# Patient Record
Sex: FEMALE | Race: WHITE | NOT HISPANIC OR LATINO | Employment: FULL TIME | URBAN - METROPOLITAN AREA
[De-identification: names, ages, dates, MRNs, and addresses within clinical notes are randomized per-mention and may not be internally consistent; named-entity substitution may affect disease eponyms.]

---

## 2017-01-16 ENCOUNTER — HOSPITAL ENCOUNTER (OUTPATIENT)
Dept: MAMMOGRAPHY | Facility: HOSPITAL | Age: 47
Discharge: HOME | End: 2017-01-16
Attending: PHYSICIAN ASSISTANT | Admitting: PHYSICIAN ASSISTANT

## 2017-01-17 ENCOUNTER — HOSPITAL ENCOUNTER (OUTPATIENT)
Dept: PREOP | Facility: HOSPITAL | Age: 47
Setting detail: HOSPITAL OUTPATIENT SURGERY
Discharge: HOME | End: 2017-01-17
Attending: INTERNAL MEDICINE | Admitting: INTERNAL MEDICINE

## 2017-01-17 ENCOUNTER — ON CAMPUS - OUTPATIENT (OUTPATIENT)
Dept: URBAN - METROPOLITAN AREA HOSPITAL 85 | Facility: HOSPITAL | Age: 47
End: 2017-01-17
Payer: COMMERCIAL

## 2017-01-17 DIAGNOSIS — R13.10 DYSPHAGIA, UNSPECIFIED: ICD-10-CM

## 2017-01-17 LAB — GLUCOSE BLD-MCNC: 138 MG/DL (ref 70–105)

## 2017-01-17 PROCEDURE — 43450 DILATE ESOPHAGUS 1/MULT PASS: CPT | Performed by: INTERNAL MEDICINE

## 2017-01-17 PROCEDURE — 43235 EGD DIAGNOSTIC BRUSH WASH: CPT | Performed by: INTERNAL MEDICINE

## 2017-02-09 ENCOUNTER — HOSPITAL ENCOUNTER (OUTPATIENT)
Dept: PREADMISSION TESTING | Facility: HOSPITAL | Age: 47
Discharge: HOME | End: 2017-02-09
Attending: PODIATRIST | Admitting: PODIATRIST

## 2017-02-09 LAB
ANION GAP SERPL CALC-SCNC: 13 MMOL/L (ref 10–20)
BACTERIA SPEC AEROBE CULT: NORMAL
BASOPHILS # BLD AUTO: 0.1 10*3/UL (ref 0–0.2)
BASOPHILS NFR BLD AUTO: 1 % (ref 0–2)
BILIRUB UR QL STRIP: NEGATIVE MG/DL
BUN SERPL-MCNC: 8 MG/DL (ref 8–20)
BUN/CREAT SERPL: 13.3 (ref 5.4–26.2)
CALCIUM SERPL-MCNC: 8.7 MG/DL (ref 8.9–10.3)
CASTS URNS QL MICRO: NORMAL /[LPF]
CHLORIDE SERPL-SCNC: 108 MMOL/L (ref 101–111)
COLOR UR: YELLOW
CONV BACTERIA IN URINE MICRO: NEGATIVE
CONV CLARITY OF URINE: CLEAR
CONV CO2: 22 MMOL/L (ref 22–32)
CONV HYALINE CASTS IN URINE MICRO: 2 /[LPF] (ref 0–5)
CONV PROTEIN IN URINE BY AUTOMATED TEST STRIP: NEGATIVE MG/DL
CONV SMALL ROUND CELLS: NORMAL /[HPF]
CONV UROBILINOGEN IN URINE BY AUTOMATED TEST STRIP: 0.2 MG/DL
CREAT UR-MCNC: 0.6 MG/DL (ref 0.4–1)
CULTURE INDICATED?: NORMAL
DIFFERENTIAL METHOD BLD: (no result)
EOSINOPHIL # BLD AUTO: 0.2 10*3/UL (ref 0–0.3)
EOSINOPHIL # BLD AUTO: 2 % (ref 0–3)
ERYTHROCYTE [DISTWIDTH] IN BLOOD BY AUTOMATED COUNT: 13.5 % (ref 11.5–14.5)
GLUCOSE SERPL-MCNC: 105 MG/DL (ref 65–99)
GLUCOSE UR QL: NEGATIVE MG/DL
HCT VFR BLD AUTO: 42.8 % (ref 35–49)
HGB BLD-MCNC: 14.7 G/DL (ref 12–15)
HGB UR QL STRIP: NEGATIVE
KETONES UR QL STRIP: NEGATIVE MG/DL
LEUKOCYTE ESTERASE UR QL STRIP: NEGATIVE
LYMPHOCYTES # BLD AUTO: 2.4 10*3/UL (ref 0.8–4.8)
LYMPHOCYTES NFR BLD AUTO: 28 % (ref 18–42)
Lab: NORMAL
MCH RBC QN AUTO: 30.8 PG (ref 26–32)
MCHC RBC AUTO-ENTMCNC: 34.3 G/DL (ref 32–36)
MCV RBC AUTO: 89.9 FL (ref 80–94)
MICRO REPORT STATUS: NORMAL
MONOCYTES # BLD AUTO: 0.6 10*3/UL (ref 0.1–1.3)
MONOCYTES NFR BLD AUTO: 7 % (ref 2–11)
NEUTROPHILS # BLD AUTO: 5.4 10*3/UL (ref 2.3–8.6)
NEUTROPHILS NFR BLD AUTO: 62 % (ref 50–75)
NITRITE UR QL STRIP: NEGATIVE
NRBC BLD AUTO-RTO: 0 /100{WBCS}
NRBC/RBC NFR BLD MANUAL: 0 10*3/UL
PH UR STRIP.AUTO: 6 [PH] (ref 4.5–8)
PLATELET # BLD AUTO: 145 10*3/UL (ref 150–450)
PMV BLD AUTO: 10 FL (ref 7.4–10.4)
POTASSIUM SERPL-SCNC: ABNORMAL MMOL/L (ref 3.6–5.1)
RBC # BLD AUTO: 4.77 10*6/UL (ref 4–5.4)
RBC #/AREA URNS HPF: 0 /[HPF] (ref 0–3)
SODIUM SERPL-SCNC: 139 MMOL/L (ref 136–144)
SP GR UR: 1.01 (ref 1–1.03)
SPECIMEN SOURCE: NORMAL
SPERM URNS QL MICRO: NORMAL /[HPF]
SQUAMOUS SPT QL MICRO: 1 /[HPF] (ref 0–5)
UNIDENT CRYS URNS QL MICRO: NORMAL /[HPF]
WBC # BLD AUTO: 8.8 10*3/UL (ref 4.5–11.5)
WBC #/AREA URNS HPF: 1 /[HPF] (ref 0–5)
YEAST SPEC QL WET PREP: NORMAL /[HPF]

## 2017-02-15 ENCOUNTER — HOSPITAL ENCOUNTER (OUTPATIENT)
Dept: PERIOP | Facility: HOSPITAL | Age: 47
Setting detail: HOSPITAL OUTPATIENT SURGERY
Discharge: HOME | End: 2017-02-15
Attending: PODIATRIST | Admitting: PODIATRIST

## 2017-02-28 ENCOUNTER — OFFICE (OUTPATIENT)
Dept: URBAN - METROPOLITAN AREA CLINIC 64 | Facility: CLINIC | Age: 47
End: 2017-02-28
Payer: COMMERCIAL

## 2017-02-28 VITALS
HEIGHT: 65 IN | SYSTOLIC BLOOD PRESSURE: 125 MMHG | DIASTOLIC BLOOD PRESSURE: 72 MMHG | WEIGHT: 212 LBS | HEART RATE: 76 BPM

## 2017-02-28 DIAGNOSIS — K59.1 FUNCTIONAL DIARRHEA: ICD-10-CM

## 2017-02-28 DIAGNOSIS — R11.0 NAUSEA: ICD-10-CM

## 2017-02-28 DIAGNOSIS — K59.00 CONSTIPATION, UNSPECIFIED: ICD-10-CM

## 2017-02-28 PROCEDURE — 99213 OFFICE O/P EST LOW 20 MIN: CPT | Performed by: NURSE PRACTITIONER

## 2017-02-28 RX ORDER — LACTULOSE 10 G/15ML
600 SOLUTION ORAL
Qty: 1800 | Refills: 11 | Status: COMPLETED
Start: 2017-02-28 | End: 2020-02-25

## 2017-03-08 ENCOUNTER — HOSPITAL ENCOUNTER (OUTPATIENT)
Dept: ORTHOPEDIC SURGERY | Facility: CLINIC | Age: 47
Discharge: HOME | End: 2017-03-08
Attending: ORTHOPAEDIC SURGERY | Admitting: ORTHOPAEDIC SURGERY

## 2017-03-08 ENCOUNTER — CONVERSION ENCOUNTER (OUTPATIENT)
Dept: FAMILY MEDICINE CLINIC | Facility: CLINIC | Age: 47
End: 2017-03-08

## 2017-03-08 LAB — INTACT PTH: 64 PG/ML (ref 14–64)

## 2017-03-11 LAB
ALBUMIN SERPL-MCNC: 4 G/DL (ref 3.6–5.1)
ALBUMIN/GLOB SERPL: ABNORMAL {RATIO} (ref 1–2.5)
ALP SERPL-CCNC: 57 UNITS/L (ref 33–115)
ALT SERPL-CCNC: 11 UNITS/L (ref 6–29)
ANA SER QL IA: POSITIVE
ANTI-CARDIOLIPIN IGG ANTIBODY: <14
ANTI-CARDIOLIPIN IGM ANTIBODY: <12
AST SERPL-CCNC: 12 UNITS/L (ref 10–35)
BASOPHILS # BLD AUTO: ABNORMAL 10*3/MM3 (ref 0–200)
BASOPHILS NFR BLD AUTO: 0.4 %
BILIRUB SERPL-MCNC: 0.3 MG/DL (ref 0.2–1.2)
BILIRUB UR QL STRIP: NEGATIVE
BUN SERPL-MCNC: 7 MG/DL (ref 7–25)
BUN/CREAT SERPL: ABNORMAL (ref 6–22)
C3 SERPL-MCNC: 132 MG/DL (ref 90–180)
C4 SERPL-MCNC: 30 MG/DL (ref 16–47)
CALCIUM SERPL-MCNC: 8.9 MG/DL (ref 8.6–10.2)
CHLORIDE SERPL-SCNC: 105 MMOL/L (ref 98–110)
CO2 CONTENT VENOUS: 28 MMOL/L (ref 20–31)
COLOR UR: YELLOW
CONV BACTERIA IN URINE MICRO: ABNORMAL /HPF
CONV HYALINE CASTS IN URINE MICRO: ABNORMAL
CONV NEUTROPHILS/100 LEUKOCYTES IN BODY FLUID BY MANUAL COUNT: 62.9 %
CONV PROTEIN IN URINE BY AUTOMATED TEST STRIP: NEGATIVE
CONV TOTAL PROTEIN: 6.4 G/DL (ref 6.1–8.1)
CREAT UR-MCNC: 0.58 MG/DL (ref 0.5–1.1)
CRP SERPL-MCNC: 0.25 MG/DL
DSDNA AB SER-ACNC: <1 [IU]/ML
EOSINOPHIL # BLD AUTO: 1.3 %
EOSINOPHIL # BLD AUTO: ABNORMAL 10*3/MM3 (ref 15–500)
ERYTHROCYTE [DISTWIDTH] IN BLOOD BY AUTOMATED COUNT: 13.9 % (ref 11–15)
ERYTHROCYTE [SEDIMENTATION RATE] IN BLOOD BY WESTERGREN METHOD: 2 MM/HR
GLOBULIN UR ELPH-MCNC: ABNORMAL G/DL (ref 1.9–3.7)
GLUCOSE SERPL-MCNC: 94 MG/DL (ref 65–99)
GLUCOSE UR QL: NEGATIVE G/DL
HCT VFR BLD AUTO: 42.2 % (ref 35–45)
HGB BLD-MCNC: 13.9 G/DL (ref 11.7–15.5)
HGB UR QL STRIP: NEGATIVE
KETONES UR QL STRIP: NEGATIVE
LEUKOCYTE ESTERASE UR QL STRIP: NEGATIVE
LYMPHOCYTES # BLD AUTO: ABNORMAL 10*3/MM3 (ref 850–3900)
LYMPHOCYTES NFR BLD AUTO: 30.5 %
MCH RBC QN AUTO: 30.3 PG (ref 27–33)
MCHC RBC AUTO-ENTMCNC: ABNORMAL % (ref 32–36)
MCV RBC AUTO: 92 FL (ref 80–100)
MONOCYTES # BLD AUTO: ABNORMAL 10*3/MICROLITER (ref 200–950)
MONOCYTES NFR BLD AUTO: 4.9 %
NEUTROPHILS # BLD AUTO: ABNORMAL 10*3/MM3 (ref 1500–7800)
NITRITE UR QL STRIP: NEGATIVE
PH UR STRIP.AUTO: 6 [PH] (ref 5–8)
PLATELET # BLD AUTO: ABNORMAL 10*3/MM3 (ref 140–400)
PMV BLD AUTO: 10.1 FL (ref 7.5–12.5)
POTASSIUM SERPL-SCNC: 4 MMOL/L (ref 3.5–5.3)
RBC # BLD AUTO: ABNORMAL 10*6/MM3 (ref 3.8–5.1)
RBC #/AREA URNS HPF: ABNORMAL /[HPF]
SODIUM SERPL-SCNC: 138 MMOL/L (ref 135–146)
SP GR UR: 1.01 (ref 1–1.03)
SQUAMOUS #/AREA URNS HPF: ABNORMAL /HPF
WBC # BLD AUTO: ABNORMAL K/UL (ref 3.8–10.8)
WBC #/AREA URNS HPF: ABNORMAL CELLS/HPF

## 2017-03-16 ENCOUNTER — HOSPITAL ENCOUNTER (OUTPATIENT)
Dept: ORTHOPEDIC SURGERY | Facility: CLINIC | Age: 47
Discharge: HOME | End: 2017-03-16
Attending: PODIATRIST | Admitting: PODIATRIST

## 2017-04-10 ENCOUNTER — OFFICE (OUTPATIENT)
Dept: URBAN - METROPOLITAN AREA CLINIC 64 | Facility: CLINIC | Age: 47
End: 2017-04-10
Payer: COMMERCIAL

## 2017-04-10 VITALS
HEART RATE: 85 BPM | SYSTOLIC BLOOD PRESSURE: 129 MMHG | HEIGHT: 65 IN | DIASTOLIC BLOOD PRESSURE: 79 MMHG | WEIGHT: 233 LBS

## 2017-04-10 DIAGNOSIS — K21.9 GASTRO-ESOPHAGEAL REFLUX DISEASE WITHOUT ESOPHAGITIS: ICD-10-CM

## 2017-04-10 DIAGNOSIS — K59.00 CONSTIPATION, UNSPECIFIED: ICD-10-CM

## 2017-04-10 DIAGNOSIS — R11.0 NAUSEA: ICD-10-CM

## 2017-04-10 PROCEDURE — 99213 OFFICE O/P EST LOW 20 MIN: CPT | Performed by: NURSE PRACTITIONER

## 2017-06-16 ENCOUNTER — HOSPITAL ENCOUNTER (OUTPATIENT)
Dept: CT IMAGING | Facility: HOSPITAL | Age: 47
Discharge: HOME | End: 2017-06-16
Attending: SURGERY | Admitting: SURGERY

## 2017-07-11 ENCOUNTER — HOSPITAL ENCOUNTER (OUTPATIENT)
Dept: LAB | Facility: HOSPITAL | Age: 47
Discharge: HOME | End: 2017-07-11
Attending: INTERNAL MEDICINE | Admitting: INTERNAL MEDICINE

## 2017-07-11 LAB
ALBUMIN SERPL-MCNC: 3.8 G/DL (ref 3.5–4.8)
ALBUMIN/GLOB SERPL: 1.4 {RATIO} (ref 1–1.7)
ALP SERPL-CCNC: 63 IU/L (ref 32–91)
ALT SERPL-CCNC: 14 IU/L (ref 14–54)
ANION GAP SERPL CALC-SCNC: 11 MMOL/L (ref 10–20)
AST SERPL-CCNC: 18 IU/L (ref 15–41)
BACTERIA SPEC AEROBE CULT: NORMAL
BASOPHILS # BLD AUTO: 0.1 10*3/UL (ref 0–0.2)
BASOPHILS NFR BLD AUTO: 1 % (ref 0–2)
BILIRUB SERPL-MCNC: 0.6 MG/DL (ref 0.3–1.2)
BILIRUB UR QL STRIP: NEGATIVE MG/DL
BUN SERPL-MCNC: 11 MG/DL (ref 8–20)
BUN/CREAT SERPL: 15.7 (ref 5.4–26.2)
CALCIUM SERPL-MCNC: 8.7 MG/DL (ref 8.9–10.3)
CASTS URNS QL MICRO: ABNORMAL /[LPF]
CHLORIDE SERPL-SCNC: 109 MMOL/L (ref 101–111)
COLOR UR: ABNORMAL
CONV BACTERIA IN URINE MICRO: ABNORMAL
CONV CLARITY OF URINE: CLEAR
CONV CO2: 23 MMOL/L (ref 22–32)
CONV HYALINE CASTS IN URINE MICRO: ABNORMAL /[LPF] (ref 0–5)
CONV PROTEIN IN URINE BY AUTOMATED TEST STRIP: ABNORMAL MG/DL
CONV SMALL ROUND CELLS: ABNORMAL /[HPF]
CONV TOTAL PROTEIN: 6.6 G/DL (ref 6.1–7.9)
CONV UROBILINOGEN IN URINE BY AUTOMATED TEST STRIP: 0.2 MG/DL
CREAT UR-MCNC: 0.7 MG/DL (ref 0.4–1)
CRP SERPL-MCNC: 0.17 MG/DL (ref 0–0.7)
CULTURE INDICATED?: ABNORMAL
DIFFERENTIAL METHOD BLD: (no result)
EOSINOPHIL # BLD AUTO: 0.2 10*3/UL (ref 0–0.3)
EOSINOPHIL # BLD AUTO: 2 % (ref 0–3)
ERYTHROCYTE [DISTWIDTH] IN BLOOD BY AUTOMATED COUNT: 14.2 % (ref 11.5–14.5)
ERYTHROCYTE [SEDIMENTATION RATE] IN BLOOD BY WESTERGREN METHOD: 8 MM/HR (ref 0–20)
GLOBULIN UR ELPH-MCNC: 2.8 G/DL (ref 2.5–3.8)
GLUCOSE SERPL-MCNC: 104 MG/DL (ref 65–99)
GLUCOSE UR QL: NEGATIVE MG/DL
HCT VFR BLD AUTO: 43.2 % (ref 35–49)
HGB BLD-MCNC: 14.7 G/DL (ref 12–15)
HGB UR QL STRIP: NEGATIVE
KETONES UR QL STRIP: NEGATIVE MG/DL
LEUKOCYTE ESTERASE UR QL STRIP: NEGATIVE
LYMPHOCYTES # BLD AUTO: 2.4 10*3/UL (ref 0.8–4.8)
LYMPHOCYTES NFR BLD AUTO: 26 % (ref 18–42)
Lab: NORMAL
MCH RBC QN AUTO: 31.4 PG (ref 26–32)
MCHC RBC AUTO-ENTMCNC: 34 G/DL (ref 32–36)
MCV RBC AUTO: 92.4 FL (ref 80–94)
MICRO REPORT STATUS: NORMAL
MONOCYTES # BLD AUTO: 0.8 10*3/UL (ref 0.1–1.3)
MONOCYTES NFR BLD AUTO: 9 % (ref 2–11)
NEUTROPHILS # BLD AUTO: 5.8 10*3/UL (ref 2.3–8.6)
NEUTROPHILS NFR BLD AUTO: 62 % (ref 50–75)
NITRITE UR QL STRIP: NEGATIVE
NRBC BLD AUTO-RTO: 0 /100{WBCS}
NRBC/RBC NFR BLD MANUAL: 0 10*3/UL
PH UR STRIP.AUTO: 5.5 [PH] (ref 4.5–8)
PLATELET # BLD AUTO: 160 10*3/UL (ref 150–450)
PMV BLD AUTO: 9.3 FL (ref 7.4–10.4)
POTASSIUM SERPL-SCNC: 4 MMOL/L (ref 3.6–5.1)
RBC # BLD AUTO: 4.67 10*6/UL (ref 4–5.4)
RBC #/AREA URNS HPF: 2 /[HPF] (ref 0–3)
SODIUM SERPL-SCNC: 139 MMOL/L (ref 136–144)
SP GR UR: 1.03 (ref 1–1.03)
SPECIMEN SOURCE: NORMAL
SPERM URNS QL MICRO: ABNORMAL /[HPF]
SQUAMOUS SPT QL MICRO: 7 /[HPF] (ref 0–5)
UNIDENT CRYS URNS QL MICRO: ABNORMAL /[HPF]
WBC # BLD AUTO: 9.2 10*3/UL (ref 4.5–11.5)
WBC #/AREA URNS HPF: 3 /[HPF] (ref 0–5)
YEAST SPEC QL WET PREP: ABNORMAL /[HPF]

## 2017-07-12 LAB
C3 SERPL-MCNC: 102 MG/DL (ref 79–152)
C4 SERPL-MCNC: 23.2 MG/DL (ref 18–55)

## 2017-07-17 ENCOUNTER — HOSPITAL ENCOUNTER (OUTPATIENT)
Dept: FAMILY MEDICINE CLINIC | Facility: CLINIC | Age: 47
Setting detail: SPECIMEN
Discharge: HOME | End: 2017-07-17
Attending: PHYSICIAN ASSISTANT | Admitting: PHYSICIAN ASSISTANT

## 2017-07-17 LAB
BILIRUB UR QL STRIP: ABNORMAL
BILIRUB UR QL STRIP: ABNORMAL MG/DL
CASTS URNS QL MICRO: ABNORMAL /[LPF]
COLOR UR: ABNORMAL
CONV BACTERIA IN URINE MICRO: NEGATIVE
CONV CLARITY OF URINE: CLEAR
CONV HYALINE CASTS IN URINE MICRO: 5 /[LPF] (ref 0–5)
CONV PROTEIN IN URINE BY AUTOMATED TEST STRIP: ABNORMAL MG/DL
CONV SMALL ROUND CELLS: ABNORMAL /[HPF]
CONV UROBILINOGEN IN URINE BY AUTOMATED TEST STRIP: 0.2 MG/DL
CULTURE INDICATED?: ABNORMAL
GLUCOSE UR QL: NEGATIVE MG/DL
HGB UR QL STRIP: NEGATIVE
KETONES UR QL STRIP: NEGATIVE MG/DL
LEUKOCYTE ESTERASE UR QL STRIP: NEGATIVE
NITRITE UR QL STRIP: NEGATIVE
PH UR STRIP.AUTO: 6 [PH] (ref 4.5–8)
RBC #/AREA URNS HPF: 1 /[HPF] (ref 0–3)
SP GR UR: 1.03 (ref 1–1.03)
SPERM URNS QL MICRO: ABNORMAL /[HPF]
SQUAMOUS SPT QL MICRO: 1 /[HPF] (ref 0–5)
UNIDENT CRYS URNS QL MICRO: ABNORMAL /[HPF]
WBC #/AREA URNS HPF: 2 /[HPF] (ref 0–5)
YEAST SPEC QL WET PREP: ABNORMAL /[HPF]

## 2017-08-30 ENCOUNTER — HOSPITAL ENCOUNTER (OUTPATIENT)
Dept: PREOP | Facility: HOSPITAL | Age: 47
Setting detail: HOSPITAL OUTPATIENT SURGERY
Discharge: HOME | End: 2017-08-30
Attending: SURGERY | Admitting: SURGERY

## 2017-09-20 ENCOUNTER — HOSPITAL ENCOUNTER (OUTPATIENT)
Dept: CARDIOLOGY | Facility: HOSPITAL | Age: 47
Discharge: HOME | End: 2017-09-20
Attending: PHYSICIAN ASSISTANT | Admitting: PHYSICIAN ASSISTANT

## 2017-09-21 ENCOUNTER — HOSPITAL ENCOUNTER (OUTPATIENT)
Dept: FAMILY MEDICINE CLINIC | Facility: CLINIC | Age: 47
Setting detail: SPECIMEN
Discharge: HOME | End: 2017-09-21
Attending: PHYSICIAN ASSISTANT | Admitting: PHYSICIAN ASSISTANT

## 2017-10-05 ENCOUNTER — HOSPITAL ENCOUNTER (OUTPATIENT)
Dept: FAMILY MEDICINE CLINIC | Facility: CLINIC | Age: 47
Setting detail: SPECIMEN
Discharge: HOME | End: 2017-10-05
Attending: PHYSICIAN ASSISTANT | Admitting: PHYSICIAN ASSISTANT

## 2017-10-05 LAB
CA-I SERPL ISE-MCNC: 1.25 MMOL/L (ref 1.2–1.3)
CALCIUM 24H UR-MCNC: 11.3 MG/DL
PTH-INTACT SERPL-MCNC: 56 PG/ML (ref 11–72)
TSH SERPL-ACNC: 1.23 UIU/ML (ref 0.34–5.6)

## 2017-10-25 ENCOUNTER — HOSPITAL ENCOUNTER (OUTPATIENT)
Dept: CARDIOLOGY | Facility: HOSPITAL | Age: 47
Discharge: HOME | End: 2017-10-25
Attending: INTERNAL MEDICINE | Admitting: INTERNAL MEDICINE

## 2018-02-01 ENCOUNTER — HOSPITAL ENCOUNTER (OUTPATIENT)
Dept: LAB | Facility: HOSPITAL | Age: 48
Discharge: HOME | End: 2018-02-01
Attending: NURSE PRACTITIONER | Admitting: NURSE PRACTITIONER

## 2018-02-01 LAB
ALBUMIN SERPL-MCNC: 3.6 G/DL (ref 3.5–4.8)
ALBUMIN/GLOB SERPL: 1.1 {RATIO} (ref 1–1.7)
ALP SERPL-CCNC: 64 IU/L (ref 32–91)
ALT SERPL-CCNC: 16 IU/L (ref 14–54)
ANION GAP SERPL CALC-SCNC: 9.7 MMOL/L (ref 10–20)
AST SERPL-CCNC: 18 IU/L (ref 15–41)
BASOPHILS # BLD AUTO: 0 10*3/UL (ref 0–0.2)
BASOPHILS NFR BLD AUTO: 1 % (ref 0–2)
BILIRUB SERPL-MCNC: 0.3 MG/DL (ref 0.3–1.2)
BUN SERPL-MCNC: 7 MG/DL (ref 8–20)
BUN/CREAT SERPL: 11.7 (ref 5.4–26.2)
CALCIUM SERPL-MCNC: 8.9 MG/DL (ref 8.9–10.3)
CHLORIDE SERPL-SCNC: 105 MMOL/L (ref 101–111)
CONV CO2: 29 MMOL/L (ref 22–32)
CONV TOTAL PROTEIN: 6.8 G/DL (ref 6.1–7.9)
CREAT UR-MCNC: 0.6 MG/DL (ref 0.4–1)
CRP SERPL-MCNC: 0.99 MG/DL (ref 0–0.7)
DIFFERENTIAL METHOD BLD: (no result)
EOSINOPHIL # BLD AUTO: 0.1 10*3/UL (ref 0–0.3)
EOSINOPHIL # BLD AUTO: 2 % (ref 0–3)
ERYTHROCYTE [DISTWIDTH] IN BLOOD BY AUTOMATED COUNT: 14.3 % (ref 11.5–14.5)
ERYTHROCYTE [SEDIMENTATION RATE] IN BLOOD BY WESTERGREN METHOD: 11 MM/HR (ref 0–20)
GLOBULIN UR ELPH-MCNC: 3.2 G/DL (ref 2.5–3.8)
GLUCOSE SERPL-MCNC: 110 MG/DL (ref 65–99)
HCT VFR BLD AUTO: 43.2 % (ref 35–49)
HGB BLD-MCNC: 14.6 G/DL (ref 12–15)
LYMPHOCYTES # BLD AUTO: 2 10*3/UL (ref 0.8–4.8)
LYMPHOCYTES NFR BLD AUTO: 26 % (ref 18–42)
MCH RBC QN AUTO: 31.9 PG (ref 26–32)
MCHC RBC AUTO-ENTMCNC: 33.8 G/DL (ref 32–36)
MCV RBC AUTO: 94.4 FL (ref 80–94)
MONOCYTES # BLD AUTO: 0.8 10*3/UL (ref 0.1–1.3)
MONOCYTES NFR BLD AUTO: 11 % (ref 2–11)
NEUTROPHILS # BLD AUTO: 4.5 10*3/UL (ref 2.3–8.6)
NEUTROPHILS NFR BLD AUTO: 60 % (ref 50–75)
NRBC BLD AUTO-RTO: 0 /100{WBCS}
NRBC/RBC NFR BLD MANUAL: 0 10*3/UL
PLATELET # BLD AUTO: 172 10*3/UL (ref 150–450)
PMV BLD AUTO: 10.2 FL (ref 7.4–10.4)
POTASSIUM SERPL-SCNC: 3.7 MMOL/L (ref 3.6–5.1)
RBC # BLD AUTO: 4.58 10*6/UL (ref 4–5.4)
SODIUM SERPL-SCNC: 140 MMOL/L (ref 136–144)
WBC # BLD AUTO: 7.5 10*3/UL (ref 4.5–11.5)

## 2019-01-14 ENCOUNTER — HOSPITAL ENCOUNTER (OUTPATIENT)
Dept: FAMILY MEDICINE CLINIC | Facility: CLINIC | Age: 49
Discharge: HOME | End: 2019-01-14
Attending: PHYSICIAN ASSISTANT | Admitting: PHYSICIAN ASSISTANT

## 2019-01-14 ENCOUNTER — HOSPITAL ENCOUNTER (OUTPATIENT)
Dept: FAMILY MEDICINE CLINIC | Facility: CLINIC | Age: 49
Setting detail: SPECIMEN
Discharge: HOME | End: 2019-01-14
Attending: PHYSICIAN ASSISTANT | Admitting: PHYSICIAN ASSISTANT

## 2019-01-14 LAB
ALBUMIN SERPL-MCNC: 3.5 G/DL (ref 3.5–4.8)
ALBUMIN/GLOB SERPL: 1.2 {RATIO} (ref 1–1.7)
ALP SERPL-CCNC: 73 IU/L (ref 32–91)
ALT SERPL-CCNC: 15 IU/L (ref 14–54)
ANION GAP SERPL CALC-SCNC: 14.5 MMOL/L (ref 10–20)
AST SERPL-CCNC: 18 IU/L (ref 15–41)
BILIRUB SERPL-MCNC: 0.2 MG/DL (ref 0.3–1.2)
BUN SERPL-MCNC: 7 MG/DL (ref 8–20)
BUN/CREAT SERPL: 10 (ref 5.4–26.2)
CALCIUM SERPL-MCNC: 8.4 MG/DL (ref 8.9–10.3)
CHLORIDE SERPL-SCNC: 103 MMOL/L (ref 101–111)
CHOLEST SERPL-MCNC: 172 MG/DL
CHOLEST/HDLC SERPL: 4 {RATIO}
CONV CO2: 25 MMOL/L (ref 22–32)
CONV LDL CHOLESTEROL DIRECT: 60 MG/DL (ref 0–100)
CONV TOTAL PROTEIN: 6.4 G/DL (ref 6.1–7.9)
CREAT UR-MCNC: 0.7 MG/DL (ref 0.4–1)
GLOBULIN UR ELPH-MCNC: 2.9 G/DL (ref 2.5–3.8)
GLUCOSE SERPL-MCNC: 86 MG/DL (ref 65–99)
HDLC SERPL-MCNC: 43 MG/DL
LDLC/HDLC SERPL: 1.4 {RATIO}
LIPID INTERPRETATION: ABNORMAL
POTASSIUM SERPL-SCNC: 3.5 MMOL/L (ref 3.6–5.1)
SODIUM SERPL-SCNC: 139 MMOL/L (ref 136–144)
TRIGL SERPL-MCNC: 225 MG/DL
VLDLC SERPL CALC-MCNC: 69 MG/DL

## 2019-05-10 ENCOUNTER — HOSPITAL ENCOUNTER (OUTPATIENT)
Dept: SLEEP MEDICINE | Facility: HOSPITAL | Age: 49
Discharge: HOME | End: 2019-05-10
Attending: INTERNAL MEDICINE | Admitting: INTERNAL MEDICINE

## 2019-05-17 ENCOUNTER — HOSPITAL ENCOUNTER (OUTPATIENT)
Dept: GENERAL RADIOLOGY | Facility: HOSPITAL | Age: 49
Discharge: HOME | End: 2019-05-17
Attending: PHYSICIAN ASSISTANT | Admitting: PHYSICIAN ASSISTANT

## 2019-05-17 ENCOUNTER — CONVERSION ENCOUNTER (OUTPATIENT)
Dept: FAMILY MEDICINE CLINIC | Facility: CLINIC | Age: 49
End: 2019-05-17

## 2019-05-17 ENCOUNTER — HOSPITAL ENCOUNTER (OUTPATIENT)
Dept: FAMILY MEDICINE CLINIC | Facility: CLINIC | Age: 49
Setting detail: SPECIMEN
Discharge: HOME | End: 2019-05-17
Attending: PHYSICIAN ASSISTANT | Admitting: PHYSICIAN ASSISTANT

## 2019-05-17 LAB
BASOPHILS # BLD AUTO: 0.1 10*3/UL (ref 0–0.2)
BASOPHILS NFR BLD AUTO: 1 % (ref 0–2)
DIFFERENTIAL METHOD BLD: (no result)
EOSINOPHIL # BLD AUTO: 0.2 10*3/UL (ref 0–0.3)
EOSINOPHIL # BLD AUTO: 1 % (ref 0–3)
ERYTHROCYTE [DISTWIDTH] IN BLOOD BY AUTOMATED COUNT: 14.4 % (ref 11.5–14.5)
HCT VFR BLD AUTO: 45.1 % (ref 35–49)
HGB BLD-MCNC: 14.9 G/DL (ref 12–15)
LYMPHOCYTES # BLD AUTO: 2.6 10*3/UL (ref 0.8–4.8)
LYMPHOCYTES NFR BLD AUTO: 22 % (ref 18–42)
MCH RBC QN AUTO: 31 PG (ref 26–32)
MCHC RBC AUTO-ENTMCNC: 33.1 G/DL (ref 32–36)
MCV RBC AUTO: 93.9 FL (ref 80–94)
MONOCYTES # BLD AUTO: 1 10*3/UL (ref 0.1–1.3)
MONOCYTES NFR BLD AUTO: 8 % (ref 2–11)
NEUTROPHILS # BLD AUTO: 8.3 10*3/UL (ref 2.3–8.6)
NEUTROPHILS NFR BLD AUTO: 68 % (ref 50–75)
NRBC BLD AUTO-RTO: 0 /100{WBCS}
NRBC/RBC NFR BLD MANUAL: 0 10*3/UL
PLATELET # BLD AUTO: 199 10*3/UL (ref 150–450)
PMV BLD AUTO: 9.7 FL (ref 7.4–10.4)
RBC # BLD AUTO: 4.8 10*6/UL (ref 4–5.4)
WBC # BLD AUTO: 12.1 10*3/UL (ref 4.5–11.5)

## 2019-05-31 ENCOUNTER — HOSPITAL ENCOUNTER (OUTPATIENT)
Dept: SLEEP MEDICINE | Facility: HOSPITAL | Age: 49
Discharge: HOME | End: 2019-05-31
Attending: INTERNAL MEDICINE | Admitting: INTERNAL MEDICINE

## 2019-06-04 VITALS
OXYGEN SATURATION: 95 % | WEIGHT: 250 LBS | DIASTOLIC BLOOD PRESSURE: 88 MMHG | BODY MASS INDEX: 40.18 KG/M2 | HEIGHT: 66 IN | HEART RATE: 92 BPM | SYSTOLIC BLOOD PRESSURE: 148 MMHG

## 2019-06-18 RX ORDER — LISINOPRIL 20 MG/1
TABLET ORAL
Qty: 30 TABLET | Refills: 6 | Status: SHIPPED | OUTPATIENT
Start: 2019-06-18 | End: 2019-06-21 | Stop reason: SINTOL

## 2019-06-21 ENCOUNTER — OFFICE VISIT (OUTPATIENT)
Dept: CARDIOLOGY | Facility: CLINIC | Age: 49
End: 2019-06-21

## 2019-06-21 VITALS
OXYGEN SATURATION: 96 % | BODY MASS INDEX: 40.32 KG/M2 | WEIGHT: 249.8 LBS | RESPIRATION RATE: 18 BRPM | SYSTOLIC BLOOD PRESSURE: 155 MMHG | DIASTOLIC BLOOD PRESSURE: 82 MMHG | HEART RATE: 70 BPM

## 2019-06-21 DIAGNOSIS — R00.2 PALPITATIONS: ICD-10-CM

## 2019-06-21 DIAGNOSIS — I49.3 PVC'S (PREMATURE VENTRICULAR CONTRACTIONS): ICD-10-CM

## 2019-06-21 DIAGNOSIS — I95.1 ORTHOSTATIC HYPOTENSION: ICD-10-CM

## 2019-06-21 DIAGNOSIS — I10 ESSENTIAL HYPERTENSION: Primary | ICD-10-CM

## 2019-06-21 PROBLEM — E66.9 OBESITY: Status: ACTIVE | Noted: 2017-03-07

## 2019-06-21 PROBLEM — G47.30 SLEEP APNEA: Status: ACTIVE | Noted: 2017-11-02

## 2019-06-21 PROBLEM — Z95.818 PRESENCE OF OTHER CARDIAC IMPLANTS AND GRAFTS: Status: ACTIVE | Noted: 2018-05-09

## 2019-06-21 PROCEDURE — 93000 ELECTROCARDIOGRAM COMPLETE: CPT | Performed by: INTERNAL MEDICINE

## 2019-06-21 PROCEDURE — 99204 OFFICE O/P NEW MOD 45 MIN: CPT | Performed by: INTERNAL MEDICINE

## 2019-06-21 NOTE — PROGRESS NOTES
Visit Type: New consultation  CC: Palpitations and prior history of vasovagal syncope  Referring physician Dr. Shah    History of Present Illness:  49-year-old patient with multiple medical problems came for evaluation of symptoms of palpitations and intermittent symptoms of dizziness and lightheadedness without any hansel syncope --she has a loop recorder in situ with a prior diagnosis of vasovagal syncope and she also has remote history of ablation done nearly 19 years ago --patient has multiple other medical problems including SLE with possible combination of scleroderma being followed by rheumatologist, history of recurrent hypertension, recently diagnosed sleep apnea with history of obesity --she also has chronic pain syndrome and COPD   Patient had prior  stress test which was abnormal consistent with apical ischemia.  Echocardiogram showed EF of 60% and no significant valvular heart   disease.  Holter monitor showed few PVCs.  Patient underwent cardiac catheterization in 2016 and showed no significant CAD.  In the past Holter monitor showed moderate density of PVCs but no SVT or VT noted. Patient underwent loop recorder implantation further monitoring of palpitation and arrhythmia and syncope.  She does have issues with ongoing tobacco abuse and history of hypertension currently being treated with a combination of metoprolol and lisinopril  She does have more vasovagal symptoms with  lisinopril        Past Medical History:        connective tissue dz        smoker        pinched nerve in right elbow        migraine        retinopathy bilateral        fibromyalgia        ddd-back        bladder stimulator on right         right ovarian mass        vocal cord fungus        Asthma        C O [facility]-dr draw- o2 at night        mild josé miguel        leg Pain        Diabetes, Type 2        G E R D        Hypertension        GI- dr chen        anxiety/depression- dr mcclendon        Cardiac Dysrhythmia        c-diff         "neurogentic bladder        rynauds disease        IBS        long term steriod use        early stage osteoporosis         elevated cholesterol        heartburn        lupus        hip/foot/muscle/wrist/knee/back and ankle pain        carpal tunnel syndrome        restless leg syndrome        morbid obesity        balance disturbance    Past Surgical History:     Reviewed history from 05/09/2018 and no changes required:        Urerine hysterectomy         bronchoscopy        t and a        d&c        bladder stim        emily        electrophysiology        colon        endoscopy        Cardiac Ablation - 1/2000         Ankle Surgery R 2/2017        Knee surgery R 9/2017        right wrist surgery        Loop recorder placement    Current Allergies (reviewed today):  * MORPHINE (Critical)  NEXCARE FLEXIBLE TAPE 3/4\"[id number] (ADHESIVE TAPE) (Critical)  * PLASTIC TAPE (Critical)  * BUSPAR (Mild)    Current Medications reviewed with the patient in person    Family History Summary:   Negative for sudden cardiac death or arrhythmias     Social history--schoolteacher by profession, fairly active and smokes a pack a day denies any alcohol abuse and compliant with medications        Review of Systems   General: No fatigue or tiredness  Eyes: No redness  Ear/Nose/Throat: No discharge  Cardiovascular:   Palpitation improved  Respiratory:  shortness of breath  Gastrointestinal: No nausea or vomiting  Genitourinary: No Bleeding  Musculoskeletal:  arthritis  Skin: No rash  Neurologic: No numbness or tingling  Psychiatric: No anxiety or depression  Hematologic/Lymphatic: No abnormal bleeding      Vital Signs:    Blood pressure 155/82, pulse rate on this patient was 65 bpm, respiration 14 times a minute and patient is afebrile      Physical Exam    General:      well developed, well nourished, in no acute distress.    Head:      normocephalic and atraumatic.    Neck:      no masses, thyromegaly  Chest Wall:      no deformities "   Lungs:      clear bilaterally to auscultation.  Trachea central with normal respiratory effort  Heart:      non-displaced PMI, chest non-tender; regular rate and rhythm, S1, S2 without murmurs, rubs, or gallops  Abdomen:       normal bowel sounds;   Pulses:      pulses normal in all extremities without any bruits   extremities:      no clubbing, cyanosis, edema,  Neurologic:      no focal deficits, alert and oriented x3  Psych:      alert and cooperative; normal mood and affect; normal attention span and concentration.              Assessment plan    Complex patient with multiple medical problems with polypharmacy  Ongoing tobacco abuse  History of vasovagal syncope with vasodepressor type and a prior tilt table testing done at [facility]  Hypertension  Obesity with sleep apnea  Connective tissue disorder  Interloop recorder in situ  Prior history of ventricular arrhythmias with PVCs      Recommendations    Lifestyle modification to avoid recurrent symptoms of vasovagal syncope educated to the patient  Stop theophylline  Stop ACE inhibitor  Monitor blood pressure closely to evaluate for any progressive development of worsening hypertension  Initiation of sleep apnea therapy with CPAP would help and optimize hypertension  Polypharmacy and drug interaction educated  Patient to be followed up in a month with a blood pressure log to evaluate and optimize her symptoms and hypertension    EKG sinus rhythm

## 2019-07-15 ENCOUNTER — OFFICE VISIT (OUTPATIENT)
Dept: FAMILY MEDICINE CLINIC | Facility: CLINIC | Age: 49
End: 2019-07-15

## 2019-07-15 VITALS
HEIGHT: 66 IN | BODY MASS INDEX: 39.05 KG/M2 | DIASTOLIC BLOOD PRESSURE: 81 MMHG | SYSTOLIC BLOOD PRESSURE: 114 MMHG | WEIGHT: 243 LBS | TEMPERATURE: 97.9 F | OXYGEN SATURATION: 97 % | HEART RATE: 73 BPM | RESPIRATION RATE: 14 BRPM

## 2019-07-15 DIAGNOSIS — R10.84 GENERALIZED ABDOMINAL PAIN: ICD-10-CM

## 2019-07-15 DIAGNOSIS — Z72.0 TOBACCO USE: Primary | ICD-10-CM

## 2019-07-15 DIAGNOSIS — R19.7 DIARRHEA, UNSPECIFIED TYPE: ICD-10-CM

## 2019-07-15 DIAGNOSIS — R11.2 NAUSEA AND VOMITING, INTRACTABILITY OF VOMITING NOT SPECIFIED, UNSPECIFIED VOMITING TYPE: ICD-10-CM

## 2019-07-15 PROBLEM — M25.371 OTHER INSTABILITY, RIGHT ANKLE: Status: ACTIVE | Noted: 2017-04-13

## 2019-07-15 PROBLEM — A08.4 VIRAL INTESTINAL INFECTION: Status: ACTIVE | Noted: 2019-05-17

## 2019-07-15 PROBLEM — M54.2 NECK PAIN: Status: ACTIVE | Noted: 2019-01-14

## 2019-07-15 PROBLEM — R06.2 WHEEZING: Status: ACTIVE | Noted: 2017-09-14

## 2019-07-15 PROBLEM — Z71.3 ENCOUNTER FOR DIETARY TREATMENT OF HYPERTENSION: Status: ACTIVE | Noted: 2017-09-21

## 2019-07-15 PROBLEM — D72.829 LEUKOCYTOSIS: Status: ACTIVE | Noted: 2019-05-17

## 2019-07-15 PROBLEM — E55.9 VITAMIN D DEFICIENCY: Status: ACTIVE | Noted: 2019-01-14

## 2019-07-15 PROBLEM — R93.1 ABNORMAL ECHOCARDIOGRAM: Status: ACTIVE | Noted: 2019-07-15

## 2019-07-15 PROBLEM — R55 VASODEPRESSOR SYNCOPE: Status: ACTIVE | Noted: 2019-07-15

## 2019-07-15 PROBLEM — M32.9 SYSTEMIC LUPUS ERYTHEMATOSUS: Status: ACTIVE | Noted: 2017-03-16

## 2019-07-15 PROBLEM — J45.909 ASTHMA: Status: ACTIVE | Noted: 2019-07-15

## 2019-07-15 PROBLEM — R25.2 MUSCLE CRAMPS: Status: ACTIVE | Noted: 2017-09-21

## 2019-07-15 PROBLEM — R12 HEARTBURN: Status: ACTIVE | Noted: 2017-11-02

## 2019-07-15 PROBLEM — Z87.891 HISTORY OF TOBACCO USE: Status: ACTIVE | Noted: 2019-07-15

## 2019-07-15 PROBLEM — Z71.6 TOBACCO ABUSE COUNSELING: Status: ACTIVE | Noted: 2017-09-21

## 2019-07-15 PROBLEM — K02.9 DENTAL CARIES: Status: ACTIVE | Noted: 2019-01-14

## 2019-07-15 PROBLEM — M19.90 OSTEOARTHRITIS: Status: ACTIVE | Noted: 2017-03-07

## 2019-07-15 PROBLEM — Z82.49 FAMILY HISTORY OF EARLY CAD: Status: ACTIVE | Noted: 2019-07-15

## 2019-07-15 PROBLEM — M85.80 OSTEOPENIA: Status: ACTIVE | Noted: 2019-01-14

## 2019-07-15 PROBLEM — I10 ENCOUNTER FOR DIETARY TREATMENT OF HYPERTENSION: Status: ACTIVE | Noted: 2017-09-21

## 2019-07-15 PROBLEM — M25.572 LEFT ANKLE PAIN: Status: ACTIVE | Noted: 2017-09-21

## 2019-07-15 PROBLEM — E83.51 HYPOCALCEMIA: Status: ACTIVE | Noted: 2017-09-21

## 2019-07-15 PROBLEM — G47.9 SLEEP DISORDER: Status: ACTIVE | Noted: 2019-01-14

## 2019-07-15 PROBLEM — R20.2 FACIAL PARESTHESIA: Status: ACTIVE | Noted: 2017-06-12

## 2019-07-15 PROBLEM — F32.A DEPRESSION: Status: ACTIVE | Noted: 2017-11-02

## 2019-07-15 PROBLEM — Z23 ENCOUNTER FOR IMMUNIZATION: Status: ACTIVE | Noted: 2017-09-21

## 2019-07-15 PROBLEM — E66.01 MORBID OBESITY: Status: ACTIVE | Noted: 2017-11-02

## 2019-07-15 PROBLEM — M54.50 LOWER BACK PAIN: Status: ACTIVE | Noted: 2018-02-01

## 2019-07-15 PROBLEM — M25.562 ARTHRALGIA OF LEFT KNEE: Status: ACTIVE | Noted: 2017-09-21

## 2019-07-15 PROBLEM — Z74.09 IMPAIRED MOBILITY: Status: ACTIVE | Noted: 2017-09-14

## 2019-07-15 LAB
ALBUMIN SERPL-MCNC: 3.6 G/DL (ref 3.5–4.8)
ALBUMIN/GLOB SERPL: 1.4 G/DL (ref 1–1.7)
ALP SERPL-CCNC: 55 U/L (ref 32–91)
ALT SERPL W P-5'-P-CCNC: 16 U/L (ref 14–54)
AMYLASE SERPL-CCNC: 25 U/L (ref 36–128)
ANION GAP SERPL CALCULATED.3IONS-SCNC: 13.7 MMOL/L (ref 5–15)
AST SERPL-CCNC: 18 U/L (ref 15–41)
BACTERIA UR QL AUTO: ABNORMAL /HPF
BASOPHILS # BLD AUTO: 0.1 10*3/MM3 (ref 0–0.2)
BASOPHILS NFR BLD AUTO: 0.9 % (ref 0–1.5)
BILIRUB SERPL-MCNC: 0.6 MG/DL (ref 0.3–1.2)
BILIRUB UR QL STRIP: ABNORMAL
BUN BLD-MCNC: 13 MG/DL (ref 8–20)
BUN/CREAT SERPL: 18.6 (ref 5.4–26.2)
CALCIUM SPEC-SCNC: 8.5 MG/DL (ref 8.9–10.3)
CHLORIDE SERPL-SCNC: 106 MMOL/L (ref 101–111)
CLARITY UR: ABNORMAL
CO2 SERPL-SCNC: 22 MMOL/L (ref 22–32)
COD CRY URNS QL: ABNORMAL /HPF
COLOR UR: ABNORMAL
CREAT BLD-MCNC: 0.7 MG/DL (ref 0.4–1)
DEPRECATED RDW RBC AUTO: 45.5 FL (ref 37–54)
EOSINOPHIL # BLD AUTO: 0.1 10*3/MM3 (ref 0–0.4)
EOSINOPHIL NFR BLD AUTO: 1.8 % (ref 0.3–6.2)
ERYTHROCYTE [DISTWIDTH] IN BLOOD BY AUTOMATED COUNT: 13.8 % (ref 12.3–15.4)
GFR SERPL CREATININE-BSD FRML MDRD: 89 ML/MIN/1.73
GGT SERPL-CCNC: 29 U/L (ref 7–50)
GLOBULIN UR ELPH-MCNC: 2.6 GM/DL (ref 2.5–3.8)
GLUCOSE BLD-MCNC: 92 MG/DL (ref 65–99)
GLUCOSE UR STRIP-MCNC: NEGATIVE MG/DL
HCT VFR BLD AUTO: 42.6 % (ref 34–46.6)
HGB BLD-MCNC: 14.4 G/DL (ref 12–15.9)
HGB UR QL STRIP.AUTO: NEGATIVE
HYALINE CASTS UR QL AUTO: ABNORMAL /LPF
KETONES UR QL STRIP: NEGATIVE
LEUKOCYTE ESTERASE UR QL STRIP.AUTO: NEGATIVE
LIPASE SERPL-CCNC: 31 U/L (ref 22–51)
LYMPHOCYTES # BLD AUTO: 2.6 10*3/MM3 (ref 0.7–3.1)
LYMPHOCYTES NFR BLD AUTO: 31.9 % (ref 19.6–45.3)
MCH RBC QN AUTO: 31.5 PG (ref 26.6–33)
MCHC RBC AUTO-ENTMCNC: 33.8 G/DL (ref 31.5–35.7)
MCV RBC AUTO: 93.2 FL (ref 79–97)
MONOCYTES # BLD AUTO: 0.5 10*3/MM3 (ref 0.1–0.9)
MONOCYTES NFR BLD AUTO: 6.4 % (ref 5–12)
NEUTROPHILS # BLD AUTO: 4.8 10*3/MM3 (ref 1.7–7)
NEUTROPHILS NFR BLD AUTO: 59 % (ref 42.7–76)
NITRITE UR QL STRIP: NEGATIVE
NRBC BLD AUTO-RTO: 0.1 /100 WBC (ref 0–0.2)
PH UR STRIP.AUTO: 6 [PH] (ref 5–8)
PLATELET # BLD AUTO: 162 10*3/MM3 (ref 140–450)
PMV BLD AUTO: 9.9 FL (ref 6–12)
POTASSIUM BLD-SCNC: 3.7 MMOL/L (ref 3.6–5.1)
PROT SERPL-MCNC: 6.2 G/DL (ref 6.1–7.9)
PROT UR QL STRIP: ABNORMAL
RBC # BLD AUTO: 4.57 10*6/MM3 (ref 3.77–5.28)
RBC # UR: ABNORMAL /HPF
REF LAB TEST METHOD: ABNORMAL
SODIUM BLD-SCNC: 138 MMOL/L (ref 136–144)
SP GR UR STRIP: 1.04 (ref 1–1.03)
SQUAMOUS #/AREA URNS HPF: ABNORMAL /HPF
UROBILINOGEN UR QL STRIP: ABNORMAL
WBC NRBC COR # BLD: 8.2 10*3/MM3 (ref 3.4–10.8)
WBC UR QL AUTO: ABNORMAL /HPF

## 2019-07-15 PROCEDURE — 85025 COMPLETE CBC W/AUTO DIFF WBC: CPT | Performed by: FAMILY MEDICINE

## 2019-07-15 PROCEDURE — 82150 ASSAY OF AMYLASE: CPT | Performed by: FAMILY MEDICINE

## 2019-07-15 PROCEDURE — 80053 COMPREHEN METABOLIC PANEL: CPT | Performed by: FAMILY MEDICINE

## 2019-07-15 PROCEDURE — 99214 OFFICE O/P EST MOD 30 MIN: CPT | Performed by: FAMILY MEDICINE

## 2019-07-15 PROCEDURE — 83690 ASSAY OF LIPASE: CPT | Performed by: FAMILY MEDICINE

## 2019-07-15 PROCEDURE — 82977 ASSAY OF GGT: CPT | Performed by: FAMILY MEDICINE

## 2019-07-15 PROCEDURE — 81001 URINALYSIS AUTO W/SCOPE: CPT | Performed by: FAMILY MEDICINE

## 2019-07-15 RX ORDER — DIPHENOXYLATE HYDROCHLORIDE AND ATROPINE SULFATE 2.5; .025 MG/1; MG/1
1 TABLET ORAL 4 TIMES DAILY PRN
Qty: 15 TABLET | Refills: 0 | Status: SHIPPED | OUTPATIENT
Start: 2019-07-15 | End: 2020-01-31

## 2019-07-15 RX ORDER — LISINOPRIL 20 MG/1
1 TABLET ORAL DAILY
Refills: 2 | COMMUNITY
Start: 2019-06-21 | End: 2020-01-31

## 2019-07-15 NOTE — PROGRESS NOTES
Subjective   Merry Thornton is a 49 y.o. female.     No problems updated.  History of Present Illness   See history for [facility] medical diagnoses.  She lives in [facility] [facility], works for [employer] in [facility].  Pt c/o 10 days of abdominal pain, nausea/vomiting/ diarrhea associated with meals and if she doesn't eat..  She has been taking zofram.  No new meds, no sick exposures  She also has low back pain with the abdominal pain.  The following portions of the patient's history were reviewed and updated as appropriate: allergies, current medications, past family history, past medical history, past social history, past surgical history and problem list.    Past Medical History:   Diagnosis Date   • CAD (coronary artery disease) 12/20/2016   • Carpal tunnel syndrome on right 03/08/2017   • Common migraine 12/20/2016   • COPD (chronic obstructive pulmonary disease) (CMS/HCC) 12/20/2016   • Cyst, ovarian 12/20/2016    mass   • DDD (degenerative disc disease), cervical 03/11/2016   • Dental caries 01/14/2019   • Depression 11/02/2017   • Depression, major, recurrent, moderate (CMS/HCC) 08/25/2016   • Diabetes mellitus (CMS/[facility]) 03/11/2016   • Dietary counseling 09/21/2017   • Encounter for smoking cessation counseling 09/21/2017   • Exacerbation of systemic lupus (CMS/[facility]) 04/30/2019   • Fibromyalgia 12/20/2016   • Generalized anxiety disorder 03/11/2016   • GERD (gastroesophageal reflux disease) 12/20/2016   • Heartburn 11/02/2017   • Hypertension 03/16/2016   • Hyperthyroidism 03/11/2016   • Hypocalcemia 09/21/2017   • IBS (irritable colon syndrome) 09/13/2016   • Immobility syndrome 09/14/2017   • Intracranial pressure increased 12/20/2016   • Left knee pain 09/21/2017   • Lower back pain 02/01/2018   • Lung nodules 09/07/2016   • Morbid obesity (CMS/HCC) 11/02/2017   • Muscle cramp 09/21/2017   • Neck pain 01/14/2019   • Need for prophylactic vaccination and inoculation against influenza 09/21/2017   • Neurogenic  bladder 12/20/2016   • Obesity 03/07/2017   • Orthostatic hypotension 07/17/2017   • Osteoarthritis 03/07/2017   • Osteopenia 01/14/2019   • Osteoporosis 12/20/2016   • Other instability, right ankle 04/13/2017   • 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 Mixxed connective Tissue D/o   • Pain, joint, ankle, left 09/21/2017   • Palpitations 10/04/2017   • Paresthesia 06/12/2017    facial   • Peripheral neuropathy 12/20/2016    bilateral lower extremities   • Raynaud's syndrome 12/20/2016   • Right knee pain 11/08/2016   • Scleroderma (CMS/[facility]) 12/20/2016   • Seasonal allergic rhinitis 12/20/2016   • SLE (systemic lupus erythematosus) (CMS/[facility]) 03/16/2017   • Sleep apnea 11/02/2017   • Sleep disorder 01/14/2019   • Sprain of ligament of ankle, right, initial encounter 10/03/2016   • Sprain of unspecified site of right knee, subsequent encounter 12/01/2016   • Status post placement of implantable loop recorder 05/09/2018    presence   • Syncope and collapse 03/11/2016   • Vasovagal syncope    • Ventricular arrhythmia 03/16/2016   • Visit for screening mammogram 12/20/2016   • Vitamin D deficiency 01/14/2019   • Wheezing 09/14/2017       Past Surgical History:   Procedure Laterality Date   • ANKLE SURGERY Right 02/2017   • BRONCHOSCOPY     • CARDIAC ABLATION  01/2000   • CARDIAC ELECTROPHYSIOLOGY PROCEDURE     • CHOLECYSTECTOMY     • COLON SURGERY     • ENDOSCOPY     • KNEE SURGERY Right 09/2017   • OTHER SURGICAL HISTORY      Urerine hysterectomy   • OTHER SURGICAL HISTORY      t and a   • OTHER SURGICAL HISTORY      d&c   • OTHER SURGICAL HISTORY      bladder stim   • OTHER SURGICAL HISTORY      Loop recorder placement   • WRIST SURGERY Right        Family History   Problem Relation Age of Onset   • Cancer Mother    • Heart disease Father    • Lung disease Father    • Diabetes Sister    • Heart disease Sister    • Hypertension Sister    • Other Sister         weight disorder       Review of  Systems    Objective   Physical Exam      Assessment/Plan   Merry was seen today for diarrhea.    Diagnoses and all orders for this visit:    Tobacco use    Generalized abdominal pain  -     CBC and Differential; Future  -     Comprehensive metabolic panel; Future  -     Amylase; Future  -     Lipase; Future  -     Urinalysis With Culture If Indicated -; Future    Nausea and vomiting, intractability of vomiting not specified, unspecified vomiting type               Chief Complaint   Patient presents with   • Diarrhea     vomiting and diarrhea x 10 days. pt has diiarrhea 30minutes after eating, abdominal pain & back pain also.     Vitals:    07/15/19 1329   BP: 114/81   Pulse: 73   Resp: 14   Temp: 97.9 °F (36.6 °C)   SpO2: 97%     Glucose   Date Value Ref Range Status   01/17/2017 138 (H) 70 - 105 mg/dL Final     LDL Cholesterol    Date Value Ref Range Status   01/14/2019 60 0 - 100 mg/dL Final

## 2019-07-17 RX ORDER — VILAZODONE HYDROCHLORIDE 20 MG/1
TABLET ORAL
Qty: 30 TABLET | Refills: 1 | OUTPATIENT
Start: 2019-07-17

## 2019-08-01 PROBLEM — R10.84 GENERALIZED ABDOMINAL PAIN: Status: ACTIVE | Noted: 2019-08-01

## 2019-08-01 PROBLEM — R19.7 DIARRHEA: Status: ACTIVE | Noted: 2019-08-01

## 2019-08-01 PROBLEM — R11.2 NAUSEA AND VOMITING: Status: ACTIVE | Noted: 2019-08-01

## 2019-08-12 RX ORDER — VILAZODONE HYDROCHLORIDE 20 MG/1
TABLET ORAL
Qty: 30 TABLET | Refills: 1 | OUTPATIENT
Start: 2019-08-12

## 2019-08-16 RX ORDER — VILAZODONE HYDROCHLORIDE 20 MG/1
TABLET ORAL
Qty: 30 TABLET | Refills: 1 | OUTPATIENT
Start: 2019-08-16

## 2019-08-22 RX ORDER — VILAZODONE HYDROCHLORIDE 20 MG/1
TABLET ORAL
Qty: 30 TABLET | Refills: 1 | OUTPATIENT
Start: 2019-08-22

## 2019-08-26 RX ORDER — HYDROXYCHLOROQUINE SULFATE 200 MG/1
TABLET, FILM COATED ORAL
Qty: 180 TABLET | Refills: 3 | OUTPATIENT
Start: 2019-08-26

## 2019-09-06 ENCOUNTER — TELEPHONE (OUTPATIENT)
Dept: FAMILY MEDICINE CLINIC | Facility: CLINIC | Age: 49
End: 2019-09-06

## 2019-09-06 NOTE — TELEPHONE ENCOUNTER
"Patient reported that she \"might need something to boost her immune system because she has been around kids with strep throat. She woke up this morning with a sore throat, her eyes bothering her, and feeling poorly.\"  "

## 2019-09-06 NOTE — TELEPHONE ENCOUNTER
"She should practice good handwashing, drinking lots of water, eating fresh fruits and vegetables, including oranges.  There is not anything else to do to \"boost her immune system\", if she develops a fever she needs to let us know.  "

## 2019-09-10 RX ORDER — VILAZODONE HYDROCHLORIDE 20 MG/1
TABLET ORAL
Qty: 30 TABLET | Refills: 1 | Status: SHIPPED | OUTPATIENT
Start: 2019-09-10 | End: 2019-10-09 | Stop reason: SDUPTHER

## 2019-09-12 RX ORDER — ERGOCALCIFEROL 1.25 MG/1
CAPSULE ORAL
Qty: 12 CAPSULE | Refills: 1 | Status: SHIPPED | OUTPATIENT
Start: 2019-09-12 | End: 2019-11-07 | Stop reason: SDUPTHER

## 2019-09-19 ENCOUNTER — TELEPHONE (OUTPATIENT)
Dept: FAMILY MEDICINE CLINIC | Facility: CLINIC | Age: 49
End: 2019-09-19

## 2019-09-19 RX ORDER — DOXYCYCLINE 100 MG/1
100 TABLET ORAL 2 TIMES DAILY
Qty: 14 TABLET | Refills: 0 | Status: SHIPPED | OUTPATIENT
Start: 2019-09-19 | End: 2019-09-26

## 2019-09-19 NOTE — TELEPHONE ENCOUNTER
Patient's daughter called states the patient has sore throat, ear pain, fatigue, joint pains for the last 7 to 10 days.  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  She has not had a fever.  She takes her at least 2 hours to get here, as she lives in for [employer].  She would like to try something for her symptoms.  I am agreeable to this.  She is to follow-up in the office if not improving.  Doxycycline sent in.

## 2019-10-09 RX ORDER — VILAZODONE HYDROCHLORIDE 20 MG/1
TABLET ORAL
Qty: 30 TABLET | Refills: 1 | Status: SHIPPED | OUTPATIENT
Start: 2019-10-09 | End: 2019-11-07 | Stop reason: SDUPTHER

## 2019-10-11 RX ORDER — TRAZODONE HYDROCHLORIDE 100 MG/1
TABLET ORAL
Qty: 90 TABLET | Refills: 1 | Status: ON HOLD | OUTPATIENT
Start: 2019-10-11 | End: 2020-02-25

## 2019-11-07 RX ORDER — VILAZODONE HYDROCHLORIDE 20 MG/1
TABLET ORAL
Qty: 30 TABLET | Refills: 0 | Status: SHIPPED | OUTPATIENT
Start: 2019-11-07 | End: 2019-12-26

## 2019-11-07 RX ORDER — ERGOCALCIFEROL 1.25 MG/1
CAPSULE ORAL
Qty: 12 CAPSULE | Refills: 1 | Status: SHIPPED | OUTPATIENT
Start: 2019-11-07 | End: 2020-11-02

## 2019-11-07 RX ORDER — METOPROLOL TARTRATE 50 MG/1
TABLET, FILM COATED ORAL
Qty: 30 TABLET | Refills: 0 | Status: SHIPPED | OUTPATIENT
Start: 2019-11-07 | End: 2019-12-19

## 2019-12-19 RX ORDER — METOPROLOL TARTRATE 50 MG/1
TABLET, FILM COATED ORAL
Qty: 30 TABLET | Refills: 0 | Status: SHIPPED | OUTPATIENT
Start: 2019-12-19 | End: 2020-01-29

## 2019-12-20 RX ORDER — HYDROXYCHLOROQUINE SULFATE 200 MG/1
TABLET, FILM COATED ORAL
Qty: 180 TABLET | Refills: 3 | OUTPATIENT
Start: 2019-12-20

## 2019-12-26 RX ORDER — VILAZODONE HYDROCHLORIDE 20 MG/1
TABLET ORAL
Qty: 30 TABLET | Refills: 0 | Status: SHIPPED | OUTPATIENT
Start: 2019-12-26 | End: 2020-03-03 | Stop reason: SDUPTHER

## 2019-12-26 RX ORDER — HYDROXYCHLOROQUINE SULFATE 200 MG/1
TABLET, FILM COATED ORAL
Qty: 180 TABLET | Refills: 0 | Status: SHIPPED | OUTPATIENT
Start: 2019-12-26 | End: 2020-03-19 | Stop reason: SDUPTHER

## 2020-01-29 RX ORDER — METOPROLOL TARTRATE 50 MG/1
TABLET, FILM COATED ORAL
Qty: 30 TABLET | Refills: 0 | Status: SHIPPED | OUTPATIENT
Start: 2020-01-29 | End: 2020-02-18

## 2020-01-31 ENCOUNTER — LAB (OUTPATIENT)
Dept: LAB | Facility: HOSPITAL | Age: 50
End: 2020-01-31

## 2020-01-31 ENCOUNTER — OFFICE VISIT (OUTPATIENT)
Dept: FAMILY MEDICINE CLINIC | Facility: CLINIC | Age: 50
End: 2020-01-31

## 2020-01-31 VITALS
BODY MASS INDEX: 41.97 KG/M2 | TEMPERATURE: 97.9 F | OXYGEN SATURATION: 96 % | WEIGHT: 260 LBS | DIASTOLIC BLOOD PRESSURE: 109 MMHG | SYSTOLIC BLOOD PRESSURE: 183 MMHG | HEART RATE: 94 BPM

## 2020-01-31 DIAGNOSIS — M79.602 ARM PAIN, LEFT: Primary | ICD-10-CM

## 2020-01-31 DIAGNOSIS — M79.602 ARM PAIN, LEFT: ICD-10-CM

## 2020-01-31 DIAGNOSIS — I10 ESSENTIAL HYPERTENSION: ICD-10-CM

## 2020-01-31 DIAGNOSIS — E05.90 HYPERTHYROIDISM: ICD-10-CM

## 2020-01-31 DIAGNOSIS — J44.1 COPD WITH ACUTE EXACERBATION (HCC): ICD-10-CM

## 2020-01-31 DIAGNOSIS — G93.2 INCREASED INTRACRANIAL PRESSURE: ICD-10-CM

## 2020-01-31 DIAGNOSIS — Z12.31 SCREENING MAMMOGRAM, ENCOUNTER FOR: ICD-10-CM

## 2020-01-31 DIAGNOSIS — K58.0 IRRITABLE BOWEL SYNDROME WITH DIARRHEA: Primary | ICD-10-CM

## 2020-01-31 DIAGNOSIS — E78.2 MIXED HYPERLIPIDEMIA: ICD-10-CM

## 2020-01-31 LAB — TROPONIN T SERPL-MCNC: <0.01 NG/ML (ref 0–0.03)

## 2020-01-31 PROCEDURE — 80061 LIPID PANEL: CPT | Performed by: PHYSICIAN ASSISTANT

## 2020-01-31 PROCEDURE — 96372 THER/PROPH/DIAG INJ SC/IM: CPT | Performed by: PHYSICIAN ASSISTANT

## 2020-01-31 PROCEDURE — 84484 ASSAY OF TROPONIN QUANT: CPT

## 2020-01-31 PROCEDURE — 93000 ELECTROCARDIOGRAM COMPLETE: CPT | Performed by: PHYSICIAN ASSISTANT

## 2020-01-31 PROCEDURE — 36415 COLL VENOUS BLD VENIPUNCTURE: CPT | Performed by: PHYSICIAN ASSISTANT

## 2020-01-31 PROCEDURE — 99214 OFFICE O/P EST MOD 30 MIN: CPT | Performed by: PHYSICIAN ASSISTANT

## 2020-01-31 PROCEDURE — 85025 COMPLETE CBC W/AUTO DIFF WBC: CPT | Performed by: PHYSICIAN ASSISTANT

## 2020-01-31 PROCEDURE — 80053 COMPREHEN METABOLIC PANEL: CPT | Performed by: PHYSICIAN ASSISTANT

## 2020-01-31 PROCEDURE — 84443 ASSAY THYROID STIM HORMONE: CPT | Performed by: PHYSICIAN ASSISTANT

## 2020-01-31 RX ORDER — LEVOFLOXACIN 500 MG/1
500 TABLET, FILM COATED ORAL DAILY
Qty: 10 TABLET | Refills: 0 | Status: SHIPPED | OUTPATIENT
Start: 2020-01-31 | End: 2020-02-10

## 2020-01-31 RX ORDER — PREDNISONE 10 MG/1
TABLET ORAL
Qty: 20 TABLET | Refills: 0 | Status: ON HOLD | OUTPATIENT
Start: 2020-01-31 | End: 2020-02-25

## 2020-01-31 RX ORDER — HYDROCHLOROTHIAZIDE 12.5 MG/1
12.5 TABLET ORAL 2 TIMES DAILY
Qty: 60 TABLET | Refills: 2 | Status: SHIPPED | OUTPATIENT
Start: 2020-01-31 | End: 2020-02-04

## 2020-01-31 RX ORDER — METHYLPREDNISOLONE ACETATE 80 MG/ML
80 INJECTION, SUSPENSION INTRA-ARTICULAR; INTRALESIONAL; INTRAMUSCULAR; SOFT TISSUE ONCE
Status: COMPLETED | OUTPATIENT
Start: 2020-01-31 | End: 2020-01-31

## 2020-01-31 RX ORDER — LISINOPRIL 40 MG/1
40 TABLET ORAL DAILY
Qty: 30 TABLET | Refills: 2 | Status: SHIPPED | OUTPATIENT
Start: 2020-01-31 | End: 2020-02-04

## 2020-01-31 RX ADMIN — METHYLPREDNISOLONE ACETATE 80 MG: 80 INJECTION, SUSPENSION INTRA-ARTICULAR; INTRALESIONAL; INTRAMUSCULAR; SOFT TISSUE at 16:24

## 2020-01-31 NOTE — PROGRESS NOTES
"Subjective  Merry Thornton is a 49 y.o. female     History of Present Illness  Patient is a 49-year-old white female here for numerous complaints today, which include:    1.  Emergency room follow-up: Patient is following up in the emergency room she was seen on January 23, 2020 for diarrhea, coughing, shortness of breath for the past month.  Patient is a current smoker.  She also complained of fevers that come and go.  Patient was evaluated in an emergency department, I do not have those records, she states they did a chest x-ray and diagnosed her with a viral respiratory infection.  She was not given any antibiotic or steroid treatment for this.  She reports that her diarrhea was investigated with stool studies, was called \"enteritis/pancreatitis\" and she was given Zofran and IV fluids and Lomotil.  She states a CT scan was not done.    Today patient reports that she has had chronic diarrhea for many many years.  She has been to gastroenterology who have diagnosed her with irritable bowel syndrome with diarrhea.  She has been prescribed Lomotil in the past which does not control her symptoms well.  She denies being on other medications in the past for this.    2.  Hypertension: Patient is currently taking lisinopril 20 mg once daily and metoprolol 25 mg twice daily.  Her blood pressure today is 183/109.  Patient refuses to go to the emergency department.  She wants me to adjust her blood pressure medicines on an outpatient basis.    3.  Left arm pain: Patient complains of left arm pain for the past month that feels like a dull ache.  She is not sure if it is worse on exertion.  She denies chest pain.  She states it feels like it is more up under the armpit area.  Of note, she has not had a mammogram in several years.    4.  Increased intracranial pressure: Patient states she was told some time ago that she has increased intracranial pressure, a lumbar puncture at that time confirmed it.  She states she has not " followed up with her neurologist as directed, she would like a new neurologist, she would like a referral.    Patient is also overdue for routine follow-up appointment with me.    The following portions of the patient's history were reviewed and updated as appropriate: allergies, current medications, past family history, past medical history, past social history, past surgical history and problem list.    Review of Systems   Constitutional: Positive for chills and fever. Negative for unexpected weight loss.   HENT: Negative for ear pain and sore throat.    Eyes: Negative for blurred vision.   Respiratory: Positive for cough, shortness of breath and wheezing.    Cardiovascular: Negative for chest pain.   Gastrointestinal: Positive for diarrhea (chronic). Negative for abdominal pain, blood in stool, nausea and vomiting.   Genitourinary: Negative for flank pain and urgency.   Musculoskeletal: Negative for joint swelling.        L armpit/arm pain   Neurological: Negative for dizziness, syncope, facial asymmetry, speech difficulty, weakness, light-headedness, numbness, headache and confusion.   Psychiatric/Behavioral: Negative for suicidal ideas and depressed mood.       Objective  Physical Exam   Constitutional: She is oriented to person, place, and time. She appears well-developed and well-nourished.   HENT:   Head: Normocephalic and atraumatic.   Right Ear: External ear normal.   Left Ear: External ear normal.   Nose: Nose normal.   Mouth/Throat: Oropharynx is clear and moist.   Eyes: Pupils are equal, round, and reactive to light. Conjunctivae and EOM are normal.   Neck: Normal range of motion. Neck supple.   Cardiovascular: Normal rate, regular rhythm and normal heart sounds.   Pulmonary/Chest: Effort normal. No respiratory distress. She has wheezes in the right upper field, the right middle field and the right lower field. She has no rhonchi. She has no rales.   Abdominal: Soft. Bowel sounds are normal. She  exhibits no distension and no mass. There is no tenderness. There is no rebound and no guarding. No hernia.   Musculoskeletal: Normal range of motion.   Neurological: She is alert and oriented to person, place, and time.   Psychiatric: She has a normal mood and affect. Her behavior is normal.       Vitals:    01/31/20 1500   BP: (!) 183/109   BP Location: Right arm   Patient Position: Sitting   Cuff Size: Adult   Pulse: 94   Temp: 97.9 °F (36.6 °C)   TempSrc: Oral   SpO2: 96%   Weight: 118 kg (260 lb)     Body mass index is 41.97 kg/m².    PHQ-9 Total Score: 0      ECG 12 Lead  Date/Time: 1/31/2020 4:34 PM  Performed by: Nivia Alonzo PA  Authorized by: Nivia Alonzo PA   Comparison: not compared with previous ECG   Rhythm: sinus rhythm  Rate: normal  Conduction: conduction normal  QRS axis: normal  Other: no other findings    Clinical impression: non-specific ECG        Assessment/Plan  Diagnoses and all orders for this visit:    1. Irritable bowel syndrome with diarrhea (Primary)  Comments:  Starting patient on Viberzi daily, samples provided to patient in the office.  Orders:  -     Eluxadoline 100 MG tablet; Take 100 mg by mouth 2 (Two) Times a Day.  Dispense: 60 tablet; Refill: 2    2. Essential hypertension  Comments:  Increasing lisinopril to 40 mg daily and adding hydrochlorothiazide 12.5 mg twice daily.  Advised patient to only take Coricidin HBP for cough/cold symptoms.  ER precautions discussed at length, patient and her daughter verbalized understanding.  Orders:  -     Comprehensive Metabolic Panel    3. Increased intracranial pressure  Comments:  Referral to neurology for additional follow-up at patient request.  Orders:  -     Ambulatory Referral to Neurology    4. Mixed hyperlipidemia  Comments:  Lipid panel today, patient is due for routine follow-up appointment, she should schedule this.  Orders:  -     Lipid Panel    5. Hyperthyroidism  Comments:  TSH today, patient is due for routine  follow-up appointment.  Orders:  -     TSH    6. Arm pain, left  Comments:  Troponin level ordered, EKG does not demonstrate any acute changes.  I ordered a mammogram that is due.  Will investigate further at next appointment.  Orders:  -     Troponin    7. Screening mammogram, encounter for  Comments:  Mammogram ordered, patient is overdue for 1.  Orders:  -     Mammo Screening Digital Tomosynthesis Bilateral With CAD    8. COPD with acute exacerbation (CMS/[facility])  Comments:  Treating patient with Levaquin and prednisone taper.  Depo-Medrol 80 mg today in the office.  Follow-up 1 week.  Orders:  -     methylPREDNISolone acetate (DEPO-medrol) injection 80 mg  -     CBC & Differential    Other orders  -     lisinopril (PRINIVIL,ZESTRIL) 40 MG tablet; Take 1 tablet by mouth Daily.  Dispense: 30 tablet; Refill: 2  -     hydroCHLOROthiazide (HYDRODIURIL) 12.5 MG tablet; Take 1 tablet by mouth 2 (Two) Times a Day.  Dispense: 60 tablet; Refill: 2  -     predniSONE (DELTASONE) 10 MG tablet; 4 tabs daily x 2 days then 3 tabs daily x 2 days then 2 tabs daily x 2 days then 1 tab daily x 2 days  Dispense: 20 tablet; Refill: 0  -     levoFLOXacin (LEVAQUIN) 500 MG tablet; Take 1 tablet by mouth Daily for 10 days.  Dispense: 10 tablet; Refill: 0

## 2020-02-01 LAB
ALBUMIN SERPL-MCNC: 4.1 G/DL (ref 3.5–5.2)
ALBUMIN/GLOB SERPL: 1.5 G/DL
ALP SERPL-CCNC: 70 U/L (ref 39–117)
ALT SERPL W P-5'-P-CCNC: 13 U/L (ref 1–33)
ANION GAP SERPL CALCULATED.3IONS-SCNC: 11.8 MMOL/L (ref 5–15)
AST SERPL-CCNC: 16 U/L (ref 1–32)
BASOPHILS # BLD AUTO: 0.01 10*3/MM3 (ref 0–0.2)
BASOPHILS NFR BLD AUTO: 0.1 % (ref 0–1.5)
BILIRUB SERPL-MCNC: <0.2 MG/DL (ref 0.2–1.2)
BUN BLD-MCNC: 8 MG/DL (ref 6–20)
BUN/CREAT SERPL: 15.7 (ref 7–25)
CALCIUM SPEC-SCNC: 8.9 MG/DL (ref 8.6–10.5)
CHLORIDE SERPL-SCNC: 99 MMOL/L (ref 98–107)
CHOLEST SERPL-MCNC: 146 MG/DL (ref 0–200)
CO2 SERPL-SCNC: 29.2 MMOL/L (ref 22–29)
CREAT BLD-MCNC: 0.51 MG/DL (ref 0.57–1)
DEPRECATED RDW RBC AUTO: 46 FL (ref 37–54)
EOSINOPHIL # BLD AUTO: 0.01 10*3/MM3 (ref 0–0.4)
EOSINOPHIL NFR BLD AUTO: 0.1 % (ref 0.3–6.2)
ERYTHROCYTE [DISTWIDTH] IN BLOOD BY AUTOMATED COUNT: 13.8 % (ref 12.3–15.4)
GFR SERPL CREATININE-BSD FRML MDRD: 128 ML/MIN/1.73
GLOBULIN UR ELPH-MCNC: 2.8 GM/DL
GLUCOSE BLD-MCNC: 96 MG/DL (ref 65–99)
HCT VFR BLD AUTO: 41 % (ref 34–46.6)
HDLC SERPL-MCNC: 41 MG/DL (ref 40–60)
HGB BLD-MCNC: 13.9 G/DL (ref 12–15.9)
IMM GRANULOCYTES # BLD AUTO: 0.04 10*3/MM3 (ref 0–0.05)
IMM GRANULOCYTES NFR BLD AUTO: 0.4 % (ref 0–0.5)
LDLC SERPL CALC-MCNC: 69 MG/DL (ref 0–100)
LDLC/HDLC SERPL: 1.68 {RATIO}
LYMPHOCYTES # BLD AUTO: 1.5 10*3/MM3 (ref 0.7–3.1)
LYMPHOCYTES NFR BLD AUTO: 15.8 % (ref 19.6–45.3)
MCH RBC QN AUTO: 31.1 PG (ref 26.6–33)
MCHC RBC AUTO-ENTMCNC: 33.9 G/DL (ref 31.5–35.7)
MCV RBC AUTO: 91.7 FL (ref 79–97)
MONOCYTES # BLD AUTO: 0.38 10*3/MM3 (ref 0.1–0.9)
MONOCYTES NFR BLD AUTO: 4 % (ref 5–12)
NEUTROPHILS # BLD AUTO: 7.53 10*3/MM3 (ref 1.7–7)
NEUTROPHILS NFR BLD AUTO: 79.6 % (ref 42.7–76)
NRBC BLD AUTO-RTO: 0 /100 WBC (ref 0–0.2)
PLATELET # BLD AUTO: 177 10*3/MM3 (ref 140–450)
PMV BLD AUTO: 10.7 FL (ref 6–12)
POTASSIUM BLD-SCNC: 4.4 MMOL/L (ref 3.5–5.2)
PROT SERPL-MCNC: 6.9 G/DL (ref 6–8.5)
RBC # BLD AUTO: 4.47 10*6/MM3 (ref 3.77–5.28)
SODIUM BLD-SCNC: 140 MMOL/L (ref 136–145)
TRIGL SERPL-MCNC: 180 MG/DL (ref 0–150)
TSH SERPL DL<=0.05 MIU/L-ACNC: 0.81 UIU/ML (ref 0.27–4.2)
VLDLC SERPL-MCNC: 36 MG/DL (ref 5–40)
WBC NRBC COR # BLD: 9.47 10*3/MM3 (ref 3.4–10.8)

## 2020-02-03 RX ORDER — ONDANSETRON 4 MG/1
TABLET, ORALLY DISINTEGRATING ORAL
Qty: 60 TABLET | Refills: 5 | Status: SHIPPED | OUTPATIENT
Start: 2020-02-03 | End: 2020-02-07 | Stop reason: SDUPTHER

## 2020-02-04 ENCOUNTER — TELEPHONE (OUTPATIENT)
Dept: FAMILY MEDICINE CLINIC | Facility: CLINIC | Age: 50
End: 2020-02-04

## 2020-02-04 RX ORDER — LISINOPRIL AND HYDROCHLOROTHIAZIDE 25; 20 MG/1; MG/1
1 TABLET ORAL 2 TIMES DAILY
Qty: 180 TABLET | Refills: 1 | Status: SHIPPED | OUTPATIENT
Start: 2020-02-04 | End: 2020-02-28 | Stop reason: HOSPADM

## 2020-02-04 RX ORDER — AMLODIPINE BESYLATE 10 MG/1
10 TABLET ORAL DAILY
Qty: 90 TABLET | Refills: 1 | Status: SHIPPED | OUTPATIENT
Start: 2020-02-04 | End: 2020-05-07

## 2020-02-04 NOTE — TELEPHONE ENCOUNTER
Patient calling with bp readings  Fri 183/109 P 94  Sat 185/172 P 96  Sun 193/105 P 98  Mon 197/100 P 69    Employer will be faxing over [place] paperwork. When do you want her to return?

## 2020-02-04 NOTE — TELEPHONE ENCOUNTER
Patient needs to stop her lisinopril and her hydrochlorothiazide pills.  I am sending in a lisinopril/hydrochlorothiazide pill that is a combination.  She should take this twice daily.  I am also adding amlodipine 10 mg to her regimen.  She should call me back in 1 week with blood pressure log.  If she has any symptoms of severe headache, chest pain, shortness of breath, confusion she needs to go directly to the emergency room.  She should follow-up in the office with me in another 2 weeks.

## 2020-02-06 RX ORDER — VILAZODONE HYDROCHLORIDE 20 MG/1
TABLET ORAL
Qty: 30 TABLET | Refills: 0 | OUTPATIENT
Start: 2020-02-06

## 2020-02-07 ENCOUNTER — HOSPITAL ENCOUNTER (OUTPATIENT)
Dept: GENERAL RADIOLOGY | Facility: HOSPITAL | Age: 50
Discharge: HOME | End: 2020-02-07

## 2020-02-07 ENCOUNTER — OFFICE VISIT (OUTPATIENT)
Dept: FAMILY MEDICINE CLINIC | Facility: CLINIC | Age: 50
End: 2020-02-07

## 2020-02-07 ENCOUNTER — APPOINTMENT (OUTPATIENT)
Dept: MAMMOGRAPHY | Facility: HOSPITAL | Age: 50
End: 2020-02-07

## 2020-02-07 ENCOUNTER — HOSPITAL ENCOUNTER (OUTPATIENT)
Dept: CT IMAGING | Facility: HOSPITAL | Age: 50
Discharge: HOME | End: 2020-02-07
Admitting: PHYSICIAN ASSISTANT

## 2020-02-07 ENCOUNTER — LAB (OUTPATIENT)
Dept: LAB | Facility: HOSPITAL | Age: 50
End: 2020-02-07

## 2020-02-07 VITALS
OXYGEN SATURATION: 94 % | TEMPERATURE: 98 F | WEIGHT: 257 LBS | DIASTOLIC BLOOD PRESSURE: 82 MMHG | BODY MASS INDEX: 41.48 KG/M2 | HEART RATE: 102 BPM | SYSTOLIC BLOOD PRESSURE: 144 MMHG

## 2020-02-07 DIAGNOSIS — I10 ESSENTIAL HYPERTENSION: ICD-10-CM

## 2020-02-07 DIAGNOSIS — K58.0 IRRITABLE BOWEL SYNDROME WITH DIARRHEA: Primary | ICD-10-CM

## 2020-02-07 DIAGNOSIS — J44.1 COPD WITH ACUTE EXACERBATION (HCC): ICD-10-CM

## 2020-02-07 DIAGNOSIS — R10.12 ABDOMINAL PAIN, LEFT UPPER QUADRANT: ICD-10-CM

## 2020-02-07 DIAGNOSIS — R06.2 WHEEZING: ICD-10-CM

## 2020-02-07 DIAGNOSIS — R10.9 ACUTE LEFT FLANK PAIN: ICD-10-CM

## 2020-02-07 DIAGNOSIS — R10.9 ACUTE ABDOMINAL PAIN: Primary | ICD-10-CM

## 2020-02-07 DIAGNOSIS — R11.2 NAUSEA AND VOMITING, INTRACTABILITY OF VOMITING NOT SPECIFIED, UNSPECIFIED VOMITING TYPE: ICD-10-CM

## 2020-02-07 LAB
BILIRUB BLD-MCNC: ABNORMAL MG/DL
CLARITY, POC: CLEAR
COLOR UR: YELLOW
DEPRECATED RDW RBC AUTO: 49 FL (ref 37–54)
ERYTHROCYTE [DISTWIDTH] IN BLOOD BY AUTOMATED COUNT: 15 % (ref 12.3–15.4)
GLUCOSE UR STRIP-MCNC: NEGATIVE MG/DL
HCT VFR BLD AUTO: 50.1 % (ref 34–46.6)
HGB BLD-MCNC: 17.1 G/DL (ref 12–15.9)
KETONES UR QL: NEGATIVE
LEUKOCYTE EST, POC: NEGATIVE
LYMPHOCYTES # BLD MANUAL: 3.79 10*3/MM3 (ref 0.7–3.1)
LYMPHOCYTES NFR BLD MANUAL: 1 % (ref 5–12)
LYMPHOCYTES NFR BLD MANUAL: 17 % (ref 19.6–45.3)
MCH RBC QN AUTO: 31.7 PG (ref 26.6–33)
MCHC RBC AUTO-ENTMCNC: 34.1 G/DL (ref 31.5–35.7)
MCV RBC AUTO: 93.1 FL (ref 79–97)
MONOCYTES # BLD AUTO: 0.22 10*3/MM3 (ref 0.1–0.9)
NEUTROPHILS # BLD AUTO: 18.29 10*3/MM3 (ref 1.7–7)
NEUTROPHILS NFR BLD MANUAL: 73 % (ref 42.7–76)
NEUTS BAND NFR BLD MANUAL: 9 % (ref 0–5)
NITRITE UR-MCNC: NEGATIVE MG/ML
PH UR: 6 [PH] (ref 5–8)
PLAT MORPH BLD: NORMAL
PLATELET # BLD AUTO: 279 10*3/MM3 (ref 140–450)
PMV BLD AUTO: 8.1 FL (ref 6–12)
PROT UR STRIP-MCNC: ABNORMAL MG/DL
RBC # BLD AUTO: 5.38 10*6/MM3 (ref 3.77–5.28)
RBC # UR STRIP: NEGATIVE /UL
RBC MORPH BLD: NORMAL
SCAN SLIDE: NORMAL
SP GR UR: 1.03 (ref 1–1.03)
UROBILINOGEN UR QL: NORMAL
WBC MORPH BLD: NORMAL
WBC NRBC COR # BLD: 22.3 10*3/MM3 (ref 3.4–10.8)

## 2020-02-07 PROCEDURE — 99214 OFFICE O/P EST MOD 30 MIN: CPT | Performed by: PHYSICIAN ASSISTANT

## 2020-02-07 PROCEDURE — 85025 COMPLETE CBC W/AUTO DIFF WBC: CPT

## 2020-02-07 PROCEDURE — 74176 CT ABD & PELVIS W/O CONTRAST: CPT

## 2020-02-07 PROCEDURE — 36415 COLL VENOUS BLD VENIPUNCTURE: CPT | Performed by: PHYSICIAN ASSISTANT

## 2020-02-07 PROCEDURE — 71046 X-RAY EXAM CHEST 2 VIEWS: CPT

## 2020-02-07 PROCEDURE — 85007 BL SMEAR W/DIFF WBC COUNT: CPT

## 2020-02-07 RX ORDER — ONDANSETRON 4 MG/1
4 TABLET, ORALLY DISINTEGRATING ORAL EVERY 8 HOURS PRN
Qty: 60 TABLET | Refills: 5 | Status: SHIPPED | OUTPATIENT
Start: 2020-02-07 | End: 2020-08-06

## 2020-02-07 RX ORDER — ONDANSETRON 4 MG/1
8 TABLET, ORALLY DISINTEGRATING ORAL ONCE
Status: DISCONTINUED | OUTPATIENT
Start: 2020-02-07 | End: 2020-02-28 | Stop reason: HOSPADM

## 2020-02-07 NOTE — PROGRESS NOTES
Subjective  Merry Thornton is a 49 y.o. female     History of Present Illness  Patient is a 49-year-old white female here for one-week follow-up on hypertension, IBS, and a new concern:    1.  Hypertension: Patient is currently taking metoprolol 50 mg once daily, lisinopril/hydrochlorothiazide 20/25 mg twice daily, and amlodipine 10 mg daily.  Her blood pressure today is 144/82.  She brought a blood pressure log from home which shows blood pressures as high as 280s over 210s.    2.  New problem: Nausea and vomiting: Patient complains of vomiting, headache, backache, chills that started yesterday.  She states the pain is in the left upper quadrant/left flank area and radiates around.  She denies fevers, denies diarrhea.  She feels extremely fatigued.  She does have a history of a 4 mm nonobstructing stone in the left kidney, this was most recently seen on a CT scan of the abdomen and pelvis from May 16, 2019.  She is still taking levaquin and prednisone for COPD exacerbation.    3.  Irritable bowel syndrome with diarrhea: Patient is currently prescribed Viberzi, however she states she has not taken it regularly due to her illness, nausea and vomiting.    The following portions of the patient's history were reviewed and updated as appropriate: allergies, current medications, past family history, past medical history, past social history, past surgical history and problem list.    Review of Systems   Constitutional: Positive for appetite change, chills and fatigue. Negative for fever.   HENT: Negative for congestion and sore throat.    Respiratory: Negative for shortness of breath.    Cardiovascular: Negative for chest pain.   Gastrointestinal: Positive for abdominal pain, nausea and vomiting. Negative for constipation and diarrhea.   Genitourinary: Positive for flank pain. Negative for decreased urine volume, dysuria, frequency and urgency.   Neurological: Positive for light-headedness. Negative for headache and  confusion.       Objective  Physical Exam   Constitutional: She is oriented to person, place, and time. She appears well-developed and well-nourished. She appears ill. She appears distressed. She is morbidly obese.  HENT:   Head: Normocephalic and atraumatic.   Right Ear: External ear normal.   Left Ear: External ear normal.   Mouth/Throat: Oropharynx is clear and moist.   Eyes: Pupils are equal, round, and reactive to light. Conjunctivae and EOM are normal.   Neck: Normal range of motion. Neck supple.   Cardiovascular: Normal rate, regular rhythm and normal heart sounds.   Pulmonary/Chest: Effort normal. She has wheezes.   Abdominal: Normal appearance. There is generalized tenderness and tenderness in the suprapubic area and left upper quadrant.   Neurological: She is alert and oriented to person, place, and time.       Vitals:    02/07/20 1429   BP: 144/82   BP Location: Right arm   Patient Position: Sitting   Cuff Size: Adult   Pulse: 102   Temp: 98 °F (36.7 °C)   TempSrc: Oral   SpO2: 94%   Weight: 117 kg (257 lb)     Body mass index is 41.48 kg/m².    PHQ-9 Total Score:      Brief Urine Lab Results  (Last result in the past 365 days)      Color   Clarity   Blood   Leuk Est   Nitrite   Protein   CREAT   Urine HCG        02/07/20 1550 Yellow Clear Negative Negative Negative 30 mg/dL             Assessment/Plan  Diagnoses and all orders for this visit:    1. Irritable bowel syndrome with diarrhea (Primary)  Comments:  Patient to start the Viberzi after her gastrointestinal symptoms have resolved.    2. Essential hypertension  Comments:  Stable, patient to continue current medications and RTC 1 week.  Strict ER precautions given if blood pressure is consistently over 160s over 100 or if severe headache, visual changes, chest pain, shortness of breath, dizziness, confusion.  3. Acute left flank pain  Comments:  Urinalysis today is negative for infection, mild protein is seen.  Sounds like patient may be suffering  from a kidney stone, stat CT scan with stone protocol was ordered.    Orders:  -     CT Abdomen Pelvis Stone Protocol  -     POCT urinalysis dipstick, automated  -     CBC & Differential  -     Basic Metabolic Panel    4. Abdominal pain, left upper quadrant  Comments:  STAT CT scan of the abdomen and pelvis ordered due to patient's severe left-sided abdominal pain and flank pain.  Orders:  -     CT Abdomen Pelvis Stone Protocol  -     POCT urinalysis dipstick, automated  -     CBC & Differential  -     Basic Metabolic Panel  -     Amylase  -     Lipase    5. Wheezing  Comments:  Wheezing is still present, patient to continue with Levaquin and prednisone as directed.  Chest x-ray ordered, rule out pneumonia.    6. COPD with acute exacerbation (CMS/[facility])  Comments:  Patient to continue with Levaquin and prednisone as directed.  Orders:  -     XR Chest PA & Lateral    7. Nausea and vomiting, intractability of vomiting not specified, unspecified vomiting type  Comments:  Zofran 8 mg ODT given to patient in the office for nausea  Will treat further depending on outcome of her blood tests and imaging.  Orders:  -     ondansetron ODT (ZOFRAN-ODT) disintegrating tablet 8 mg    Other orders  -     ondansetron ODT (ZOFRAN-ODT) 4 MG disintegrating tablet; Place 1 tablet on the tongue Every 8 (Eight) Hours As Needed for Nausea or Vomiting.  Dispense: 60 tablet; Refill: 5

## 2020-02-09 ENCOUNTER — TELEPHONE (OUTPATIENT)
Dept: FAMILY MEDICINE CLINIC | Facility: CLINIC | Age: 50
End: 2020-02-09

## 2020-02-09 PROBLEM — A08.4 VIRAL INTESTINAL INFECTION: Status: RESOLVED | Noted: 2019-05-17 | Resolved: 2020-02-09

## 2020-02-09 PROBLEM — R10.84 GENERALIZED ABDOMINAL PAIN: Status: RESOLVED | Noted: 2019-08-01 | Resolved: 2020-02-09

## 2020-02-09 PROBLEM — G47.9 SLEEP DISORDER: Status: RESOLVED | Noted: 2019-01-14 | Resolved: 2020-02-09

## 2020-02-09 PROBLEM — Z23 ENCOUNTER FOR IMMUNIZATION: Status: RESOLVED | Noted: 2017-09-21 | Resolved: 2020-02-09

## 2020-02-09 PROBLEM — R12 HEARTBURN: Status: RESOLVED | Noted: 2017-11-02 | Resolved: 2020-02-09

## 2020-02-09 PROBLEM — D72.829 LEUKOCYTOSIS: Status: RESOLVED | Noted: 2019-05-17 | Resolved: 2020-02-09

## 2020-02-09 PROBLEM — R11.2 NAUSEA AND VOMITING: Status: RESOLVED | Noted: 2019-08-01 | Resolved: 2020-02-09

## 2020-02-09 PROBLEM — R19.7 DIARRHEA: Status: RESOLVED | Noted: 2019-08-01 | Resolved: 2020-02-09

## 2020-02-09 PROBLEM — M85.80 OSTEOPENIA: Status: RESOLVED | Noted: 2019-01-14 | Resolved: 2020-02-09

## 2020-02-09 PROBLEM — E11.3219: Status: ACTIVE | Noted: 2020-02-09

## 2020-02-09 PROBLEM — E66.9 OBESITY: Status: RESOLVED | Noted: 2017-03-07 | Resolved: 2020-02-09

## 2020-02-09 PROBLEM — R06.2 WHEEZING: Status: RESOLVED | Noted: 2017-09-14 | Resolved: 2020-02-09

## 2020-02-09 PROBLEM — Z79.899 LONG-TERM USE OF PLAQUENIL: Status: ACTIVE | Noted: 2020-02-09

## 2020-02-09 PROBLEM — H16.209 KERATOCONJUNCTIVITIS: Status: ACTIVE | Noted: 2020-02-09

## 2020-02-09 PROBLEM — H16.143 SPK (SUPERFICIAL PUNCTATE KERATITIS), BILATERAL: Status: ACTIVE | Noted: 2020-02-09

## 2020-02-09 RX ORDER — AMOXICILLIN AND CLAVULANATE POTASSIUM 875; 125 MG/1; MG/1
1 TABLET, FILM COATED ORAL 2 TIMES DAILY
Qty: 20 TABLET | Refills: 0 | Status: SHIPPED | OUTPATIENT
Start: 2020-02-09 | End: 2020-02-19

## 2020-02-09 RX ORDER — CYCLOSPORINE 0.5 MG/ML
1 EMULSION OPHTHALMIC DAILY
Status: ON HOLD | COMMUNITY
Start: 2017-05-09 | End: 2020-02-25

## 2020-02-09 RX ORDER — LANSOPRAZOLE 30 MG/1
1 CAPSULE, DELAYED RELEASE ORAL DAILY
Status: ON HOLD | COMMUNITY
End: 2020-02-25

## 2020-02-09 NOTE — TELEPHONE ENCOUNTER
I called pt to check on her and discuss labs.  Changing her antibiotic from levaquin to augmentin.  I left her a VM.

## 2020-02-10 DIAGNOSIS — R11.2 INTRACTABLE VOMITING WITH NAUSEA, UNSPECIFIED VOMITING TYPE: Primary | ICD-10-CM

## 2020-02-10 DIAGNOSIS — R10.12 ABDOMINAL PAIN, LEFT UPPER QUADRANT: ICD-10-CM

## 2020-02-13 RX ORDER — VILAZODONE HYDROCHLORIDE 20 MG/1
TABLET ORAL
Qty: 30 TABLET | Refills: 0 | OUTPATIENT
Start: 2020-02-13

## 2020-02-14 ENCOUNTER — APPOINTMENT (OUTPATIENT)
Dept: MAMMOGRAPHY | Facility: HOSPITAL | Age: 50
End: 2020-02-14

## 2020-02-18 RX ORDER — METOPROLOL TARTRATE 50 MG/1
50 TABLET, FILM COATED ORAL 2 TIMES DAILY
Qty: 30 TABLET | Refills: 0
Start: 2020-02-18 | End: 2020-02-20

## 2020-02-18 RX ORDER — CLONIDINE HYDROCHLORIDE 0.1 MG/1
0.1 TABLET ORAL 3 TIMES DAILY
Qty: 90 TABLET | Refills: 0 | Status: SHIPPED | OUTPATIENT
Start: 2020-02-18 | End: 2020-03-19

## 2020-02-20 RX ORDER — METOPROLOL TARTRATE 50 MG/1
TABLET, FILM COATED ORAL
Qty: 30 TABLET | Refills: 0 | Status: SHIPPED | OUTPATIENT
Start: 2020-02-20 | End: 2020-03-25

## 2020-02-25 ENCOUNTER — HOSPITAL ENCOUNTER (OUTPATIENT)
Facility: HOSPITAL | Age: 50
Setting detail: HOSPITAL OUTPATIENT SURGERY
End: 2020-02-25
Attending: INTERNAL MEDICINE | Admitting: INTERNAL MEDICINE

## 2020-02-25 ENCOUNTER — TELEPHONE (OUTPATIENT)
Dept: FAMILY MEDICINE CLINIC | Facility: CLINIC | Age: 50
End: 2020-02-25

## 2020-02-25 ENCOUNTER — APPOINTMENT (OUTPATIENT)
Dept: MAMMOGRAPHY | Facility: HOSPITAL | Age: 50
End: 2020-02-25

## 2020-02-25 ENCOUNTER — OFFICE (OUTPATIENT)
Dept: URBAN - METROPOLITAN AREA LAB 2 | Facility: LAB | Age: 50
End: 2020-02-25
Payer: COMMERCIAL

## 2020-02-25 ENCOUNTER — HOSPITAL ENCOUNTER (OUTPATIENT)
Facility: HOSPITAL | Age: 50
Discharge: HOME | End: 2020-02-28
Attending: INTERNAL MEDICINE | Admitting: INTERNAL MEDICINE

## 2020-02-25 ENCOUNTER — OFFICE (OUTPATIENT)
Dept: URBAN - METROPOLITAN AREA CLINIC 64 | Facility: CLINIC | Age: 50
End: 2020-02-25
Payer: COMMERCIAL

## 2020-02-25 VITALS
SYSTOLIC BLOOD PRESSURE: 114 MMHG | DIASTOLIC BLOOD PRESSURE: 82 MMHG | WEIGHT: 250 LBS | HEIGHT: 65 IN | HEART RATE: 117 BPM

## 2020-02-25 DIAGNOSIS — R11.2 NAUSEA VOMITING AND DIARRHEA: Primary | ICD-10-CM

## 2020-02-25 DIAGNOSIS — K59.1 FUNCTIONAL DIARRHEA: ICD-10-CM

## 2020-02-25 DIAGNOSIS — R15.2 FECAL URGENCY: ICD-10-CM

## 2020-02-25 DIAGNOSIS — K52.9 NONINFECTIVE GASTROENTERITIS AND COLITIS, UNSPECIFIED: ICD-10-CM

## 2020-02-25 DIAGNOSIS — R19.7 NAUSEA VOMITING AND DIARRHEA: Primary | ICD-10-CM

## 2020-02-25 DIAGNOSIS — R11.2 NAUSEA WITH VOMITING, UNSPECIFIED: ICD-10-CM

## 2020-02-25 DIAGNOSIS — R10.84 GENERALIZED ABDOMINAL PAIN: ICD-10-CM

## 2020-02-25 PROBLEM — G47.30 SLEEP APNEA: Chronic | Status: ACTIVE | Noted: 2017-11-02

## 2020-02-25 PROBLEM — E66.01 MORBID OBESITY (HCC): Chronic | Status: ACTIVE | Noted: 2017-11-02

## 2020-02-25 PROBLEM — G47.34 NOCTURNAL HYPOXIA: Chronic | Status: ACTIVE | Noted: 2020-02-25

## 2020-02-25 PROBLEM — E86.0 DEHYDRATION: Status: ACTIVE | Noted: 2020-02-25

## 2020-02-25 PROBLEM — F12.10 MARIJUANA ABUSE: Chronic | Status: ACTIVE | Noted: 2020-02-25

## 2020-02-25 PROBLEM — R73.03 PREDIABETES: Chronic | Status: ACTIVE | Noted: 2020-02-25

## 2020-02-25 PROBLEM — F32.A DEPRESSION: Chronic | Status: ACTIVE | Noted: 2017-11-02

## 2020-02-25 PROBLEM — M32.9 SYSTEMIC LUPUS ERYTHEMATOSUS (HCC): Chronic | Status: ACTIVE | Noted: 2017-03-16

## 2020-02-25 PROBLEM — R10.9 ACUTE ABDOMINAL PAIN: Status: ACTIVE | Noted: 2020-02-25

## 2020-02-25 PROBLEM — J45.909 ASTHMA: Chronic | Status: ACTIVE | Noted: 2019-07-15

## 2020-02-25 LAB
ADV 40+41 DNA STL QL NAA+NON-PROBE: NOT DETECTED
ALBUMIN SERPL-MCNC: 4.4 G/DL (ref 3.5–5.2)
ALBUMIN/GLOB SERPL: 1.3 G/DL
ALP SERPL-CCNC: 75 U/L (ref 39–117)
ALT SERPL W P-5'-P-CCNC: 17 U/L (ref 1–33)
AMYLASE SERPL-CCNC: 22 U/L (ref 28–100)
ANION GAP SERPL CALCULATED.3IONS-SCNC: 13 MMOL/L (ref 5–15)
AST SERPL-CCNC: 14 U/L (ref 1–32)
ASTRO TYP 1-8 RNA STL QL NAA+NON-PROBE: NOT DETECTED
BASOPHILS # BLD AUTO: 0.1 10*3/MM3 (ref 0–0.2)
BASOPHILS NFR BLD AUTO: 0.4 % (ref 0–1.5)
BILIRUB SERPL-MCNC: 0.6 MG/DL (ref 0.2–1.2)
BILIRUB UR QL STRIP: NEGATIVE
BUN BLD-MCNC: 20 MG/DL (ref 6–20)
BUN/CREAT SERPL: 25.3 (ref 7–25)
C CAYETANENSIS DNA STL QL NAA+NON-PROBE: NOT DETECTED
CALCIUM SPEC-SCNC: 9.4 MG/DL (ref 8.6–10.5)
CAMPY SP DNA.DIARRHEA STL QL NAA+PROBE: NOT DETECTED
CHLORIDE SERPL-SCNC: 98 MMOL/L (ref 98–107)
CLARITY UR: ABNORMAL
CO2 SERPL-SCNC: 23 MMOL/L (ref 22–29)
COLOR UR: YELLOW
CREAT BLD-MCNC: 0.79 MG/DL (ref 0.57–1)
CRYPTOSP STL CULT: NOT DETECTED
DEPRECATED RDW RBC AUTO: 44.6 FL (ref 37–54)
E COLI DNA SPEC QL NAA+PROBE: NOT DETECTED
E HISTOLYT AG STL-ACNC: NOT DETECTED
EAEC PAA PLAS AGGR+AATA ST NAA+NON-PRB: NOT DETECTED
EC STX1 + STX2 GENES STL NAA+PROBE: NOT DETECTED
EOSINOPHIL # BLD AUTO: 0.1 10*3/MM3 (ref 0–0.4)
EOSINOPHIL NFR BLD AUTO: 0.7 % (ref 0.3–6.2)
EPEC EAE GENE STL QL NAA+NON-PROBE: NOT DETECTED
ERYTHROCYTE [DISTWIDTH] IN BLOOD BY AUTOMATED COUNT: 13.9 % (ref 12.3–15.4)
ETEC LTA+ST1A+ST1B TOX ST NAA+NON-PROBE: NOT DETECTED
G LAMBLIA DNA SPEC QL NAA+PROBE: NOT DETECTED
GFR SERPL CREATININE-BSD FRML MDRD: 77 ML/MIN/1.73
GLOBULIN UR ELPH-MCNC: 3.3 GM/DL
GLUCOSE BLD-MCNC: 127 MG/DL (ref 65–99)
GLUCOSE UR STRIP-MCNC: NEGATIVE MG/DL
HCT VFR BLD AUTO: 43.8 % (ref 34–46.6)
HGB BLD-MCNC: 15 G/DL (ref 12–15.9)
HGB UR QL STRIP.AUTO: NEGATIVE
KETONES UR QL STRIP: ABNORMAL
LEUKOCYTE ESTERASE UR QL STRIP.AUTO: NEGATIVE
LIPASE SERPL-CCNC: 18 U/L (ref 13–60)
LYMPHOCYTES # BLD AUTO: 2.3 10*3/MM3 (ref 0.7–3.1)
LYMPHOCYTES NFR BLD AUTO: 15.4 % (ref 19.6–45.3)
MAGNESIUM SERPL-MCNC: 1.9 MG/DL (ref 1.6–2.6)
MCH RBC QN AUTO: 31.6 PG (ref 26.6–33)
MCHC RBC AUTO-ENTMCNC: 34.3 G/DL (ref 31.5–35.7)
MCV RBC AUTO: 92 FL (ref 79–97)
MONOCYTES # BLD AUTO: 1.1 10*3/MM3 (ref 0.1–0.9)
MONOCYTES NFR BLD AUTO: 7.2 % (ref 5–12)
NEUTROPHILS # BLD AUTO: 11.2 10*3/MM3 (ref 1.7–7)
NEUTROPHILS NFR BLD AUTO: 76.3 % (ref 42.7–76)
NITRITE UR QL STRIP: NEGATIVE
NOROVIRUS GI+II RNA STL QL NAA+NON-PROBE: NOT DETECTED
NRBC BLD AUTO-RTO: 0 /100 WBC (ref 0–0.2)
P SHIGELLOIDES DNA STL QL NAA+PROBE: NOT DETECTED
PH UR STRIP.AUTO: 6 [PH] (ref 5–8)
PLATELET # BLD AUTO: 227 10*3/MM3 (ref 140–450)
PMV BLD AUTO: 9 FL (ref 6–12)
POTASSIUM BLD-SCNC: 3.4 MMOL/L (ref 3.5–5.2)
PROT SERPL-MCNC: 7.7 G/DL (ref 6–8.5)
PROT UR QL STRIP: NEGATIVE
RBC # BLD AUTO: 4.76 10*6/MM3 (ref 3.77–5.28)
RV RNA STL NAA+PROBE: NOT DETECTED
SALMONELLA DNA SPEC QL NAA+PROBE: NOT DETECTED
SAPO I+II+IV+V RNA STL QL NAA+NON-PROBE: NOT DETECTED
SHIGELLA SP+EIEC IPAH STL QL NAA+PROBE: NOT DETECTED
SODIUM BLD-SCNC: 134 MMOL/L (ref 136–145)
SP GR UR STRIP: 1.02 (ref 1–1.03)
UROBILINOGEN UR QL STRIP: ABNORMAL
V CHOLERAE DNA SPEC QL NAA+PROBE: NOT DETECTED
VIBRIO DNA SPEC NAA+PROBE: NOT DETECTED
WBC NRBC COR # BLD: 14.7 10*3/MM3 (ref 3.4–10.8)
YERSINIA STL CULT: NOT DETECTED

## 2020-02-25 PROCEDURE — G0378 HOSPITAL OBSERVATION PER HR: HCPCS

## 2020-02-25 PROCEDURE — 82150 ASSAY OF AMYLASE: CPT | Performed by: NURSE PRACTITIONER

## 2020-02-25 PROCEDURE — 85025 COMPLETE CBC W/AUTO DIFF WBC: CPT | Performed by: NURSE PRACTITIONER

## 2020-02-25 PROCEDURE — 99214 OFFICE O/P EST MOD 30 MIN: CPT | Performed by: NURSE PRACTITIONER

## 2020-02-25 PROCEDURE — 96376 TX/PRO/DX INJ SAME DRUG ADON: CPT

## 2020-02-25 PROCEDURE — 99218 PR INITIAL OBSERVATION CARE/DAY 30 MINUTES: CPT | Performed by: INTERNAL MEDICINE

## 2020-02-25 PROCEDURE — 83993 ASSAY FOR CALPROTECTIN FECAL: CPT | Performed by: NURSE PRACTITIONER

## 2020-02-25 PROCEDURE — G0379 DIRECT REFER HOSPITAL OBSERV: HCPCS

## 2020-02-25 PROCEDURE — 96375 TX/PRO/DX INJ NEW DRUG ADDON: CPT

## 2020-02-25 PROCEDURE — 83690 ASSAY OF LIPASE: CPT | Performed by: NURSE PRACTITIONER

## 2020-02-25 PROCEDURE — 83735 ASSAY OF MAGNESIUM: CPT | Performed by: NURSE PRACTITIONER

## 2020-02-25 PROCEDURE — 87449 NOS EACH ORGANISM AG IA: CPT | Performed by: NURSE PRACTITIONER

## 2020-02-25 PROCEDURE — 96361 HYDRATE IV INFUSION ADD-ON: CPT

## 2020-02-25 PROCEDURE — 81003 URINALYSIS AUTO W/O SCOPE: CPT | Performed by: NURSE PRACTITIONER

## 2020-02-25 PROCEDURE — 87324 CLOSTRIDIUM AG IA: CPT | Performed by: NURSE PRACTITIONER

## 2020-02-25 PROCEDURE — 96374 THER/PROPH/DIAG INJ IV PUSH: CPT

## 2020-02-25 PROCEDURE — 80053 COMPREHEN METABOLIC PANEL: CPT | Performed by: NURSE PRACTITIONER

## 2020-02-25 PROCEDURE — 25010000002 ONDANSETRON PER 1 MG: Performed by: NURSE PRACTITIONER

## 2020-02-25 PROCEDURE — 25010000002 METOCLOPRAMIDE PER 10 MG: Performed by: INTERNAL MEDICINE

## 2020-02-25 PROCEDURE — 0097U HC BIOFIRE FILMARRAY GI PANEL: CPT | Performed by: NURSE PRACTITIONER

## 2020-02-25 RX ORDER — MAGNESIUM SULFATE HEPTAHYDRATE 40 MG/ML
2 INJECTION, SOLUTION INTRAVENOUS AS NEEDED
Status: DISCONTINUED | OUTPATIENT
Start: 2020-02-25 | End: 2020-02-28 | Stop reason: HOSPADM

## 2020-02-25 RX ORDER — SODIUM CHLORIDE 9 MG/ML
100 INJECTION, SOLUTION INTRAVENOUS CONTINUOUS
Status: DISCONTINUED | OUTPATIENT
Start: 2020-02-25 | End: 2020-02-28 | Stop reason: HOSPADM

## 2020-02-25 RX ORDER — SACCHAROMYCES BOULARDII 250 MG
250 CAPSULE ORAL 2 TIMES DAILY
Status: DISCONTINUED | OUTPATIENT
Start: 2020-02-25 | End: 2020-02-28 | Stop reason: HOSPADM

## 2020-02-25 RX ORDER — PROMETHAZINE HYDROCHLORIDE 12.5 MG/1
12.5 SUPPOSITORY RECTAL EVERY 6 HOURS PRN
Status: DISCONTINUED | OUTPATIENT
Start: 2020-02-25 | End: 2020-02-28 | Stop reason: HOSPADM

## 2020-02-25 RX ORDER — VILAZODONE HYDROCHLORIDE 40 MG/1
20 TABLET ORAL DAILY
Status: DISCONTINUED | OUTPATIENT
Start: 2020-02-26 | End: 2020-02-28 | Stop reason: HOSPADM

## 2020-02-25 RX ORDER — CLONIDINE HYDROCHLORIDE 0.1 MG/1
0.1 TABLET ORAL 3 TIMES DAILY
Status: DISCONTINUED | OUTPATIENT
Start: 2020-02-25 | End: 2020-02-28 | Stop reason: HOSPADM

## 2020-02-25 RX ORDER — SODIUM CHLORIDE 0.9 % (FLUSH) 0.9 %
10 SYRINGE (ML) INJECTION AS NEEDED
Status: DISCONTINUED | OUTPATIENT
Start: 2020-02-25 | End: 2020-02-28 | Stop reason: HOSPADM

## 2020-02-25 RX ORDER — HYDROCODONE BITARTRATE AND ACETAMINOPHEN 10; 325 MG/1; MG/1
1 TABLET ORAL EVERY 8 HOURS PRN
Status: DISCONTINUED | OUTPATIENT
Start: 2020-02-25 | End: 2020-02-28 | Stop reason: HOSPADM

## 2020-02-25 RX ORDER — ACETAMINOPHEN 650 MG/1
650 SUPPOSITORY RECTAL EVERY 4 HOURS PRN
Status: DISCONTINUED | OUTPATIENT
Start: 2020-02-25 | End: 2020-02-28 | Stop reason: HOSPADM

## 2020-02-25 RX ORDER — SODIUM CHLORIDE 0.9 % (FLUSH) 0.9 %
10 SYRINGE (ML) INJECTION EVERY 12 HOURS SCHEDULED
Status: DISCONTINUED | OUTPATIENT
Start: 2020-02-25 | End: 2020-02-28 | Stop reason: HOSPADM

## 2020-02-25 RX ORDER — TOPIRAMATE 25 MG/1
50 TABLET ORAL NIGHTLY
Status: DISCONTINUED | OUTPATIENT
Start: 2020-02-25 | End: 2020-02-28 | Stop reason: HOSPADM

## 2020-02-25 RX ORDER — ONDANSETRON 4 MG/1
4 TABLET, FILM COATED ORAL EVERY 6 HOURS PRN
Status: DISCONTINUED | OUTPATIENT
Start: 2020-02-25 | End: 2020-02-28 | Stop reason: HOSPADM

## 2020-02-25 RX ORDER — ALBUTEROL SULFATE 2.5 MG/3ML
2.5 SOLUTION RESPIRATORY (INHALATION) EVERY 4 HOURS PRN
Status: DISCONTINUED | OUTPATIENT
Start: 2020-02-25 | End: 2020-02-28 | Stop reason: HOSPADM

## 2020-02-25 RX ORDER — ONDANSETRON 2 MG/ML
4 INJECTION INTRAMUSCULAR; INTRAVENOUS EVERY 6 HOURS PRN
Status: DISCONTINUED | OUTPATIENT
Start: 2020-02-25 | End: 2020-02-28 | Stop reason: HOSPADM

## 2020-02-25 RX ORDER — MAGNESIUM SULFATE 1 G/100ML
1 INJECTION INTRAVENOUS AS NEEDED
Status: DISCONTINUED | OUTPATIENT
Start: 2020-02-25 | End: 2020-02-28 | Stop reason: HOSPADM

## 2020-02-25 RX ORDER — AMLODIPINE BESYLATE 5 MG/1
10 TABLET ORAL DAILY
Status: DISCONTINUED | OUTPATIENT
Start: 2020-02-26 | End: 2020-02-28 | Stop reason: HOSPADM

## 2020-02-25 RX ORDER — GABAPENTIN 300 MG/1
600 CAPSULE ORAL 3 TIMES DAILY
Status: DISCONTINUED | OUTPATIENT
Start: 2020-02-25 | End: 2020-02-28 | Stop reason: HOSPADM

## 2020-02-25 RX ORDER — PEG-3350, SODIUM SULFATE, SODIUM CHLORIDE, POTASSIUM CHLORIDE, SODIUM ASCORBATE AND ASCORBIC ACID 7.5-2.691G
1000 KIT ORAL EVERY 12 HOURS
Status: DISPENSED | OUTPATIENT
Start: 2020-02-25 | End: 2020-02-26

## 2020-02-25 RX ORDER — PANTOPRAZOLE SODIUM 40 MG/1
40 TABLET, DELAYED RELEASE ORAL
Status: DISCONTINUED | OUTPATIENT
Start: 2020-02-26 | End: 2020-02-28 | Stop reason: HOSPADM

## 2020-02-25 RX ORDER — HYDROXYCHLOROQUINE SULFATE 200 MG/1
200 TABLET, FILM COATED ORAL 2 TIMES DAILY
Status: DISCONTINUED | OUTPATIENT
Start: 2020-02-25 | End: 2020-02-28 | Stop reason: HOSPADM

## 2020-02-25 RX ORDER — PROMETHAZINE HYDROCHLORIDE 25 MG/1
TABLET ORAL
Qty: 60 | Refills: 1 | Status: ACTIVE

## 2020-02-25 RX ORDER — ACETAMINOPHEN 160 MG/5ML
650 SOLUTION ORAL EVERY 4 HOURS PRN
Status: DISCONTINUED | OUTPATIENT
Start: 2020-02-25 | End: 2020-02-28 | Stop reason: HOSPADM

## 2020-02-25 RX ORDER — CHOLECALCIFEROL (VITAMIN D3) 125 MCG
5 CAPSULE ORAL NIGHTLY PRN
Status: DISCONTINUED | OUTPATIENT
Start: 2020-02-25 | End: 2020-02-28 | Stop reason: HOSPADM

## 2020-02-25 RX ORDER — BISACODYL 10 MG
10 SUPPOSITORY, RECTAL RECTAL DAILY PRN
Status: DISCONTINUED | OUTPATIENT
Start: 2020-02-25 | End: 2020-02-28 | Stop reason: HOSPADM

## 2020-02-25 RX ORDER — ACETAMINOPHEN 325 MG/1
650 TABLET ORAL EVERY 4 HOURS PRN
Status: DISCONTINUED | OUTPATIENT
Start: 2020-02-25 | End: 2020-02-28 | Stop reason: HOSPADM

## 2020-02-25 RX ORDER — FAMOTIDINE 10 MG/ML
20 INJECTION, SOLUTION INTRAVENOUS EVERY 12 HOURS SCHEDULED
Status: DISCONTINUED | OUTPATIENT
Start: 2020-02-25 | End: 2020-02-25

## 2020-02-25 RX ORDER — POTASSIUM CHLORIDE 20 MEQ/1
40 TABLET, EXTENDED RELEASE ORAL AS NEEDED
Status: DISCONTINUED | OUTPATIENT
Start: 2020-02-25 | End: 2020-02-28 | Stop reason: HOSPADM

## 2020-02-25 RX ORDER — NICOTINE 21 MG/24HR
1 PATCH, TRANSDERMAL 24 HOURS TRANSDERMAL EVERY 24 HOURS
Status: DISCONTINUED | OUTPATIENT
Start: 2020-02-25 | End: 2020-02-28 | Stop reason: HOSPADM

## 2020-02-25 RX ORDER — LISINOPRIL 20 MG/1
20 TABLET ORAL EVERY 12 HOURS SCHEDULED
Status: DISCONTINUED | OUTPATIENT
Start: 2020-02-25 | End: 2020-02-28 | Stop reason: HOSPADM

## 2020-02-25 RX ORDER — KETOROLAC TROMETHAMINE 15 MG/ML
15 INJECTION, SOLUTION INTRAMUSCULAR; INTRAVENOUS EVERY 6 HOURS PRN
Status: ACTIVE | OUTPATIENT
Start: 2020-02-25 | End: 2020-02-27

## 2020-02-25 RX ORDER — ALUMINA, MAGNESIA, AND SIMETHICONE 2400; 2400; 240 MG/30ML; MG/30ML; MG/30ML
15 SUSPENSION ORAL EVERY 6 HOURS PRN
Status: DISCONTINUED | OUTPATIENT
Start: 2020-02-25 | End: 2020-02-28 | Stop reason: HOSPADM

## 2020-02-25 RX ORDER — LIDOCAINE 50 MG/G
1 PATCH TOPICAL NIGHTLY
Status: DISCONTINUED | OUTPATIENT
Start: 2020-02-25 | End: 2020-02-28 | Stop reason: HOSPADM

## 2020-02-25 RX ORDER — METOCLOPRAMIDE HYDROCHLORIDE 5 MG/ML
10 INJECTION INTRAMUSCULAR; INTRAVENOUS EVERY 6 HOURS
Status: DISCONTINUED | OUTPATIENT
Start: 2020-02-25 | End: 2020-02-27

## 2020-02-25 RX ADMIN — METOPROLOL TARTRATE 25 MG: 25 TABLET, FILM COATED ORAL at 22:20

## 2020-02-25 RX ADMIN — Medication 10 ML: at 22:30

## 2020-02-25 RX ADMIN — LIDOCAINE 1 PATCH: 50 PATCH CUTANEOUS at 22:20

## 2020-02-25 RX ADMIN — HYDROXYCHLOROQUINE SULFATE 200 MG: 200 TABLET, FILM COATED ORAL at 22:20

## 2020-02-25 RX ADMIN — GABAPENTIN 600 MG: 300 CAPSULE ORAL at 16:26

## 2020-02-25 RX ADMIN — ONDANSETRON 4 MG: 2 INJECTION INTRAMUSCULAR; INTRAVENOUS at 22:29

## 2020-02-25 RX ADMIN — GABAPENTIN 600 MG: 300 CAPSULE ORAL at 22:19

## 2020-02-25 RX ADMIN — NICOTINE 1 PATCH: 21 PATCH TRANSDERMAL at 16:25

## 2020-02-25 RX ADMIN — CLONIDINE HYDROCHLORIDE 0.1 MG: 0.1 TABLET ORAL at 22:25

## 2020-02-25 RX ADMIN — METOCLOPRAMIDE 10 MG: 5 INJECTION, SOLUTION INTRAMUSCULAR; INTRAVENOUS at 22:26

## 2020-02-25 RX ADMIN — SODIUM CHLORIDE 100 ML/HR: 900 INJECTION, SOLUTION INTRAVENOUS at 16:26

## 2020-02-25 RX ADMIN — ONDANSETRON 4 MG: 2 INJECTION INTRAMUSCULAR; INTRAVENOUS at 16:26

## 2020-02-25 RX ADMIN — Medication 250 MG: at 22:19

## 2020-02-25 RX ADMIN — LISINOPRIL 20 MG: 20 TABLET ORAL at 22:20

## 2020-02-25 NOTE — TELEPHONE ENCOUNTER
Patient came to the office today for an appointment, however she was unable to leave her van due to severe nausea, vomiting, and diarrhea.  I went out to her vehicle, took her blood pressure, checked her pulse, and listen to her heart and lungs.  Her blood pressure was 124/86 and her pulse was 98.  Heart and lungs were normal and clear on auscultation.  Her abdominal area is diffusely tender throughout.  This patient is well-known to me, I recommended she be admitted for fluid resuscitation as well as further work-up of her symptoms for which she has previously been seen by me in the office as well as in the emergency department and nothing specific has been found to account for her symptoms.  She did see the gastroenterologist today and stool testing was ordered.  I called the on-call hospitalist group and they agreed to admit the patient to [facility].  Patient was informed and agreed to go over to the hospital for admission.

## 2020-02-26 ENCOUNTER — ANESTHESIA (OUTPATIENT)
Dept: GASTROENTEROLOGY | Facility: HOSPITAL | Age: 50
End: 2020-02-26

## 2020-02-26 ENCOUNTER — ON CAMPUS - OUTPATIENT (OUTPATIENT)
Dept: URBAN - METROPOLITAN AREA HOSPITAL 85 | Facility: HOSPITAL | Age: 50
End: 2020-02-26
Payer: COMMERCIAL

## 2020-02-26 ENCOUNTER — ANESTHESIA EVENT (OUTPATIENT)
Dept: GASTROENTEROLOGY | Facility: HOSPITAL | Age: 50
End: 2020-02-26

## 2020-02-26 ENCOUNTER — APPOINTMENT (OUTPATIENT)
Dept: CT IMAGING | Facility: HOSPITAL | Age: 50
End: 2020-02-26

## 2020-02-26 DIAGNOSIS — R11.2 NAUSEA WITH VOMITING, UNSPECIFIED: ICD-10-CM

## 2020-02-26 DIAGNOSIS — K21.9 GASTRO-ESOPHAGEAL REFLUX DISEASE WITHOUT ESOPHAGITIS: ICD-10-CM

## 2020-02-26 DIAGNOSIS — I10 ESSENTIAL (PRIMARY) HYPERTENSION: ICD-10-CM

## 2020-02-26 DIAGNOSIS — R19.7 DIARRHEA, UNSPECIFIED: ICD-10-CM

## 2020-02-26 DIAGNOSIS — R13.10 DYSPHAGIA, UNSPECIFIED: ICD-10-CM

## 2020-02-26 DIAGNOSIS — K57.90 DIVERTICULOSIS OF INTESTINE, PART UNSPECIFIED, WITHOUT PERFO: ICD-10-CM

## 2020-02-26 DIAGNOSIS — R10.9 UNSPECIFIED ABDOMINAL PAIN: ICD-10-CM

## 2020-02-26 DIAGNOSIS — K64.8 OTHER HEMORRHOIDS: ICD-10-CM

## 2020-02-26 DIAGNOSIS — E87.6 HYPOKALEMIA: ICD-10-CM

## 2020-02-26 DIAGNOSIS — J44.9 CHRONIC OBSTRUCTIVE PULMONARY DISEASE, UNSPECIFIED: ICD-10-CM

## 2020-02-26 LAB
ALBUMIN SERPL-MCNC: 3.6 G/DL (ref 3.5–5.2)
ALBUMIN/GLOB SERPL: 1.3 G/DL
ALP SERPL-CCNC: 64 U/L (ref 39–117)
ALT SERPL W P-5'-P-CCNC: 15 U/L (ref 1–33)
ANION GAP SERPL CALCULATED.3IONS-SCNC: 11 MMOL/L (ref 5–15)
AST SERPL-CCNC: 12 U/L (ref 1–32)
BASOPHILS # BLD AUTO: 0 10*3/MM3 (ref 0–0.2)
BASOPHILS NFR BLD AUTO: 0.2 % (ref 0–1.5)
BILIRUB SERPL-MCNC: 0.5 MG/DL (ref 0.2–1.2)
BUN BLD-MCNC: 16 MG/DL (ref 6–20)
BUN/CREAT SERPL: 22.2 (ref 7–25)
C DIFF GDH STL QL: NEGATIVE
CALCIUM SPEC-SCNC: 8.6 MG/DL (ref 8.6–10.5)
CHLORIDE SERPL-SCNC: 99 MMOL/L (ref 98–107)
CO2 SERPL-SCNC: 25 MMOL/L (ref 22–29)
CREAT BLD-MCNC: 0.72 MG/DL (ref 0.57–1)
DEPRECATED RDW RBC AUTO: 46.4 FL (ref 37–54)
EOSINOPHIL # BLD AUTO: 0.2 10*3/MM3 (ref 0–0.4)
EOSINOPHIL NFR BLD AUTO: 1.5 % (ref 0.3–6.2)
ERYTHROCYTE [DISTWIDTH] IN BLOOD BY AUTOMATED COUNT: 14.1 % (ref 12.3–15.4)
GFR SERPL CREATININE-BSD FRML MDRD: 86 ML/MIN/1.73
GLOBULIN UR ELPH-MCNC: 2.8 GM/DL
GLUCOSE BLD-MCNC: 93 MG/DL (ref 65–99)
HCT VFR BLD AUTO: 39.3 % (ref 34–46.6)
HGB BLD-MCNC: 13.7 G/DL (ref 12–15.9)
LYMPHOCYTES # BLD AUTO: 2.9 10*3/MM3 (ref 0.7–3.1)
LYMPHOCYTES NFR BLD AUTO: 28.3 % (ref 19.6–45.3)
MAGNESIUM SERPL-MCNC: 1.9 MG/DL (ref 1.6–2.6)
MCH RBC QN AUTO: 32.7 PG (ref 26.6–33)
MCHC RBC AUTO-ENTMCNC: 34.9 G/DL (ref 31.5–35.7)
MCV RBC AUTO: 93.7 FL (ref 79–97)
MONOCYTES # BLD AUTO: 0.9 10*3/MM3 (ref 0.1–0.9)
MONOCYTES NFR BLD AUTO: 8.3 % (ref 5–12)
NEUTROPHILS # BLD AUTO: 6.4 10*3/MM3 (ref 1.7–7)
NEUTROPHILS NFR BLD AUTO: 61.7 % (ref 42.7–76)
NRBC BLD AUTO-RTO: 0.1 /100 WBC (ref 0–0.2)
PLATELET # BLD AUTO: 167 10*3/MM3 (ref 140–450)
PMV BLD AUTO: 9.1 FL (ref 6–12)
POTASSIUM BLD-SCNC: 3.2 MMOL/L (ref 3.5–5.2)
POTASSIUM BLD-SCNC: 3.4 MMOL/L (ref 3.5–5.2)
PROT SERPL-MCNC: 6.4 G/DL (ref 6–8.5)
RBC # BLD AUTO: 4.19 10*6/MM3 (ref 3.77–5.28)
SODIUM BLD-SCNC: 135 MMOL/L (ref 136–145)
WBC NRBC COR # BLD: 10.3 10*3/MM3 (ref 3.4–10.8)

## 2020-02-26 PROCEDURE — 96361 HYDRATE IV INFUSION ADD-ON: CPT

## 2020-02-26 PROCEDURE — 99215 OFFICE O/P EST HI 40 MIN: CPT | Mod: 25 | Performed by: NURSE PRACTITIONER

## 2020-02-26 PROCEDURE — 25010000002 ONDANSETRON PER 1 MG: Performed by: INTERNAL MEDICINE

## 2020-02-26 PROCEDURE — G0378 HOSPITAL OBSERVATION PER HR: HCPCS

## 2020-02-26 PROCEDURE — 80053 COMPREHEN METABOLIC PANEL: CPT | Performed by: INTERNAL MEDICINE

## 2020-02-26 PROCEDURE — 45380 COLONOSCOPY AND BIOPSY: CPT | Performed by: INTERNAL MEDICINE

## 2020-02-26 PROCEDURE — 25010000002 PROMETHAZINE PER 50 MG: Performed by: NURSE PRACTITIONER

## 2020-02-26 PROCEDURE — 74177 CT ABD & PELVIS W/CONTRAST: CPT

## 2020-02-26 PROCEDURE — 25010000002 PROMETHAZINE PER 50 MG: Performed by: INTERNAL MEDICINE

## 2020-02-26 PROCEDURE — 25010000002 METOCLOPRAMIDE PER 10 MG: Performed by: INTERNAL MEDICINE

## 2020-02-26 PROCEDURE — 99225 PR SBSQ OBSERVATION CARE/DAY 25 MINUTES: CPT | Performed by: INTERNAL MEDICINE

## 2020-02-26 PROCEDURE — 96376 TX/PRO/DX INJ SAME DRUG ADON: CPT

## 2020-02-26 PROCEDURE — 0 IOPAMIDOL PER 1 ML: Performed by: INTERNAL MEDICINE

## 2020-02-26 PROCEDURE — 88305 TISSUE EXAM BY PATHOLOGIST: CPT | Performed by: INTERNAL MEDICINE

## 2020-02-26 PROCEDURE — 85025 COMPLETE CBC W/AUTO DIFF WBC: CPT | Performed by: INTERNAL MEDICINE

## 2020-02-26 PROCEDURE — 25010000002 ONDANSETRON PER 1 MG: Performed by: NURSE PRACTITIONER

## 2020-02-26 PROCEDURE — 25010000002 PROPOFOL 10 MG/ML EMULSION: Performed by: ANESTHESIOLOGY

## 2020-02-26 PROCEDURE — 96375 TX/PRO/DX INJ NEW DRUG ADDON: CPT

## 2020-02-26 PROCEDURE — 83735 ASSAY OF MAGNESIUM: CPT | Performed by: NURSE PRACTITIONER

## 2020-02-26 PROCEDURE — 84132 ASSAY OF SERUM POTASSIUM: CPT | Performed by: INTERNAL MEDICINE

## 2020-02-26 RX ORDER — ATROPINE SULFATE 1 MG/ML
0.5 INJECTION, SOLUTION INTRAMUSCULAR; INTRAVENOUS; SUBCUTANEOUS ONCE AS NEEDED
Status: CANCELLED | OUTPATIENT
Start: 2020-02-26

## 2020-02-26 RX ORDER — ALBUTEROL SULFATE 2.5 MG/3ML
2.5 SOLUTION RESPIRATORY (INHALATION) ONCE AS NEEDED
Status: CANCELLED | OUTPATIENT
Start: 2020-02-26

## 2020-02-26 RX ORDER — PROPOFOL 10 MG/ML
VIAL (ML) INTRAVENOUS AS NEEDED
Status: DISCONTINUED | OUTPATIENT
Start: 2020-02-26 | End: 2020-02-26 | Stop reason: SURG

## 2020-02-26 RX ORDER — HYDRALAZINE HYDROCHLORIDE 20 MG/ML
5 INJECTION INTRAMUSCULAR; INTRAVENOUS
Status: CANCELLED | OUTPATIENT
Start: 2020-02-26

## 2020-02-26 RX ORDER — HYDROMORPHONE HCL 110MG/55ML
0.5 PATIENT CONTROLLED ANALGESIA SYRINGE INTRAVENOUS
Status: CANCELLED | OUTPATIENT
Start: 2020-02-26 | End: 2020-03-07

## 2020-02-26 RX ORDER — FLUMAZENIL 0.1 MG/ML
0.2 INJECTION INTRAVENOUS AS NEEDED
Status: CANCELLED | OUTPATIENT
Start: 2020-02-26

## 2020-02-26 RX ORDER — PROMETHAZINE HYDROCHLORIDE 25 MG/1
25 TABLET ORAL ONCE AS NEEDED
Status: CANCELLED | OUTPATIENT
Start: 2020-02-26

## 2020-02-26 RX ORDER — PHENYLEPHRINE HCL IN 0.9% NACL 0.5 MG/5ML
SYRINGE (ML) INTRAVENOUS AS NEEDED
Status: DISCONTINUED | OUTPATIENT
Start: 2020-02-26 | End: 2020-02-26 | Stop reason: SURG

## 2020-02-26 RX ORDER — NALBUPHINE HCL 10 MG/ML
10 AMPUL (ML) INJECTION EVERY 4 HOURS PRN
Status: CANCELLED | OUTPATIENT
Start: 2020-02-26

## 2020-02-26 RX ORDER — LIDOCAINE HYDROCHLORIDE 10 MG/ML
INJECTION, SOLUTION EPIDURAL; INFILTRATION; INTRACAUDAL; PERINEURAL AS NEEDED
Status: DISCONTINUED | OUTPATIENT
Start: 2020-02-26 | End: 2020-02-26 | Stop reason: SURG

## 2020-02-26 RX ORDER — NALOXONE HCL 0.4 MG/ML
0.4 VIAL (ML) INJECTION AS NEEDED
Status: CANCELLED | OUTPATIENT
Start: 2020-02-26

## 2020-02-26 RX ORDER — LORAZEPAM 2 MG/ML
1 INJECTION INTRAMUSCULAR
Status: CANCELLED | OUTPATIENT
Start: 2020-02-26 | End: 2020-03-07

## 2020-02-26 RX ORDER — COLESEVELAM 180 1/1
1875 TABLET ORAL 2 TIMES DAILY WITH MEALS
Status: DISCONTINUED | OUTPATIENT
Start: 2020-02-26 | End: 2020-02-28 | Stop reason: HOSPADM

## 2020-02-26 RX ORDER — MEPERIDINE HYDROCHLORIDE 25 MG/ML
12.5 INJECTION INTRAMUSCULAR; INTRAVENOUS; SUBCUTANEOUS
Status: CANCELLED | OUTPATIENT
Start: 2020-02-26 | End: 2020-02-27

## 2020-02-26 RX ORDER — HYDROMORPHONE HCL 110MG/55ML
0.5 PATIENT CONTROLLED ANALGESIA SYRINGE INTRAVENOUS
Status: CANCELLED | OUTPATIENT
Start: 2020-02-26

## 2020-02-26 RX ORDER — MESALAMINE 1.2 G/1
4.8 TABLET, DELAYED RELEASE ORAL
Status: DISCONTINUED | OUTPATIENT
Start: 2020-02-27 | End: 2020-02-28 | Stop reason: HOSPADM

## 2020-02-26 RX ORDER — EPHEDRINE SULFATE 50 MG/ML
5 INJECTION, SOLUTION INTRAVENOUS ONCE AS NEEDED
Status: CANCELLED | OUTPATIENT
Start: 2020-02-26

## 2020-02-26 RX ORDER — PHENYLEPHRINE HCL IN 0.9% NACL 0.5 MG/5ML
.5-3 SYRINGE (ML) INTRAVENOUS
Status: CANCELLED | OUTPATIENT
Start: 2020-02-26

## 2020-02-26 RX ORDER — ONDANSETRON 2 MG/ML
4 INJECTION INTRAMUSCULAR; INTRAVENOUS ONCE AS NEEDED
Status: CANCELLED | OUTPATIENT
Start: 2020-02-26

## 2020-02-26 RX ORDER — MIDAZOLAM HYDROCHLORIDE 1 MG/ML
1 INJECTION INTRAMUSCULAR; INTRAVENOUS
Status: CANCELLED | OUTPATIENT
Start: 2020-02-26

## 2020-02-26 RX ORDER — NALBUPHINE HCL 10 MG/ML
2 AMPUL (ML) INJECTION EVERY 4 HOURS PRN
Status: CANCELLED | OUTPATIENT
Start: 2020-02-26

## 2020-02-26 RX ORDER — PROMETHAZINE HYDROCHLORIDE 25 MG/1
25 SUPPOSITORY RECTAL ONCE AS NEEDED
Status: CANCELLED | OUTPATIENT
Start: 2020-02-26

## 2020-02-26 RX ORDER — DIPHENHYDRAMINE HYDROCHLORIDE 50 MG/ML
12.5 INJECTION INTRAMUSCULAR; INTRAVENOUS
Status: CANCELLED | OUTPATIENT
Start: 2020-02-26

## 2020-02-26 RX ORDER — PROMETHAZINE HYDROCHLORIDE 25 MG/ML
6.25 INJECTION, SOLUTION INTRAMUSCULAR; INTRAVENOUS ONCE AS NEEDED
Status: CANCELLED | OUTPATIENT
Start: 2020-02-26

## 2020-02-26 RX ORDER — LABETALOL HYDROCHLORIDE 5 MG/ML
5 INJECTION, SOLUTION INTRAVENOUS
Status: CANCELLED | OUTPATIENT
Start: 2020-02-26

## 2020-02-26 RX ADMIN — METOCLOPRAMIDE 10 MG: 5 INJECTION, SOLUTION INTRAMUSCULAR; INTRAVENOUS at 09:59

## 2020-02-26 RX ADMIN — COLESEVELAM HCL 1875 MG: 625 TABLET, FILM COATED ORAL at 18:18

## 2020-02-26 RX ADMIN — SODIUM CHLORIDE 12.5 MG: 900 INJECTION, SOLUTION INTRAVENOUS at 09:59

## 2020-02-26 RX ADMIN — PHENYLEPHRINE HYDROCHLORIDE 300 MCG: 10 INJECTION INTRAVENOUS at 13:36

## 2020-02-26 RX ADMIN — Medication 10 ML: at 21:01

## 2020-02-26 RX ADMIN — Medication 10 ML: at 09:58

## 2020-02-26 RX ADMIN — METOCLOPRAMIDE 10 MG: 5 INJECTION, SOLUTION INTRAMUSCULAR; INTRAVENOUS at 04:16

## 2020-02-26 RX ADMIN — METOCLOPRAMIDE 10 MG: 5 INJECTION, SOLUTION INTRAMUSCULAR; INTRAVENOUS at 15:57

## 2020-02-26 RX ADMIN — PHENYLEPHRINE HYDROCHLORIDE 300 MCG: 10 INJECTION INTRAVENOUS at 13:41

## 2020-02-26 RX ADMIN — ONDANSETRON 4 MG: 2 INJECTION INTRAMUSCULAR; INTRAVENOUS at 15:57

## 2020-02-26 RX ADMIN — ONDANSETRON 4 MG: 2 INJECTION INTRAMUSCULAR; INTRAVENOUS at 21:58

## 2020-02-26 RX ADMIN — LIDOCAINE HYDROCHLORIDE 50 MG: 10 INJECTION, SOLUTION EPIDURAL; INFILTRATION; INTRACAUDAL; PERINEURAL at 12:51

## 2020-02-26 RX ADMIN — POLYETHYLENE GLYCOL 3350, SODIUM SULFATE, SODIUM CHLORIDE, POTASSIUM CHLORIDE, ASCORBIC ACID, SODIUM ASCORBATE 1000 ML: KIT at 08:00

## 2020-02-26 RX ADMIN — POTASSIUM CHLORIDE 40 MEQ: 1500 TABLET, EXTENDED RELEASE ORAL at 15:57

## 2020-02-26 RX ADMIN — SODIUM CHLORIDE 100 ML/HR: 900 INJECTION, SOLUTION INTRAVENOUS at 16:01

## 2020-02-26 RX ADMIN — CLONIDINE HYDROCHLORIDE 0.1 MG: 0.1 TABLET ORAL at 16:13

## 2020-02-26 RX ADMIN — SODIUM CHLORIDE 12.5 MG: 900 INJECTION, SOLUTION INTRAVENOUS at 18:18

## 2020-02-26 RX ADMIN — PROPOFOL 600 MG: 10 INJECTION, EMULSION INTRAVENOUS at 12:50

## 2020-02-26 RX ADMIN — ONDANSETRON 4 MG: 2 INJECTION INTRAMUSCULAR; INTRAVENOUS at 05:57

## 2020-02-26 RX ADMIN — SODIUM CHLORIDE 12.5 MG: 900 INJECTION, SOLUTION INTRAVENOUS at 01:44

## 2020-02-26 RX ADMIN — LIDOCAINE 1 PATCH: 50 PATCH CUTANEOUS at 20:58

## 2020-02-26 RX ADMIN — Medication 250 MG: at 21:00

## 2020-02-26 RX ADMIN — POTASSIUM CHLORIDE 40 MEQ: 1500 TABLET, EXTENDED RELEASE ORAL at 21:56

## 2020-02-26 RX ADMIN — GABAPENTIN 600 MG: 300 CAPSULE ORAL at 21:00

## 2020-02-26 RX ADMIN — METOPROLOL TARTRATE 25 MG: 25 TABLET, FILM COATED ORAL at 21:00

## 2020-02-26 RX ADMIN — POTASSIUM CHLORIDE 40 MEQ: 1500 TABLET, EXTENDED RELEASE ORAL at 05:54

## 2020-02-26 RX ADMIN — IOPAMIDOL 100 ML: 755 INJECTION, SOLUTION INTRAVENOUS at 02:00

## 2020-02-26 RX ADMIN — METOCLOPRAMIDE 10 MG: 5 INJECTION, SOLUTION INTRAMUSCULAR; INTRAVENOUS at 21:57

## 2020-02-26 RX ADMIN — CLONIDINE HYDROCHLORIDE 0.1 MG: 0.1 TABLET ORAL at 21:00

## 2020-02-26 RX ADMIN — HYDROXYCHLOROQUINE SULFATE 200 MG: 200 TABLET, FILM COATED ORAL at 21:00

## 2020-02-26 RX ADMIN — PANTOPRAZOLE SODIUM 40 MG: 40 TABLET, DELAYED RELEASE ORAL at 05:54

## 2020-02-26 RX ADMIN — NICOTINE 1 PATCH: 21 PATCH TRANSDERMAL at 16:09

## 2020-02-26 RX ADMIN — GABAPENTIN 600 MG: 300 CAPSULE ORAL at 15:57

## 2020-02-26 RX ADMIN — LISINOPRIL 20 MG: 20 TABLET ORAL at 21:00

## 2020-02-26 NOTE — ANESTHESIA POSTPROCEDURE EVALUATION
Patient: Merry Thornton    Procedure Summary     Date:  02/26/20 Room / Location:  [facility] ENDOSCOPY 1 / [facility] ENDOSCOPY    Anesthesia Start:  1327 Anesthesia Stop:  1355    Procedure:  COLONOSCOPY WITH BIOPSY X1 AREA (N/A ) Diagnosis:       Nausea vomiting and diarrhea      (Nausea vomiting and diarrhea [R11.2, R19.7])    Surgeon:  Michael Cheng MD Provider:  Giancarlo Gan MD    Anesthesia Type:  MAC ASA Status:  4          Anesthesia Type: MAC    Vitals  Vitals Value Taken Time   /50 2/26/2020  2:07 PM   Temp     Pulse 81 2/26/2020  2:07 PM   Resp 14 2/26/2020  2:07 PM   SpO2 100 % 2/26/2020  2:07 PM           Post Anesthesia Care and Evaluation    Patient location during evaluation: PHASE II  Patient participation: complete - patient participated  Level of consciousness: awake  Pain scale: See nurse's notes for pain score.  Pain management: adequate  Airway patency: patent  Anesthetic complications: No anesthetic complications  PONV Status: none  Cardiovascular status: acceptable  Respiratory status: acceptable  Hydration status: acceptable    Comments: Patient seen and examined postoperatively; vital signs stable; SpO2 greater than or equal to 90%; cardiopulmonary status stable; nausea/vomiting adequately controlled; pain adequately controlled; no apparent anesthesia complications; patient discharged from anesthesia care when discharge criteria were met

## 2020-02-26 NOTE — ANESTHESIA PREPROCEDURE EVALUATION
Anesthesia Evaluation                  Airway   Mallampati: II  TM distance: >3 FB  Neck ROM: full  No difficulty expected  Dental - normal exam     Pulmonary - normal exam    breath sounds clear to auscultation  (+) COPD, asthma,sleep apnea,   Cardiovascular - normal exam    Rhythm: regular  Rate: normal    (+) hypertension, CAD,       Neuro/Psych  (+) headaches, syncope, psychiatric history Anxiety,     GI/Hepatic/Renal/Endo    (+) morbid obesity, GERD,      Musculoskeletal     (+) neck pain,   Abdominal  - normal exam   Substance History      OB/GYN          Other   arthritis,                      Anesthesia Plan    ASA 4     MAC     intravenous induction     Anesthetic plan, all risks, benefits, and alternatives have been provided, discussed and informed consent has been obtained with: patient.

## 2020-02-27 ENCOUNTER — ON CAMPUS - OUTPATIENT (OUTPATIENT)
Dept: URBAN - METROPOLITAN AREA HOSPITAL 85 | Facility: HOSPITAL | Age: 50
End: 2020-02-27
Payer: COMMERCIAL

## 2020-02-27 DIAGNOSIS — R11.2 NAUSEA WITH VOMITING, UNSPECIFIED: ICD-10-CM

## 2020-02-27 DIAGNOSIS — R13.10 DYSPHAGIA, UNSPECIFIED: ICD-10-CM

## 2020-02-27 DIAGNOSIS — J44.9 CHRONIC OBSTRUCTIVE PULMONARY DISEASE, UNSPECIFIED: ICD-10-CM

## 2020-02-27 DIAGNOSIS — R19.7 DIARRHEA, UNSPECIFIED: ICD-10-CM

## 2020-02-27 DIAGNOSIS — E87.6 HYPOKALEMIA: ICD-10-CM

## 2020-02-27 DIAGNOSIS — I10 ESSENTIAL (PRIMARY) HYPERTENSION: ICD-10-CM

## 2020-02-27 LAB
ALBUMIN SERPL-MCNC: 3.2 G/DL (ref 3.5–5.2)
ALBUMIN/GLOB SERPL: 1.1 G/DL
ALP SERPL-CCNC: 79 U/L (ref 39–117)
ALT SERPL W P-5'-P-CCNC: 12 U/L (ref 1–33)
ANION GAP SERPL CALCULATED.3IONS-SCNC: 9 MMOL/L (ref 5–15)
AST SERPL-CCNC: 18 U/L (ref 1–32)
BILIRUB SERPL-MCNC: 0.3 MG/DL (ref 0.2–1.2)
BUN BLD-MCNC: 14 MG/DL (ref 6–20)
BUN/CREAT SERPL: 21.5 (ref 7–25)
CALCIUM SPEC-SCNC: 7.9 MG/DL (ref 8.6–10.5)
CHLORIDE SERPL-SCNC: 104 MMOL/L (ref 98–107)
CO2 SERPL-SCNC: 25 MMOL/L (ref 22–29)
CREAT BLD-MCNC: 0.65 MG/DL (ref 0.57–1)
GFR SERPL CREATININE-BSD FRML MDRD: 97 ML/MIN/1.73
GLOBULIN UR ELPH-MCNC: 2.8 GM/DL
GLUCOSE BLD-MCNC: 132 MG/DL (ref 65–99)
LAB AP CASE REPORT: NORMAL
LAB AP CLINICAL INFORMATION: NORMAL
MAGNESIUM SERPL-MCNC: 2 MG/DL (ref 1.6–2.6)
PATH REPORT.FINAL DX SPEC: NORMAL
PATH REPORT.GROSS SPEC: NORMAL
POTASSIUM BLD-SCNC: 3.3 MMOL/L (ref 3.5–5.2)
POTASSIUM BLD-SCNC: 4.1 MMOL/L (ref 3.5–5.2)
PROT SERPL-MCNC: 6 G/DL (ref 6–8.5)
SODIUM BLD-SCNC: 138 MMOL/L (ref 136–145)

## 2020-02-27 PROCEDURE — 25010000002 ONDANSETRON PER 1 MG: Performed by: INTERNAL MEDICINE

## 2020-02-27 PROCEDURE — 25010000002 PROMETHAZINE PER 50 MG: Performed by: INTERNAL MEDICINE

## 2020-02-27 PROCEDURE — 99225 PR SBSQ OBSERVATION CARE/DAY 25 MINUTES: CPT | Performed by: INTERNAL MEDICINE

## 2020-02-27 PROCEDURE — 83735 ASSAY OF MAGNESIUM: CPT | Performed by: INTERNAL MEDICINE

## 2020-02-27 PROCEDURE — 80053 COMPREHEN METABOLIC PANEL: CPT | Performed by: INTERNAL MEDICINE

## 2020-02-27 PROCEDURE — 99214 OFFICE O/P EST MOD 30 MIN: CPT | Performed by: INTERNAL MEDICINE

## 2020-02-27 PROCEDURE — 25010000002 METOCLOPRAMIDE PER 10 MG: Performed by: INTERNAL MEDICINE

## 2020-02-27 PROCEDURE — G0378 HOSPITAL OBSERVATION PER HR: HCPCS

## 2020-02-27 PROCEDURE — 84132 ASSAY OF SERUM POTASSIUM: CPT | Performed by: INTERNAL MEDICINE

## 2020-02-27 RX ORDER — BUDESONIDE 3 MG/1
9 CAPSULE, COATED PELLETS ORAL DAILY
Status: DISCONTINUED | OUTPATIENT
Start: 2020-02-27 | End: 2020-02-28 | Stop reason: HOSPADM

## 2020-02-27 RX ORDER — METOCLOPRAMIDE 10 MG/1
10 TABLET ORAL
Status: DISCONTINUED | OUTPATIENT
Start: 2020-02-27 | End: 2020-02-28 | Stop reason: HOSPADM

## 2020-02-27 RX ADMIN — Medication 10 ML: at 21:48

## 2020-02-27 RX ADMIN — POTASSIUM CHLORIDE 40 MEQ: 1500 TABLET, EXTENDED RELEASE ORAL at 17:19

## 2020-02-27 RX ADMIN — METOCLOPRAMIDE 10 MG: 5 INJECTION, SOLUTION INTRAMUSCULAR; INTRAVENOUS at 04:12

## 2020-02-27 RX ADMIN — CLONIDINE HYDROCHLORIDE 0.1 MG: 0.1 TABLET ORAL at 17:15

## 2020-02-27 RX ADMIN — CLONIDINE HYDROCHLORIDE 0.1 MG: 0.1 TABLET ORAL at 20:30

## 2020-02-27 RX ADMIN — Medication 250 MG: at 08:50

## 2020-02-27 RX ADMIN — Medication 250 MG: at 20:30

## 2020-02-27 RX ADMIN — SODIUM CHLORIDE 100 ML/HR: 900 INJECTION, SOLUTION INTRAVENOUS at 03:20

## 2020-02-27 RX ADMIN — POTASSIUM CHLORIDE 40 MEQ: 1500 TABLET, EXTENDED RELEASE ORAL at 08:51

## 2020-02-27 RX ADMIN — ONDANSETRON 4 MG: 2 INJECTION INTRAMUSCULAR; INTRAVENOUS at 11:27

## 2020-02-27 RX ADMIN — METOCLOPRAMIDE 10 MG: 10 TABLET ORAL at 17:15

## 2020-02-27 RX ADMIN — LIDOCAINE 1 PATCH: 50 PATCH CUTANEOUS at 08:53

## 2020-02-27 RX ADMIN — GABAPENTIN 600 MG: 300 CAPSULE ORAL at 08:50

## 2020-02-27 RX ADMIN — MESALAMINE 4.8 G: 1.2 TABLET, DELAYED RELEASE ORAL at 08:49

## 2020-02-27 RX ADMIN — SODIUM CHLORIDE 100 ML/HR: 900 INJECTION, SOLUTION INTRAVENOUS at 13:29

## 2020-02-27 RX ADMIN — AMLODIPINE BESYLATE 10 MG: 5 TABLET ORAL at 08:51

## 2020-02-27 RX ADMIN — Medication 10 ML: at 08:52

## 2020-02-27 RX ADMIN — CLONIDINE HYDROCHLORIDE 0.1 MG: 0.1 TABLET ORAL at 08:50

## 2020-02-27 RX ADMIN — HYDROXYCHLOROQUINE SULFATE 200 MG: 200 TABLET, FILM COATED ORAL at 08:51

## 2020-02-27 RX ADMIN — HYDROXYCHLOROQUINE SULFATE 200 MG: 200 TABLET, FILM COATED ORAL at 20:30

## 2020-02-27 RX ADMIN — LISINOPRIL 20 MG: 20 TABLET ORAL at 08:51

## 2020-02-27 RX ADMIN — PANTOPRAZOLE SODIUM 40 MG: 40 TABLET, DELAYED RELEASE ORAL at 05:18

## 2020-02-27 RX ADMIN — METOPROLOL TARTRATE 25 MG: 25 TABLET, FILM COATED ORAL at 20:30

## 2020-02-27 RX ADMIN — BUDESONIDE 9 MG: 3 CAPSULE, COATED PELLETS ORAL at 08:50

## 2020-02-27 RX ADMIN — SODIUM CHLORIDE 100 ML/HR: 900 INJECTION, SOLUTION INTRAVENOUS at 02:13

## 2020-02-27 RX ADMIN — COLESEVELAM HCL 1875 MG: 625 TABLET, FILM COATED ORAL at 08:49

## 2020-02-27 RX ADMIN — METOPROLOL TARTRATE 25 MG: 25 TABLET, FILM COATED ORAL at 08:51

## 2020-02-27 RX ADMIN — COLESEVELAM HCL 1875 MG: 625 TABLET, FILM COATED ORAL at 17:14

## 2020-02-27 RX ADMIN — VILAZODONE HYDROCHLORIDE 20 MG: 40 TABLET ORAL at 08:51

## 2020-02-27 RX ADMIN — GABAPENTIN 600 MG: 300 CAPSULE ORAL at 17:15

## 2020-02-27 RX ADMIN — METOCLOPRAMIDE 10 MG: 10 TABLET ORAL at 11:27

## 2020-02-27 RX ADMIN — LIDOCAINE 1 PATCH: 50 PATCH CUTANEOUS at 20:33

## 2020-02-27 RX ADMIN — NICOTINE 1 PATCH: 21 PATCH TRANSDERMAL at 17:16

## 2020-02-27 RX ADMIN — SODIUM CHLORIDE 12.5 MG: 900 INJECTION, SOLUTION INTRAVENOUS at 22:19

## 2020-02-27 RX ADMIN — GABAPENTIN 600 MG: 300 CAPSULE ORAL at 20:30

## 2020-02-27 RX ADMIN — SODIUM CHLORIDE 12.5 MG: 900 INJECTION, SOLUTION INTRAVENOUS at 11:48

## 2020-02-27 RX ADMIN — LISINOPRIL 20 MG: 20 TABLET ORAL at 20:30

## 2020-02-27 RX ADMIN — SODIUM CHLORIDE 12.5 MG: 900 INJECTION, SOLUTION INTRAVENOUS at 04:49

## 2020-02-27 RX ADMIN — SODIUM CHLORIDE 100 ML/HR: 900 INJECTION, SOLUTION INTRAVENOUS at 23:30

## 2020-02-28 ENCOUNTER — TELEPHONE (OUTPATIENT)
Dept: SOCIAL WORK | Facility: HOSPITAL | Age: 50
End: 2020-02-28

## 2020-02-28 ENCOUNTER — NURSE TRIAGE (OUTPATIENT)
Dept: CALL CENTER | Facility: HOSPITAL | Age: 50
End: 2020-02-28

## 2020-02-28 VITALS
HEART RATE: 73 BPM | WEIGHT: 267.2 LBS | OXYGEN SATURATION: 98 % | RESPIRATION RATE: 16 BRPM | SYSTOLIC BLOOD PRESSURE: 113 MMHG | BODY MASS INDEX: 42.94 KG/M2 | HEIGHT: 66 IN | DIASTOLIC BLOOD PRESSURE: 74 MMHG | TEMPERATURE: 97.8 F

## 2020-02-28 LAB
ANION GAP SERPL CALCULATED.3IONS-SCNC: 8 MMOL/L (ref 5–15)
BASOPHILS # BLD AUTO: 0 10*3/MM3 (ref 0–0.2)
BASOPHILS NFR BLD AUTO: 0.5 % (ref 0–1.5)
BUN BLD-MCNC: 10 MG/DL (ref 6–20)
BUN/CREAT SERPL: 15.9 (ref 7–25)
CALCIUM SPEC-SCNC: 8 MG/DL (ref 8.6–10.5)
CHLORIDE SERPL-SCNC: 110 MMOL/L (ref 98–107)
CO2 SERPL-SCNC: 20 MMOL/L (ref 22–29)
CREAT BLD-MCNC: 0.63 MG/DL (ref 0.57–1)
DEPRECATED RDW RBC AUTO: 47.3 FL (ref 37–54)
EOSINOPHIL # BLD AUTO: 0.1 10*3/MM3 (ref 0–0.4)
EOSINOPHIL NFR BLD AUTO: 1.7 % (ref 0.3–6.2)
ERYTHROCYTE [DISTWIDTH] IN BLOOD BY AUTOMATED COUNT: 14.1 % (ref 12.3–15.4)
GFR SERPL CREATININE-BSD FRML MDRD: 100 ML/MIN/1.73
GLUCOSE BLD-MCNC: 109 MG/DL (ref 65–99)
HCT VFR BLD AUTO: 34.7 % (ref 34–46.6)
HGB BLD-MCNC: 11.8 G/DL (ref 12–15.9)
LYMPHOCYTES # BLD AUTO: 1.9 10*3/MM3 (ref 0.7–3.1)
LYMPHOCYTES NFR BLD AUTO: 23.2 % (ref 19.6–45.3)
MAGNESIUM SERPL-MCNC: 2 MG/DL (ref 1.6–2.6)
MCH RBC QN AUTO: 32.4 PG (ref 26.6–33)
MCHC RBC AUTO-ENTMCNC: 34.1 G/DL (ref 31.5–35.7)
MCV RBC AUTO: 95 FL (ref 79–97)
MONOCYTES # BLD AUTO: 0.7 10*3/MM3 (ref 0.1–0.9)
MONOCYTES NFR BLD AUTO: 7.9 % (ref 5–12)
NEUTROPHILS # BLD AUTO: 5.5 10*3/MM3 (ref 1.7–7)
NEUTROPHILS NFR BLD AUTO: 66.7 % (ref 42.7–76)
NRBC BLD AUTO-RTO: 0 /100 WBC (ref 0–0.2)
PLATELET # BLD AUTO: 134 10*3/MM3 (ref 140–450)
PMV BLD AUTO: 8.9 FL (ref 6–12)
POTASSIUM BLD-SCNC: 4.1 MMOL/L (ref 3.5–5.2)
RBC # BLD AUTO: 3.65 10*6/MM3 (ref 3.77–5.28)
SODIUM BLD-SCNC: 138 MMOL/L (ref 136–145)
WBC NRBC COR # BLD: 8.2 10*3/MM3 (ref 3.4–10.8)

## 2020-02-28 PROCEDURE — 80048 BASIC METABOLIC PNL TOTAL CA: CPT | Performed by: INTERNAL MEDICINE

## 2020-02-28 PROCEDURE — 25010000002 ONDANSETRON PER 1 MG: Performed by: INTERNAL MEDICINE

## 2020-02-28 PROCEDURE — 83735 ASSAY OF MAGNESIUM: CPT | Performed by: INTERNAL MEDICINE

## 2020-02-28 PROCEDURE — G0378 HOSPITAL OBSERVATION PER HR: HCPCS

## 2020-02-28 PROCEDURE — 85025 COMPLETE CBC W/AUTO DIFF WBC: CPT | Performed by: INTERNAL MEDICINE

## 2020-02-28 PROCEDURE — 25010000002 PROMETHAZINE PER 50 MG: Performed by: INTERNAL MEDICINE

## 2020-02-28 PROCEDURE — 99217 PR OBSERVATION CARE DISCHARGE MANAGEMENT: CPT | Performed by: INTERNAL MEDICINE

## 2020-02-28 RX ORDER — PANTOPRAZOLE SODIUM 40 MG/1
40 TABLET, DELAYED RELEASE ORAL DAILY
Qty: 30 TABLET | Refills: 0 | Status: SHIPPED | OUTPATIENT
Start: 2020-02-28 | End: 2020-03-19 | Stop reason: SDUPTHER

## 2020-02-28 RX ORDER — BUDESONIDE 3 MG/1
9 CAPSULE, COATED PELLETS ORAL DAILY
Qty: 90 CAPSULE | Refills: 0 | Status: SHIPPED | OUTPATIENT
Start: 2020-02-29 | End: 2020-03-19 | Stop reason: SDUPTHER

## 2020-02-28 RX ORDER — VILAZODONE HYDROCHLORIDE 20 MG/1
TABLET ORAL
Qty: 30 TABLET | Refills: 0 | OUTPATIENT
Start: 2020-02-28

## 2020-02-28 RX ORDER — COLESEVELAM 180 1/1
1875 TABLET ORAL 2 TIMES DAILY WITH MEALS
Qty: 180 TABLET | Refills: 0 | Status: SHIPPED | OUTPATIENT
Start: 2020-02-28 | End: 2020-03-29

## 2020-02-28 RX ORDER — MESALAMINE 1.2 G/1
4.8 TABLET, DELAYED RELEASE ORAL
Qty: 120 TABLET | Refills: 0 | Status: SHIPPED | OUTPATIENT
Start: 2020-02-29 | End: 2020-03-30

## 2020-02-28 RX ORDER — LISINOPRIL 20 MG/1
20 TABLET ORAL EVERY 12 HOURS SCHEDULED
Qty: 60 TABLET | Refills: 0 | Status: SHIPPED | OUTPATIENT
Start: 2020-02-28 | End: 2020-03-03 | Stop reason: SDUPTHER

## 2020-02-28 RX ORDER — SACCHAROMYCES BOULARDII 250 MG
250 CAPSULE ORAL 2 TIMES DAILY
Qty: 60 CAPSULE | Refills: 0 | Status: SHIPPED | OUTPATIENT
Start: 2020-02-28 | End: 2020-03-29

## 2020-02-28 RX ORDER — METOCLOPRAMIDE 10 MG/1
10 TABLET ORAL
Qty: 90 TABLET | Refills: 0 | Status: SHIPPED | OUTPATIENT
Start: 2020-02-28 | End: 2020-03-29

## 2020-02-28 RX ADMIN — METOPROLOL TARTRATE 25 MG: 25 TABLET, FILM COATED ORAL at 08:21

## 2020-02-28 RX ADMIN — CLONIDINE HYDROCHLORIDE 0.1 MG: 0.1 TABLET ORAL at 08:21

## 2020-02-28 RX ADMIN — MESALAMINE 4.8 G: 1.2 TABLET, DELAYED RELEASE ORAL at 08:20

## 2020-02-28 RX ADMIN — VILAZODONE HYDROCHLORIDE 20 MG: 40 TABLET ORAL at 08:20

## 2020-02-28 RX ADMIN — COLESEVELAM HCL 1875 MG: 625 TABLET, FILM COATED ORAL at 08:20

## 2020-02-28 RX ADMIN — PANTOPRAZOLE SODIUM 40 MG: 40 TABLET, DELAYED RELEASE ORAL at 05:52

## 2020-02-28 RX ADMIN — METOCLOPRAMIDE 10 MG: 10 TABLET ORAL at 12:19

## 2020-02-28 RX ADMIN — HYDROXYCHLOROQUINE SULFATE 200 MG: 200 TABLET, FILM COATED ORAL at 08:20

## 2020-02-28 RX ADMIN — SODIUM CHLORIDE 12.5 MG: 900 INJECTION, SOLUTION INTRAVENOUS at 09:03

## 2020-02-28 RX ADMIN — Medication 250 MG: at 08:20

## 2020-02-28 RX ADMIN — AMLODIPINE BESYLATE 10 MG: 5 TABLET ORAL at 08:20

## 2020-02-28 RX ADMIN — BUDESONIDE 9 MG: 3 CAPSULE, COATED PELLETS ORAL at 08:21

## 2020-02-28 RX ADMIN — GABAPENTIN 600 MG: 300 CAPSULE ORAL at 08:20

## 2020-02-28 RX ADMIN — LISINOPRIL 20 MG: 20 TABLET ORAL at 08:21

## 2020-02-28 RX ADMIN — ONDANSETRON 4 MG: 2 INJECTION INTRAMUSCULAR; INTRAVENOUS at 13:36

## 2020-02-28 RX ADMIN — METOCLOPRAMIDE 10 MG: 10 TABLET ORAL at 08:21

## 2020-02-28 NOTE — TELEPHONE ENCOUNTER
Received call from Katelyn at [facility].  Patient discharged today on 3 new medications and PA was needed.  Called and spoke to patient preferred pharmacy.  Received number to call for PA.  Spoke to Bhavik at [facility] and he stated only 2 medications were needing PA and those could be done on [email or web address].  Completed required PA auth on [email or web address].  Will take approximately 24 hours to get determination.  Called Katelyn back and she was going to call the patient and let them know we were working on obtaining PA. PA was for Welchol and Entocort

## 2020-02-28 NOTE — TELEPHONE ENCOUNTER
Patient has called again concerning her medication needing  Authorization. Explained to the patient may take 24 hour. She is afraid if she does not receive these meds , she may have to return to the hospital. She has also messaged her PCP per my chart. Message left for the ED case manager  [phone]  , called back, has sent in the necessary information for the medication, will hopefully be at the pharmacy in the morning, called the patient back informed

## 2020-02-28 NOTE — TELEPHONE ENCOUNTER
"Caller states that 4 of her new prescriptions require pre authorization.  Her doctor told her not to miss any doses.  She needs meds asap.  Routed to case management.    Reason for Disposition  • [1] Request for URGENT new prescription or refill of \"essential\" medication (i.e., likelihood of harm to patient if not taken) AND [2] triager unable to fill per unit policy    Additional Information  • Negative: Drug overdose and nurse unable to answer question  • Negative: Caller requesting information not related to medicine  • Negative: Caller requesting a prescription for Strep throat and has a positive culture result  • Negative: Rash while taking a medication or within 3 days of stopping it  • Negative: Immunization reaction suspected  • Negative: [1] Asthma and [2] having symptoms of asthma (cough, wheezing, etc)  • Negative: MORE THAN A DOUBLE DOSE of a prescription or over-the-counter (OTC) drug  • Negative: [1] DOUBLE DOSE (an extra dose or lesser amount) of over-the-counter (OTC) drug AND [2] any symptoms (e.g., dizziness, nausea, pain, sleepiness)  • Negative: [1] DOUBLE DOSE (an extra dose or lesser amount) of prescription drug AND [2] any symptoms (e.g., dizziness, nausea, pain, sleepiness)  • Negative: Took another person's prescription drug  • Negative: [1] DOUBLE DOSE (an extra dose or lesser amount) of prescription drug AND [2] NO symptoms (Exception: a double dose of antibiotics)  • Negative: Diabetes drug error or overdose (e.g., insulin or extra dose)  • Negative: [1] Prescription not at pharmacy AND [2] was prescribed today by PCP    Answer Assessment - Initial Assessment Questions  1. SYMPTOMS: \"Do you have any symptoms?\"      yes  2. SEVERITY: If symptoms are present, ask \"Are they mild, moderate or severe?\"      mild    Protocols used: MEDICATION QUESTION CALL-ADULT-      "

## 2020-02-29 ENCOUNTER — READMISSION MANAGEMENT (OUTPATIENT)
Dept: CALL CENTER | Facility: HOSPITAL | Age: 50
End: 2020-02-29

## 2020-02-29 NOTE — OUTREACH NOTE
Prep Survey      Responses   Facility patient discharged from?  [facility]   Is patient eligible?  Yes   Discharge diagnosis  Nausea vomiting and diarrhea   Does the patient have one of the following disease processes/diagnoses(primary or secondary)?  Other   Does the patient have Home health ordered?  No   Is there a DME ordered?  No   Prep survey completed?  Yes          Isidra Nicholson RN

## 2020-03-02 NOTE — PROGRESS NOTES
Discharge Planning Assessment   [facility]     Patient Name: Merry Thornton  MRN: [id number]  Today's Date: 3/2/2020    Admit Date: 2/25/2020          Plan    Final Discharge Disposition Code  01 - home or self-care    Final Note  return home          Carol naegele rn  Case management  Office number [phone]  Cell phone [phone]

## 2020-03-03 ENCOUNTER — OFFICE VISIT (OUTPATIENT)
Dept: FAMILY MEDICINE CLINIC | Facility: CLINIC | Age: 50
End: 2020-03-03

## 2020-03-03 ENCOUNTER — READMISSION MANAGEMENT (OUTPATIENT)
Dept: CALL CENTER | Facility: HOSPITAL | Age: 50
End: 2020-03-03

## 2020-03-03 VITALS
DIASTOLIC BLOOD PRESSURE: 62 MMHG | SYSTOLIC BLOOD PRESSURE: 91 MMHG | OXYGEN SATURATION: 96 % | HEART RATE: 98 BPM | TEMPERATURE: 98.7 F

## 2020-03-03 DIAGNOSIS — B37.0 THRUSH, ORAL: ICD-10-CM

## 2020-03-03 DIAGNOSIS — R11.2 INTRACTABLE VOMITING WITH NAUSEA, UNSPECIFIED VOMITING TYPE: ICD-10-CM

## 2020-03-03 DIAGNOSIS — R50.9 FEVER, UNSPECIFIED FEVER CAUSE: ICD-10-CM

## 2020-03-03 DIAGNOSIS — K51.90 ULCERATIVE COLITIS WITHOUT COMPLICATIONS, UNSPECIFIED LOCATION (HCC): Primary | ICD-10-CM

## 2020-03-03 DIAGNOSIS — J10.1 INFLUENZA B: ICD-10-CM

## 2020-03-03 LAB
EXPIRATION DATE: ABNORMAL
EXPIRATION DATE: NORMAL
FLUAV AG NPH QL: NEGATIVE
FLUBV AG NPH QL: POSITIVE
INTERNAL CONTROL: ABNORMAL
INTERNAL CONTROL: NORMAL
Lab: ABNORMAL
Lab: NORMAL
S PYO AG THROAT QL: NEGATIVE

## 2020-03-03 PROCEDURE — 87804 INFLUENZA ASSAY W/OPTIC: CPT | Performed by: PHYSICIAN ASSISTANT

## 2020-03-03 PROCEDURE — 87880 STREP A ASSAY W/OPTIC: CPT | Performed by: PHYSICIAN ASSISTANT

## 2020-03-03 PROCEDURE — 99214 OFFICE O/P EST MOD 30 MIN: CPT | Performed by: PHYSICIAN ASSISTANT

## 2020-03-03 RX ORDER — PROMETHAZINE HYDROCHLORIDE 25 MG/1
25 TABLET ORAL DAILY
COMMUNITY
End: 2022-09-08 | Stop reason: ALTCHOICE

## 2020-03-03 RX ORDER — PREDNISONE 20 MG/1
20 TABLET ORAL DAILY
COMMUNITY
End: 2020-03-03

## 2020-03-03 RX ORDER — NICOTINE 21 MG/24HR
1 PATCH, TRANSDERMAL 24 HOURS TRANSDERMAL EVERY 24 HOURS
Qty: 14 PATCH | Refills: 0 | Status: SHIPPED | OUTPATIENT
Start: 2020-03-03 | End: 2020-03-17

## 2020-03-03 RX ORDER — VILAZODONE HYDROCHLORIDE 20 MG/1
20 TABLET ORAL EVERY MORNING
Qty: 90 TABLET | Refills: 1 | Status: SHIPPED | OUTPATIENT
Start: 2020-03-03 | End: 2020-04-17 | Stop reason: SDUPTHER

## 2020-03-03 RX ORDER — OSELTAMIVIR PHOSPHATE 75 MG/1
75 CAPSULE ORAL 2 TIMES DAILY
Qty: 10 CAPSULE | Refills: 0 | Status: SHIPPED | OUTPATIENT
Start: 2020-03-03 | End: 2020-03-08

## 2020-03-03 RX ORDER — NICOTINE 21 MG/24HR
1 PATCH, TRANSDERMAL 24 HOURS TRANSDERMAL EVERY 24 HOURS
Qty: 30 PATCH | Refills: 1 | Status: SHIPPED | OUTPATIENT
Start: 2020-03-03 | End: 2020-04-17

## 2020-03-03 RX ORDER — LISINOPRIL 20 MG/1
20 TABLET ORAL EVERY 12 HOURS SCHEDULED
Qty: 180 TABLET | Refills: 1 | Status: SHIPPED | OUTPATIENT
Start: 2020-03-03 | End: 2020-04-02

## 2020-03-03 NOTE — PROGRESS NOTES
Subjective  Merry Thornton is a 49 y.o. female     History of Present Illness  Patient is a 49-year-old white female here to follow-up from the hospital where she was seen from February 25 to February 28 for severe nausea, vomiting, and diarrhea.  Extensive work-up was done, CT scan with and without contrast was negative for acute abnormality, laboratory testing was essentially normal, some electrolytes had to be replaced.  Colonoscopy which initially negative, however biopsies showed ulcerative colitis.  Patient was then started on Reglan, WelChol, budesonide, Florastor, and mesalamine.  Her PPI was continued.  ER records reviewed and medications reconciled.    Today patient reports that she is feeling better overall, her nausea vomiting and diarrhea are improved.  She is still trying to get the WelChol from the pharmacy due to insurance denying it.  She is complaining of ear pain, fever, cough, congestion, sore throat, tongue pain for the past 2 days.  Her temperature was 102.0 yesterday.  She did receive a flu vaccine this season.    The following portions of the patient's history were reviewed and updated as appropriate: allergies, current medications, past family history, past medical history, past social history, past surgical history and problem list.    Review of Systems   Constitutional: Positive for chills, diaphoresis, fatigue and fever. Negative for unexpected weight loss.   HENT: Positive for congestion, ear pain, sinus pressure and sore throat.    Eyes: Negative for pain and redness.   Respiratory: Positive for cough. Negative for shortness of breath.    Gastrointestinal: Positive for nausea. Negative for abdominal pain, constipation, diarrhea and vomiting.   Genitourinary: Negative for dysuria.   Musculoskeletal: Negative for joint swelling.   Neurological: Negative for dizziness, headache and confusion.   Psychiatric/Behavioral: Negative for suicidal ideas and depressed mood.       Objective  Physical  Exam   Constitutional: She is oriented to person, place, and time. She appears well-developed and well-nourished. She is obese.  HENT:   Head: Normocephalic and atraumatic.   Right Ear: External ear normal.   Left Ear: External ear normal.   Nose: Nose normal.   Mouth/Throat: Posterior oropharyngeal erythema present.   White coating appreciated on the tongue, soft palate erythematous.   Eyes: Pupils are equal, round, and reactive to light. Conjunctivae and EOM are normal.   Neck: Normal range of motion. Neck supple.   Cardiovascular: Normal rate, regular rhythm and normal heart sounds.   Pulmonary/Chest: Effort normal. She has wheezes.   Neurological: She is alert and oriented to person, place, and time.   Psychiatric: She has a normal mood and affect. Her behavior is normal.       Vitals:    03/03/20 1421   BP: 91/62   BP Location: Right arm   Patient Position: Sitting   Cuff Size: Adult   Pulse: 98   Temp: 98.7 °F (37.1 °C)   SpO2: 96%     There is no height or weight on file to calculate BMI.    PHQ-9 Total Score:        Assessment/Plan  Diagnoses and all orders for this visit:    1. Ulcerative colitis without complications, unspecified location (CMS/[facility]) (Primary)  Comments:  Stable, patient to continue her medications.    2. Influenza B  Comments:  Flu B+, treating patient with Tamiflu.    3. Thrush, oral  Comments:  Treating oral thrush with nystatin.    4. Intractable vomiting with nausea, unspecified vomiting type  Comments:  Improved, patient to continue current medications.    Other orders  -     lisinopril (PRINIVIL,ZESTRIL) 20 MG tablet; Take 1 tablet by mouth Every 12 (Twelve) Hours for 30 days.  Dispense: 180 tablet; Refill: 1  -     nicotine (NICODERM CQ) 21 MG/24HR patch; Place 1 patch on the skin as directed by provider Daily for 45 days.  Dispense: 30 patch; Refill: 1  -     nicotine (NICODERM CQ) 14 MG/24HR patch; Place 1 patch on the skin as directed by provider Daily for 14 days.  Dispense: 14  patch; Refill: 0  -     nicotine (NICODERM CQ) 7 MG/24HR patch; Place 1 patch on the skin as directed by provider Daily for 14 days.  Dispense: 14 patch; Refill: 0  -     oseltamivir (TAMIFLU) 75 MG capsule; Take 1 capsule by mouth 2 (Two) Times a Day for 5 days.  Dispense: 10 capsule; Refill: 0  -     nystatin (MYCOSTATIN) 754404 UNIT/ML suspension; Swish and swallow 5 mL 4 (Four) Times a Day.  Dispense: 200 mL; Refill: 2  -     vilazodone (VIIBRYD) 20 MG tablet tablet; Take 1 tablet by mouth Every Morning.  Dispense: 90 tablet; Refill: 1              Answers for HPI/ROS submitted by the patient on 3/3/2020   Abdominal pain  What is the primary reason for your visit?: Abdominal Pain  Chronicity: recurrent  Onset: 1 to 4 weeks ago  Onset quality: gradual  Frequency: constantly  Episode duration: 4 weeks  Pain location: epigastric region  Pain - numeric: 8/10  Pain quality: a sensation of fullness  Radiates to: left shoulder  anorexia: Yes  headaches: Yes  melena: No  Aggravated by: bowel movement, coughing, eating, movement, vomiting  Relieved by: certain positions, recumbency, sitting up  Diagnostic workup: CT scan, lower endoscopy

## 2020-03-04 NOTE — OUTREACH NOTE
Medical Week 1 Survey      Responses   [facility] patient discharged from?  [facility]   Does the patient have one of the following disease processes/diagnoses(primary or secondary)?  Other   Is there a successful TCM telephone encounter documented?  No   Week 1 attempt successful?  Yes   Call start time  1932   Call end time  1937   Discharge diagnosis  Nausea vomiting and diarrhea   Meds reviewed with patient/caregiver?  Yes   Is the patient having any side effects they believe may be caused by any medication additions or changes?  No   Does the patient have all medications ordered at discharge?  Yes   Is the patient taking all medications as directed (includes completed medication regime)?  Yes   Does the patient have a primary care provider?   Yes   Does the patient have an appointment with their PCP within 7 days of discharge?  Yes   Has the patient kept scheduled appointments due by today?  Yes   Has home health visited the patient within 72 hours of discharge?  N/A   Did the patient receive a copy of their discharge instructions?  Yes   Nursing interventions  Reviewed instructions with patient   What is the patient's perception of their health status since discharge?  New symptoms unrelated to diagnosis   Is the patient/caregiver able to teach back signs and symptoms related to disease process for when to call PCP?  Yes   Is the patient/caregiver able to teach back signs and symptoms related to disease process for when to call 911?  Yes   Is the patient/caregiver able to teach back the hierarchy of who to call/visit for symptoms/problems? PCP, Specialist, Home health nurse, Urgent Care, ED, 911  Yes   Additional teach back comments  Patient states she went to her PCP and she tested positive for the Flu, strep and has thrush.  They have started her on Tamiflu, antibiotics and steroids.  She also has a swish and swallow medication for the thrush.  She has already scheduled another follow up with her PCP.     Week 1 call completed?  Yes   Wrap up additional comments  Patient sergio has newly diagnosed with the flu and strep and has started on new meds.          Hawa Palacios LPN

## 2020-03-10 ENCOUNTER — READMISSION MANAGEMENT (OUTPATIENT)
Dept: CALL CENTER | Facility: HOSPITAL | Age: 50
End: 2020-03-10

## 2020-03-10 NOTE — OUTREACH NOTE
Medical Week 2 Survey      Responses   [facility] patient discharged from?  [facility]   Does the patient have one of the following disease processes/diagnoses(primary or secondary)?  Other   Week 2 attempt successful?  No   Rescheduled  Revoked   Revoke  Decline to participate [NO ANSWER, NO VM LEFT ]          Debbie Bonilla LPN

## 2020-03-17 ENCOUNTER — HOSPITAL ENCOUNTER (OUTPATIENT)
Dept: MAMMOGRAPHY | Facility: HOSPITAL | Age: 50
End: 2020-03-17

## 2020-03-18 RX ORDER — HYDROXYCHLOROQUINE SULFATE 200 MG/1
TABLET, FILM COATED ORAL
Qty: 180 TABLET | Refills: 0 | OUTPATIENT
Start: 2020-03-18

## 2020-03-19 RX ORDER — HYDROXYCHLOROQUINE SULFATE 200 MG/1
200 TABLET, FILM COATED ORAL 2 TIMES DAILY
Qty: 180 TABLET | Refills: 0 | Status: SHIPPED | OUTPATIENT
Start: 2020-03-19 | End: 2020-06-18 | Stop reason: SDUPTHER

## 2020-03-19 RX ORDER — CLONIDINE HYDROCHLORIDE 0.1 MG/1
TABLET ORAL
Qty: 90 TABLET | Refills: 1 | Status: SHIPPED | OUTPATIENT
Start: 2020-03-19 | End: 2020-04-08 | Stop reason: SDUPTHER

## 2020-03-19 RX ORDER — IPRATROPIUM BROMIDE AND ALBUTEROL SULFATE 2.5; .5 MG/3ML; MG/3ML
3 SOLUTION RESPIRATORY (INHALATION) 4 TIMES DAILY
Qty: 360 ML | Refills: 1 | Status: SHIPPED | OUTPATIENT
Start: 2020-03-19

## 2020-03-19 RX ORDER — BENZONATATE 150 MG/1
150 CAPSULE ORAL 3 TIMES DAILY PRN
Qty: 30 CAPSULE | Refills: 1 | Status: SHIPPED | OUTPATIENT
Start: 2020-03-19 | End: 2020-03-29

## 2020-03-19 RX ORDER — HYDROXYCHLOROQUINE SULFATE 200 MG/1
TABLET, FILM COATED ORAL
Qty: 180 TABLET | Refills: 0 | OUTPATIENT
Start: 2020-03-19

## 2020-03-19 RX ORDER — IPRATROPIUM BROMIDE AND ALBUTEROL SULFATE 2.5; .5 MG/3ML; MG/3ML
3 SOLUTION RESPIRATORY (INHALATION) 4 TIMES DAILY
Qty: 360 ML | Refills: 1 | Status: SHIPPED | OUTPATIENT
Start: 2020-03-19 | End: 2020-03-19 | Stop reason: SDUPTHER

## 2020-03-20 RX ORDER — BUDESONIDE 3 MG/1
9 CAPSULE, COATED PELLETS ORAL DAILY
Qty: 90 CAPSULE | Refills: 0 | Status: SHIPPED | OUTPATIENT
Start: 2020-03-20 | End: 2020-04-23

## 2020-03-20 RX ORDER — PANTOPRAZOLE SODIUM 40 MG/1
40 TABLET, DELAYED RELEASE ORAL DAILY
Qty: 90 TABLET | Refills: 1 | Status: SHIPPED | OUTPATIENT
Start: 2020-03-20 | End: 2020-04-19

## 2020-03-25 RX ORDER — METOPROLOL TARTRATE 50 MG/1
TABLET, FILM COATED ORAL
Qty: 30 TABLET | Refills: 5 | Status: SHIPPED | OUTPATIENT
Start: 2020-03-25 | End: 2020-08-24

## 2020-04-09 RX ORDER — TRAZODONE HYDROCHLORIDE 100 MG/1
TABLET ORAL
Qty: 90 TABLET | Refills: 1 | OUTPATIENT
Start: 2020-04-09

## 2020-04-09 RX ORDER — CLONIDINE HYDROCHLORIDE 0.1 MG/1
0.1 TABLET ORAL 3 TIMES DAILY
Qty: 270 TABLET | Refills: 1 | Status: SHIPPED | OUTPATIENT
Start: 2020-04-09 | End: 2020-08-28

## 2020-04-17 ENCOUNTER — OFFICE VISIT (OUTPATIENT)
Dept: PSYCHIATRY | Facility: CLINIC | Age: 50
End: 2020-04-17

## 2020-04-17 DIAGNOSIS — F41.1 GENERALIZED ANXIETY DISORDER: Primary | Chronic | ICD-10-CM

## 2020-04-17 DIAGNOSIS — F33.1 MAJOR DEPRESSIVE DISORDER, RECURRENT EPISODE, MODERATE (HCC): ICD-10-CM

## 2020-04-17 PROCEDURE — 99441 PR PHYS/QHP TELEPHONE EVALUATION 5-10 MIN: CPT | Performed by: PSYCHIATRY & NEUROLOGY

## 2020-04-17 RX ORDER — ALPRAZOLAM 0.5 MG/1
0.5 TABLET ORAL 2 TIMES DAILY PRN
Qty: 60 TABLET | Refills: 2 | Status: SHIPPED | OUTPATIENT
Start: 2020-04-17 | End: 2020-05-20 | Stop reason: SDUPTHER

## 2020-04-17 RX ORDER — VILAZODONE HYDROCHLORIDE 20 MG/1
20 TABLET ORAL EVERY MORNING
Qty: 90 TABLET | Refills: 1 | Status: SHIPPED | OUTPATIENT
Start: 2020-04-17 | End: 2020-07-30

## 2020-04-17 RX ORDER — PHENOL 1.4 %
10 AEROSOL, SPRAY (ML) MUCOUS MEMBRANE
Qty: 30 TABLET | Refills: 2 | Status: SHIPPED | OUTPATIENT
Start: 2020-04-17 | End: 2022-09-08

## 2020-04-17 NOTE — PROGRESS NOTES
"Subjective   Merry Thornton is a 49 y.o. female who presents today for follow up via phone     You have chosen to receive care through a telephone visit. Do you consent to use a telephone visit for your medical care today? Yes  Chief Complaint:  Depression anxiety     History of Present Illness:   The pt suffered from depression anxiety for many years, was doing well on Viibryd, she had to change jobs, Last appt 3/20/18   The pt was admitted to [facility] in march 2020 for abd pain, was dsd with ulcerative colitis .   BP is pretty high , anxiety affects her BP and pain, anxiety increased recently due to covid situation ,  Anxiety is persistent and intense , she is medically fragile, does not go out, feels fidgety , decreased sleep - she had sleep study done - chronic PIPPA , on BPAP machine now   Depression is rated as4-5/10, \"not more depressed than anybody else in this situation \"   Denied avh/si/hi        The following portions of the patient's history were reviewed and updated as appropriate: allergies, current medications, past family history, past medical history, past social history, past surgical history and problem list.    PAST PSYCHIATRIC HISTORY  Axis I  Affective/Bipoloar Disorder, Anxiety/Panic Disorder  Axis II  None    PAST OUTPATIENT TREATMENT  Diagnosis treated:  Affective Disorder, Anxiety/Panic Disorder  Treatment Type:  Medication Management  Prior Psychiatric Medications:  xanax PRN effective   buspar - allergy   zoloft weight gain  lexapro - weight gain   wellbutrin - anxiety   Trazodone -groggy in the morning     Support Groups:  None   Sequelae Of Mental Disorder:  medical illness, emotional distress          Interval History  No Change    Side Effects  Denied       Past Medical History:  Past Medical History:   Diagnosis Date   • CAD (coronary artery disease) 12/20/2016   • Carpal tunnel syndrome on right 03/08/2017   • Common migraine 12/20/2016   • COPD (chronic obstructive pulmonary " disease) (CMS/HCC) 12/20/2016   • Cyst, ovarian 12/20/2016    mass   • DDD (degenerative disc disease), cervical 03/11/2016   • Dental caries 01/14/2019   • Depression 11/02/2017   • Depression, major, recurrent, moderate (CMS/HCC) 08/25/2016   • Dietary counseling 09/21/2017   • Encounter for smoking cessation counseling 09/21/2017   • Exacerbation of systemic lupus (CMS/HCC) 04/30/2019   • Fibromyalgia 12/20/2016   • Generalized anxiety disorder 03/11/2016   • GERD (gastroesophageal reflux disease) 12/20/2016   • Heartburn 11/02/2017   • Hypertension 03/16/2016   • Hyperthyroidism 03/11/2016   • Hypocalcemia 09/21/2017   • IBS (irritable colon syndrome) 09/13/2016   • Immobility syndrome 09/14/2017   • Intracranial pressure increased 12/20/2016   • Left knee pain 09/21/2017   • Lower back pain 02/01/2018   • Lung nodules 09/07/2016   • Morbid obesity (CMS/HCC) 11/02/2017   • Muscle cramp 09/21/2017   • Neck pain 01/14/2019   • Need for prophylactic vaccination and inoculation against influenza 09/21/2017   • Neurogenic bladder 12/20/2016   • Obesity 03/07/2017   • Orthostatic hypotension 07/17/2017   • Osteoarthritis 03/07/2017   • Osteopenia 01/14/2019   • Osteoporosis 12/20/2016   • Other instability, right ankle 04/13/2017   • 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 Mixxed connective Tissue D/o   • Pain, joint, ankle, left 09/21/2017   • Palpitations 10/04/2017   • Paresthesia 06/12/2017    facial   • Peripheral neuropathy 12/20/2016    bilateral lower extremities   • Prediabetes    • Raynaud's syndrome 12/20/2016   • Right knee pain 11/08/2016   • Scleroderma (CMS/[facility]) 12/20/2016   • Seasonal allergic rhinitis 12/20/2016   • SLE (systemic lupus erythematosus) (CMS/HCC) 03/16/2017   • Sleep apnea 11/02/2017   • Sleep disorder 01/14/2019   • Sprain of ligament of ankle, right, initial encounter 10/03/2016   • Sprain of unspecified site of right knee, subsequent encounter 12/01/2016   •  Status post placement of implantable loop recorder 05/09/2018    presence   • Syncope and collapse 03/11/2016   • Vasovagal syncope    • Ventricular arrhythmia 03/16/2016   • Visit for screening mammogram 12/20/2016   • Vitamin D deficiency 01/14/2019   • Wheezing 09/14/2017       Social History:  Social History     Socioeconomic History   • Marital status: Single     Spouse name: Not on file   • Number of children: Not on file   • Years of education: Not on file   • Highest education level: Not on file   Tobacco Use   • Smoking status: Former Smoker     Packs/day: 1.00     Years: 0.00     Pack years: 0.00     Types: Cigarettes   • Smokeless tobacco: Never Used   Substance and Sexual Activity   • Alcohol use: No     Frequency: Never   • Drug use: Yes     Frequency: 1.0 times per week     Types: Marijuana   • Sexual activity: Defer       Family History:  Family History   Problem Relation Age of Onset   • Cancer Mother    • Heart disease Father    • Lung disease Father    • Diabetes Sister    • Heart disease Sister    • Hypertension Sister    • Other Sister         weight disorder       Past Surgical History:  Past Surgical History:   Procedure Laterality Date   • ANKLE SURGERY Right 02/2017   • BRONCHOSCOPY     • CARDIAC ABLATION  01/2000   • CARDIAC ELECTROPHYSIOLOGY PROCEDURE     • CHOLECYSTECTOMY     • COLON SURGERY     • COLONOSCOPY N/A 2/26/2020    Procedure: COLONOSCOPY WITH BIOPSY X1 AREA;  Surgeon: Michael Cheng MD;  Location: [facility] ENDOSCOPY;  Service: Gastroenterology;  Laterality: N/A;  diarrhea   • ENDOSCOPY     • KNEE SURGERY Right 09/2017   • OTHER SURGICAL HISTORY      Urerine hysterectomy   • OTHER SURGICAL HISTORY      t and a   • OTHER SURGICAL HISTORY      d&c   • OTHER SURGICAL HISTORY      bladder stim   • OTHER SURGICAL HISTORY      Loop recorder placement   • WRIST SURGERY Right        Problem List:  Patient Active Problem List   Diagnosis   • PVC's (premature ventricular contractions)   •  Hypertension   • Sleep apnea   • Orthostatic hypotension   • Presence of other cardiac implants and grafts   • Abnormal echocardiogram   • Left ankle pain   • Arthralgia of left knee   • Knee pain, right   • Low back pain   • Lower back pain   • Neurogenic claudication   • Carpal tunnel syndrome of right wrist   • Chronic coronary artery disease   • Allergic rhinitis, seasonal   • Chronic obstructive pulmonary disease (CMS/HCC)   • Depression   • Dental caries   • Fibromyalgia   • Neck pain   • Gastroesophageal reflux disease   • Generalized anxiety disorder   • H. pylori infection   • Degeneration of intervertebral disc   • Herniation of intervertebral disc   • Hyperthyroidism   • Hypocalcemia   • Impaired mobility   • Irritable bowel syndrome with diarrhea   • Chronic steroid use   • Long term current use of therapeutic drug   • Loss of peripheral visual field   • Lung nodule   • Major depressive disorder, recurrent episode, moderate (CMS/HCC)   • Migraine without aura, intractable   • Muscle cramps   • Neurogenic bladder   • Neuropathy involving both lower extremities   • Osteoarthritis   • Osteoporosis   • Other instability, right ankle   • Other localized visual field defect   • Other screening mammogram   • Ovarian cystic mass   • Palpitations   • Raynaud's phenomenon   • Scleroderma (CMS/HCC)   • Vasodepressor syncope   • Systemic lupus erythematosus (CMS/HCC)   • Tobacco dependency   • Undifferentiated connective tissue disease (CMS/HCC)   • Vitamin D deficiency   • White matter changes   • Asthma   • Morbid obesity (CMS/HCC)   • Pseudotumor cerebri   • Immunosuppression (CMS/HCC)   • Keratoconjunctivitis   • Presbyopia   • SPK (superficial punctate keratitis), bilateral   • Nausea vomiting and diarrhea   • Dehydration   • Acute abdominal pain   • Marijuana abuse   • Prediabetes   • Nocturnal hypoxia   • Moderate nonproliferative diabetic retinopathy (CMS/HCC)   • Ulcerative colitis (CMS/[facility])       Allergy:    Allergies   Allergen Reactions   • Adhesive Tape Other (See Comments)     Pt ok with paper tape    Abstracted from centricity   • Bupropion Shortness Of Breath   • Morphine Other (See Comments), Rash and Swelling     Abstracted from centricity   • Buspar [Buspirone] Other (See Comments)     Abstracted from centricity        Discontinued Medications:  Medications Discontinued During This Encounter   Medication Reason   • vilazodone (VIIBRYD) 20 MG tablet tablet Reorder   • ALPRAZolam (XANAX) 0.5 MG tablet Reorder       Current Medications:   Current Outpatient Medications   Medication Sig Dispense Refill   • albuterol sulfate  (90 Base) MCG/ACT inhaler Inhale 2 puffs Every 4 (Four) Hours As Needed for Wheezing.     • ALPRAZolam (XANAX) 0.5 MG tablet Take 1 tablet by mouth 2 (Two) Times a Day As Needed for Anxiety. 60 tablet 2   • amLODIPine (NORVASC) 10 MG tablet Take 1 tablet by mouth Daily. 90 tablet 1   • Budesonide (ENTOCORT EC) 3 MG 24 hr capsule Take 3 capsules by mouth Daily for 30 days. 90 capsule 0   • cloNIDine (CATAPRES) 0.1 MG tablet Take 1 tablet by mouth 3 (Three) Times a Day. 270 tablet 1   • Cyanocobalamin (B-12 COMPLIANCE INJECTION) 1000 MCG/ML kit Inject  as directed. Inject twice monthly     • Eluxadoline 100 MG tablet Take 100 mg by mouth 2 (Two) Times a Day. 60 tablet 2   • gabapentin (NEURONTIN) 600 MG tablet Take 600 mg by mouth 3 (Three) Times a Day.     • HYDROcodone-acetaminophen (NORCO)  MG per tablet Take 1 tablet by mouth Every 6 (Six) Hours As Needed for Moderate Pain .     • hydroxychloroquine (PLAQUENIL) 200 MG tablet Take 1 tablet by mouth 2 (Two) Times a Day. 180 tablet 0   • ipratropium-albuterol (DUO-NEB) 0.5-2.5 mg/3 ml nebulizer Take 3 mL by nebulization 4 (Four) Times a Day. 360 mL 1   • lidocaine (LIDODERM) 5 % Place 1 patch on the skin as directed by provider Daily. Remove & Discard patch within 12 hours or as directed by MD     • loratadine (CLARITIN) 10 MG  tablet Take 10 mg by mouth Daily.     • Melatonin 10 MG tablet Take 1 tablet by mouth every night at bedtime. 30 tablet 2   • metoprolol tartrate (LOPRESSOR) 50 MG tablet TAKE 1 TABLET BY MOUTH DAILY 30 tablet 5   • nicotine (NICODERM CQ) 21 MG/24HR patch Place 1 patch on the skin as directed by provider Daily for 45 days. 30 patch 1   • nystatin (MYCOSTATIN) 446305 UNIT/ML suspension Swish and swallow 5 mL 4 (Four) Times a Day. 200 mL 2   • O2 (OXYGEN) Inhale 2 L/min Every Night.     • ondansetron ODT (ZOFRAN-ODT) 4 MG disintegrating tablet Place 1 tablet on the tongue Every 8 (Eight) Hours As Needed for Nausea or Vomiting. (Patient taking differently: Place 4 mg on the tongue Every 8 (Eight) Hours As Needed for Nausea or Vomiting (can take up to 8 mg as needed).) 60 tablet 5   • orphenadrine (NORFLEX) 100 MG 12 hr tablet Take 100 mg by mouth 3 (Three) Times a Day. As needed     • pantoprazole (PROTONIX) 40 MG EC tablet Take 1 tablet by mouth Daily for 30 days. 90 tablet 1   • promethazine (PHENERGAN) 25 MG tablet Take 25 mg by mouth Daily.     • SUMAtriptan (IMITREX) 100 MG tablet Take 100 mg by mouth. One tab at onset of HA, after one hour use sumatriptan injection     • topiramate (TOPAMAX) 50 MG tablet Take 50 mg by mouth Daily.     • vilazodone (Viibryd) 20 MG tablet tablet Take 1 tablet by mouth Every Morning. 90 tablet 1   • vitamin D (ERGOCALCIFEROL) 1.25 MG (56495 UT) capsule capsule TAKE 1 CAPSULE BY MOUTH ONCE WEEKLY 12 capsule 1     No current facility-administered medications for this visit.          Review of Symptoms:    Psychiatric/Behavioral: Negative for agitation, behavioral problems, confusion, decreased concentration, dysphoric mood, hallucinations, self-injury, sleep disturbance and suicidal ideas. The patient is  nervous/anxious and is not hyperactive.        Physical Exam:   There were no vitals taken for this visit.    Mental Status Exam:   Hygiene:   unable to assess due to phone visit    Cooperation:  Cooperative  Eye Contact:  unable to assess due to phone visit   Psychomotor Behavior:  Appropriate  Affect:  Appropriate  Mood: anxious  Hopelessness: Denies  Speech:  Normal  Thought Process:  Goal directed and Linear  Thought Content:  Normal  Suicidal:  None  Homicidal:  None  Hallucinations:  None  Delusion:  None  Memory:  fair   Orientation:  Person, Place, Time and Situation  Reliability:  fair  Insight:  Fair  Judgement:  Fair  Impulse Control:  Fair  Physical/Medical Issues:  Yes         PHQ-9 Depression Screening  Little interest or pleasure in doing things? 2   Feeling down, depressed, or hopeless? 2   Trouble falling or staying asleep, or sleeping too much? 2   Feeling tired or having little energy? 2   Poor appetite or overeating? 1   Feeling bad about yourself - or that you are a failure or have let yourself or your family down? 1   Trouble concentrating on things, such as reading the newspaper or watching television? 1   Moving or speaking so slowly that other people could have noticed? Or the opposite - being so fidgety or restless that you have been moving around a lot more than usual? 0   Thoughts that you would be better off dead, or of hurting yourself in some way? 0   PHQ-9 Total Score 11   If you checked off any problems, how difficult have these problems made it for you to do your work, take care of things at home, or get along with other people? Very difficult           Former smoker    I advised Merry of the risks of tobacco use.     Lab Results:   Office Visit on 03/03/2020   Component Date Value Ref Range Status   • Rapid Influenza A Ag 03/03/2020 Negative  Negative Final   • Rapid Influenza B Ag 03/03/2020 Positive* Negative Final   • Internal Control 03/03/2020 Passed  Passed Final   • Lot Number 03/03/2020 [id number]   Final   • Expiration Date 03/03/2020 10/31/2021   Final   • Rapid Strep A Screen 03/03/2020 Negative  Negative, VALID, INVALID, Not Performed Final   •  Internal Control 03/03/2020 Passed  Passed Final   • Lot Number 03/03/2020 [id number]   Final   • Expiration Date 03/03/2020 04/30/2021   Final   Admission on 02/25/2020, Discharged on 02/28/2020   Component Date Value Ref Range Status   • Glucose 02/25/2020 127* 65 - 99 mg/dL Final   • BUN 02/25/2020 20  6 - 20 mg/dL Final   • Creatinine 02/25/2020 0.79  0.57 - 1.00 mg/dL Final   • Sodium 02/25/2020 134* 136 - 145 mmol/L Final   • Potassium 02/25/2020 3.4* 3.5 - 5.2 mmol/L Final   • Chloride 02/25/2020 98  98 - 107 mmol/L Final   • CO2 02/25/2020 23.0  22.0 - 29.0 mmol/L Final   • Calcium 02/25/2020 9.4  8.6 - 10.5 mg/dL Final   • Total Protein 02/25/2020 7.7  6.0 - 8.5 g/dL Final   • Albumin 02/25/2020 4.40  3.50 - 5.20 g/dL Final   • ALT (SGPT) 02/25/2020 17  1 - 33 U/L Final   • AST (SGOT) 02/25/2020 14  1 - 32 U/L Final   • Alkaline Phosphatase 02/25/2020 75  39 - 117 U/L Final   • Total Bilirubin 02/25/2020 0.6  0.2 - 1.2 mg/dL Final   • eGFR Non African Amer 02/25/2020 77  >60 mL/min/1.73 Final   • Globulin 02/25/2020 3.3  gm/dL Final   • A/G Ratio 02/25/2020 1.3  g/dL Final   • BUN/Creatinine Ratio 02/25/2020 25.3* 7.0 - 25.0 Final   • Anion Gap 02/25/2020 13.0  5.0 - 15.0 mmol/L Final   • WBC 02/25/2020 14.70* 3.40 - 10.80 10*3/mm3 Final   • RBC 02/25/2020 4.76  3.77 - 5.28 10*6/mm3 Final   • Hemoglobin 02/25/2020 15.0  12.0 - 15.9 g/dL Final   • Hematocrit 02/25/2020 43.8  34.0 - 46.6 % Final   • MCV 02/25/2020 92.0  79.0 - 97.0 fL Final   • MCH 02/25/2020 31.6  26.6 - 33.0 pg Final   • MCHC 02/25/2020 34.3  31.5 - 35.7 g/dL Final   • RDW 02/25/2020 13.9  12.3 - 15.4 % Final   • RDW-SD 02/25/2020 44.6  37.0 - 54.0 fl Final   • MPV 02/25/2020 9.0  6.0 - 12.0 fL Final   • Platelets 02/25/2020 227  140 - 450 10*3/mm3 Final   • Neutrophil % 02/25/2020 76.3* 42.7 - 76.0 % Final   • Lymphocyte % 02/25/2020 15.4* 19.6 - 45.3 % Final   • Monocyte % 02/25/2020 7.2  5.0 - 12.0 % Final   • Eosinophil % 02/25/2020 0.7   0.3 - 6.2 % Final   • Basophil % 02/25/2020 0.4  0.0 - 1.5 % Final   • Neutrophils, Absolute 02/25/2020 11.20* 1.70 - 7.00 10*3/mm3 Final   • Lymphocytes, Absolute 02/25/2020 2.30  0.70 - 3.10 10*3/mm3 Final   • Monocytes, Absolute 02/25/2020 1.10* 0.10 - 0.90 10*3/mm3 Final   • Eosinophils, Absolute 02/25/2020 0.10  0.00 - 0.40 10*3/mm3 Final   • Basophils, Absolute 02/25/2020 0.10  0.00 - 0.20 10*3/mm3 Final   • nRBC 02/25/2020 0.0  0.0 - 0.2 /100 WBC Final   • Magnesium 02/25/2020 1.9  1.6 - 2.6 mg/dL Final   • Color, UA 02/25/2020 Yellow  Yellow, Straw Final   • Appearance, UA 02/25/2020 Cloudy* Clear Final    Result checked    • pH, UA 02/25/2020 6.0  5.0 - 8.0 Final   • Specific Gravity, UA 02/25/2020 1.023  1.005 - 1.030 Final   • Glucose, UA 02/25/2020 Negative  Negative Final   • Ketones, UA 02/25/2020 Trace* Negative Final   • Bilirubin, UA 02/25/2020 Negative  Negative Final   • Blood, UA 02/25/2020 Negative  Negative Final   • Protein, UA 02/25/2020 Negative  Negative Final   • Leuk Esterase, UA 02/25/2020 Negative  Negative Final   • Nitrite, UA 02/25/2020 Negative  Negative Final   • Urobilinogen, UA 02/25/2020 0.2 E.U./dL  0.2 - 1.0 E.U./dL Final   • Campylobacter 02/25/2020 Not Detected  Not Detected Final   • Plesiomonas shigelloides 02/25/2020 Not Detected  Not Detected Final   • Salmonella 02/25/2020 Not Detected  Not Detected Final   • Vibrio 02/25/2020 Not Detected  Not Detected Final   • Vibrio cholerae 02/25/2020 Not Detected  Not Detected Final   • Yersinia enterocolitica 02/25/2020 Not Detected  Not Detected Final   • Enteroaggregative E. coli (EAEC) 02/25/2020 Not Detected  Not Detected Final   • Enteropathogenic E. coli (EPEC) 02/25/2020 Not Detected  Not Detected Final   • Enterotoxigenic E. coli (ETEC) lt/* 02/25/2020 Not Detected  Not Detected Final   • Shiga-like toxin-producing E. coli* 02/25/2020 Not Detected  Not Detected Final   • E. coli O157 02/25/2020 Not Detected  Not Detected  Final   • Shigella/Enteroinvasive E. coli (E* 02/25/2020 Not Detected  Not Detected Final   • Cryptosporidium 02/25/2020 Not Detected  Not Detected Final   • Cyclospora cayetanensis 02/25/2020 Not Detected  Not Detected Final   • Entamoeba histolytica 02/25/2020 Not Detected  Not Detected Final   • Giardia lamblia 02/25/2020 Not Detected  Not Detected Final   • Adenovirus F40/41 02/25/2020 Not Detected  Not Detected Final   • Astrovirus 02/25/2020 Not Detected  Not Detected Final   • Norovirus GI/GII 02/25/2020 Not Detected  Not Detected Final   • Rotavirus A 02/25/2020 Not Detected  Not Detected Final   • Sapovirus (I, II, IV or V) 02/25/2020 Not Detected  Not Detected Final   • C Diff GDH / Toxin 02/25/2020 Negative  Negative Final   • Lipase 02/25/2020 18  13 - 60 U/L Final   • Amylase 02/25/2020 22* 28 - 100 U/L Final   • Magnesium 02/26/2020 1.9  1.6 - 2.6 mg/dL Final   • Glucose 02/26/2020 93  65 - 99 mg/dL Final   • BUN 02/26/2020 16  6 - 20 mg/dL Final   • Creatinine 02/26/2020 0.72  0.57 - 1.00 mg/dL Final   • Sodium 02/26/2020 135* 136 - 145 mmol/L Final   • Potassium 02/26/2020 3.2* 3.5 - 5.2 mmol/L Final   • Chloride 02/26/2020 99  98 - 107 mmol/L Final   • CO2 02/26/2020 25.0  22.0 - 29.0 mmol/L Final   • Calcium 02/26/2020 8.6  8.6 - 10.5 mg/dL Final   • Total Protein 02/26/2020 6.4  6.0 - 8.5 g/dL Final   • Albumin 02/26/2020 3.60  3.50 - 5.20 g/dL Final   • ALT (SGPT) 02/26/2020 15  1 - 33 U/L Final   • AST (SGOT) 02/26/2020 12  1 - 32 U/L Final   • Alkaline Phosphatase 02/26/2020 64  39 - 117 U/L Final   • Total Bilirubin 02/26/2020 0.5  0.2 - 1.2 mg/dL Final   • eGFR Non African Amer 02/26/2020 86  >60 mL/min/1.73 Final   • Globulin 02/26/2020 2.8  gm/dL Final   • A/G Ratio 02/26/2020 1.3  g/dL Final   • BUN/Creatinine Ratio 02/26/2020 22.2  7.0 - 25.0 Final   • Anion Gap 02/26/2020 11.0  5.0 - 15.0 mmol/L Final   • WBC 02/26/2020 10.30  3.40 - 10.80 10*3/mm3 Final   • RBC 02/26/2020 4.19  3.77 -  5.28 10*6/mm3 Final   • Hemoglobin 02/26/2020 13.7  12.0 - 15.9 g/dL Final   • Hematocrit 02/26/2020 39.3  34.0 - 46.6 % Final   • MCV 02/26/2020 93.7  79.0 - 97.0 fL Final   • MCH 02/26/2020 32.7  26.6 - 33.0 pg Final   • MCHC 02/26/2020 34.9  31.5 - 35.7 g/dL Final   • RDW 02/26/2020 14.1  12.3 - 15.4 % Final   • RDW-SD 02/26/2020 46.4  37.0 - 54.0 fl Final   • MPV 02/26/2020 9.1  6.0 - 12.0 fL Final   • Platelets 02/26/2020 167  140 - 450 10*3/mm3 Final   • Neutrophil % 02/26/2020 61.7  42.7 - 76.0 % Final   • Lymphocyte % 02/26/2020 28.3  19.6 - 45.3 % Final   • Monocyte % 02/26/2020 8.3  5.0 - 12.0 % Final   • Eosinophil % 02/26/2020 1.5  0.3 - 6.2 % Final   • Basophil % 02/26/2020 0.2  0.0 - 1.5 % Final   • Neutrophils, Absolute 02/26/2020 6.40  1.70 - 7.00 10*3/mm3 Final   • Lymphocytes, Absolute 02/26/2020 2.90  0.70 - 3.10 10*3/mm3 Final   • Monocytes, Absolute 02/26/2020 0.90  0.10 - 0.90 10*3/mm3 Final   • Eosinophils, Absolute 02/26/2020 0.20  0.00 - 0.40 10*3/mm3 Final   • Basophils, Absolute 02/26/2020 0.00  0.00 - 0.20 10*3/mm3 Final   • nRBC 02/26/2020 0.1  0.0 - 0.2 /100 WBC Final   • Case Report 02/26/2020    Final                    Value:Surgical Pathology Report                         Case: [id number]                                  Authorizing Provider:  Michael Cheng MD       Collected:           02/26/2020 01:43 PM          Ordering Location:     [facility]  Received:            02/26/2020 03:45 PM                                 SUITES                                                                       Pathologist:           Nicanor Fallon MD                                                           Specimen:    Large Intestine, random colon                                                             • Clinical Information 02/26/2020    Final                    Value:This result contains rich text formatting which cannot be displayed here.   • Final Diagnosis  02/26/2020    Final                    Value:This result contains rich text formatting which cannot be displayed here.   • Gross Description 02/26/2020    Final                    Value:This result contains rich text formatting which cannot be displayed here.   • Potassium 02/26/2020 3.4* 3.5 - 5.2 mmol/L Final   • Magnesium 02/27/2020 2.0  1.6 - 2.6 mg/dL Final   • Glucose 02/27/2020 132* 65 - 99 mg/dL Final   • BUN 02/27/2020 14  6 - 20 mg/dL Final   • Creatinine 02/27/2020 0.65  0.57 - 1.00 mg/dL Final   • Sodium 02/27/2020 138  136 - 145 mmol/L Final   • Potassium 02/27/2020 3.3* 3.5 - 5.2 mmol/L Final   • Chloride 02/27/2020 104  98 - 107 mmol/L Final   • CO2 02/27/2020 25.0  22.0 - 29.0 mmol/L Final   • Calcium 02/27/2020 7.9* 8.6 - 10.5 mg/dL Final   • Total Protein 02/27/2020 6.0  6.0 - 8.5 g/dL Final   • Albumin 02/27/2020 3.20* 3.50 - 5.20 g/dL Final   • ALT (SGPT) 02/27/2020 12  1 - 33 U/L Final   • AST (SGOT) 02/27/2020 18  1 - 32 U/L Final   • Alkaline Phosphatase 02/27/2020 79  39 - 117 U/L Final   • Total Bilirubin 02/27/2020 0.3  0.2 - 1.2 mg/dL Final   • eGFR Non African Amer 02/27/2020 97  >60 mL/min/1.73 Final   • Globulin 02/27/2020 2.8  gm/dL Final   • A/G Ratio 02/27/2020 1.1  g/dL Final   • BUN/Creatinine Ratio 02/27/2020 21.5  7.0 - 25.0 Final   • Anion Gap 02/27/2020 9.0  5.0 - 15.0 mmol/L Final   • Potassium 02/27/2020 4.1  3.5 - 5.2 mmol/L Final   • Magnesium 02/28/2020 2.0  1.6 - 2.6 mg/dL Final   • Glucose 02/28/2020 109* 65 - 99 mg/dL Final   • BUN 02/28/2020 10  6 - 20 mg/dL Final   • Creatinine 02/28/2020 0.63  0.57 - 1.00 mg/dL Final   • Sodium 02/28/2020 138  136 - 145 mmol/L Final   • Potassium 02/28/2020 4.1  3.5 - 5.2 mmol/L Final   • Chloride 02/28/2020 110* 98 - 107 mmol/L Final   • CO2 02/28/2020 20.0* 22.0 - 29.0 mmol/L Final   • Calcium 02/28/2020 8.0* 8.6 - 10.5 mg/dL Final   • eGFR Non African Amer 02/28/2020 100  >60 mL/min/1.73 Final   • BUN/Creatinine Ratio 02/28/2020  15.9  7.0 - 25.0 Final   • Anion Gap 02/28/2020 8.0  5.0 - 15.0 mmol/L Final   • WBC 02/28/2020 8.20  3.40 - 10.80 10*3/mm3 Final   • RBC 02/28/2020 3.65* 3.77 - 5.28 10*6/mm3 Final   • Hemoglobin 02/28/2020 11.8* 12.0 - 15.9 g/dL Final   • Hematocrit 02/28/2020 34.7  34.0 - 46.6 % Final   • MCV 02/28/2020 95.0  79.0 - 97.0 fL Final   • MCH 02/28/2020 32.4  26.6 - 33.0 pg Final   • MCHC 02/28/2020 34.1  31.5 - 35.7 g/dL Final   • RDW 02/28/2020 14.1  12.3 - 15.4 % Final   • RDW-SD 02/28/2020 47.3  37.0 - 54.0 fl Final   • MPV 02/28/2020 8.9  6.0 - 12.0 fL Final   • Platelets 02/28/2020 134* 140 - 450 10*3/mm3 Final   • Neutrophil % 02/28/2020 66.7  42.7 - 76.0 % Final   • Lymphocyte % 02/28/2020 23.2  19.6 - 45.3 % Final   • Monocyte % 02/28/2020 7.9  5.0 - 12.0 % Final   • Eosinophil % 02/28/2020 1.7  0.3 - 6.2 % Final   • Basophil % 02/28/2020 0.5  0.0 - 1.5 % Final   • Neutrophils, Absolute 02/28/2020 5.50  1.70 - 7.00 10*3/mm3 Final   • Lymphocytes, Absolute 02/28/2020 1.90  0.70 - 3.10 10*3/mm3 Final   • Monocytes, Absolute 02/28/2020 0.70  0.10 - 0.90 10*3/mm3 Final   • Eosinophils, Absolute 02/28/2020 0.10  0.00 - 0.40 10*3/mm3 Final   • Basophils, Absolute 02/28/2020 0.00  0.00 - 0.20 10*3/mm3 Final   • nRBC 02/28/2020 0.0  0.0 - 0.2 /100 WBC Final   Lab on 02/07/2020   Component Date Value Ref Range Status   • WBC 02/07/2020 22.30* 3.40 - 10.80 10*3/mm3 Final   • RBC 02/07/2020 5.38* 3.77 - 5.28 10*6/mm3 Final   • Hemoglobin 02/07/2020 17.1* 12.0 - 15.9 g/dL Final   • Hematocrit 02/07/2020 50.1* 34.0 - 46.6 % Final   • MCV 02/07/2020 93.1  79.0 - 97.0 fL Final   • MCH 02/07/2020 31.7  26.6 - 33.0 pg Final   • MCHC 02/07/2020 34.1  31.5 - 35.7 g/dL Final   • RDW 02/07/2020 15.0  12.3 - 15.4 % Final   • RDW-SD 02/07/2020 49.0  37.0 - 54.0 fl Final   • MPV 02/07/2020 8.1  6.0 - 12.0 fL Final   • Platelets 02/07/2020 279  140 - 450 10*3/mm3 Final   • Scan Slide 02/07/2020    Final    See Manual Differential  Results   • Neutrophil % 02/07/2020 73.0  42.7 - 76.0 % Final   • Lymphocyte % 02/07/2020 17.0* 19.6 - 45.3 % Final   • Monocyte % 02/07/2020 1.0* 5.0 - 12.0 % Final   • Bands %  02/07/2020 9.0* 0.0 - 5.0 % Final   • Neutrophils Absolute 02/07/2020 18.29* 1.70 - 7.00 10*3/mm3 Final   • Lymphocytes Absolute 02/07/2020 3.79* 0.70 - 3.10 10*3/mm3 Final   • Monocytes Absolute 02/07/2020 0.22  0.10 - 0.90 10*3/mm3 Final   • RBC Morphology 02/07/2020 Normal  Normal Final   • WBC Morphology 02/07/2020 Normal  Normal Final   • Platelet Morphology 02/07/2020 Normal  Normal Final   Office Visit on 02/07/2020   Component Date Value Ref Range Status   • Color 02/07/2020 Yellow  Yellow, Straw, Dark Yellow, Tarah Final   • Clarity, UA 02/07/2020 Clear  Clear Final   • Specific Gravity  02/07/2020 1.030  1.005 - 1.030 Final   • pH, Urine 02/07/2020 6.0  5.0 - 8.0 Final   • Leukocytes 02/07/2020 Negative  Negative Final   • Nitrite, UA 02/07/2020 Negative  Negative Final   • Protein, POC 02/07/2020 30 mg/dL* Negative mg/dL Final   • Glucose, UA 02/07/2020 Negative  Negative, 1000 mg/dL (3+) mg/dL Final   • Ketones, UA 02/07/2020 Negative  Negative Final   • Urobilinogen, UA 02/07/2020 Normal  Normal Final   • Bilirubin 02/07/2020 Small (1+)* Negative Final   • Blood, UA 02/07/2020 Negative  Negative Final   Lab on 01/31/2020   Component Date Value Ref Range Status   • Troponin T 01/31/2020 <0.010  0.000 - 0.030 ng/mL Final   Office Visit on 01/31/2020   Component Date Value Ref Range Status   • Glucose 01/31/2020 96  65 - 99 mg/dL Final   • BUN 01/31/2020 8  6 - 20 mg/dL Final   • Creatinine 01/31/2020 0.51* 0.57 - 1.00 mg/dL Final   • Sodium 01/31/2020 140  136 - 145 mmol/L Final   • Potassium 01/31/2020 4.4  3.5 - 5.2 mmol/L Final   • Chloride 01/31/2020 99  98 - 107 mmol/L Final   • CO2 01/31/2020 29.2* 22.0 - 29.0 mmol/L Final   • Calcium 01/31/2020 8.9  8.6 - 10.5 mg/dL Final   • Total Protein 01/31/2020 6.9  6.0 - 8.5 g/dL  Final   • Albumin 01/31/2020 4.10  3.50 - 5.20 g/dL Final   • ALT (SGPT) 01/31/2020 13  1 - 33 U/L Final   • AST (SGOT) 01/31/2020 16  1 - 32 U/L Final    Specimen hemolyzed.  Results may be affected.   • Alkaline Phosphatase 01/31/2020 70  39 - 117 U/L Final   • Total Bilirubin 01/31/2020 <0.2* 0.2 - 1.2 mg/dL Final   • eGFR Non African Amer 01/31/2020 128  >60 mL/min/1.73 Final   • Globulin 01/31/2020 2.8  gm/dL Final   • A/G Ratio 01/31/2020 1.5  g/dL Final   • BUN/Creatinine Ratio 01/31/2020 15.7  7.0 - 25.0 Final   • Anion Gap 01/31/2020 11.8  5.0 - 15.0 mmol/L Final   • Total Cholesterol 01/31/2020 146  0 - 200 mg/dL Final   • Triglycerides 01/31/2020 180* 0 - 150 mg/dL Final   • HDL Cholesterol 01/31/2020 41  40 - 60 mg/dL Final   • LDL Cholesterol  01/31/2020 69  0 - 100 mg/dL Final   • VLDL Cholesterol 01/31/2020 36  5 - 40 mg/dL Final   • LDL/HDL Ratio 01/31/2020 1.68   Final   • TSH 01/31/2020 0.807  0.270 - 4.200 uIU/mL Final   • WBC 01/31/2020 9.47  3.40 - 10.80 10*3/mm3 Final   • RBC 01/31/2020 4.47  3.77 - 5.28 10*6/mm3 Final   • Hemoglobin 01/31/2020 13.9  12.0 - 15.9 g/dL Final   • Hematocrit 01/31/2020 41.0  34.0 - 46.6 % Final   • MCV 01/31/2020 91.7  79.0 - 97.0 fL Final   • MCH 01/31/2020 31.1  26.6 - 33.0 pg Final   • MCHC 01/31/2020 33.9  31.5 - 35.7 g/dL Final   • RDW 01/31/2020 13.8  12.3 - 15.4 % Final   • RDW-SD 01/31/2020 46.0  37.0 - 54.0 fl Final   • MPV 01/31/2020 10.7  6.0 - 12.0 fL Final   • Platelets 01/31/2020 177  140 - 450 10*3/mm3 Final   • Neutrophil % 01/31/2020 79.6* 42.7 - 76.0 % Final   • Lymphocyte % 01/31/2020 15.8* 19.6 - 45.3 % Final   • Monocyte % 01/31/2020 4.0* 5.0 - 12.0 % Final   • Eosinophil % 01/31/2020 0.1* 0.3 - 6.2 % Final   • Basophil % 01/31/2020 0.1  0.0 - 1.5 % Final   • Immature Grans % 01/31/2020 0.4  0.0 - 0.5 % Final   • Neutrophils, Absolute 01/31/2020 7.53* 1.70 - 7.00 10*3/mm3 Final   • Lymphocytes, Absolute 01/31/2020 1.50  0.70 - 3.10 10*3/mm3  Final   • Monocytes, Absolute 01/31/2020 0.38  0.10 - 0.90 10*3/mm3 Final   • Eosinophils, Absolute 01/31/2020 0.01  0.00 - 0.40 10*3/mm3 Final   • Basophils, Absolute 01/31/2020 0.01  0.00 - 0.20 10*3/mm3 Final   • Immature Grans, Absolute 01/31/2020 0.04  0.00 - 0.05 10*3/mm3 Final   • nRBC 01/31/2020 0.0  0.0 - 0.2 /100 WBC Final       Assessment/Plan   Problems Addressed this Visit        Other    Generalized anxiety disorder - Primary (Chronic)    Relevant Medications    vilazodone (Viibryd) 20 MG tablet tablet    ALPRAZolam (XANAX) 0.5 MG tablet    Major depressive disorder, recurrent episode, moderate (CMS/HCC)    Relevant Medications    vilazodone (Viibryd) 20 MG tablet tablet    ALPRAZolam (XANAX) 0.5 MG tablet          Visit Diagnoses:    ICD-10-CM ICD-9-CM   1. Generalized anxiety disorder F41.1 300.02   2. Major depressive disorder, recurrent episode, moderate (CMS/HCC) F33.1 296.32       TREATMENT PLAN/GOALS: Continue supportive psychotherapy efforts and medications as indicated. Treatment and medication options discussed during today's visit. Patient ackowledged and verbally consented to continue with current treatment plan and was educated on the importance of compliance with treatment and follow-up appointments.    MEDICATION ISSUES:  Cont viibryd 20 mg   xanxa 0.5 mg BID PRN for anxiety   Coping skills discussed   INSPECT reviewed as expected  Discussed medication options and treatment plan of prescribed medication as well as the risks, benefits, and side effects including potential falls, possible impaired driving and metabolic adversities among others. Patient is agreeable to call the office with any worsening of symptoms or onset of side effects. Patient is agreeable to call 911 or go to the nearest ER should he/she begin having SI/HI. No medication side effects or related complaints today.     MEDS ORDERED DURING VISIT:  New Medications Ordered This Visit   Medications   • vilazodone (Viibryd)  20 MG tablet tablet     Sig: Take 1 tablet by mouth Every Morning.     Dispense:  90 tablet     Refill:  1   • ALPRAZolam (XANAX) 0.5 MG tablet     Sig: Take 1 tablet by mouth 2 (Two) Times a Day As Needed for Anxiety.     Dispense:  60 tablet     Refill:  2   • Melatonin 10 MG tablet     Sig: Take 1 tablet by mouth every night at bedtime.     Dispense:  30 tablet     Refill:  2       Return in about 4 months (around 8/17/2020).       This visit has been rescheduled as a phone visit to comply with patient safety concerns in accordance with CDC recommendations. Total time of discussion was 15 minutes.      This document has been electronically signed by Africa Conway MD  April 17, 2020 11:45

## 2020-04-17 NOTE — PATIENT INSTRUCTIONS
Generalized Anxiety Disorder, Adult  Generalized anxiety disorder (MIGUEL ANGEL) is a mental health disorder. People with this condition constantly worry about everyday events. Unlike normal anxiety, worry related to MIGUEL ANGEL is not triggered by a specific event. These worries also do not fade or get better with time. MIGUEL ANGEL interferes with life functions, including relationships, work, and school.  MIGUEL ANGEL can vary from mild to severe. People with severe MIGUEL ANGEL can have intense waves of anxiety with physical symptoms (panic attacks).  What are the causes?  The exact cause of MIGUEL ANGEL is not known.  What increases the risk?  This condition is more likely to develop in:  · Women.  · People who have a family history of anxiety disorders.  · People who are very shy.  · People who experience very stressful life events, such as the death of a loved one.  · People who have a very stressful family environment.  What are the signs or symptoms?  People with MIGUEL ANGEL often worry excessively about many things in their lives, such as their health and family. They may also be overly concerned about:  · Doing well at work.  · Being on time.  · Natural disasters.  · Friendships.  Physical symptoms of MIGUEL ANGEL include:  · Fatigue.  · Muscle tension or having muscle twitches.  · Trembling or feeling shaky.  · Being easily startled.  · Feeling like your heart is pounding or racing.  · Feeling out of breath or like you cannot take a deep breath.  · Having trouble falling asleep or staying asleep.  · Sweating.  · Nausea, diarrhea, or irritable bowel syndrome (IBS).  · Headaches.  · Trouble concentrating or remembering facts.  · Restlessness.  · Irritability.  How is this diagnosed?  Your health care provider can diagnose MIGUEL ANGEL based on your symptoms and medical history. You will also have a physical exam. The health care provider will ask specific questions about your symptoms, including how severe they are, when they started, and if they come and go. Your health care  provider may ask you about your use of alcohol or drugs, including prescription medicines. Your health care provider may refer you to a mental health specialist for further evaluation.  Your health care provider will do a thorough examination and may perform additional tests to rule out other possible causes of your symptoms.  To be diagnosed with MIGUEL ANGEL, a person must have anxiety that:  · Is out of his or her control.  · Affects several different aspects of his or her life, such as work and relationships.  · Causes distress that makes him or her unable to take part in normal activities.  · Includes at least three physical symptoms of MIGUEL ANGEL, such as restlessness, fatigue, trouble concentrating, irritability, muscle tension, or sleep problems.  Before your health care provider can confirm a diagnosis of MIGUEL ANGEL, these symptoms must be present more days than they are not, and they must last for six months or longer.  How is this treated?  The following therapies are usually used to treat MIGUEL ANGEL:  · Medicine. Antidepressant medicine is usually prescribed for long-term daily control. Antianxiety medicines may be added in severe cases, especially when panic attacks occur.  · Talk therapy (psychotherapy). Certain types of talk therapy can be helpful in treating MIGUEL ANGEL by providing support, education, and guidance. Options include:  ? Cognitive behavioral therapy (CBT). People learn coping skills and techniques to ease their anxiety. They learn to identify unrealistic or negative thoughts and behaviors and to replace them with positive ones.  ? Acceptance and commitment therapy (ACT). This treatment teaches people how to be mindful as a way to cope with unwanted thoughts and feelings.  ? Biofeedback. This process trains you to manage your body's response (physiological response) through breathing techniques and relaxation methods. You will work with a therapist while machines are used to monitor your physical symptoms.  · Stress  management techniques. These include yoga, meditation, and exercise.  A mental health specialist can help determine which treatment is best for you. Some people see improvement with one type of therapy. However, other people require a combination of therapies.  Follow these instructions at home:  · Take over-the-counter and prescription medicines only as told by your health care provider.  · Try to maintain a normal routine.  · Try to anticipate stressful situations and allow extra time to manage them.  · Practice any stress management or self-calming techniques as taught by your health care provider.  · Do not punish yourself for setbacks or for not making progress.  · Try to recognize your accomplishments, even if they are small.  · Keep all follow-up visits as told by your health care provider. This is important.  Contact a health care provider if:  · Your symptoms do not get better.  · Your symptoms get worse.  · You have signs of depression, such as:  ? A persistently sad, cranky, or irritable mood.  ? Loss of enjoyment in activities that used to bring you justin.  ? Change in weight or eating.  ? Changes in sleeping habits.  ? Avoiding friends or family members.  ? Loss of energy for normal tasks.  ? Feelings of guilt or worthlessness.  Get help right away if:  · You have serious thoughts about hurting yourself or others.  If you ever feel like you may hurt yourself or others, or have thoughts about taking your own life, get help right away. You can go to your nearest emergency department or call:  · Your local emergency services (911 in the [place]  · A suicide crisis helpline, such as the National Suicide Prevention Lifeline at 1-341.385.1832. This is open 24 hours a day.  Summary  · Generalized anxiety disorder (MIGUEL ANGEL) is a mental health disorder that involves worry that is not triggered by a specific event.  · People with MIGUEL ANGEL often worry excessively about many things in their lives, such as their health and  family.  · MIGUEL ANGEL may cause physical symptoms such as restlessness, trouble concentrating, sleep problems, frequent sweating, nausea, diarrhea, headaches, and trembling or muscle twitching.  · A mental health specialist can help determine which treatment is best for you. Some people see improvement with one type of therapy. However, other people require a combination of therapies.  This information is not intended to replace advice given to you by your health care provider. Make sure you discuss any questions you have with your health care provider.  Document Released: 04/14/2014 Document Revised: 01/07/2020 Document Reviewed: 11/07/2017  [employer] Patient Education © 2020 [employer]

## 2020-04-23 ENCOUNTER — TELEPHONE (OUTPATIENT)
Dept: PSYCHIATRY | Facility: CLINIC | Age: 50
End: 2020-04-23

## 2020-04-23 ENCOUNTER — TELEPHONE (OUTPATIENT)
Dept: FAMILY MEDICINE CLINIC | Facility: CLINIC | Age: 50
End: 2020-04-23

## 2020-04-23 RX ORDER — BUDESONIDE 3 MG/1
CAPSULE, COATED PELLETS ORAL
Qty: 90 CAPSULE | Refills: 0 | Status: SHIPPED | OUTPATIENT
Start: 2020-04-23 | End: 2020-05-22

## 2020-04-23 RX ORDER — BENZONATATE 100 MG/1
100 CAPSULE ORAL 3 TIMES DAILY PRN
Qty: 30 CAPSULE | Refills: 2 | Status: SHIPPED | OUTPATIENT
Start: 2020-04-23 | End: 2020-05-28

## 2020-04-23 RX ORDER — AZITHROMYCIN 250 MG/1
TABLET, FILM COATED ORAL
Qty: 6 TABLET | Refills: 0 | Status: SHIPPED | OUTPATIENT
Start: 2020-04-23 | End: 2020-05-14

## 2020-04-23 RX ORDER — PREDNISONE 10 MG/1
TABLET ORAL
Qty: 20 TABLET | Refills: 0 | Status: SHIPPED | OUTPATIENT
Start: 2020-04-23 | End: 2020-05-14

## 2020-04-23 NOTE — TELEPHONE ENCOUNTER
Patient called complaining of cough for the past 6 weeks, chest tightness, fatigue from coughing.  She had influenza 2 months ago and has been coughing since then.  She states she has been at home quarantining since the coronavirus happened.  She does not feel like she has had a fever.  She does have COPD and is medically frail.  For this reason I am treating her with a Z-Brett and prednisone with Tessalon Perles.  She is to follow-up in the office if she is not improving or if a fever develops.

## 2020-04-23 NOTE — TELEPHONE ENCOUNTER
4/23/20-- DR WILSON-- PATIENT WAS PLACED ON ALPRZOLAM 0.5 #60---  HE ALSO  HAD AN RX DATED 4/15/ FOR [id number] #90 FOR 30DAYS BY DR ANTHONY LEBRON--- HIS INSURANCE IS REJECTING DUE TO CONCURRENT OPIOID  BENZODIAZEPINE----- IF YOU OK-- PA WANTS YOUR SIGNATURE-- AND THAT YOU ARE AWARE OF THE  CONCURRENT THERAPY-- BOX--DEV/HIMANSHU--PLEASE ADVISE OF APPROVAL OR NOT--HIMANSHU   LYNDON ambulatory encounter  FAMILY PRACTICE OFFICE VISIT    CHIEF COMPLAINT:    Chief Complaint   Patient presents with   • Follow-up       SUBJECTIVE:  Grzegorz Lion is a 50 year old male who presented requesting evaluation for multiple medical problems.     1. Arteriosclerotic heart disease: The patient presents for a routine follow-up for this medical problem. Doing well, clinically stable. Tolerating current medications. Denies any new problems.  2. Ischemic Cardiomyopathy: The patient is here for a follow up on this chronic problem. Patient states that he is doing well. The patient is tolerating his current medication regimen with no problems. Patient has recently experienced an increase in creatinine level. He has been experiencing mild swelling to bilateral lower extremities. He denies any current chest pain or shortness of breath.   3. 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 The problem has been unchanged. Patient complains of no new problems. He denies shortness of breath or chest pain. Current treatment includes insulin. Treatment has been effective.  4. Hypertensive heart disease: The patient has been doing well on current antihypertensive regimen. He has not had any new problems or issues at this time. He denies any current chest pain, headache or shortness of breath. His current treatment regimen includes Diuretic, ACE inhibitor, nitrates, Beta Blocker and Ranolazine. The patient denies any new problems or concerns.  5. Gastroesophageal reflux disease without esophagitis:  Reflux symptoms have been under good control.  Tolerating the treatment regimen quite well.  Has not had any problems or side effects from the treatment at this time.  6. Mild intermittent asthma: The patient presents for a routine follow-up for this medical problem. Doing well, clinically stable. Tolerating current medications. Denies any new  problems.  7. Depression: The patient is here for a follow up on this chronic problem. Patient states that he is doing well. The patient is tolerating his current medication regimen with no problems. Patient complains of no new problems.      Review of systems:   Constitutional: Negative for fever and chills.   Skin: Negative for rash.   HEENT: Negative for ear pain or sore throat.  Respiratory: Negative for cough or shortness of breath.    Cardiovascular: Negative for chest pain or chest pressure.   Extremities:  Negative for joint swelling or joint pain.     OBJECTIVE:  PROBLEM LIST:   Patient Active Problem List   Diagnosis   • Leukocytosis, unspecified   • Eosinophilia   • Angina pectoris (CMS/HCC)   • Asthma   • Ischemic cardiomyopathy, established   • Chronic pain   • CAD (coronary artery disease)   • GERD (gastroesophageal reflux disease)   • Hyperlipidemia   • Neck pain   •  BiV ICD, Medtronic   • Chronic systolic heart failure (CMS/[facility])   • Hypertension   • Angina at rest (CMS/[facility])   • Encounter for long-term use of high-risk medications - Ranolazine   • Chronic kidney disease (CKD), stage III (moderate) (CMS/[facility])   • Angina, class III (CMS/[facility])   • Type 2 diabetes mellitus with renal manifestations (CMS/[facility])   • Dyspnea   • Hyponatremia   • LBBB with QRS >130ms   • Prior anteroapical MI   • Obesity   • COPD (chronic obstructive pulmonary disease) (CMS/[facility])   • Recurrent syncope   • Non-ST elevation MI (NSTEMI) (CMS/[facility])   • KINGS (obstructive sleep apnea)   • Gout of multiple sites       PAST HISTORIES:   I have reviewed the past medical history, family history, social history, medications and allergies listed in the medical record as obtained by my nursing staff and support staff and agree with their documentation.  ALLERGIES:   Allergen Reactions   • Dye [Contrast Media] SWELLING     Eyes swell, improves with diphenhydramine   • Adhesive   (Environmental) RASH     Telemetry electrodes     Current  Outpatient Medications   Medication Sig Dispense Refill   • ONE TOUCH ULTRA TEST test strip USE ONE STRIP TO CHECK GLUCOSE 4 TIMES DAILY AS NEEDED 100 strip 11   • aspirin 325 MG tablet Take 325 mg by mouth daily.     • spironolactone (ALDACTONE) 50 MG tablet Take 1 tablet by mouth daily. 90 tablet 3   • KLOR-CON M20 20 MEQ CR tablet TAKE 1 TABLET BY MOUTH ONCE DAILY 90 tablet 1   • albuterol (PROAIR HFA) 108 (90 Base) MCG/ACT inhaler INHALE 2 PUFFS BY MOUTH EVERY 4 HOURS AS NEEDED FOR WHEEZING 2 Inhaler 3   • atorvastatin (LIPITOR) 80 MG tablet TAKE 1 TABLET BY MOUTH ONCE DAILY 30 tablet 6   • zolpidem (AMBIEN) 10 MG tablet TAKE 1 TABLET BY MOUTH AT BEDTIME AS NEEDED FOR SLEEP 30 tablet 2   • allopurinol (ZYLOPRIM) 100 MG tablet Take 2 tablets by mouth daily. 180 tablet 1   • albuterol (VENTOLIN) (2.5 MG/3ML) 0.083% nebulizer solution Take 3 mLs by nebulization every 6 hours as needed for Wheezing. 375 mL 12   • citalopram (CELEXA) 20 MG tablet Take 1.5 tablets by mouth daily. 135 tablet 1   • clopidogrel (PLAVIX) 75 MG tablet TAKE 1 TABLET BY MOUTH ONCE DAILY 30 tablet 6   • lisinopril (ZESTRIL) 10 MG tablet Take 1 tablet by mouth daily. 30 tablet 0   • isosorbide dinitrate (ISORDIL) 30 MG tablet TAKE 1 TABLET BY MOUTH 4 TIMES DAILY 120 tablet 0   • gabapentin (NEURONTIN) 800 MG tablet TAKE 1 TABLET BY MOUTH 4 TIMES DAILY 120 tablet 2   • nitroGLYcerin (NITROSTAT) 0.4 MG sublingual tablet Place 1 tablet under the tongue every 5 minutes as needed for Chest pain. 90 tablet 0   • Insulin Pen Needle 31G X 8 MM Misc Use to inject insulin 3 times daily. Remove needle cover(s) to expose needle before injecting. 300 each 1   • bisoprolol (ZEBETA) 10 MG tablet Take 5 mg by mouth. Decrease to 5mg qd per Dr Mcdonough 7/12/18      • metOLazone (ZAROXOLYN) 2.5 MG tablet Take 1 tablet by mouth as needed (weight gain). For a weight gain of three pounds over night or five pounds in a week 30 tablet 3   • torsemide (DEMADEX) 20 MG  tablet TAKE FOUR TABLETS BY MOUTH ONCE DAILY 120 tablet 11   • Ranolazine (RANEXA) 1000 MG TABLET SR 12 HR Take 1 tablet by mouth every 12 hours. 60 tablet 11   • insulin regular human, CONCENTRATED, (HUMULIN R U-500 KWIKPEN) 500 UNIT/ML pen-injector 80 units regular insulin equivalent tid with meals. 18 mL 3   • Insulin Pen Needle 29G X 10MM Misc 1 application 3 times daily. 30 each 0   • Insulin Pen Needle 32G X 4 MM Misc Use as directed. 100 each 0   • albuterol-ipratropium 2.5 mg/0.5 mg (DUONEB) 0.5-2.5 (3) MG/3ML nebulizer solution Use with nebulizer every 4 hours as needed for cough/shortness of breath/wheeze 90 mL 1     No current facility-administered medications for this visit.      Immunization History   Administered Date(s) Administered   • Hep B, adult 04/15/2010, 05/14/2010, 06/24/2010   • Influenza, Unspecified Formulation 10/28/2006, 09/25/2011, 10/23/2012   • Influenza, injectable, quadrivalent 11/06/2014, 09/21/2015, 09/12/2018   • Influenza, injectable, quadrivalent, preservative-free 09/22/2017, 09/12/2018   • Influenza, seasonal, injectable, preservative free 09/26/2015, 08/27/2016   • Influenza, seasonal, injectable, trivalent 10/28/2006, 10/06/2009, 10/01/2010, 09/25/2011, 10/23/2012, 10/01/2013, 10/02/2016, 09/22/2017   • Pneumococcal polysaccharide, adult, 23 valent 09/25/2011   • Td:Adult type tetanus/diphtheria 05/16/2009   • Tdap 05/16/2009, 01/08/2019     Past Medical History:   Diagnosis Date   • Angina, class III (CMS/[facility])    • Anxiety    • Asthma    • Asthma    • Blood in stool     Now and then - monitoring   • Bronchitis    • Cerebral infarction (CMS/[facility])     TIA   • CHF (congestive heart failure) (CMS/[facility])     Grade II / IV diastolic dysfunction;  EF 30 - 35%   • CKD (chronic kidney disease), stage II    • COPD (chronic obstructive pulmonary disease) (CMS/[facility])    • Coronary artery disease    • Depression     Needed hospitalization [id number]   • Dyslipidemia    • Falls     due to BP  problems, mostly resolved    • GERD (gastroesophageal reflux disease)    • Hiatal hernia    • High cholesterol    • Hypertension 2003   • Insomnia    • Ischemic cardiomyopathy     LVEF 26%, on heart transplant list since 7/2010   • MI (myocardial infarction) (CMS/[facility])     X6/recent 2006   • Pneumonia    • Sleep apnea     Never used CPAP due to not covered by Medicare   • Type II diabetes mellitus (CMS/[facility]) 1995    Checks blood sugar at home QID insulin start 2008   • Wears glasses      Past Surgical History:   Procedure Laterality Date   • Appendectomy     • Cardiac catherization  1/25/2012; 12/2013    LIMA graft to LAD patent, Left cx stent patent   • Cardiac catherization  12/13/2013   • Cardiac catherization  1/28/2015   • Cardiac catherization  2/14/2016    Occluded LAD, Patent LIMA bypass   • Cardiac catherization  1/15/2007   • Cardiac catherization  12/27/2007   • Cardiac defibrillator placement  08/18/2003        • Cardiac surgery  9/2005    CABG   • Colonoscopy     • Coronary angioplasty with stent placement  02/12/2011    PTCA/Stents to LAD, 1st Diagonal and Circ   • Coronary angioplasty with stent placement  07/31/2015    PTCA of the Ramus with a 2.75x26 Resolute Integrity drug eluting stent and PTCA of the OM with a 4.0x12 Resolute Integrity drug eluting stent   • Icd generator change  04/28/2006    medtronic protecta   • Left heart cath,percutaneous  04/20/2011    LVEF ~ 35% - Apical Aneurysm noted   • Other coronary care      CP  last treatment 1/27/2015   • Removal gallbladder  08/22/2006    Lap Cholecystectomy (gallbladder)   • Right heart cath  3/16/2016   • Right heart cath w oc sat & cardiac output  Multiple    04/14/2010, 10/11/2010, 4/26/2012   • Right heart cath w oc sat & cardiac output  06/10/2015   • Service to gastroenterology  2006; 2008    EGD   • Thoracentesis  10/04/2005   • Upgrade to bi-v implant  10/27/2016    MEDT  BIV     Social History     Socioeconomic History   • Marital  status: /Civil Union     Spouse name: Rebeka   • Number of children: 1   • Years of education: None   • Highest education level: None   Social Needs   • Financial resource strain: None   • Food insecurity - worry: None   • Food insecurity - inability: None   • Transportation needs - medical: None   • Transportation needs - non-medical: None   Occupational History   • Occupation: Disability     Comment: Construction   Tobacco Use   • Smoking status: Former Smoker     Packs/day: 0.25     Years: 20.00     Pack years: 5.00     Types: Cigars     Start date: 1983     Last attempt to quit: 2018     Years since quittin.1   • Smokeless tobacco: Former User   Substance and Sexual Activity   • Alcohol use: No     Alcohol/week: 0.0 oz     Comment: Quit drinking in    • Drug use: No     Comment: Quit 20 years ago   • Sexual activity: Not Currently     Partners: Female   Other Topics Concern   •  Service Not Asked   • Blood Transfusions No   • Caffeine Concern Not Asked   • Occupational Exposure Not Asked     Comment: No exposure to benzene, asbestos or radiation   • Hobby Hazards Not Asked   • Sleep Concern Not Asked   • Stress Concern Not Asked   • Weight Concern No   • Special Diet Not Asked   • Back Care Not Asked   • Exercise Not Asked   • Bike Helmet Not Asked   • Seat Belt Not Asked   • Self-Exams Not Asked   Social History Narrative   • None     Social History     Tobacco Use   Smoking Status Former Smoker   • Packs/day: 0.25   • Years: 20.00   • Pack years: 5.00   • Types: Cigars   • Start date: 1983   • Last attempt to quit: 2018   • Years since quittin.1   Smokeless Tobacco Former User     Social History     Substance and Sexual Activity   Alcohol Use No   • Alcohol/week: 0.0 oz    Comment: Quit drinking in      Family History   Problem Relation Age of Onset   • Hypertension Mother    • Diabetes Mother    • High blood pressure Mother    • Stroke Mother    • Asthma Mother     • Depression Mother    • Heart disease Mother    • High cholesterol Mother    • Thyroid Mother    • Hypertension Father    • Diabetes Father    • High blood pressure Father    • High cholesterol Father    • Asthma Father    • Diabetes Brother    • COPD Brother    • High blood pressure Brother    • Heart Brother      Health Maintenance   Topic Date Due   • Diabetes Eye Exam  06/27/1986   • Pneumococcal Vaccine 19-64 Highest Risk (2 of 3 - PCV13) 09/25/2012   • Colorectal Cancer Screening-Colonoscopy  06/27/2018   • Diabetes A1C  04/29/2019   • Diabetes Foot Exam  06/11/2019   • Depression Screening  10/25/2019   • Diabetes GFR  02/19/2020   • DTaP/Tdap/Td Vaccine (3 - Td) 01/08/2029   • Influenza Vaccine  Completed       PHYSICAL EXAM:   Constitutional: He is oriented to person, place, and time and well-developed, well-nourished, and in no distress. No distress.   HENT:   Head: Normocephalic and atraumatic.   Eyes: Conjunctivae are normal. Right eye exhibits no discharge. Left eye exhibits no discharge. No scleral icterus.   Neck: Neck supple. No tracheal deviation present. No thyromegaly present.   Cardiovascular: Normal rate, regular rhythm and normal heart sounds. Exam reveals no gallop and no friction rub.   No murmur heard.  Pulmonary/Chest: Effort normal and breath sounds normal. No respiratory distress.  The patient has no wheezes or rales.   Lymphadenopathy:   He has no cervical adenopathy.   Neurological: He is alert and oriented to person, place, and time. No cranial nerve deficit.   Skin: Skin is warm and dry. He is not diaphoretic.   Psychiatric: Mood, memory, affect and judgment normal.   Nursing note and vitals reviewed.      LAB RESULTS:   All pertinent laboratory results were reviewed.    ASSESSMENT:   1. ASHD (arteriosclerotic heart disease)    2. Ischemic cardiomyopathy    3. Type 2 diabetes mellitus with other circulatory complication, with long-term current use of insulin (CMS/[facility])    4.  Gastroesophageal reflux disease without esophagitis    5. Hypertensive heart disease without heart failure    6. Mild intermittent asthma without complication    7. Depression, unspecified depression type    8. Acute kidney injury (CMS/[facility])        PLAN:   Orders Placed This Encounter   • US Renal Complete (Comp Urinary System)   • Basic Metabolic Panel       1. Atherosclerotic heart disease:  Clinically controlled.  We will continue current treatment regimen of Plavix 75 mg daily and Aspirin 325 mg daily.  Will continue to monitor and make adjustments to medication and treatment plan as needed.  2. Ischemic cardiomyopathy: Clinically stable, he will continue with Renexa 1000 mg twice a day and Aspirin 325 mg daily along with current hypertensive therapy. He does use Nitroglycerin 0.4 mg PRN for chest pain. Will continue to monitor. He is followed by cardiology.   3. Diabetes: Patient is stable on current medications and treatment regimen. Will continue same medications per medication list and treatment regimen. Will make adjustments as needed.   4. Hypertension: Patient blood pressure currently stable. Will continue current treatment and medication including: Lisinopril 10 mg daily, Isosorbide dinitrate 30 mg QID, Bisoprolol 5 mg daily, metolazone 2.5 mg (prn), torsemide 80 mg daily, Ranolazine 1000 mg BID, Spironolactone 50 mg daily. Will continue to monitor blood pressure. Will make any adjustments to treatment and medication regimen as needed.  5. GERD: Reflux symptoms stable at this time. Will continue current diet regimen. Will make any adjustments and recommendations to plan of care as needed.  6. Mild intermittent asthma: Clinically controlled.  Will continue current treatment regimen.  Will continue to monitor and make adjustments and recommendations to plan of care as needed.  7. Depression: Will continue current citalopram 30 mg daily and treatment plan of care. Will make adjustments to treatment regimen  as needed or recommended.  8. Acute kidney injury: Will check stat BMP. Will check US renal system. Will make further recommendations based on laboratory and diagnostic results.       Return after ultrasound.    Instructions provided as documented in the after visit summary.    The patient indicated understanding of the diagnosis and agreed with the plan of care.     On 2/21/2019, IMelba RN scribed the services personally performed by Mukesh Souza MD      I attest that I performed all of the work for this encounter, and the scribe merely recorded my findings. Mukesh Souza MD

## 2020-04-28 RX ORDER — METOCLOPRAMIDE 10 MG/1
10 TABLET ORAL
COMMUNITY
End: 2020-04-28 | Stop reason: SDUPTHER

## 2020-04-30 RX ORDER — METOCLOPRAMIDE 10 MG/1
10 TABLET ORAL 4 TIMES DAILY
Qty: 120 TABLET | Refills: 1 | Status: SHIPPED | OUTPATIENT
Start: 2020-04-30 | End: 2020-08-04

## 2020-05-07 RX ORDER — AMLODIPINE BESYLATE 10 MG/1
10 TABLET ORAL DAILY
Qty: 90 TABLET | Refills: 1 | Status: SHIPPED | OUTPATIENT
Start: 2020-05-07 | End: 2020-06-25

## 2020-05-13 NOTE — PAT
Called [facility] to relay pt symptoms of sore throat and cough.  Pt states has had since December.  States cough is smokers cough and sore throat is Pharyngitis.  Her PCP is aware and has treated with meds.  No COVID testing done.  Had to leave message on Lizzy JORDAN at [facility].

## 2020-05-13 NOTE — PAT
Called I again to make sure Lizzy received message RE:  Pt symptoms.  Had to leave another message on VM

## 2020-05-14 ENCOUNTER — HOSPITAL ENCOUNTER (OUTPATIENT)
Facility: HOSPITAL | Age: 50
Setting detail: OBSERVATION
Discharge: HOME | End: 2020-05-17
Attending: EMERGENCY MEDICINE | Admitting: INTERNAL MEDICINE

## 2020-05-14 ENCOUNTER — APPOINTMENT (OUTPATIENT)
Dept: GENERAL RADIOLOGY | Facility: HOSPITAL | Age: 50
End: 2020-05-14

## 2020-05-14 DIAGNOSIS — J44.1 CHRONIC OBSTRUCTIVE PULMONARY DISEASE WITH ACUTE EXACERBATION (HCC): ICD-10-CM

## 2020-05-14 DIAGNOSIS — R50.9 FEVER, UNSPECIFIED FEVER CAUSE: ICD-10-CM

## 2020-05-14 DIAGNOSIS — R06.03 ACUTE RESPIRATORY DISTRESS: Primary | ICD-10-CM

## 2020-05-14 PROBLEM — K51.90 ULCERATIVE COLITIS: Chronic | Status: ACTIVE | Noted: 2020-03-03

## 2020-05-14 PROBLEM — E66.9 OBESITY (BMI 30-39.9): Chronic | Status: ACTIVE | Noted: 2020-05-14

## 2020-05-14 PROBLEM — R73.09 ELEVATED RANDOM BLOOD GLUCOSE LEVEL: Status: ACTIVE | Noted: 2020-05-14

## 2020-05-14 PROBLEM — Z20.822 SUSPECTED COVID-19 VIRUS INFECTION: Status: ACTIVE | Noted: 2020-05-14

## 2020-05-14 PROBLEM — E66.01 MORBID OBESITY: Chronic | Status: RESOLVED | Noted: 2017-11-02 | Resolved: 2020-05-14

## 2020-05-14 PROBLEM — R73.9 ELEVATED RANDOM BLOOD GLUCOSE LEVEL: Status: ACTIVE | Noted: 2020-05-14

## 2020-05-14 PROBLEM — E55.9 VITAMIN D DEFICIENCY: Chronic | Status: ACTIVE | Noted: 2019-01-14

## 2020-05-14 LAB
ALBUMIN SERPL-MCNC: 3.9 G/DL (ref 3.5–5.2)
ALBUMIN/GLOB SERPL: 1.3 G/DL
ALP SERPL-CCNC: 63 U/L (ref 39–117)
ALT SERPL W P-5'-P-CCNC: 16 U/L (ref 1–33)
ANION GAP SERPL CALCULATED.3IONS-SCNC: 14 MMOL/L (ref 5–15)
AST SERPL-CCNC: 14 U/L (ref 1–32)
B PERT DNA SPEC QL NAA+PROBE: NOT DETECTED
BASOPHILS # BLD AUTO: 0 10*3/MM3 (ref 0–0.2)
BASOPHILS NFR BLD AUTO: 0.4 % (ref 0–1.5)
BILIRUB SERPL-MCNC: 0.3 MG/DL (ref 0.2–1.2)
BUN BLD-MCNC: 8 MG/DL (ref 6–20)
BUN/CREAT SERPL: 11.9 (ref 7–25)
C PNEUM DNA NPH QL NAA+NON-PROBE: NOT DETECTED
CALCIUM SPEC-SCNC: 8.9 MG/DL (ref 8.6–10.5)
CHLORIDE SERPL-SCNC: 102 MMOL/L (ref 98–107)
CO2 SERPL-SCNC: 24 MMOL/L (ref 22–29)
CREAT BLD-MCNC: 0.67 MG/DL (ref 0.57–1)
CRP SERPL-MCNC: 0.76 MG/DL (ref 0–0.5)
D DIMER PPP FEU-MCNC: 0.75 MCGFEU/ML (ref 0.17–0.59)
D-LACTATE SERPL-SCNC: 2 MMOL/L (ref 0.5–2)
DEPRECATED RDW RBC AUTO: 48.6 FL (ref 37–54)
EOSINOPHIL # BLD AUTO: 0.1 10*3/MM3 (ref 0–0.4)
EOSINOPHIL NFR BLD AUTO: 0.9 % (ref 0.3–6.2)
ERYTHROCYTE [DISTWIDTH] IN BLOOD BY AUTOMATED COUNT: 14.8 % (ref 12.3–15.4)
FERRITIN SERPL-MCNC: 161.4 NG/ML (ref 13–150)
FLUAV H1 2009 PAND RNA NPH QL NAA+PROBE: NOT DETECTED
FLUAV H1 HA GENE NPH QL NAA+PROBE: NOT DETECTED
FLUAV H3 RNA NPH QL NAA+PROBE: NOT DETECTED
FLUAV SUBTYP SPEC NAA+PROBE: NOT DETECTED
FLUBV RNA ISLT QL NAA+PROBE: NOT DETECTED
GFR SERPL CREATININE-BSD FRML MDRD: 94 ML/MIN/1.73
GLOBULIN UR ELPH-MCNC: 3 GM/DL
GLUCOSE BLD-MCNC: 114 MG/DL (ref 65–99)
HADV DNA SPEC NAA+PROBE: NOT DETECTED
HCOV 229E RNA SPEC QL NAA+PROBE: NOT DETECTED
HCOV HKU1 RNA SPEC QL NAA+PROBE: NOT DETECTED
HCOV NL63 RNA SPEC QL NAA+PROBE: NOT DETECTED
HCOV OC43 RNA SPEC QL NAA+PROBE: NOT DETECTED
HCT VFR BLD AUTO: 39.6 % (ref 34–46.6)
HGB BLD-MCNC: 13.5 G/DL (ref 12–15.9)
HMPV RNA NPH QL NAA+NON-PROBE: NOT DETECTED
HOLD SPECIMEN: NORMAL
HOLD SPECIMEN: NORMAL
HPIV1 RNA SPEC QL NAA+PROBE: NOT DETECTED
HPIV2 RNA SPEC QL NAA+PROBE: NOT DETECTED
HPIV3 RNA NPH QL NAA+PROBE: NOT DETECTED
HPIV4 P GENE NPH QL NAA+PROBE: NOT DETECTED
LDH SERPL-CCNC: 313 U/L (ref 135–214)
LYMPHOCYTES # BLD AUTO: 2.2 10*3/MM3 (ref 0.7–3.1)
LYMPHOCYTES NFR BLD AUTO: 19.2 % (ref 19.6–45.3)
M PNEUMO IGG SER IA-ACNC: NOT DETECTED
MCH RBC QN AUTO: 32 PG (ref 26.6–33)
MCHC RBC AUTO-ENTMCNC: 34.2 G/DL (ref 31.5–35.7)
MCV RBC AUTO: 93.5 FL (ref 79–97)
MONOCYTES # BLD AUTO: 0.7 10*3/MM3 (ref 0.1–0.9)
MONOCYTES NFR BLD AUTO: 6 % (ref 5–12)
NEUTROPHILS # BLD AUTO: 8.3 10*3/MM3 (ref 1.7–7)
NEUTROPHILS NFR BLD AUTO: 73.5 % (ref 42.7–76)
NRBC BLD AUTO-RTO: 0 /100 WBC (ref 0–0.2)
PLATELET # BLD AUTO: 202 10*3/MM3 (ref 140–450)
PMV BLD AUTO: 8 FL (ref 6–12)
POTASSIUM BLD-SCNC: 3.8 MMOL/L (ref 3.5–5.2)
PROCALCITONIN SERPL-MCNC: <0.02 NG/ML (ref 0.1–0.25)
PROT SERPL-MCNC: 6.9 G/DL (ref 6–8.5)
RBC # BLD AUTO: 4.23 10*6/MM3 (ref 3.77–5.28)
RHINOVIRUS RNA SPEC NAA+PROBE: NOT DETECTED
RSV RNA NPH QL NAA+NON-PROBE: NOT DETECTED
SODIUM BLD-SCNC: 140 MMOL/L (ref 136–145)
WBC NRBC COR # BLD: 11.3 10*3/MM3 (ref 3.4–10.8)
WHOLE BLOOD HOLD SPECIMEN: NORMAL
WHOLE BLOOD HOLD SPECIMEN: NORMAL

## 2020-05-14 PROCEDURE — 94799 UNLISTED PULMONARY SVC/PX: CPT

## 2020-05-14 PROCEDURE — 87635 SARS-COV-2 COVID-19 AMP PRB: CPT | Performed by: EMERGENCY MEDICINE

## 2020-05-14 PROCEDURE — 85379 FIBRIN DEGRADATION QUANT: CPT | Performed by: STUDENT IN AN ORGANIZED HEALTH CARE EDUCATION/TRAINING PROGRAM

## 2020-05-14 PROCEDURE — 96365 THER/PROPH/DIAG IV INF INIT: CPT

## 2020-05-14 PROCEDURE — 85025 COMPLETE CBC W/AUTO DIFF WBC: CPT | Performed by: EMERGENCY MEDICINE

## 2020-05-14 PROCEDURE — 84145 PROCALCITONIN (PCT): CPT | Performed by: EMERGENCY MEDICINE

## 2020-05-14 PROCEDURE — 96372 THER/PROPH/DIAG INJ SC/IM: CPT

## 2020-05-14 PROCEDURE — 87040 BLOOD CULTURE FOR BACTERIA: CPT | Performed by: EMERGENCY MEDICINE

## 2020-05-14 PROCEDURE — 96361 HYDRATE IV INFUSION ADD-ON: CPT

## 2020-05-14 PROCEDURE — 83605 ASSAY OF LACTIC ACID: CPT

## 2020-05-14 PROCEDURE — 71045 X-RAY EXAM CHEST 1 VIEW: CPT

## 2020-05-14 PROCEDURE — 80053 COMPREHEN METABOLIC PANEL: CPT | Performed by: EMERGENCY MEDICINE

## 2020-05-14 PROCEDURE — 96375 TX/PRO/DX INJ NEW DRUG ADDON: CPT

## 2020-05-14 PROCEDURE — 93005 ELECTROCARDIOGRAM TRACING: CPT | Performed by: EMERGENCY MEDICINE

## 2020-05-14 PROCEDURE — 83615 LACTATE (LD) (LDH) ENZYME: CPT | Performed by: EMERGENCY MEDICINE

## 2020-05-14 PROCEDURE — 99220 PR INITIAL OBSERVATION CARE/DAY 70 MINUTES: CPT | Performed by: STUDENT IN AN ORGANIZED HEALTH CARE EDUCATION/TRAINING PROGRAM

## 2020-05-14 PROCEDURE — G0378 HOSPITAL OBSERVATION PER HR: HCPCS

## 2020-05-14 PROCEDURE — 99284 EMERGENCY DEPT VISIT MOD MDM: CPT

## 2020-05-14 PROCEDURE — 86140 C-REACTIVE PROTEIN: CPT | Performed by: EMERGENCY MEDICINE

## 2020-05-14 PROCEDURE — 0099U HC BIOFIRE FILMARRAY RESP PANEL 1: CPT | Performed by: EMERGENCY MEDICINE

## 2020-05-14 PROCEDURE — 82728 ASSAY OF FERRITIN: CPT | Performed by: STUDENT IN AN ORGANIZED HEALTH CARE EDUCATION/TRAINING PROGRAM

## 2020-05-14 PROCEDURE — 96376 TX/PRO/DX INJ SAME DRUG ADON: CPT

## 2020-05-14 PROCEDURE — 94640 AIRWAY INHALATION TREATMENT: CPT

## 2020-05-14 PROCEDURE — 25010000002 AZITHROMYCIN PER 500 MG: Performed by: EMERGENCY MEDICINE

## 2020-05-14 RX ORDER — GABAPENTIN 300 MG/1
600 CAPSULE ORAL 3 TIMES DAILY
Status: DISCONTINUED | OUTPATIENT
Start: 2020-05-14 | End: 2020-05-17 | Stop reason: HOSPADM

## 2020-05-14 RX ORDER — ALBUTEROL SULFATE 90 UG/1
2 AEROSOL, METERED RESPIRATORY (INHALATION)
Status: DISCONTINUED | OUTPATIENT
Start: 2020-05-14 | End: 2020-05-15

## 2020-05-14 RX ORDER — ACETAMINOPHEN 650 MG/1
650 SUPPOSITORY RECTAL EVERY 4 HOURS PRN
Status: DISCONTINUED | OUTPATIENT
Start: 2020-05-14 | End: 2020-05-17 | Stop reason: HOSPADM

## 2020-05-14 RX ORDER — LISINOPRIL 20 MG/1
20 TABLET ORAL DAILY
Status: DISCONTINUED | OUTPATIENT
Start: 2020-05-15 | End: 2020-05-17 | Stop reason: HOSPADM

## 2020-05-14 RX ORDER — VILAZODONE HYDROCHLORIDE 10 MG/1
20 TABLET ORAL NIGHTLY
Status: DISCONTINUED | OUTPATIENT
Start: 2020-05-14 | End: 2020-05-17 | Stop reason: HOSPADM

## 2020-05-14 RX ORDER — SUMATRIPTAN 6 MG/.5ML
6 INJECTION, SOLUTION SUBCUTANEOUS DAILY PRN
Status: DISCONTINUED | OUTPATIENT
Start: 2020-05-14 | End: 2020-05-17 | Stop reason: HOSPADM

## 2020-05-14 RX ORDER — ACETAZOLAMIDE 250 MG/1
250 TABLET ORAL DAILY
Status: DISCONTINUED | OUTPATIENT
Start: 2020-05-15 | End: 2020-05-17 | Stop reason: HOSPADM

## 2020-05-14 RX ORDER — SODIUM CHLORIDE 0.9 % (FLUSH) 0.9 %
10 SYRINGE (ML) INJECTION AS NEEDED
Status: DISCONTINUED | OUTPATIENT
Start: 2020-05-14 | End: 2020-05-17 | Stop reason: HOSPADM

## 2020-05-14 RX ORDER — BUDESONIDE 0.5 MG/2ML
0.5 INHALANT ORAL
Status: DISCONTINUED | OUTPATIENT
Start: 2020-05-14 | End: 2020-05-14 | Stop reason: SDUPTHER

## 2020-05-14 RX ORDER — METOCLOPRAMIDE 10 MG/1
10 TABLET ORAL 4 TIMES DAILY
Status: DISCONTINUED | OUTPATIENT
Start: 2020-05-14 | End: 2020-05-17 | Stop reason: HOSPADM

## 2020-05-14 RX ORDER — AMLODIPINE BESYLATE 5 MG/1
10 TABLET ORAL EVERY MORNING
Status: DISCONTINUED | OUTPATIENT
Start: 2020-05-15 | End: 2020-05-17 | Stop reason: HOSPADM

## 2020-05-14 RX ORDER — BENZONATATE 100 MG/1
100 CAPSULE ORAL 3 TIMES DAILY PRN
Status: DISCONTINUED | OUTPATIENT
Start: 2020-05-14 | End: 2020-05-17 | Stop reason: HOSPADM

## 2020-05-14 RX ORDER — GUAIFENESIN 600 MG/1
1200 TABLET, EXTENDED RELEASE ORAL EVERY 12 HOURS SCHEDULED
Status: DISCONTINUED | OUTPATIENT
Start: 2020-05-14 | End: 2020-05-17 | Stop reason: HOSPADM

## 2020-05-14 RX ORDER — SUMATRIPTAN 6 MG/.5ML
6 INJECTION, SOLUTION SUBCUTANEOUS ONCE AS NEEDED
COMMUNITY
End: 2023-01-12

## 2020-05-14 RX ORDER — IPRATROPIUM BROMIDE AND ALBUTEROL SULFATE 2.5; .5 MG/3ML; MG/3ML
3 SOLUTION RESPIRATORY (INHALATION) 4 TIMES DAILY
Status: DISCONTINUED | OUTPATIENT
Start: 2020-05-14 | End: 2020-05-14

## 2020-05-14 RX ORDER — AZITHROMYCIN 250 MG/1
250 TABLET, FILM COATED ORAL
Status: DISCONTINUED | OUTPATIENT
Start: 2020-05-15 | End: 2020-05-17 | Stop reason: HOSPADM

## 2020-05-14 RX ORDER — CETIRIZINE HYDROCHLORIDE 10 MG/1
10 TABLET ORAL DAILY
Status: DISCONTINUED | OUTPATIENT
Start: 2020-05-15 | End: 2020-05-17 | Stop reason: HOSPADM

## 2020-05-14 RX ORDER — IPRATROPIUM BROMIDE AND ALBUTEROL SULFATE 2.5; .5 MG/3ML; MG/3ML
3 SOLUTION RESPIRATORY (INHALATION)
Status: DISCONTINUED | OUTPATIENT
Start: 2020-05-14 | End: 2020-05-14 | Stop reason: SDUPTHER

## 2020-05-14 RX ORDER — TOPIRAMATE 25 MG/1
50 TABLET ORAL DAILY
Status: DISCONTINUED | OUTPATIENT
Start: 2020-05-15 | End: 2020-05-17 | Stop reason: HOSPADM

## 2020-05-14 RX ORDER — LIDOCAINE 50 MG/G
1 PATCH TOPICAL
Status: DISCONTINUED | OUTPATIENT
Start: 2020-05-14 | End: 2020-05-17 | Stop reason: HOSPADM

## 2020-05-14 RX ORDER — AZITHROMYCIN 250 MG/1
500 TABLET, FILM COATED ORAL DAILY
Status: DISCONTINUED | OUTPATIENT
Start: 2020-05-15 | End: 2020-05-14 | Stop reason: SDUPTHER

## 2020-05-14 RX ORDER — SUMATRIPTAN 50 MG/1
100 TABLET, FILM COATED ORAL DAILY PRN
Status: DISCONTINUED | OUTPATIENT
Start: 2020-05-14 | End: 2020-05-17 | Stop reason: HOSPADM

## 2020-05-14 RX ORDER — HYDROCODONE BITARTRATE AND ACETAMINOPHEN 10; 325 MG/1; MG/1
1 TABLET ORAL EVERY 8 HOURS PRN
Status: DISCONTINUED | OUTPATIENT
Start: 2020-05-14 | End: 2020-05-17 | Stop reason: HOSPADM

## 2020-05-14 RX ORDER — SODIUM CHLORIDE 0.9 % (FLUSH) 0.9 %
10 SYRINGE (ML) INJECTION EVERY 12 HOURS SCHEDULED
Status: DISCONTINUED | OUTPATIENT
Start: 2020-05-14 | End: 2020-05-17 | Stop reason: HOSPADM

## 2020-05-14 RX ORDER — THEOPHYLLINE 300 MG/1
300 TABLET, EXTENDED RELEASE ORAL DAILY
Status: DISCONTINUED | OUTPATIENT
Start: 2020-05-15 | End: 2020-05-17 | Stop reason: HOSPADM

## 2020-05-14 RX ORDER — BUDESONIDE AND FORMOTEROL FUMARATE DIHYDRATE 160; 4.5 UG/1; UG/1
2 AEROSOL RESPIRATORY (INHALATION)
Status: DISCONTINUED | OUTPATIENT
Start: 2020-05-14 | End: 2020-05-17 | Stop reason: HOSPADM

## 2020-05-14 RX ORDER — BUDESONIDE 3 MG/1
9 CAPSULE, COATED PELLETS ORAL EVERY 24 HOURS
Status: DISCONTINUED | OUTPATIENT
Start: 2020-05-15 | End: 2020-05-17 | Stop reason: HOSPADM

## 2020-05-14 RX ORDER — NICOTINE 21 MG/24HR
1 PATCH, TRANSDERMAL 24 HOURS TRANSDERMAL
Status: DISCONTINUED | OUTPATIENT
Start: 2020-05-15 | End: 2020-05-17 | Stop reason: HOSPADM

## 2020-05-14 RX ORDER — AZITHROMYCIN 250 MG/1
250 TABLET, FILM COATED ORAL DAILY
Status: DISCONTINUED | OUTPATIENT
Start: 2020-05-16 | End: 2020-05-14 | Stop reason: SDUPTHER

## 2020-05-14 RX ORDER — ONDANSETRON 2 MG/ML
4 INJECTION INTRAMUSCULAR; INTRAVENOUS EVERY 6 HOURS PRN
Status: DISCONTINUED | OUTPATIENT
Start: 2020-05-14 | End: 2020-05-17 | Stop reason: HOSPADM

## 2020-05-14 RX ORDER — ACETAMINOPHEN 325 MG/1
650 TABLET ORAL EVERY 4 HOURS PRN
Status: DISCONTINUED | OUTPATIENT
Start: 2020-05-14 | End: 2020-05-17 | Stop reason: HOSPADM

## 2020-05-14 RX ORDER — CLONIDINE HYDROCHLORIDE 0.1 MG/1
0.1 TABLET ORAL 3 TIMES DAILY
Status: DISCONTINUED | OUTPATIENT
Start: 2020-05-14 | End: 2020-05-17 | Stop reason: HOSPADM

## 2020-05-14 RX ORDER — ONDANSETRON 4 MG/1
4 TABLET, FILM COATED ORAL EVERY 6 HOURS PRN
Status: DISCONTINUED | OUTPATIENT
Start: 2020-05-14 | End: 2020-05-17 | Stop reason: HOSPADM

## 2020-05-14 RX ORDER — NITROGLYCERIN 0.4 MG/1
0.4 TABLET SUBLINGUAL
Status: DISCONTINUED | OUTPATIENT
Start: 2020-05-14 | End: 2020-05-17 | Stop reason: HOSPADM

## 2020-05-14 RX ORDER — ALPRAZOLAM 0.5 MG/1
0.5 TABLET ORAL 2 TIMES DAILY PRN
Status: DISCONTINUED | OUTPATIENT
Start: 2020-05-14 | End: 2020-05-17 | Stop reason: HOSPADM

## 2020-05-14 RX ORDER — CHOLECALCIFEROL (VITAMIN D3) 125 MCG
10 CAPSULE ORAL NIGHTLY
Status: DISCONTINUED | OUTPATIENT
Start: 2020-05-14 | End: 2020-05-17 | Stop reason: HOSPADM

## 2020-05-14 RX ORDER — ORPHENADRINE CITRATE 100 MG/1
100 TABLET, EXTENDED RELEASE ORAL 3 TIMES DAILY PRN
Status: DISCONTINUED | OUTPATIENT
Start: 2020-05-14 | End: 2020-05-17 | Stop reason: HOSPADM

## 2020-05-14 RX ORDER — TRAZODONE HYDROCHLORIDE 100 MG/1
100 TABLET ORAL DAILY
Status: DISCONTINUED | OUTPATIENT
Start: 2020-05-15 | End: 2020-05-17 | Stop reason: HOSPADM

## 2020-05-14 RX ORDER — ALBUTEROL SULFATE 90 UG/1
2 AEROSOL, METERED RESPIRATORY (INHALATION) ONCE
Status: COMPLETED | OUTPATIENT
Start: 2020-05-14 | End: 2020-05-14

## 2020-05-14 RX ORDER — ACETAMINOPHEN 160 MG/5ML
650 SOLUTION ORAL EVERY 4 HOURS PRN
Status: DISCONTINUED | OUTPATIENT
Start: 2020-05-14 | End: 2020-05-17 | Stop reason: HOSPADM

## 2020-05-14 RX ORDER — SODIUM CHLORIDE 9 MG/ML
100 INJECTION, SOLUTION INTRAVENOUS CONTINUOUS
Status: DISCONTINUED | OUTPATIENT
Start: 2020-05-15 | End: 2020-05-17 | Stop reason: HOSPADM

## 2020-05-14 RX ORDER — BISACODYL 10 MG
10 SUPPOSITORY, RECTAL RECTAL DAILY PRN
Status: DISCONTINUED | OUTPATIENT
Start: 2020-05-14 | End: 2020-05-17 | Stop reason: HOSPADM

## 2020-05-14 RX ORDER — ALBUTEROL SULFATE 90 UG/1
2 AEROSOL, METERED RESPIRATORY (INHALATION) EVERY 4 HOURS PRN
Status: DISCONTINUED | OUTPATIENT
Start: 2020-05-14 | End: 2020-05-14 | Stop reason: SDUPTHER

## 2020-05-14 RX ADMIN — ALBUTEROL SULFATE 2 PUFF: 90 AEROSOL, METERED RESPIRATORY (INHALATION) at 23:50

## 2020-05-14 RX ADMIN — GUAIFENESIN 1200 MG: 600 TABLET, EXTENDED RELEASE ORAL at 22:37

## 2020-05-14 RX ADMIN — ALBUTEROL SULFATE 2 PUFF: 90 AEROSOL, METERED RESPIRATORY (INHALATION) at 19:18

## 2020-05-14 RX ADMIN — Medication 10 ML: at 23:12

## 2020-05-14 RX ADMIN — IPRATROPIUM BROMIDE AND ALBUTEROL 1 PUFF: 20; 100 SPRAY, METERED RESPIRATORY (INHALATION) at 23:50

## 2020-05-14 RX ADMIN — AZITHROMYCIN MONOHYDRATE 500 MG: 500 INJECTION, POWDER, LYOPHILIZED, FOR SOLUTION INTRAVENOUS at 19:28

## 2020-05-14 NOTE — H&P
[facility] Medicine Services      Patient Name: Merry Thornton  : 1970  MRN: [id number]  Primary Care Physician: Nivia Alonzo PA  Date of admission: 2020    Patient Care Team:  Nivia Alonzo PA as PCP - General  Nivia Alonzo PA as PCP - Family Medicine          Subjective   History Present Illness     Chief Complaint:   Chief Complaint   Patient presents with   • Shortness of Breath       Ms. Thornton is a 49 y.o. female who presents to [facility] ED with a history of lupus, fibromyalgia, migraines, hypertension, ulcerative colitis, COPD,  anxiety, depression, and current smoker complaining of increasing shortness of breath.       Ms. Thornton is a 49 y.o. female who presents to [facility] ED with a history of lupus, fibromyalgia, migraines, hypertension, ulcerative colitis, COPD,  anxiety, depression, and current smoker complaining of increasing shortness of breath.  Patient states she has had increasing shortness of breath for the last week.  She has been nauseous with multiple episodes of emesis for the last 2 days, no emesis today but states she has had anything to eat or drink.  Patient states she has a significant productive cough that has lasted for about 7 days.  She states that she has had a sore throat with progressive hoarseness of voice.  Patient states she has felt weak since this all began.  Both of her ears are sore which she states feels like sinus allergy drainage.  She denies fever, hematochezia, hematemesis, melena, and diarrhea.  There have been no exacerbating or relieving factors noted.  Patient states she is supposed to wear a BiPAP at home but does not she does wear 2 L nasal cannula at night for sleep and has been attempting to quit smoking by using nicotine patches.  She states she did stop smoking marijuana about 2 months ago.  She has no known sick contacts.    In the ER, WBC 11.30, glucose 114, , CRP 0.76, lactic 2.0,  Procalcitonin <0.02. Blood Cultures and COVID 19 swab pending, Respiratory Virus panel negative. EKG SR 81. Chest XRay no acute findings. Sat 99% on room air.  Patient is admitted for further evaluation and treatment.      Review of Systems   Constitution: Negative for chills and fever.   HENT: Positive for ear pain and hoarse voice.    Respiratory: Positive for cough and shortness of breath.    Gastrointestinal: Positive for nausea and vomiting. Negative for diarrhea.   Genitourinary: Negative for dysuria, frequency and urgency.   Psychiatric/Behavioral: The patient is nervous/anxious.    All other systems reviewed and are negative.        Personal History     Past Medical History:   Past Medical History:   Diagnosis Date   • CAD (coronary artery disease) 12/20/2016    dr. singh   • Carpal tunnel syndrome on right 03/08/2017   • Common migraine 12/20/2016   • COPD (chronic obstructive pulmonary disease) (CMS/HCC) 12/20/2016   • Cyst, ovarian 12/20/2016    mass   • DDD (degenerative disc disease), cervical 03/11/2016   • Dental caries 01/14/2019   • Depression 11/02/2017   • Depression, major, recurrent, moderate (CMS/HCC) 08/25/2016   • Dietary counseling 09/21/2017   • Dysphagia 05/2020   • Elevated random blood glucose level 5/14/2020   • Encounter for smoking cessation counseling 09/21/2017   • Exacerbation of systemic lupus (CMS/[facility]) 04/30/2019   • Fibromyalgia 12/20/2016   • Generalized anxiety disorder 03/11/2016   • GERD (gastroesophageal reflux disease) 12/20/2016   • Heartburn 11/02/2017   • Hypertension 03/16/2016   • Hyperthyroidism 03/11/2016   • Hypocalcemia 09/21/2017   • IBS (irritable colon syndrome) 09/13/2016   • Immobility syndrome 09/14/2017   • Intracranial pressure increased 12/20/2016   • Left knee pain 09/21/2017   • Lower back pain 02/01/2018   • Lung nodules 09/07/2016   • Morbid obesity (CMS/HCC) 11/02/2017   • Muscle cramp 09/21/2017   • Neck pain 01/14/2019   • Need for prophylactic  vaccination and inoculation against influenza 09/21/2017   • Neurogenic bladder 12/20/2016   • Obesity 03/07/2017   • Obesity (BMI 30-39.9) 5/14/2020   • Orthostatic hypotension 07/17/2017   • Osteoarthritis 03/07/2017   • Osteopenia 01/14/2019   • Osteoporosis 12/20/2016   • Other instability, right ankle 04/13/2017   • 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 Mixxed connective Tissue D/o   • Pain, joint, ankle, left 09/21/2017   • Palpitations 10/04/2017   • Paresthesia 06/12/2017    facial   • Peripheral neuropathy 12/20/2016    bilateral lower extremities   • Poor vision    • Prediabetes    • Raynaud's syndrome 12/20/2016   • Retinopathy    • Right knee pain 11/08/2016   • Scleroderma (CMS/[facility]) 12/20/2016   • Seasonal allergic rhinitis 12/20/2016   • SLE (systemic lupus erythematosus) (CMS/[facility]) 03/16/2017   • Sleep apnea 11/02/2017    BIPAP   • Sleep disorder 01/14/2019   • Sprain of unspecified site of right knee, subsequent encounter 12/01/2016   • Status post placement of implantable loop recorder 05/09/2018    presence   • Syncope and collapse 2020    still has occassionally   • Urine incontinence    • Vasovagal syncope    • Ventricular arrhythmia 03/16/2016   • Visit for screening mammogram 12/20/2016   • Vitamin D deficiency 01/14/2019   • Wheezing        Surgical History:      Past Surgical History:   Procedure Laterality Date   • ANKLE SURGERY Right 02/2017   • BRONCHOSCOPY     • CARDIAC ABLATION  01/2000   • CARDIAC ELECTROPHYSIOLOGY PROCEDURE     • CHOLECYSTECTOMY     • COLON SURGERY     • COLONOSCOPY N/A 2/26/2020    Procedure: COLONOSCOPY WITH BIOPSY X1 AREA;  Surgeon: Michael Cheng MD;  Location: [facility] ENDOSCOPY;  Service: Gastroenterology;  Laterality: N/A;  diarrhea   • ENDOSCOPY     • KNEE SURGERY Right 09/2017   • OTHER SURGICAL HISTORY      Urerine hysterectomy   • OTHER SURGICAL HISTORY      t and a   • OTHER SURGICAL HISTORY      d&c   • OTHER SURGICAL HISTORY       bladder stim   • OTHER SURGICAL HISTORY      Loop recorder placement   • WRIST SURGERY Right        Family History: family history includes Cancer in her mother; Diabetes in her sister; Heart disease in her father and sister; Hypertension in her sister; Lung disease in her father; Other in her sister. Otherwise pertinent FHx was reviewed and unremarkable.     Social History:  reports that she has been smoking cigarettes. She has a 35.00 pack-year smoking history. She has never used smokeless tobacco. She reports that she has current or past drug history. Drug: Marijuana. Frequency: 1.00 time per week. She reports that she does not drink alcohol.      Medications:  Prior to Admission medications    Medication Sig Start Date End Date Taking? Authorizing Provider   acetaZOLAMIDE (DIAMOX) 250 MG tablet  5/13/20   Emergency, Nurse Kingsley, RN   albuterol sulfate  (90 Base) MCG/ACT inhaler Inhale 2 puffs Every 4 (Four) Hours As Needed for Wheezing. 9/18/17   ProviderNela MD   ALPRAZolam (XANAX) 0.5 MG tablet Take 1 tablet by mouth 2 (Two) Times a Day As Needed for Anxiety. 4/17/20   Africa Conway MD   ALPRAZolam (XANAX) 0.5 MG tablet alprazolam 0.5 mg tablet    Emergency, Nurse Kingsley, RN   amLODIPine (NORVASC) 10 MG tablet TAKE 1 TABLET BY MOUTH DAILY.  Patient taking differently: Take 10 mg by mouth Every Morning. Take dos 5/7/20   Nivia Alonzo PA   benzonatate (Tessalon Perles) 100 MG capsule Take 1 capsule by mouth 3 (Three) Times a Day As Needed for Cough. 4/23/20   Nivia Alonzo PA   Budesonide (ENTOCORT EC) 3 MG 24 hr capsule TAKE 3 CAPSULES BY MOUTH DAILY 4/23/20   Nivia Alonzo PA   cloNIDine (CATAPRES) 0.1 MG tablet Take 1 tablet by mouth 3 (Three) Times a Day. 4/9/20   Nivia Alonzo PA   Cyanocobalamin (B-12 COMPLIANCE INJECTION) 1000 MCG/ML kit Inject  as directed. Inject twice monthly 12/20/16   ProviderNela MD   Eluxadoline 100 MG tablet Take 100 mg by mouth 2 (Two) Times  a Day. 1/31/20   Nivia Alonzo PA   gabapentin (NEURONTIN) 600 MG tablet Take 600 mg by mouth 3 (Three) Times a Day. 3/11/16   Africa Conway MD   HYDROcodone-acetaminophen (NORCO)  MG per tablet Take 1 tablet by mouth Every 6 (Six) Hours As Needed for Moderate Pain . 9/30/16   Tiny Bhatt MD   hydroxychloroquine (PLAQUENIL) 200 MG tablet Take 1 tablet by mouth 2 (Two) Times a Day. 3/19/20   Nivia Alonzo PA   ipratropium-albuterol (DUO-NEB) 0.5-2.5 mg/3 ml nebulizer Take 3 mL by nebulization 4 (Four) Times a Day. 3/19/20   Nivia Alonzo PA   lidocaine (LIDODERM) 5 % Place 1 patch on the skin as directed by provider Daily. Remove & Discard patch within 12 hours or as directed by MD 9/14/18   ProviderNela MD   lisinopril (PRINIVIL,ZESTRIL) 20 MG tablet lisinopril 20 mg tablet    Emergency, Nurse Epic, RN   loratadine (CLARITIN) 10 MG tablet Take 10 mg by mouth Daily. 1/14/19   ProviderNela MD   Melatonin 10 MG tablet Take 1 tablet by mouth every night at bedtime. 4/17/20   Africa Conway MD   metoclopramide (REGLAN) 10 MG tablet Take 1 tablet by mouth 4 (Four) Times a Day. 4/30/20   Nivia Alonzo PA   metoprolol tartrate (LOPRESSOR) 50 MG tablet TAKE 1 TABLET BY MOUTH DAILY  Patient taking differently: Take 25 mg by mouth 2 (Two) Times a Day. Take dos 3/25/20   NIGEL Mckinney MD   nystatin (MYCOSTATIN) 455482 UNIT/ML suspension Swish and swallow 5 mL 4 (Four) Times a Day. 3/3/20   Nivia Alonzo PA   ondansetron ODT (ZOFRAN-ODT) 4 MG disintegrating tablet Place 1 tablet on the tongue Every 8 (Eight) Hours As Needed for Nausea or Vomiting.  Patient taking differently: Place 4 mg on the tongue Every 8 (Eight) Hours As Needed for Nausea or Vomiting (can take up to 8 mg as needed). 2/7/20   Nivia Alonzo PA   orphenadrine (NORFLEX) 100 MG 12 hr tablet Take 100 mg by mouth 3 (Three) Times a Day. As needed 1/14/19   Provider, MD Nela    promethazine (PHENERGAN) 25 MG tablet Take 25 mg by mouth Daily.    ProviderNela MD   SUMAtriptan (IMITREX) 100 MG tablet Take 100 mg by mouth. One tab at onset of HA, after one hour use sumatriptan injection 3/11/16   Tiny Bhatt MD   theophylline (THEODUR) 300 MG 12 hr tablet  4/21/20   Emergency, Nurse Epic, RN   topiramate (TOPAMAX) 50 MG tablet Take 50 mg by mouth Daily. Take dos 1/14/19   Nela Franklin MD   traZODone (DESYREL) 100 MG tablet  4/8/20   Emergency, Nurse Kingsley, RN   Umeclidinium Bromide (Incruse Ellipta) 62.5 MCG/INH aerosol powder  Inhale.    Nela Franklin MD   vilazodone (Viibryd) 20 MG tablet tablet Take 1 tablet by mouth Every Morning.  Patient taking differently: Take 20 mg by mouth every night at bedtime. 4/17/20   Africa Conway MD   vitamin D (ERGOCALCIFEROL) 1.25 MG (47511 UT) capsule capsule TAKE 1 CAPSULE BY MOUTH ONCE WEEKLY 11/7/19   Nivia Alonzo PA   azithromycin (Zithromax Z-Brett) 250 MG tablet Take 2 tablets the first day, then 1 tablet daily for 4 days. 4/23/20 5/14/20  Nivia Alonzo PA   benzonatate (TESSALON) 100 MG capsule benzonatate 100 mg capsule  5/14/20  Emergency, Nurse Kingsley, RN   O2 (OXYGEN) Inhale 2 L/min Every Night. 1/14/19 5/14/20  Nela Franklin MD   predniSONE (DELTASONE) 10 MG tablet 4 tabs daily x 2 days then 3 tabs daily x 2 days then 2 tabs daily x 2 days then 1 tab daily x 2 days 4/23/20 5/14/20  Nivia Alonzo PA   traZODone (DESYREL) 100 MG tablet trazodone 100 mg tablet  5/14/20  Emergency, Nurse Epic, RN       Allergies:    Allergies   Allergen Reactions   • Adhesive Tape Other (See Comments)     Pt ok with paper tape    Abstracted from centricity   • Bupropion Shortness Of Breath   • Morphine Other (See Comments), Rash and Swelling     Abstracted from centricity   • Buspar [Buspirone] Other (See Comments)     Abstracted from centricity   • Augmentin [Amoxicillin-Pot Clavulanate] Diarrhea        Objective   Objective     Vital Signs  Temp:  [96.4 °F (35.8 °C)-99 °F (37.2 °C)] 99 °F (37.2 °C)  Heart Rate:  [] 75  Resp:  [17-26] 17  BP: (110-129)/() 110/63  SpO2:  [95 %-100 %] 99 %  on   ;   Device (Oxygen Therapy): room air  Body mass index is 38.43 kg/m².    Physical Exam   Constitutional: She is oriented to person, place, and time. She appears well-developed. She appears distressed.   HENT:   Head: Normocephalic and atraumatic.   Mouth/Throat: Oropharynx is clear and moist.   Eyes: Pupils are equal, round, and reactive to light. Conjunctivae and EOM are normal.   Neck: Normal range of motion. Neck supple.   Cardiovascular: Normal rate, regular rhythm, normal heart sounds and intact distal pulses.   Pulmonary/Chest: She is in respiratory distress. She has wheezes.   Abdominal: Soft. Bowel sounds are normal. She exhibits no distension. There is no tenderness.   Musculoskeletal: Normal range of motion. She exhibits edema.   Neurological: She is alert and oriented to person, place, and time.   Skin: Skin is warm and dry. Capillary refill takes less than 2 seconds. She is not diaphoretic.   Psychiatric: She has a normal mood and affect. Judgment and thought content normal.   anxious   Vitals reviewed.      Results Review:  I have personally reviewed most recent cardiac tracings, lab results and radiology images and interpretations and agree with findings.    Results from last 7 days   Lab Units 05/14/20  1728   WBC 10*3/mm3 11.30*   HEMOGLOBIN g/dL 13.5   HEMATOCRIT % 39.6   PLATELETS 10*3/mm3 202     Results from last 7 days   Lab Units 05/14/20  1738 05/14/20  1728   SODIUM mmol/L  --  140   POTASSIUM mmol/L  --  3.8   CHLORIDE mmol/L  --  102   CO2 mmol/L  --  24.0   BUN mg/dL  --  8   CREATININE mg/dL  --  0.67   GLUCOSE mg/dL  --  114*   CALCIUM mg/dL  --  8.9   ALT (SGPT) U/L  --  16   AST (SGOT) U/L  --  14   LACTATE mmol/L 2.0  --    PROCALCITONIN ng/mL  --  <0.02*     Estimated  Creatinine Clearance: 126.4 mL/min (by C-G formula based on SCr of 0.67 mg/dL).  Brief Urine Lab Results  (Last result in the past 365 days)      Color   Clarity   Blood   Leuk Est   Nitrite   Protein   CREAT   Urine HCG        02/25/20 1733 Yellow Cloudy  Comment:  Result checked  Negative Negative Negative Negative               Microbiology Results (last 10 days)     Procedure Component Value - Date/Time    Respiratory Panel, PCR - Swab, Nasopharynx [[id number]]  (Normal) Collected:  05/14/20 1731    Lab Status:  Final result Specimen:  Swab from Nasopharynx Updated:  05/14/20 1922     ADENOVIRUS, PCR Not Detected     Coronavirus 229E Not Detected     Coronavirus HKU1 Not Detected     Coronavirus NL63 Not Detected     Coronavirus OC43 Not Detected     Human Metapneumovirus Not Detected     Human Rhinovirus/Enterovirus Not Detected     Influenza B PCR Not Detected     Parainfluenza Virus 1 Not Detected     Parainfluenza Virus 2 Not Detected     Parainfluenza Virus 3 Not Detected     Parainfluenza Virus 4 Not Detected     Bordetella pertussis pcr Not Detected     Influenza A H1 2009 PCR Not Detected     Chlamydophila pneumoniae PCR Not Detected     Mycoplasma pneumo by PCR Not Detected     Influenza A PCR Not Detected     Influenza A H3 Not Detected     Influenza A H1 Not Detected     RSV, PCR Not Detected    Narrative:       The coronavirus on the RVP is NOT COVID-19 and is NOT indicative of infection with COVID-19.           ECG/EMG Results (most recent)     Procedure Component Value Units Date/Time    ECG 12 Lead [[id number]] Collected:  05/14/20 1727     Updated:  05/14/20 1729    Narrative:       HEART RATE= 81  bpm  RR Interval= 720  ms  WV Interval= 168  ms  P Horizontal Axis= -24  deg  P Front Axis= 50  deg  QRSD Interval= 84  ms  QT Interval= 398  ms  QRS Axis= 24  deg  T Wave Axis= 22  deg  - BORDERLINE ECG -  Sinus rhythm  Probable left atrial enlargement  Low voltage, precordial leads  Electronically  Signed By:   Date and Time of Study: 2020-05-14 17:27:18              Xr Chest Ap    Result Date: 5/14/2020  No radiographic findings of acute cardiopulmonary abnormality.  Electronically Signed By-DR. Ramsey Stone MD On:5/14/2020 5:27 PM This report was finalized on [id number] by DR. Ramsey Stone MD.        Estimated Creatinine Clearance: 126.4 mL/min (by C-G formula based on SCr of 0.67 mg/dL).    Assessment/Plan   Assessment/Plan       Active Hospital Problems    Diagnosis  POA   • **Acute respiratory distress [R06.03]  Yes     Priority: High   • Suspected Covid-19 Virus Infection [R68.89]  Yes     Priority: High   • Chronic obstructive pulmonary disease (CMS/HCC) [J44.9]  Yes     Priority: Medium   • Elevated random blood glucose level [R73.09]  Yes     Priority: Low   • Obesity (BMI 30-39.9) [E66.9]  Yes   • COPD exacerbation (CMS/HCC) [J44.1]  Yes   • Ulcerative colitis (CMS/HCC) [K51.90]  Yes   • Vitamin D deficiency [E55.9]  Yes   • Sleep apnea [G47.30]  Yes   • Depression [F32.9]  Yes   • Systemic lupus erythematosus (CMS/HCC) [M32.9]  Yes   • Allergic rhinitis, seasonal [J30.2]  Yes   • Fibromyalgia [M79.7]  Yes   • Irritable bowel syndrome with diarrhea [K58.0]  Yes   • Tobacco dependency [F17.200]  Yes   • Hypertension [I10]  Yes   • Migraine without aura, intractable [G43.019]  Yes      Resolved Hospital Problems    Diagnosis Date Resolved POA   • Morbid obesity (CMS/HCC) [E66.01] 05/14/2020 Yes     Acute Respiratory distress    --Cannot rule out COPD exacerbation    --Cannot rule out COVID 19  -CXR reviewed   -COVID 19 test pending  -  -Ferritin, CK, Ddimer pending  -Guaifenesin  -Continue Zithromax for now  -Pulmicort and Duonebs  -Legionella and S. Penumonia ordered  -Continuous pulse ox  -Oxygen supplementation, wean as tolerated to keep oxygen sats >90%  -No nebulized medications  -PT/OT    COPD, possible exacerbation  -CXR Reviewed  -ABG pending, repeat in am if abnormal  -EKG  Reviewed  -Consider Solumedrol daily if COVID negative  -Continue albuterol, Tessalon Perles, ipratropium-albuterol, theophylline  -Hold Incruse Ellipta, non formulary  - Continue Nystatin swish and spit    Elevated blood glucose level  -Glucose 114  -A1C in a.m.  -Repeat BMP in am    Essential Hypertension, Chronic, Controlled   -Continue home amlodipine, clonidine, lisinopril, metoprolol  - Monitor with routine vital signs     Irritable bowel and ulcerative colitis  -Continue budesonide, metoclopramide  -Hold eluxadoline, non form    Fibromyalgia  -Continue Neurontin, Norco, Xanax (inspect verified)  -Continue Lidoderm patch    Lupus  -Hold hydroxychloroquine, unless brings home dose    Open pressure of LP high 2017  -Continue diamox    Migraines  -Hold home Zofran disintegrating tablet, Phenergan  -PRN Zofran for nausea  -Sumatriptan as needed  -Topamax  - Hold Orphenadrine    Anxiety/Depression with Insomnia  -Continue Viibryd  -Continue home melatonin and trazodone    Dietary supplementation  -Hold dietary supplements for now    Seasonal allergies/allergic rhinitis  -Continue loratadine with hospital substitute    Nicotine dependence  -Encourage lifestyle modificiation  -Consider nicotine patch    Obesity  -encourage lifestyle modifications  -Nutrition consult      VTE Prophylaxis -   Mechanical Order History:     None      Pharmalogical Order History:     Lovenox          CODE STATUS:    Code Status and Medical Interventions:   Ordered at: 05/14/20 2021     Code Status:    CPR     Medical Interventions (Level of Support Prior to Arrest):    Full       This patient has been examined wearing appropriate Personal Protective Equipment . 05/14/20      I discussed the patient's findings and my recommendations with patient.        Electronically signed by ARIANNA Flaherty, 05/14/20, 7:44 PM.  [facility]ist Team

## 2020-05-14 NOTE — ED PROVIDER NOTES
Subjective   Chief complaint Short of breath cough fever    History of present illness 49-year-old female with multiple health problems who presents complaining of a couple day history of fever up to 101 cough shortness of breath and loss of voice.  No ill exposures or foreign travels no recent antibiotic use or recent hospitalizations no leg pain or swelling.  He had generalized weakness.  This is worse with exertion better with rest continuous moderate severe 2 days.  No tick bites or rashes she went to the urgent care and they sent her to the ER.  Been no rash.  No recent change in medicines none diarrhea nausea but no vomiting          Review of Systems   Constitutional: Positive for chills and fever.   HENT: Positive for congestion, sore throat and voice change.    Eyes: Negative for photophobia and visual disturbance.   Respiratory: Positive for cough and shortness of breath. Negative for chest tightness.    Cardiovascular: Negative for chest pain and leg swelling.   Gastrointestinal: Negative for abdominal pain and vomiting.   Endocrine: Negative for cold intolerance and heat intolerance.   Genitourinary: Negative for difficulty urinating and dysuria.   Musculoskeletal: Negative for arthralgias and back pain.   Skin: Negative for color change and pallor.   Neurological: Negative for dizziness and light-headedness.   Psychiatric/Behavioral: Negative for agitation and behavioral problems.       Past Medical History:   Diagnosis Date   • CAD (coronary artery disease) 12/20/2016    dr. singh   • Carpal tunnel syndrome on right 03/08/2017   • Common migraine 12/20/2016   • COPD (chronic obstructive pulmonary disease) (CMS/HCC) 12/20/2016   • Cyst, ovarian 12/20/2016    mass   • DDD (degenerative disc disease), cervical 03/11/2016   • Dental caries 01/14/2019   • Depression 11/02/2017   • Depression, major, recurrent, moderate (CMS/HCC) 08/25/2016   • Dietary counseling 09/21/2017   • Dysphagia 05/2020   • Encounter  for smoking cessation counseling 09/21/2017   • Exacerbation of systemic lupus (CMS/Formerly [facility]) 04/30/2019   • Fibromyalgia 12/20/2016   • Generalized anxiety disorder 03/11/2016   • GERD (gastroesophageal reflux disease) 12/20/2016   • Heartburn 11/02/2017   • Hypertension 03/16/2016   • Hyperthyroidism 03/11/2016   • Hypocalcemia 09/21/2017   • IBS (irritable colon syndrome) 09/13/2016   • Immobility syndrome 09/14/2017   • Intracranial pressure increased 12/20/2016   • Left knee pain 09/21/2017   • Lower back pain 02/01/2018   • Lung nodules 09/07/2016   • Morbid obesity (CMS/Former[facility]) 11/02/2017   • Muscle cramp 09/21/2017   • Neck pain 01/14/2019   • Need for prophylactic vaccination and inoculation against influenza 09/21/2017   • Neurogenic bladder 12/20/2016   • Obesity 03/07/2017   • Orthostatic hypotension 07/17/2017   • Osteoarthritis 03/07/2017   • Osteopenia 01/14/2019   • Osteoporosis 12/20/2016   • Other instability, right ankle 04/13/2017   • 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 Mixxed connective Tissue D/o   • Pain, joint, ankle, left 09/21/2017   • Palpitations 10/04/2017   • Paresthesia 06/12/2017    facial   • Peripheral neuropathy 12/20/2016    bilateral lower extremities   • Poor vision    • Prediabetes    • Raynaud's syndrome 12/20/2016   • Retinopathy    • Right knee pain 11/08/2016   • Scleroderma (CMS/[facility]) 12/20/2016   • Seasonal allergic rhinitis 12/20/2016   • SLE (systemic lupus erythematosus) (CMS/[facility]) 03/16/2017   • Sleep apnea 11/02/2017    BIPAP   • Sleep disorder 01/14/2019   • Sprain of unspecified site of right knee, subsequent encounter 12/01/2016   • Status post placement of implantable loop recorder 05/09/2018    presence   • Syncope and collapse 2020    still has occassionally   • Urine incontinence    • Vasovagal syncope    • Ventricular arrhythmia 03/16/2016   • Visit for screening mammogram 12/20/2016   • Vitamin D deficiency 01/14/2019   • Wheezing         Allergies   Allergen Reactions   • Adhesive Tape Other (See Comments)     Pt ok with paper tape    Abstracted from [facility]   • Bupropion Shortness Of Breath   • Morphine Other (See Comments), Rash and Swelling     Abstracted from [facility]   • Buspar [Buspirone] Other (See Comments)     Abstracted from [facility]   • Augmentin [Amoxicillin-Pot Clavulanate] Diarrhea       Past Surgical History:   Procedure Laterality Date   • ANKLE SURGERY Right 02/2017   • BRONCHOSCOPY     • CARDIAC ABLATION  01/2000   • CARDIAC ELECTROPHYSIOLOGY PROCEDURE     • CHOLECYSTECTOMY     • COLON SURGERY     • COLONOSCOPY N/A 2/26/2020    Procedure: COLONOSCOPY WITH BIOPSY X1 AREA;  Surgeon: Michael Cheng MD;  Location: [facility];  Service: Gastroenterology;  Laterality: N/A;  diarrhea   • ENDOSCOPY     • KNEE SURGERY Right 09/2017   • OTHER SURGICAL HISTORY      Urerine hysterectomy   • OTHER SURGICAL HISTORY      t and a   • OTHER SURGICAL HISTORY      d&c   • OTHER SURGICAL HISTORY      bladder stim   • OTHER SURGICAL HISTORY      Loop recorder placement   • WRIST SURGERY Right        Family History   Problem Relation Age of Onset   • Cancer Mother    • Heart disease Father    • Lung disease Father    • Diabetes Sister    • Heart disease Sister    • Hypertension Sister    • Other Sister         weight disorder       Social History     Socioeconomic History   • Marital status: Single     Spouse name: Not on file   • Number of children: Not on file   • Years of education: Not on file   • Highest education level: Not on file   Tobacco Use   • Smoking status: Current Some Day Smoker     Packs/day: 1.00     Years: 35.00     Pack years: 35.00     Types: Cigarettes   • Smokeless tobacco: Never Used   • Tobacco comment: using nicotine patches to quit   Substance and Sexual Activity   • Alcohol use: No     Frequency: Never   • Drug use: Not Currently     Frequency: 1.0 times per week   • Sexual activity: Defer     Prior to  Admission medications    Medication Sig Start Date End Date Taking? Authorizing Provider   acetaZOLAMIDE (DIAMOX) 250 MG tablet  5/13/20   Emergency, Nurse Epic, RN   albuterol sulfate  (90 Base) MCG/ACT inhaler Inhale 2 puffs Every 4 (Four) Hours As Needed for Wheezing. 9/18/17   Nela Franklin MD   ALPRAZolam (XANAX) 0.5 MG tablet Take 1 tablet by mouth 2 (Two) Times a Day As Needed for Anxiety. 4/17/20   Africa Conway MD   ALPRAZolam (XANAX) 0.5 MG tablet alprazolam 0.5 mg tablet    Emergency, Nurse Kingsley, RN   amLODIPine (NORVASC) 10 MG tablet TAKE 1 TABLET BY MOUTH DAILY.  Patient taking differently: Take 10 mg by mouth Every Morning. Take dos 5/7/20   Nivia Aolnzo PA   benzonatate (Tessalon Perles) 100 MG capsule Take 1 capsule by mouth 3 (Three) Times a Day As Needed for Cough. 4/23/20   Nivia Alonzo PA   benzonatate (TESSALON) 100 MG capsule benzonatate 100 mg capsule    Emergency, Nurse Kingsley, RN   Budesonide (ENTOCORT EC) 3 MG 24 hr capsule TAKE 3 CAPSULES BY MOUTH DAILY 4/23/20   Nivia Alonzo PA   cloNIDine (CATAPRES) 0.1 MG tablet Take 1 tablet by mouth 3 (Three) Times a Day. 4/9/20   Nivia Alonzo PA   Cyanocobalamin (B-12 COMPLIANCE INJECTION) 1000 MCG/ML kit Inject  as directed. Inject twice monthly 12/20/16   Nela Franklin MD   Eluxadoline 100 MG tablet Take 100 mg by mouth 2 (Two) Times a Day. 1/31/20   Nivia Alonzo PA   gabapentin (NEURONTIN) 600 MG tablet Take 600 mg by mouth 3 (Three) Times a Day. 3/11/16   Africa Conway MD   HYDROcodone-acetaminophen (NORCO)  MG per tablet Take 1 tablet by mouth Every 6 (Six) Hours As Needed for Moderate Pain . 9/30/16   Tiny Bhatt MD   hydroxychloroquine (PLAQUENIL) 200 MG tablet Take 1 tablet by mouth 2 (Two) Times a Day. 3/19/20   Nivia Alonzo, PA   ipratropium-albuterol (DUO-NEB) 0.5-2.5 mg/3 ml nebulizer Take 3 mL by nebulization 4 (Four) Times a Day. 3/19/20   Nivia Alonzo, PA    lidocaine (LIDODERM) 5 % Place 1 patch on the skin as directed by provider Daily. Remove & Discard patch within 12 hours or as directed by MD 9/14/18   Nela Franklin MD   lisinopril (PRINIVIL,ZESTRIL) 20 MG tablet lisinopril 20 mg tablet    Emergency, Nurse Kingsley, RN   loratadine (CLARITIN) 10 MG tablet Take 10 mg by mouth Daily. 1/14/19   Nela Franklin MD   Melatonin 10 MG tablet Take 1 tablet by mouth every night at bedtime. 4/17/20   Africa Conway MD   metoclopramide (REGLAN) 10 MG tablet Take 1 tablet by mouth 4 (Four) Times a Day. 4/30/20   Nivia Alonzo PA   metoprolol tartrate (LOPRESSOR) 50 MG tablet TAKE 1 TABLET BY MOUTH DAILY  Patient taking differently: Take 25 mg by mouth 2 (Two) Times a Day. Take dos 3/25/20   NIGEL Mckinney MD   nystatin (MYCOSTATIN) 733196 UNIT/ML suspension Swish and swallow 5 mL 4 (Four) Times a Day. 3/3/20   Nivia Alonzo PA   O2 (OXYGEN) Inhale 2 L/min Every Night. 1/14/19   Nela Franklin MD   ondansetron ODT (ZOFRAN-ODT) 4 MG disintegrating tablet Place 1 tablet on the tongue Every 8 (Eight) Hours As Needed for Nausea or Vomiting.  Patient taking differently: Place 4 mg on the tongue Every 8 (Eight) Hours As Needed for Nausea or Vomiting (can take up to 8 mg as needed). 2/7/20   Nivia Alonzo PA   orphenadrine (NORFLEX) 100 MG 12 hr tablet Take 100 mg by mouth 3 (Three) Times a Day. As needed 1/14/19   Nela Franklin MD   promethazine (PHENERGAN) 25 MG tablet Take 25 mg by mouth Daily.    Nela Franklin MD   SUMAtriptan (IMITREX) 100 MG tablet Take 100 mg by mouth. One tab at onset of HA, after one hour use sumatriptan injection 3/11/16   Tiny Bhatt MD   theophylline (THEODUR) 300 MG 12 hr tablet  4/21/20   Emergency, Nurse Kingsley, RN   topiramate (TOPAMAX) 50 MG tablet Take 50 mg by mouth Daily. Take dos 1/14/19   Provider, Nela, MD   traZODone (DESYREL) 100 MG tablet  4/8/20   Emergency, Nurse  Kingsley, RN   traZODone (DESYREL) 100 MG tablet trazodone 100 mg tablet    Emergency, Nurse Kingsley, RN   Umeclidinium Bromide (Incruse Ellipta) 62.5 MCG/INH aerosol powder  Inhale.    Provider, MD Nela   vilazodone (Viibryd) 20 MG tablet tablet Take 1 tablet by mouth Every Morning.  Patient taking differently: Take 20 mg by mouth every night at bedtime. 4/17/20   Africa Conway MD   vitamin D (ERGOCALCIFEROL) 1.25 MG (79622 UT) capsule capsule TAKE 1 CAPSULE BY MOUTH ONCE WEEKLY 11/7/19   Nivia Alonzo PA   azithromycin (Zithromax Z-Brett) 250 MG tablet Take 2 tablets the first day, then 1 tablet daily for 4 days. 4/23/20 5/14/20  Nivia Alonzo PA   predniSONE (DELTASONE) 10 MG tablet 4 tabs daily x 2 days then 3 tabs daily x 2 days then 2 tabs daily x 2 days then 1 tab daily x 2 days 4/23/20 5/14/20  Nivia Alonzo PA           Objective   Physical Exam  49-year-old awake alert no acute distress HEENT extraocular muscles intact pupils equal reactive patient has a hoarse voice but no hot potato voice no stridor no drooling no erythema no exudate or abscess no trismus.  Neck supple with no adenopathy no meningeal signs no JVD lungs she has rhonchi throughout poor air movement no retractions heart regular without murmur abdomen was soft tenderness or masses extremities no edema cords or Homans sign or evidence of DVT pulses equal upper and lower extremities.  Patient is awake alert follows commands motor strength normal without focal weakness  Procedures           ED Course      Results for orders placed or performed during the hospital encounter of 05/14/20   Comprehensive Metabolic Panel   Result Value Ref Range    Glucose 114 (H) 65 - 99 mg/dL    BUN 8 6 - 20 mg/dL    Creatinine 0.67 0.57 - 1.00 mg/dL    Sodium 140 136 - 145 mmol/L    Potassium 3.8 3.5 - 5.2 mmol/L    Chloride 102 98 - 107 mmol/L    CO2 24.0 22.0 - 29.0 mmol/L    Calcium 8.9 8.6 - 10.5 mg/dL    Total Protein 6.9 6.0 - 8.5 g/dL    Albumin  3.90 3.50 - 5.20 g/dL    ALT (SGPT) 16 1 - 33 U/L    AST (SGOT) 14 1 - 32 U/L    Alkaline Phosphatase 63 39 - 117 U/L    Total Bilirubin 0.3 0.2 - 1.2 mg/dL    eGFR Non African Amer 94 >60 mL/min/1.73    Globulin 3.0 gm/dL    A/G Ratio 1.3 g/dL    BUN/Creatinine Ratio 11.9 7.0 - 25.0    Anion Gap 14.0 5.0 - 15.0 mmol/L   Lactate Dehydrogenase   Result Value Ref Range     (H) 135 - 214 U/L   Procalcitonin   Result Value Ref Range    Procalcitonin <0.02 (L) 0.10 - 0.25 ng/mL   C-reactive Protein   Result Value Ref Range    C-Reactive Protein 0.76 (H) 0.00 - 0.50 mg/dL   CBC Auto Differential   Result Value Ref Range    WBC 11.30 (H) 3.40 - 10.80 10*3/mm3    RBC 4.23 3.77 - 5.28 10*6/mm3    Hemoglobin 13.5 12.0 - 15.9 g/dL    Hematocrit 39.6 34.0 - 46.6 %    MCV 93.5 79.0 - 97.0 fL    MCH 32.0 26.6 - 33.0 pg    MCHC 34.2 31.5 - 35.7 g/dL    RDW 14.8 12.3 - 15.4 %    RDW-SD 48.6 37.0 - 54.0 fl    MPV 8.0 6.0 - 12.0 fL    Platelets 202 140 - 450 10*3/mm3    Neutrophil % 73.5 42.7 - 76.0 %    Lymphocyte % 19.2 (L) 19.6 - 45.3 %    Monocyte % 6.0 5.0 - 12.0 %    Eosinophil % 0.9 0.3 - 6.2 %    Basophil % 0.4 0.0 - 1.5 %    Neutrophils, Absolute 8.30 (H) 1.70 - 7.00 10*3/mm3    Lymphocytes, Absolute 2.20 0.70 - 3.10 10*3/mm3    Monocytes, Absolute 0.70 0.10 - 0.90 10*3/mm3    Eosinophils, Absolute 0.10 0.00 - 0.40 10*3/mm3    Basophils, Absolute 0.00 0.00 - 0.20 10*3/mm3    nRBC 0.0 0.0 - 0.2 /100 WBC   POC Lactate   Result Value Ref Range    Lactate 2.0 0.5 - 2.0 mmol/L   Light Blue Top   Result Value Ref Range    Extra Tube hold for add-on    Green Top (Gel)   Result Value Ref Range    Extra Tube Hold for add-ons.    Lavender Top   Result Value Ref Range    Extra Tube hold for add-on    Gold Top - SST   Result Value Ref Range    Extra Tube Hold for add-ons.      Xr Chest Ap    Result Date: 5/14/2020  No radiographic findings of acute cardiopulmonary abnormality.  Electronically Signed By-DR. Ramsey Stone MD  On:5/14/2020 5:27 PM This report was finalized on [id number] by DR. Ramsey Stone MD.    Medications   sodium chloride 0.9 % flush 10 mL (has no administration in time range)   albuterol sulfate HFA (PROVENTIL HFA;VENTOLIN HFA;PROAIR HFA) inhaler 2 puff (has no administration in time range)         Xr Chest Ap    Result Date: 5/14/2020  No radiographic findings of acute cardiopulmonary abnormality.  Electronically Signed By-DR. Ramsey Stone MD On:5/14/2020 5:27 PM This report was finalized on [id number] by DR. Ramsey Stone MD.    Medications   sodium chloride 0.9 % flush 10 mL (has no administration in time range)   albuterol sulfate HFA (PROVENTIL HFA;VENTOLIN HFA;PROAIR HFA) inhaler 2 puff (has no administration in time range)     EKG my interpretation normal sinus rhythm rate of 80 normal axis no hypertrophy QTC of 4 and 40 no change from previous EKG normal                                       MDM  Number of Diagnoses or Management Options  Acute respiratory distress:   Chronic obstructive pulmonary disease with acute exacerbation (CMS/HCC):   Fever, unspecified fever cause:   Diagnosis management comments: Medical decision making.  Patient had the above exam evaluation IV established placed on a monitor.  She was given a albuterol inhaler 2 puffs.  Her chest x-ray was unremarkable.  EKG was normal white count was 11,000 lactic acid was 2 chemistries unremarkable blood cultures were pending.  Patient did have a COVID-19 test which is pending at this point.  Patient repeat examination at 7:10 PM was resting comfortably still had diffuse scattered wheezes throughout but sats in the mid 90s.  The patient has multiple health problems.  She is high risk for COVID-19.  In light of all these health problems or bronchospasm and shortness of breath we placed in the hospital for further care and monitoring.  She was made aware of the findings she was given Zithromax 500 mg IV.  She was examined in appropriate  PPE per hospital protocol.  Hospitalist nurse practitioner paged to see no evidence of acute cardiac ischemia see no evidence to suggest an acute sepsis no evidence to his DVT or pulmonary embolism currently.  Stable unremarkable improved ER course      Final diagnoses:   Acute respiratory distress   Chronic obstructive pulmonary disease with acute exacerbation (CMS/HCC)   Fever, unspecified fever cause            Amandeep Hood MD  05/14/20 1915

## 2020-05-15 ENCOUNTER — TELEPHONE (OUTPATIENT)
Dept: FAMILY MEDICINE CLINIC | Facility: CLINIC | Age: 50
End: 2020-05-15

## 2020-05-15 LAB
ALBUMIN SERPL-MCNC: 3.7 G/DL (ref 3.5–5.2)
ALBUMIN/GLOB SERPL: 1.4 G/DL
ALP SERPL-CCNC: 56 U/L (ref 39–117)
ALT SERPL W P-5'-P-CCNC: 14 U/L (ref 1–33)
ANION GAP SERPL CALCULATED.3IONS-SCNC: 10 MMOL/L (ref 5–15)
ARTERIAL PATENCY WRIST A: POSITIVE
ARTERIAL PATENCY WRIST A: POSITIVE
AST SERPL-CCNC: 13 U/L (ref 1–32)
ATMOSPHERIC PRESS: ABNORMAL MM[HG]
ATMOSPHERIC PRESS: ABNORMAL MM[HG]
BASE EXCESS BLDA CALC-SCNC: 2.2 MMOL/L (ref 0–3)
BASE EXCESS BLDA CALC-SCNC: 2.9 MMOL/L (ref 0–3)
BASOPHILS # BLD AUTO: 0.1 10*3/MM3 (ref 0–0.2)
BASOPHILS NFR BLD AUTO: 0.7 % (ref 0–1.5)
BDY SITE: ABNORMAL
BDY SITE: ABNORMAL
BILIRUB SERPL-MCNC: 0.3 MG/DL (ref 0.2–1.2)
BUN BLD-MCNC: 8 MG/DL (ref 6–20)
BUN/CREAT SERPL: 14.3 (ref 7–25)
CALCIUM SPEC-SCNC: 9 MG/DL (ref 8.6–10.5)
CHLORIDE SERPL-SCNC: 104 MMOL/L (ref 98–107)
CK SERPL-CCNC: 56 U/L (ref 20–180)
CO2 BLDA-SCNC: 29.5 MMOL/L (ref 22–29)
CO2 BLDA-SCNC: 30.2 MMOL/L (ref 22–29)
CO2 SERPL-SCNC: 27 MMOL/L (ref 22–29)
CREAT BLD-MCNC: 0.56 MG/DL (ref 0.57–1)
CRP SERPL-MCNC: 0.69 MG/DL (ref 0–0.5)
D DIMER PPP FEU-MCNC: 0.61 MCGFEU/ML (ref 0.17–0.59)
DEPRECATED RDW RBC AUTO: 49.9 FL (ref 37–54)
EOSINOPHIL # BLD AUTO: 0.1 10*3/MM3 (ref 0–0.4)
EOSINOPHIL NFR BLD AUTO: 1.3 % (ref 0.3–6.2)
ERYTHROCYTE [DISTWIDTH] IN BLOOD BY AUTOMATED COUNT: 15 % (ref 12.3–15.4)
FERRITIN SERPL-MCNC: 161.4 NG/ML (ref 13–150)
FIBRINOGEN PPP-MCNC: 324 MG/DL (ref 210–450)
GFR SERPL CREATININE-BSD FRML MDRD: 115 ML/MIN/1.73
GLOBULIN UR ELPH-MCNC: 2.7 GM/DL
GLUCOSE BLD-MCNC: 104 MG/DL (ref 65–99)
GLUCOSE BLDC GLUCOMTR-MCNC: 105 MG/DL (ref 70–105)
GLUCOSE BLDC GLUCOMTR-MCNC: 198 MG/DL (ref 70–105)
HBA1C MFR BLD: 5.5 % (ref 3.5–5.6)
HCO3 BLDA-SCNC: 28 MMOL/L (ref 21–28)
HCO3 BLDA-SCNC: 28.7 MMOL/L (ref 21–28)
HCT VFR BLD AUTO: 36.6 % (ref 34–46.6)
HEMODILUTION: NO
HEMODILUTION: NO
HGB BLD-MCNC: 12.9 G/DL (ref 12–15.9)
HOROWITZ INDEX BLD+IHG-RTO: 21 %
HOROWITZ INDEX BLD+IHG-RTO: 28 %
L PNEUMO1 AG UR QL IA: NEGATIVE
LDH SERPL-CCNC: 255 U/L (ref 135–214)
LYMPHOCYTES # BLD AUTO: 2.5 10*3/MM3 (ref 0.7–3.1)
LYMPHOCYTES NFR BLD AUTO: 29.9 % (ref 19.6–45.3)
MCH RBC QN AUTO: 33 PG (ref 26.6–33)
MCHC RBC AUTO-ENTMCNC: 35.2 G/DL (ref 31.5–35.7)
MCV RBC AUTO: 93.8 FL (ref 79–97)
MODALITY: ABNORMAL
MODALITY: ABNORMAL
MONOCYTES # BLD AUTO: 0.5 10*3/MM3 (ref 0.1–0.9)
MONOCYTES NFR BLD AUTO: 6.6 % (ref 5–12)
NEUTROPHILS # BLD AUTO: 5.1 10*3/MM3 (ref 1.7–7)
NEUTROPHILS NFR BLD AUTO: 61.5 % (ref 42.7–76)
NRBC BLD AUTO-RTO: 0.1 /100 WBC (ref 0–0.2)
PCO2 BLDA: 47 MM HG (ref 35–48)
PCO2 BLDA: 47.4 MM HG (ref 35–48)
PH BLDA: 7.38 PH UNITS (ref 7.35–7.45)
PH BLDA: 7.39 PH UNITS (ref 7.35–7.45)
PLATELET # BLD AUTO: 150 10*3/MM3 (ref 140–450)
PMV BLD AUTO: 8.1 FL (ref 6–12)
PO2 BLDA: 84.7 MM HG (ref 83–108)
PO2 BLDA: 85.8 MM HG (ref 83–108)
POTASSIUM BLD-SCNC: 3.5 MMOL/L (ref 3.5–5.2)
PROT SERPL-MCNC: 6.4 G/DL (ref 6–8.5)
RBC # BLD AUTO: 3.9 10*6/MM3 (ref 3.77–5.28)
S PNEUM AG SPEC QL LA: NEGATIVE
SAO2 % BLDCOA: 96.1 % (ref 94–98)
SAO2 % BLDCOA: 96.2 % (ref 94–98)
SARS-COV-2 RNA RESP QL NAA+PROBE: NOT DETECTED
SODIUM BLD-SCNC: 141 MMOL/L (ref 136–145)
WBC NRBC COR # BLD: 8.3 10*3/MM3 (ref 3.4–10.8)

## 2020-05-15 PROCEDURE — 36600 WITHDRAWAL OF ARTERIAL BLOOD: CPT

## 2020-05-15 PROCEDURE — 82550 ASSAY OF CK (CPK): CPT | Performed by: STUDENT IN AN ORGANIZED HEALTH CARE EDUCATION/TRAINING PROGRAM

## 2020-05-15 PROCEDURE — 87899 AGENT NOS ASSAY W/OPTIC: CPT | Performed by: STUDENT IN AN ORGANIZED HEALTH CARE EDUCATION/TRAINING PROGRAM

## 2020-05-15 PROCEDURE — 97165 OT EVAL LOW COMPLEX 30 MIN: CPT

## 2020-05-15 PROCEDURE — 96361 HYDRATE IV INFUSION ADD-ON: CPT

## 2020-05-15 PROCEDURE — 82803 BLOOD GASES ANY COMBINATION: CPT

## 2020-05-15 PROCEDURE — 83615 LACTATE (LD) (LDH) ENZYME: CPT | Performed by: STUDENT IN AN ORGANIZED HEALTH CARE EDUCATION/TRAINING PROGRAM

## 2020-05-15 PROCEDURE — 25010000002 METHYLPREDNISOLONE PER 125 MG: Performed by: INTERNAL MEDICINE

## 2020-05-15 PROCEDURE — 85025 COMPLETE CBC W/AUTO DIFF WBC: CPT | Performed by: STUDENT IN AN ORGANIZED HEALTH CARE EDUCATION/TRAINING PROGRAM

## 2020-05-15 PROCEDURE — 82962 GLUCOSE BLOOD TEST: CPT

## 2020-05-15 PROCEDURE — 82728 ASSAY OF FERRITIN: CPT | Performed by: STUDENT IN AN ORGANIZED HEALTH CARE EDUCATION/TRAINING PROGRAM

## 2020-05-15 PROCEDURE — 96372 THER/PROPH/DIAG INJ SC/IM: CPT

## 2020-05-15 PROCEDURE — G0378 HOSPITAL OBSERVATION PER HR: HCPCS

## 2020-05-15 PROCEDURE — 25010000002 ENOXAPARIN PER 10 MG: Performed by: STUDENT IN AN ORGANIZED HEALTH CARE EDUCATION/TRAINING PROGRAM

## 2020-05-15 PROCEDURE — 99225 PR SBSQ OBSERVATION CARE/DAY 25 MINUTES: CPT | Performed by: INTERNAL MEDICINE

## 2020-05-15 PROCEDURE — 96375 TX/PRO/DX INJ NEW DRUG ADDON: CPT

## 2020-05-15 PROCEDURE — 83036 HEMOGLOBIN GLYCOSYLATED A1C: CPT | Performed by: STUDENT IN AN ORGANIZED HEALTH CARE EDUCATION/TRAINING PROGRAM

## 2020-05-15 PROCEDURE — 85384 FIBRINOGEN ACTIVITY: CPT | Performed by: STUDENT IN AN ORGANIZED HEALTH CARE EDUCATION/TRAINING PROGRAM

## 2020-05-15 PROCEDURE — 80053 COMPREHEN METABOLIC PANEL: CPT | Performed by: STUDENT IN AN ORGANIZED HEALTH CARE EDUCATION/TRAINING PROGRAM

## 2020-05-15 PROCEDURE — 94799 UNLISTED PULMONARY SVC/PX: CPT

## 2020-05-15 PROCEDURE — 85379 FIBRIN DEGRADATION QUANT: CPT | Performed by: STUDENT IN AN ORGANIZED HEALTH CARE EDUCATION/TRAINING PROGRAM

## 2020-05-15 PROCEDURE — 86140 C-REACTIVE PROTEIN: CPT | Performed by: STUDENT IN AN ORGANIZED HEALTH CARE EDUCATION/TRAINING PROGRAM

## 2020-05-15 PROCEDURE — 96376 TX/PRO/DX INJ SAME DRUG ADON: CPT

## 2020-05-15 PROCEDURE — 94640 AIRWAY INHALATION TREATMENT: CPT

## 2020-05-15 RX ORDER — METHYLPREDNISOLONE SODIUM SUCCINATE 125 MG/2ML
60 INJECTION, POWDER, LYOPHILIZED, FOR SOLUTION INTRAMUSCULAR; INTRAVENOUS EVERY 8 HOURS
Status: DISCONTINUED | OUTPATIENT
Start: 2020-05-15 | End: 2020-05-17 | Stop reason: HOSPADM

## 2020-05-15 RX ORDER — ALBUTEROL SULFATE 2.5 MG/3ML
2.5 SOLUTION RESPIRATORY (INHALATION)
Status: DISCONTINUED | OUTPATIENT
Start: 2020-05-15 | End: 2020-05-17 | Stop reason: HOSPADM

## 2020-05-15 RX ORDER — IPRATROPIUM BROMIDE AND ALBUTEROL SULFATE 2.5; .5 MG/3ML; MG/3ML
3 SOLUTION RESPIRATORY (INHALATION)
Status: DISCONTINUED | OUTPATIENT
Start: 2020-05-15 | End: 2020-05-17 | Stop reason: HOSPADM

## 2020-05-15 RX ADMIN — LISINOPRIL 20 MG: 20 TABLET ORAL at 08:45

## 2020-05-15 RX ADMIN — METHYLPREDNISOLONE SODIUM SUCCINATE 60 MG: 125 INJECTION, POWDER, FOR SOLUTION INTRAMUSCULAR; INTRAVENOUS at 13:05

## 2020-05-15 RX ADMIN — VILAZODONE HYDROCHLORIDE 20 MG: 10 TABLET ORAL at 20:30

## 2020-05-15 RX ADMIN — GABAPENTIN 600 MG: 300 CAPSULE ORAL at 20:30

## 2020-05-15 RX ADMIN — THEOPHYLLINE 300 MG: 300 TABLET, EXTENDED RELEASE ORAL at 09:12

## 2020-05-15 RX ADMIN — SODIUM CHLORIDE 100 ML/HR: 900 INJECTION, SOLUTION INTRAVENOUS at 13:16

## 2020-05-15 RX ADMIN — CLONIDINE HYDROCHLORIDE 0.1 MG: 0.1 TABLET ORAL at 20:31

## 2020-05-15 RX ADMIN — METOCLOPRAMIDE 10 MG: 10 TABLET ORAL at 20:30

## 2020-05-15 RX ADMIN — MELATONIN TAB 5 MG 10 MG: 5 TAB at 00:28

## 2020-05-15 RX ADMIN — NICOTINE 1 PATCH: 21 PATCH TRANSDERMAL at 21:00

## 2020-05-15 RX ADMIN — TOPIRAMATE 50 MG: 25 TABLET, FILM COATED ORAL at 08:45

## 2020-05-15 RX ADMIN — METOPROLOL TARTRATE 25 MG: 25 TABLET, FILM COATED ORAL at 20:30

## 2020-05-15 RX ADMIN — HYDROCODONE BITARTRATE AND ACETAMINOPHEN 1 TABLET: 10; 325 TABLET ORAL at 00:25

## 2020-05-15 RX ADMIN — VILAZODONE HYDROCHLORIDE 20 MG: 10 TABLET ORAL at 00:56

## 2020-05-15 RX ADMIN — SODIUM CHLORIDE 100 ML/HR: 900 INJECTION, SOLUTION INTRAVENOUS at 00:18

## 2020-05-15 RX ADMIN — ALBUTEROL SULFATE 2 PUFF: 90 AEROSOL, METERED RESPIRATORY (INHALATION) at 10:12

## 2020-05-15 RX ADMIN — Medication 10 ML: at 09:12

## 2020-05-15 RX ADMIN — LIDOCAINE 1 PATCH: 50 PATCH TOPICAL at 20:35

## 2020-05-15 RX ADMIN — IPRATROPIUM BROMIDE AND ALBUTEROL 1 PUFF: 20; 100 SPRAY, METERED RESPIRATORY (INHALATION) at 06:37

## 2020-05-15 RX ADMIN — GUAIFENESIN 1200 MG: 600 TABLET, EXTENDED RELEASE ORAL at 20:30

## 2020-05-15 RX ADMIN — ENOXAPARIN SODIUM 40 MG: 40 INJECTION SUBCUTANEOUS at 17:58

## 2020-05-15 RX ADMIN — Medication 10 ML: at 20:31

## 2020-05-15 RX ADMIN — BUDESONIDE AND FORMOTEROL FUMARATE DIHYDRATE 2 PUFF: 160; 4.5 AEROSOL RESPIRATORY (INHALATION) at 18:28

## 2020-05-15 RX ADMIN — AMLODIPINE BESYLATE 10 MG: 5 TABLET ORAL at 08:45

## 2020-05-15 RX ADMIN — ACETAZOLAMIDE 250 MG: 250 TABLET ORAL at 08:45

## 2020-05-15 RX ADMIN — ALBUTEROL SULFATE 2 PUFF: 90 AEROSOL, METERED RESPIRATORY (INHALATION) at 04:14

## 2020-05-15 RX ADMIN — BUDESONIDE AND FORMOTEROL FUMARATE DIHYDRATE 2 PUFF: 160; 4.5 AEROSOL RESPIRATORY (INHALATION) at 06:37

## 2020-05-15 RX ADMIN — METOPROLOL TARTRATE 25 MG: 25 TABLET, FILM COATED ORAL at 08:45

## 2020-05-15 RX ADMIN — GUAIFENESIN 1200 MG: 600 TABLET, EXTENDED RELEASE ORAL at 08:45

## 2020-05-15 RX ADMIN — ALBUTEROL SULFATE 2 PUFF: 90 AEROSOL, METERED RESPIRATORY (INHALATION) at 15:00

## 2020-05-15 RX ADMIN — METOCLOPRAMIDE 10 MG: 10 TABLET ORAL at 08:45

## 2020-05-15 RX ADMIN — ALBUTEROL SULFATE 2.5 MG: 2.5 SOLUTION RESPIRATORY (INHALATION) at 23:38

## 2020-05-15 RX ADMIN — METOCLOPRAMIDE 10 MG: 10 TABLET ORAL at 17:59

## 2020-05-15 RX ADMIN — METOCLOPRAMIDE 10 MG: 10 TABLET ORAL at 00:24

## 2020-05-15 RX ADMIN — GABAPENTIN 600 MG: 300 CAPSULE ORAL at 17:59

## 2020-05-15 RX ADMIN — NICOTINE 1 PATCH: 21 PATCH TRANSDERMAL at 00:30

## 2020-05-15 RX ADMIN — MELATONIN TAB 5 MG 10 MG: 5 TAB at 20:30

## 2020-05-15 RX ADMIN — LIDOCAINE 1 PATCH: 50 PATCH TOPICAL at 00:29

## 2020-05-15 RX ADMIN — GABAPENTIN 600 MG: 300 CAPSULE ORAL at 00:26

## 2020-05-15 RX ADMIN — AZITHROMYCIN MONOHYDRATE 250 MG: 250 TABLET ORAL at 08:45

## 2020-05-15 RX ADMIN — GABAPENTIN 600 MG: 300 CAPSULE ORAL at 08:45

## 2020-05-15 RX ADMIN — CLONIDINE HYDROCHLORIDE 0.1 MG: 0.1 TABLET ORAL at 00:28

## 2020-05-15 RX ADMIN — METHYLPREDNISOLONE SODIUM SUCCINATE 60 MG: 125 INJECTION, POWDER, FOR SOLUTION INTRAMUSCULAR; INTRAVENOUS at 20:31

## 2020-05-15 RX ADMIN — CLONIDINE HYDROCHLORIDE 0.1 MG: 0.1 TABLET ORAL at 17:59

## 2020-05-15 RX ADMIN — BUDESONIDE 9 MG: 3 CAPSULE, COATED PELLETS ORAL at 08:45

## 2020-05-15 RX ADMIN — CLONIDINE HYDROCHLORIDE 0.1 MG: 0.1 TABLET ORAL at 08:45

## 2020-05-15 RX ADMIN — CETIRIZINE HYDROCHLORIDE 10 MG: 10 TABLET, FILM COATED ORAL at 08:45

## 2020-05-15 RX ADMIN — METOCLOPRAMIDE 10 MG: 10 TABLET ORAL at 11:36

## 2020-05-15 RX ADMIN — METOPROLOL TARTRATE 25 MG: 25 TABLET, FILM COATED ORAL at 00:25

## 2020-05-15 NOTE — PLAN OF CARE
VSS; monitor showing NSR; COVID19 swab results negative; has chronic low back pain and given Norco 10mg po last night and has been sleeping at long intervals; voice is hoarse and speaks in soft voice; BS diminished throughout with few expiratory wheezes in posterior bases; has had 02 at 2l/nc during the night since has hx of sleep apnea.

## 2020-05-15 NOTE — PLAN OF CARE
Patient resting comfortably in bed, no complaints. Will continue to monitor.  Problem: Patient Care Overview  Goal: Plan of Care Review  Outcome: Ongoing (interventions implemented as appropriate)

## 2020-05-15 NOTE — NURSING NOTE
Maria Isabel Contreras's NP here on unit and informed of RT not being able to obtain pt's ABG'S due to being dehydrated with orders received for IV fluids and Nicotine patch.

## 2020-05-15 NOTE — NURSING NOTE
Hospitalist called with Maria Isabel Singh NP returning call at [phone]-informed of pt's elevated D-Dimer of 0.75 and reports pt will review chart.

## 2020-05-15 NOTE — PROGRESS NOTES
[facility] Daily Progress Note      Hospitalist Team  LOS 0 days      Patient Care Team:  Nivia Alonzo PA as PCP - General  Nivia Alonzo PA as PCP - Family Medicine    Patient Location: 241/1      Subjective   Subjective     Chief Complaint / Subjective  Chief Complaint   Patient presents with   • Shortness of Breath         Brief Synopsis of Hospital Course/HPI    No complaints today, She states she feels a little better. Covid 19-negative      Ms. Thornton is a 49 y.o. female who presents to [facility] ED with a history of lupus, fibromyalgia, migraines, hypertension, ulcerative colitis, COPD,  anxiety, depression, and current smoker complaining of increasing shortness of breath.        Ms. Thornton is a 49 y.o. female who presents to [facility] ED with a history of lupus, fibromyalgia, migraines, hypertension, ulcerative colitis, COPD,  anxiety, depression, and current smoker complaining of increasing shortness of breath.  Patient states she has had increasing shortness of breath for the last week.  She has been nauseous with multiple episodes of emesis for the last 2 days, no emesis today but states she has had anything to eat or drink.  Patient states she has a significant productive cough that has lasted for about 7 days.  She states that she has had a sore throat with progressive hoarseness of voice.  Patient states she has felt weak since this all began.  Both of her ears are sore which she states feels like sinus allergy drainage.  She denies fever, hematochezia, hematemesis, melena, and diarrhea.  There have been no exacerbating or relieving factors noted.  Patient states she is supposed to wear a BiPAP at home but does not she does wear 2 L nasal cannula at night for sleep and has been attempting to quit smoking by using nicotine patches.  She states she did stop smoking marijuana about 2 months ago.  She has no known sick contacts.     In the  "ER, WBC 11.30, glucose 114, , CRP 0.76, lactic 2.0, Procalcitonin <0.02. Blood Cultures and COVID 19 swab pending, Respiratory Virus panel negative. EKG SR 81. Chest XRay no acute findings. Sat 99% on room air.  Patient is admitted for further evaluation and treatment.    5/15/2020: She has negative Covid 19 testing. The Legionella, Strep and Resp panel were also negative. She was transferred to the Med-Surg unit.  She was started on IV steroids for wheezing heard on her exam. Resp therapy was consulted for BiPAP evaluation      Date::  5/15/2020      Review of Systems   Constitution: Negative for chills and fever.   HENT: Positive for ear pain and hoarse voice.    Respiratory: Positive for cough and shortness of breath.    Gastrointestinal: Positive for nausea and vomiting. Negative for diarrhea.   Genitourinary: Negative for dysuria, frequency and urgency.   Psychiatric/Behavioral: The patient is nervous/anxious.    All other systems reviewed and are negative.  ROS      Objective   Objective      Vital Signs  Temp:  [96.4 °F (35.8 °C)-99 °F (37.2 °C)] 97.5 °F (36.4 °C)  Heart Rate:  [] 74  Resp:  [16-26] 18  BP: (102-129)/() 106/72  Oxygen Therapy  SpO2: 97 %  Pulse Oximetry Type: Intermittent  Device (Oxygen Therapy): room air  Device (Oxygen Therapy): room air  Flow (L/min): 2  Oxygen Concentration (%): 28  Flowsheet Rows      First Filed Value   Admission Height  167.6 cm (66\") Documented at 05/14/2020 1703   Admission Weight  108 kg (238 lb 1.6 oz) Documented at 05/14/2020 1703        Intake & Output (last 3 days)       05/12 0701 - 05/13 0700 05/13 0701 - 05/14 0700 05/14 0701 - 05/15 0700 05/15 0701 - 05/16 0700    P.O.   240     I.V. (mL/kg)   549 (5)     Total Intake(mL/kg)   789 (7.2)     Net   +789             Urine Unmeasured Occurrence   1 x         Lines, Drains & Airways    Active LDAs     Name:   Placement date:   Placement time:   Site:   Days:    Peripheral IV 05/14/20 1727 " Right Forearm   05/14/20    1727    Forearm   less than 1                  Physical Exam:  Constitutional: She is oriented to person, place, and time. She appears well-developed. She appears calm and relaxed   HEENT:   Head: Normocephalic and atraumatic.   Mouth/Throat: Oropharynx is clear and moist.   Eyes: Pupils are equal, round, and reactive to light. Conjunctivae and EOM are normal.   Neck: Normal range of motion. Neck supple.   Cardiovascular: Normal rate, regular rhythm, normal heart sounds and intact distal pulses.   Pulmonary/Chest: She has wheezes.   Abdominal: Soft. Bowel sounds are normal. She exhibits no distension. There is no tenderness.   Musculoskeletal: Normal range of motion. .   Neurological: She is alert and oriented to person, place, and time.   Skin: Skin is warm and dry. Capillary refill takes less than 2 seconds. She is not diaphoretic.   Psychiatric: She has a normal mood and affect. Judgment and thought content normal.   anxious   Vitals reviewed.  Physical Exam          Procedures:    Results Review:     I reviewed the patient's new clinical results.      Lab Results (last 24 hours)     Procedure Component Value Units Date/Time    Hemoglobin A1c [[id number]]  (Normal) Collected:  05/15/20 0523    Specimen:  Blood Updated:  05/15/20 1104     Hemoglobin A1C 5.5 %     Narrative:       Hemoglobin A1C Reference Range:    <5.7 %        Normal  5.7-6.4 %     Increased risk for diabetes  > 6.4 %        Diabetes       These guidelines have been recommended by the American Diabetic Association for Hgb A1c.      The following 2010 guidelines have been recommended by the American Diabetes Association for Hemoglobin A1c.    HBA1c 5.7-6.4% Increased risk for future diabetes (pre-diabetes)  HBA1c     >6.4% Diabetes      Blood Gas, Arterial [[id number]]  (Abnormal) Collected:  05/15/20 0827    Specimen:  Arterial Blood Updated:  05/15/20 0831     Site Right Radial     Rich's Test Positive     pH, Arterial  7.390 pH units      pCO2, Arterial 47.4 mm Hg      pO2, Arterial 84.7 mm Hg      HCO3, Arterial 28.7 mmol/L      Base Excess, Arterial 2.9 mmol/L      Comment: Serial Number: [id number]:  [id number]        O2 Saturation, Arterial 96.1 %      CO2 Content 30.2 mmol/L      Barometric Pressure for Blood Gas --     Comment: N/A        Modality Cannula     FIO2 28 %      Hemodilution No    Comprehensive Metabolic Panel [[id number]]  (Abnormal) Collected:  05/15/20 0523    Specimen:  Blood Updated:  05/15/20 0636     Glucose 104 mg/dL      BUN 8 mg/dL      Creatinine 0.56 mg/dL      Sodium 141 mmol/L      Potassium 3.5 mmol/L      Chloride 104 mmol/L      CO2 27.0 mmol/L      Calcium 9.0 mg/dL      Total Protein 6.4 g/dL      Albumin 3.70 g/dL      ALT (SGPT) 14 U/L      AST (SGOT) 13 U/L      Alkaline Phosphatase 56 U/L      Total Bilirubin 0.3 mg/dL      eGFR Non African Amer 115 mL/min/1.73      Globulin 2.7 gm/dL      A/G Ratio 1.4 g/dL      BUN/Creatinine Ratio 14.3     Anion Gap 10.0 mmol/L     Narrative:       GFR Normal >60  Chronic Kidney Disease <60  Kidney Failure <15      CK [[id number]]  (Normal) Collected:  05/15/20 0523    Specimen:  Blood Updated:  05/15/20 0636     Creatine Kinase 56 U/L     C-reactive Protein [[id number]]  (Abnormal) Collected:  05/15/20 0523    Specimen:  Blood Updated:  05/15/20 0636     C-Reactive Protein 0.69 mg/dL     Lactate Dehydrogenase [[id number]]  (Abnormal) Collected:  05/15/20 0523    Specimen:  Blood Updated:  05/15/20 0636      U/L     Ferritin [[id number]]  (Abnormal) Collected:  05/15/20 0523    Specimen:  Blood Updated:  05/15/20 0633     Ferritin 161.40 ng/mL     Narrative:       Results may be falsely decreased if patient taking Biotin.      Fibrinogen [[id number]]  (Normal) Collected:  05/15/20 0523    Specimen:  Blood Updated:  05/15/20 0624     Fibrinogen 324 mg/dL     D-dimer, Quantitative [[id number]]  (Abnormal) Collected:  05/15/20 0523    Specimen:  Blood  Updated:  05/15/20 0622     D-Dimer, Quantitative 0.61 MCGFEU/mL     Narrative:       Reference Range  --------------------------------------------------------------------     < 0.50   Negative Predictive Value  0.50-0.59   Indeterminate    >= 0.60   Probable VTE             A very low percentage of patients with DVT may yield D-Dimer results   below the cut-off of 0.50 MCGFEU/mL.  This is known to be more   prevalent in patients with distal DVT.             Results of this test should always be interpreted in conjunction with   the patient's medical history, clinical presentation and other   findings.  Clinical diagnosis should not be based on the result of   INNOVANCE D-Dimer alone.    CBC & Differential [[id number]] Collected:  05/15/20 0523    Specimen:  Blood Updated:  05/15/20 0609    Narrative:       The following orders were created for panel order CBC & Differential.  Procedure                               Abnormality         Status                     ---------                               -----------         ------                     CBC Auto Differential[[id number]]        Normal              Final result                 Please view results for these tests on the individual orders.    CBC Auto Differential [[id number]]  (Normal) Collected:  05/15/20 0523    Specimen:  Blood Updated:  05/15/20 0609     WBC 8.30 10*3/mm3      RBC 3.90 10*6/mm3      Hemoglobin 12.9 g/dL      Hematocrit 36.6 %      MCV 93.8 fL      MCH 33.0 pg      MCHC 35.2 g/dL      RDW 15.0 %      RDW-SD 49.9 fl      MPV 8.1 fL      Platelets 150 10*3/mm3      Neutrophil % 61.5 %      Lymphocyte % 29.9 %      Monocyte % 6.6 %      Eosinophil % 1.3 %      Basophil % 0.7 %      Neutrophils, Absolute 5.10 10*3/mm3      Lymphocytes, Absolute 2.50 10*3/mm3      Monocytes, Absolute 0.50 10*3/mm3      Eosinophils, Absolute 0.10 10*3/mm3      Basophils, Absolute 0.10 10*3/mm3      nRBC 0.1 /100 WBC     S. Pneumo Ag Urine or CSF - Urine, Urine,  Clean Catch [[id number]]  (Normal) Collected:  05/15/20 0203    Specimen:  Urine, Clean Catch Updated:  05/15/20 0522     Strep Pneumo Ag Negative    Legionella Antigen, Urine - Urine, Urine, Clean Catch [[id number]]  (Normal) Collected:  05/15/20 0203    Specimen:  Urine, Clean Catch Updated:  05/15/20 0522     LEGIONELLA ANTIGEN, URINE Negative    Blood Gas, Arterial [[id number]]  (Abnormal) Collected:  05/15/20 0218    Specimen:  Arterial Blood Updated:  05/15/20 0238     Site Right Radial     Rich's Test Positive     pH, Arterial 7.383 pH units      pCO2, Arterial 47.0 mm Hg      pO2, Arterial 85.8 mm Hg      HCO3, Arterial 28.0 mmol/L      Base Excess, Arterial 2.2 mmol/L      Comment: Serial Number: [id number]:  [id number]        O2 Saturation, Arterial 96.2 %      CO2 Content 29.5 mmol/L      Barometric Pressure for Blood Gas --     Comment: N/A        Modality Room Air     FIO2 21 %      Hemodilution No    SARS-CoV-2 PCR ([place] IN-HOUSE PERFORMED), NP SWAB IN TRANSPORT MEDIA - Swab, Nasopharynx [[id number]]  (Normal) Collected:  05/14/20 1731    Specimen:  Swab from Nasopharynx Updated:  05/15/20 0126     COVID19 Not Detected    Ferritin [[id number]]  (Abnormal) Collected:  05/14/20 1728    Specimen:  Blood from Arm, Right Updated:  05/14/20 2151     Ferritin 161.40 ng/mL     Narrative:       Results may be falsely decreased if patient taking Biotin.      D-dimer, Quantitative [[id number]]  (Abnormal) Collected:  05/14/20 1728    Specimen:  Blood from Arm, Right Updated:  05/14/20 2140     D-Dimer, Quantitative 0.75 MCGFEU/mL     Narrative:       Reference Range  --------------------------------------------------------------------     < 0.50   Negative Predictive Value  0.50-0.59   Indeterminate    >= 0.60   Probable VTE             A very low percentage of patients with DVT may yield D-Dimer results   below the cut-off of 0.50 MCGFEU/mL.  This is known to be more   prevalent in patients with distal DVT.              Results of this test should always be interpreted in conjunction with   the patient's medical history, clinical presentation and other   findings.  Clinical diagnosis should not be based on the result of   INNOVANCE D-Dimer alone.    Respiratory Panel, PCR - Swab, Nasopharynx [[id number]]  (Normal) Collected:  05/14/20 1731    Specimen:  Swab from Nasopharynx Updated:  05/14/20 1922     ADENOVIRUS, PCR Not Detected     Coronavirus 229E Not Detected     Coronavirus HKU1 Not Detected     Coronavirus NL63 Not Detected     Coronavirus OC43 Not Detected     Human Metapneumovirus Not Detected     Human Rhinovirus/Enterovirus Not Detected     Influenza B PCR Not Detected     Parainfluenza Virus 1 Not Detected     Parainfluenza Virus 2 Not Detected     Parainfluenza Virus 3 Not Detected     Parainfluenza Virus 4 Not Detected     Bordetella pertussis pcr Not Detected     Influenza A H1 2009 PCR Not Detected     Chlamydophila pneumoniae PCR Not Detected     Mycoplasma pneumo by PCR Not Detected     Influenza A PCR Not Detected     Influenza A H3 Not Detected     Influenza A H1 Not Detected     RSV, PCR Not Detected    Narrative:       The coronavirus on the RVP is NOT COVID-19 and is NOT indicative of infection with COVID-19.     Procalcitonin [[id number]]  (Abnormal) Collected:  05/14/20 1728    Specimen:  Blood from Arm, Right Updated:  05/14/20 1833     Procalcitonin <0.02 ng/mL     Narrative:       As a Marker for Sepsis (Non-Neonates):   1. <0.5 ng/mL represents a low risk of severe sepsis and/or septic shock.  1. >2 ng/mL represents a high risk of severe sepsis and/or septic shock.    As a Marker for Lower Respiratory Tract Infections that require antibiotic therapy:  PCT on Admission     Antibiotic Therapy             6-12 Hrs later  > 0.5                Strongly Recommended            >0.25 - <0.5         Recommended  0.1 - 0.25           Discouraged                   Remeasure/reassess PCT  <0.1                  "Strongly Discouraged          Remeasure/reassess PCT      As 28 day mortality risk marker: \"Change in Procalcitonin Result\" (> 80 % or <=80 %) if Day 0 (or Day 1) and Day 4 values are available. Refer to http://[email or web address]-pct-calculator.com/   Change in PCT <=80 %   A decrease of PCT levels below or equal to 80 % defines a positive change in PCT test result representing a higher risk for 28-day all-cause mortality of patients diagnosed with severe sepsis or septic shock.  Change in PCT > 80 %   A decrease of PCT levels of more than 80 % defines a negative change in PCT result representing a lower risk for 28-day all-cause mortality of patients diagnosed with severe sepsis or septic shock.                Results may be falsely decreased if patient taking Biotin.     [place] Draw [[id number]] Collected:  05/14/20 1728    Specimen:  Blood from Arm, Right Updated:  05/14/20 1830    Narrative:       The following orders were created for panel order [place] Draw.  Procedure                               Abnormality         Status                     ---------                               -----------         ------                     Light Blue Top[[id number]]                                   Final result               Green Top (Gel)[[id number]]                                  Final result               Lavender Top[[id number]]                                     Final result               Gold Top - SST[[id number]]                                   Final result                 Please view results for these tests on the individual orders.    Light Blue Top [[id number]] Collected:  05/14/20 1728    Specimen:  Blood from Arm, Right Updated:  05/14/20 1830     Extra Tube hold for add-on     Comment: Auto resulted       Green Top (Gel) [[id number]] Collected:  05/14/20 1728    Specimen:  Blood from Arm, Right Updated:  05/14/20 1830     Extra Tube Hold for add-ons.     Comment: Auto resulted.       Lavender Top [[id number]] " Collected:  05/14/20 1728    Specimen:  Blood from Arm, Right Updated:  05/14/20 1830     Extra Tube hold for add-on     Comment: Auto resulted       Gold Top - SST [[id number]] Collected:  05/14/20 1728    Specimen:  Blood from Arm, Right Updated:  05/14/20 1830     Extra Tube Hold for add-ons.     Comment: Auto resulted.       Comprehensive Metabolic Panel [[id number]]  (Abnormal) Collected:  05/14/20 1728    Specimen:  Blood from Arm, Right Updated:  05/14/20 1827     Glucose 114 mg/dL      BUN 8 mg/dL      Creatinine 0.67 mg/dL      Sodium 140 mmol/L      Potassium 3.8 mmol/L      Chloride 102 mmol/L      CO2 24.0 mmol/L      Calcium 8.9 mg/dL      Total Protein 6.9 g/dL      Albumin 3.90 g/dL      ALT (SGPT) 16 U/L      AST (SGOT) 14 U/L      Alkaline Phosphatase 63 U/L      Total Bilirubin 0.3 mg/dL      eGFR Non African Amer 94 mL/min/1.73      Globulin 3.0 gm/dL      A/G Ratio 1.3 g/dL      BUN/Creatinine Ratio 11.9     Anion Gap 14.0 mmol/L     Narrative:       GFR Normal >60  Chronic Kidney Disease <60  Kidney Failure <15      Lactate Dehydrogenase [[id number]]  (Abnormal) Collected:  05/14/20 1728    Specimen:  Blood from Arm, Right Updated:  05/14/20 1827      U/L     C-reactive Protein [[id number]]  (Abnormal) Collected:  05/14/20 1728    Specimen:  Blood from Arm, Right Updated:  05/14/20 1827     C-Reactive Protein 0.76 mg/dL     Blood Culture - Blood, Arm, Left [[id number]] Collected:  05/14/20 1729    Specimen:  Blood from Arm, Left Updated:  05/14/20 1745    Blood Culture - Blood, Arm, Right [[id number]] Collected:  05/14/20 1728    Specimen:  Blood from Arm, Right Updated:  05/14/20 1745    POC Lactate [[id number]] Collected:  05/14/20 1742    Specimen:  Blood Updated:  05/14/20 1742    CBC & Differential [[id number]] Collected:  05/14/20 1728    Specimen:  Blood from Arm, Right Updated:  05/14/20 1742    Narrative:       The following orders were created for panel order CBC &  Differential.  Procedure                               Abnormality         Status                     ---------                               -----------         ------                     CBC Auto Differential[[id number]]        Abnormal            Final result                 Please view results for these tests on the individual orders.    CBC Auto Differential [[id number]]  (Abnormal) Collected:  05/14/20 1728    Specimen:  Blood from Arm, Right Updated:  05/14/20 1742     WBC 11.30 10*3/mm3      RBC 4.23 10*6/mm3      Hemoglobin 13.5 g/dL      Hematocrit 39.6 %      MCV 93.5 fL      MCH 32.0 pg      MCHC 34.2 g/dL      RDW 14.8 %      RDW-SD 48.6 fl      MPV 8.0 fL      Platelets 202 10*3/mm3      Neutrophil % 73.5 %      Lymphocyte % 19.2 %      Monocyte % 6.0 %      Eosinophil % 0.9 %      Basophil % 0.4 %      Neutrophils, Absolute 8.30 10*3/mm3      Lymphocytes, Absolute 2.20 10*3/mm3      Monocytes, Absolute 0.70 10*3/mm3      Eosinophils, Absolute 0.10 10*3/mm3      Basophils, Absolute 0.00 10*3/mm3      nRBC 0.0 /100 WBC     POC Lactate [[id number]]  (Normal) Collected:  05/14/20 1738    Specimen:  Blood Updated:  05/14/20 1738     Lactate 2.0 mmol/L      Comment: Serial Number: [id number]:  [id number]           Hemoglobin A1C   Date Value Ref Range Status   05/15/2020 5.5 3.5 - 5.6 % Final           Results from last 7 days   Lab Units 05/15/20  0827   PH, ARTERIAL pH units 7.390   PO2 ART mm Hg 84.7   PCO2, ARTERIAL mm Hg 47.4   HCO3 ART mmol/L 28.7*     Lab Results   Component Value Date    LIPASE 18 02/25/2020     Lab Results   Component Value Date    CHOL 146 01/31/2020    TRIG 180 (H) 01/31/2020    HDL 41 01/31/2020    LDL 69 01/31/2020       Lab Results   Lab Value Date/Time    FINALDX  02/26/2020 1343     Colon, random biopsies:    Fragments of benign nonulcerated colon mucosa with no significant histopathology    DESIREE/sms          Microbiology Results (last 10 days)     Procedure Component  Value - Date/Time    Legionella Antigen, Urine - Urine, Urine, Clean Catch [[id number]]  (Normal) Collected:  05/15/20 0203    Lab Status:  Final result Specimen:  Urine, Clean Catch Updated:  05/15/20 0522     LEGIONELLA ANTIGEN, URINE Negative    S. Pneumo Ag Urine or CSF - Urine, Urine, Clean Catch [[id number]]  (Normal) Collected:  05/15/20 0203    Lab Status:  Final result Specimen:  Urine, Clean Catch Updated:  05/15/20 0522     Strep Pneumo Ag Negative    Respiratory Panel, PCR - Swab, Nasopharynx [[id number]]  (Normal) Collected:  05/14/20 1731    Lab Status:  Final result Specimen:  Swab from Nasopharynx Updated:  05/14/20 1922     ADENOVIRUS, PCR Not Detected     Coronavirus 229E Not Detected     Coronavirus HKU1 Not Detected     Coronavirus NL63 Not Detected     Coronavirus OC43 Not Detected     Human Metapneumovirus Not Detected     Human Rhinovirus/Enterovirus Not Detected     Influenza B PCR Not Detected     Parainfluenza Virus 1 Not Detected     Parainfluenza Virus 2 Not Detected     Parainfluenza Virus 3 Not Detected     Parainfluenza Virus 4 Not Detected     Bordetella pertussis pcr Not Detected     Influenza A H1 2009 PCR Not Detected     Chlamydophila pneumoniae PCR Not Detected     Mycoplasma pneumo by PCR Not Detected     Influenza A PCR Not Detected     Influenza A H3 Not Detected     Influenza A H1 Not Detected     RSV, PCR Not Detected    Narrative:       The coronavirus on the RVP is NOT COVID-19 and is NOT indicative of infection with COVID-19.     SARS-CoV-2 PCR ([place] IN-HOUSE PERFORMED), NP SWAB IN TRANSPORT MEDIA - Swab, Nasopharynx [[id number]]  (Normal) Collected:  05/14/20 1731    Lab Status:  Final result Specimen:  Swab from Nasopharynx Updated:  05/15/20 0126     COVID19 Not Detected          ECG/EMG Results (most recent)     Procedure Component Value Units Date/Time    ECG 12 Lead [[id number]] Collected:  05/14/20 1727     Updated:  05/15/20 0716    Narrative:       HEART RATE=  81  bpm  RR Interval= 720  ms  OH Interval= 168  ms  P Horizontal Axis= -24  deg  P Front Axis= 50  deg  QRSD Interval= 84  ms  QT Interval= 398  ms  QRS Axis= 24  deg  T Wave Axis= 22  deg  - BORDERLINE ECG -  Sinus rhythm  Probable left atrial enlargement  Low voltage, precordial leads  When compared with ECG of 09-Feb-2017 15:23:42,  Significant axis, voltage or hypertrophy change  Electronically Signed By: Amandeep Hood (INEZ) 15-May-2020 07:12:33  Date and Time of Study: 2020-05-14 17:27:18                    Xr Chest Ap    Result Date: 5/14/2020  No radiographic findings of acute cardiopulmonary abnormality.  Electronically Signed By-DR. Ramsey Stone MD On:5/14/2020 5:27 PM This report was finalized on [id number] by DR. Ramsey Stone MD.          Xrays, labs reviewed personally by physician.    Medication Review:   I have reviewed the patient's current medication list      Scheduled Meds    acetaZOLAMIDE 250 mg Oral Daily   albuterol sulfate HFA 2 puff Inhalation Q4H - RT   amLODIPine 10 mg Oral QAM   azithromycin 250 mg Oral Q24H   Budesonide 9 mg Oral Q24H   budesonide-formoterol 2 puff Inhalation BID - RT   cetirizine 10 mg Oral Daily   cloNIDine 0.1 mg Oral TID   enoxaparin 40 mg Subcutaneous Q24H   gabapentin 600 mg Oral TID   guaiFENesin 1,200 mg Oral Q12H   ipratropium-albuterol 1 puff Inhalation 4x Daily - RT   lidocaine 1 patch Transdermal Q24H   lisinopril 20 mg Oral Daily   melatonin 10 mg Oral Nightly   metoclopramide 10 mg Oral 4x Daily   metoprolol tartrate 25 mg Oral BID   nicotine 1 patch Transdermal Q24H   sodium chloride 10 mL Intravenous Q12H   theophylline 300 mg Oral Daily   topiramate 50 mg Oral Daily   traZODone 100 mg Oral Daily   vilazodone 20 mg Oral Nightly       Meds Infusions    sodium chloride 100 mL/hr Last Rate: 100 mL/hr (05/15/20 0854)       Meds PRN  •  acetaminophen **OR** acetaminophen **OR** acetaminophen  •  albuterol sulfate HFA  •  ALPRAZolam  •  benzonatate  •   bisacodyl  •  HYDROcodone-acetaminophen  •  nitroglycerin  •  ondansetron **OR** ondansetron  •  orphenadrine  •  sodium chloride  •  sodium chloride  •  SUMAtriptan  •  SUMAtriptan    I personally reviewed patient's x-ray     I personally reviewed patient's EKG strips    Assessment/Plan   Assessment/Plan     Active Hospital Problems    Diagnosis  POA   • **Acute respiratory distress [R06.03]  Yes   • Obesity (BMI 30-39.9) [E66.9]  Yes   • Elevated random blood glucose level [R73.09]  Yes   • Suspected Covid-19 Virus Infection [R68.89]  Yes   • COPD exacerbation (CMS/[facility]) [J44.1]  Yes   • Ulcerative colitis (CMS/[facility]) [K51.90]  Yes   • Vitamin D deficiency [E55.9]  Yes   • Sleep apnea [G47.30]  Yes   • Depression [F32.9]  Yes   • Systemic lupus erythematosus (CMS/[facility]) [M32.9]  Yes   • Allergic rhinitis, seasonal [J30.2]  Yes   • Fibromyalgia [M79.7]  Yes   • Irritable bowel syndrome with diarrhea [K58.0]  Yes   • Tobacco dependency [F17.200]  Yes   • Chronic obstructive pulmonary disease (CMS/[facility]) [J44.9]  Yes   • Hypertension [I10]  Yes   • Migraine without aura, intractable [G43.019]  Yes      Resolved Hospital Problems    Diagnosis Date Resolved POA   • Morbid obesity (CMS/[facility]) [E66.01] 05/14/2020 Yes       MEDICAL DECISION MAKING COMPLEXITY BY PROBLEM:   Acute Respiratory distress    --Cannot rule out COPD exacerbation    --Cannot rule out COVID 19  -CXR reviewed   -COVID 19 test-negative  - down from 313  -Ferritin, CK, Ddimer pending  -Guaifenesin  -Continue Zithromax for now  -Pulmicort and Duonebs  -Legionella and S. Penumonia ordered  -Continuous pulse ox  -Oxygen supplementation, wean as tolerated to keep oxygen sats >90%  -No nebulized medications  -PT/OT     COPD, possible exacerbation  -CXR Reviewed  -ABG pending, repeat in am if abnormal  -EKG Reviewed  -Consider Solumedrol daily if COVID negative  -Continue albuterol, Tessalon Perles, ipratropium-albuterol, theophylline  -Hold Incruse Ellipta,  non formulary  - Continue Nystatin swish and spit  -RT: BiPAP eval   Elevated blood glucose level  -Glucose 104  -A1C pending  -Repeat BMP in am     Essential Hypertension, Chronic, Controlled   -Continue home amlodipine, clonidine, lisinopril, metoprolol  - Monitor with routine vital signs      Irritable bowel and ulcerative colitis  -Continue budesonide, metoclopramide  -Hold eluxadoline, non form     Fibromyalgia  -Continue Neurontin, Norco, Xanax (inspect verified)  -Continue Lidoderm patch     Lupus  -Hold hydroxychloroquine, unless brings home dose     Open pressure of LP high 2017  -Continue diamox     Migraines  -Hold home Zofran disintegrating tablet, Phenergan  -PRN Zofran for nausea  -Sumatriptan as needed  -Topamax  - Hold Orphenadrine     Anxiety/Depression with Insomnia  -Continue Viibryd  -Continue home melatonin and trazodone     Dietary supplementation  -Hold dietary supplements for now     Seasonal allergies/allergic rhinitis  -Continue loratadine with hospital substitute     Nicotine dependence  -Encourage lifestyle modificiation  -Consider nicotine patch     Obesity  -encourage lifestyle modifications  -Nutrition consult      VTE Prophylaxis -   Mechanical Order History:     None      Pharmalogical Order History:     Ordered     Dose Route Frequency Stop    05/14/20 2128  enoxaparin (LOVENOX) syringe 40 mg  Status:  Discontinued      40 mg SC Once 05/14/20 2136 05/14/20 2135  enoxaparin (LOVENOX) syringe 40 mg      40 mg SC Every 24 Hours --    05/14/20 2124  Pharmacy to Dose enoxaparin (LOVENOX)  Status:  Discontinued     Question:  Indication of use  Answer:  Prophylaxis    -- XX Continuous PRN 05/14/20 2128            Code Status -   Code Status and Medical Interventions:   Ordered at: 05/14/20 2021     Code Status:    CPR     Medical Interventions (Level of Support Prior to Arrest):    Full       This patient has been examined wearing appropriate Personal Protective Equipment and discussed  with hospital infection control department. 05/15/20        Discharge Planning          Destination      Coordination has not been started for this encounter.      Durable Medical Equipment      Coordination has not been started for this encounter.      Dialysis/Infusion      Coordination has not been started for this encounter.      Home Medical Care      Coordination has not been started for this encounter.      Therapy      Coordination has not been started for this encounter.      Community Resources      Coordination has not been started for this encounter.            Electronically signed by Maria Eugenia Syed MD, 05/15/20, 11:38.  [facility]ist Team

## 2020-05-15 NOTE — PROGRESS NOTES
Discharge Planning Assessment   [facility]     Patient Name: Merry Thornton  MRN: [id number]  Today's Date: 5/15/2020    Admit Date: 5/14/2020    Discharge Needs Assessment     Row Name 05/15/20 0949       Living Environment    Lives With  other (see comments) Unable to reach patient in room due to Covid process - Called and spoke to caregiver Orly who lives with patient Patient has 24/7 care provided by her or patient's daughters    Current Living Arrangements  home/apartment/condo    Primary Care Provided by  other (see comments)    Able to Return to Prior Arrangements  yes       Resource/Environmental Concerns    Resource/Environmental Concerns  none    Transportation Concerns  car, none       Transition Planning    Patient/Family Anticipates Transition to  home    Patient/Family Anticipated Services at Transition  none    Transportation Anticipated  car, drives self;family or friend will provide       Discharge Needs Assessment    Readmission Within the Last 30 Days  no previous admission in last 30 days    Concerns to be Addressed  no discharge needs identified    Equipment Currently Used at Home  bipap/cpap;oxygen;nebulizer Uses oxygen at HS [facility] L [facility]    Anticipated Changes Related to Illness  none        Discharge Plan     Row Name 05/15/20 0953       Plan    Plan  Routine d/c to home   PCP Sandra          Demographic Summary     Row Name 05/15/20 0949       General Information    Admission Type  observation    Arrived From  emergency department    Referral Source  admission list    Reason for Consult  discharge planning    Preferred Language  English        Functional Status     Row Name 05/15/20 0949       Functional Status, IADL    Medications  independent    Meal Preparation  independent    Housekeeping  independent    Laundry  independent    Shopping  independent             DC Barriers - Elevated D-dimer, Dehydration, IV Fluids    Ana Cristina Higgins RN, CM  Office Phone [phone]  Cell  [phone]

## 2020-05-15 NOTE — PLAN OF CARE
Patient is near her baseline level of function and does not need Physical Therapy services per OT. Will sign off. Did not enter room.

## 2020-05-15 NOTE — THERAPY EVALUATION
Acute Care - Occupational Therapy Initial Evaluation   [facility]     Patient Name: Merry Thornton  : 1970  MRN: [id number]  Today's Date: 5/15/2020             Admit Date: 2020       ICD-10-CM ICD-9-CM   1. Acute respiratory distress R06.03 518.82   2. Chronic obstructive pulmonary disease with acute exacerbation (CMS/HCC) J44.1 491.21   3. Fever, unspecified fever cause R50.9 780.60     Patient Active Problem List   Diagnosis   • PVC's (premature ventricular contractions)   • Hypertension   • Sleep apnea   • Orthostatic hypotension   • Presence of other cardiac implants and grafts   • Abnormal echocardiogram   • Left ankle pain   • Arthralgia of left knee   • Knee pain, right   • Low back pain   • Lower back pain   • Neurogenic claudication   • Carpal tunnel syndrome of right wrist   • Chronic coronary artery disease   • Allergic rhinitis, seasonal   • Chronic obstructive pulmonary disease (CMS/HCC)   • Depression   • Dental caries   • Fibromyalgia   • Neck pain   • Gastroesophageal reflux disease   • Generalized anxiety disorder   • H. pylori infection   • Degeneration of intervertebral disc   • Herniation of intervertebral disc   • Hyperthyroidism   • Hypocalcemia   • Impaired mobility   • Irritable bowel syndrome with diarrhea   • Chronic steroid use   • Long term current use of therapeutic drug   • Loss of peripheral visual field   • Lung nodule   • Major depressive disorder, recurrent episode, moderate (CMS/HCC)   • Migraine without aura, intractable   • Muscle cramps   • Neurogenic bladder   • Neuropathy involving both lower extremities   • Osteoarthritis   • Osteoporosis   • Other instability, right ankle   • Other localized visual field defect   • Other screening mammogram   • Ovarian cystic mass   • Palpitations   • Raynaud's phenomenon   • Scleroderma (CMS/HCC)   • Vasodepressor syncope   • Systemic lupus erythematosus (CMS/HCC)   • Tobacco dependency   • Undifferentiated connective tissue  disease (CMS/[facility])   • Vitamin D deficiency   • White matter changes   • Asthma   • Pseudotumor cerebri   • Immunosuppression (CMS/[facility])   • Keratoconjunctivitis   • Presbyopia   • SPK (superficial punctate keratitis), bilateral   • Nausea vomiting and diarrhea   • Dehydration   • Acute abdominal pain   • Marijuana abuse   • Prediabetes   • Nocturnal hypoxia   • Moderate nonproliferative diabetic retinopathy (CMS/[facility])   • Ulcerative colitis (CMS/[facility])   • Pelvic mass   • Acute respiratory distress   • Obesity (BMI 30-39.9)   • Elevated random blood glucose level   • Suspected Covid-19 Virus Infection   • COPD exacerbation (CMS/[facility])     Past Medical History:   Diagnosis Date   • CAD (coronary artery disease) 12/20/2016    dr. singh   • Carpal tunnel syndrome on right 03/08/2017   • Common migraine 12/20/2016   • COPD (chronic obstructive pulmonary disease) (CMS/[facility]) 12/20/2016   • Cyst, ovarian 12/20/2016    mass   • DDD (degenerative disc disease), cervical 03/11/2016   • Dental caries 01/14/2019   • Depression 11/02/2017   • Depression, major, recurrent, moderate (CMS/[facility]) 08/25/2016   • Dietary counseling 09/21/2017   • Dysphagia 05/2020   • Elevated random blood glucose level 5/14/2020   • Encounter for smoking cessation counseling 09/21/2017   • Exacerbation of systemic lupus (CMS/[facility]) 04/30/2019   • Fibromyalgia 12/20/2016   • Generalized anxiety disorder 03/11/2016   • GERD (gastroesophageal reflux disease) 12/20/2016   • Heartburn 11/02/2017   • Hypertension 03/16/2016   • Hyperthyroidism 03/11/2016   • Hypocalcemia 09/21/2017   • IBS (irritable colon syndrome) 09/13/2016   • Immobility syndrome 09/14/2017   • Intracranial pressure increased 12/20/2016   • Left knee pain 09/21/2017   • Lower back pain 02/01/2018   • Lung nodules 09/07/2016   • Morbid obesity (CMS/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 Mixxed connective Tissue D/o   • Pain, joint, ankle, left 09/21/2017   • Palpitations 10/04/2017   • Paresthesia 06/12/2017    facial   • Peripheral neuropathy 12/20/2016    bilateral lower extremities   • Poor vision    • Prediabetes    • Raynaud's syndrome 12/20/2016   • Retinopathy    • Right knee pain 11/08/2016   • Scleroderma (CMS/[facility]) 12/20/2016   • Seasonal allergic rhinitis 12/20/2016   • SLE (systemic lupus erythematosus) (CMS/[facility]) 03/16/2017   • Sleep apnea 11/02/2017    BIPAP   • Sleep disorder 01/14/2019   • Sprain of unspecified site of right knee, subsequent encounter 12/01/2016   • Status post placement of implantable loop recorder 05/09/2018    presence   • Syncope and collapse 2020    still has occassionally   • Ulcerative colitis (CMS/[facility]) 02/2020   • Urine incontinence    • Vasovagal syncope    • Ventricular arrhythmia 03/16/2016   • Visit for screening mammogram 12/20/2016   • Vitamin D deficiency 01/14/2019   • Wheezing      Past Surgical History:   Procedure Laterality Date   • ANKLE SURGERY Right 02/2017   • BRONCHOSCOPY     • CARDIAC ABLATION  01/2000   • CARDIAC ELECTROPHYSIOLOGY PROCEDURE     • CHOLECYSTECTOMY     • COLON SURGERY     • COLONOSCOPY N/A 2/26/2020    Procedure: COLONOSCOPY WITH BIOPSY X1 AREA;  Surgeon: Michael Cheng MD;  Location: [facility] ENDOSCOPY;  Service: Gastroenterology;  Laterality: N/A;  diarrhea   • ENDOSCOPY     • KNEE SURGERY Right 09/2017   • OTHER SURGICAL HISTORY      Urerine hysterectomy   • OTHER SURGICAL HISTORY      t and a   • OTHER SURGICAL HISTORY      d&c   • OTHER SURGICAL HISTORY      bladder stim   • OTHER SURGICAL HISTORY       Loop recorder placement   • WRIST SURGERY Right           OT ASSESSMENT FLOWSHEET (last 12 hours)      Occupational Therapy Evaluation     Row Name 05/15/20 1200                   OT Evaluation Time/Intention    Subjective Information  complains of;weakness  -MP        Document Type  evaluation  -MP        Mode of Treatment  individual therapy  -MP        Patient Effort  good  -MP           General Information    Patient Profile Reviewed?  yes  -MP        Patient Observations  alert;cooperative;agree to therapy  -MP        Prior Level of Function  min assist:;ADL's Pt. lives w/ caretaker,assists mostly w/IADLs  -MP        Equipment Currently Used at Home  rollator  -MP           Relationship/Environment    Primary Source of Support/Comfort  -- roomate/caretaker  -MP        Lives With  -- caretaker  -MP           Resource/Environmental Concerns    Current Living Arrangements  home/apartment/condo  -MP        Resource/Environmental Concerns  none  -MP        Transportation Concerns  car, none  -MP           Cognitive Assessment/Intervention- PT/OT    Orientation Status (Cognition)  oriented x 3  -MP        Follows Commands (Cognition)  WNL  -MP           Functional Mobility    Functional Mobility- Ind. Level  independent  -MP        Functional Mobility- Device  rolling walker  -MP           Transfer Assessment/Treatment    Transfer Assessment/Treatment  sit-stand transfer  -MP           Sit-Stand Transfer    Sit-Stand [place] (Transfers)  independent  -MP        Assistive Device (Sit-Stand Transfers)  walker, front-wheeled  -MP           ADL Assessment/Intervention    BADL Assessment/Intervention  lower body dressing  -MP           Lower Body Dressing Assessment/Training    Lower Body Dressing [place] Level  don;socks;minimum assist (75% patient effort)  -MP           General ROM    GENERAL ROM COMMENTS  BUE WFL  -MP           MMT (Manual Muscle Testing)    General MMT Comments  BUE WFL  -MP            Positioning and Restraints    Pre-Treatment Position  in bed  -MP        Post Treatment Position  chair  -MP        In Chair  call light within reach;encouraged to call for assist  -MP           Pain Assessment    Additional Documentation  Pain Scale: Word Pre/Post-Treatment (Group)  -MP           Pain Scale: Word Pre/Post-Treatment    Pain: Word Scale, Pretreatment  0 - no pain  -MP        Pain: Word Scale, Post-Treatment  0 - no pain  -MP           Plan of Care Review    Plan of Care Reviewed With  patient  -MP        Progress  no change  -MP           Clinical Impression (OT)    Criteria for Skilled Therapeutic Interventions Met (OT Eval)  no;current level of function same as previous level of function  -MP        Therapy Frequency (OT Eval)  evaluation only  -MP        Care Plan Review (OT)  evaluation/treatment results reviewed  -MP        Anticipated Discharge Disposition (OT)  home with assist  -MP          User Key  (r) = Recorded By, (t) = Taken By, (c) = Cosigned By    Initials Name Effective Dates    Caleb Ramirez OT 03/01/19 -                OT Recommendation and Plan  Outcome Summary/Treatment Plan (OT)  Anticipated Discharge Disposition (OT): home with assist  Therapy Frequency (OT Eval): evaluation only  Plan of Care Review  Plan of Care Reviewed With: patient  Plan of Care Reviewed With: patient    Pt. Presents at baseline level of functional independence and does not require skilled OT at this time. Pt. Lives w/ caretaker, safe to d/c home    Time Calculation:   Time Calculation- OT     Row Name 05/15/20 1250             Time Calculation- OT    OT Start Time  0900  -MP      OT Stop Time  0920  -MP      OT Time Calculation (min)  20 min  -MP      Total Timed Code Minutes- OT  0 minute(s)  -MP      OT Received On  05/15/20  -MP        User Key  (r) = Recorded By, (t) = Taken By, (c) = Cosigned By    Initials Name Provider Type    Caleb Ramirez, OT Occupational Therapist        Therapy  Charges for Today     Code Description Service Date Service Provider Modifiers Qty    [id number] HC OT EVAL LOW COMPLEXITY 3 5/15/2020 Caleb Brambila, YVONNE GO 1               Caleb Brambila OT  5/15/2020

## 2020-05-15 NOTE — NURSING NOTE
Hospitalist called regarding order for IV fluids since RT here and unable to obtain pt's ABG's x 2 due to being dehydrated and also pt requesting a Nicotine patch.

## 2020-05-16 ENCOUNTER — APPOINTMENT (OUTPATIENT)
Dept: CT IMAGING | Facility: HOSPITAL | Age: 50
End: 2020-05-16

## 2020-05-16 LAB
ALBUMIN SERPL-MCNC: 3.9 G/DL (ref 3.5–5.2)
ALBUMIN/GLOB SERPL: 1.3 G/DL
ALP SERPL-CCNC: 65 U/L (ref 39–117)
ALT SERPL W P-5'-P-CCNC: 13 U/L (ref 1–33)
ANION GAP SERPL CALCULATED.3IONS-SCNC: 13 MMOL/L (ref 5–15)
AST SERPL-CCNC: 14 U/L (ref 1–32)
BASOPHILS # BLD AUTO: 0.1 10*3/MM3 (ref 0–0.2)
BASOPHILS NFR BLD AUTO: 0.6 % (ref 0–1.5)
BILIRUB SERPL-MCNC: 0.2 MG/DL (ref 0.2–1.2)
BUN BLD-MCNC: 7 MG/DL (ref 6–20)
BUN/CREAT SERPL: 11.7 (ref 7–25)
CALCIUM SPEC-SCNC: 9 MG/DL (ref 8.6–10.5)
CHLORIDE SERPL-SCNC: 107 MMOL/L (ref 98–107)
CK SERPL-CCNC: 68 U/L (ref 20–180)
CO2 SERPL-SCNC: 22 MMOL/L (ref 22–29)
CREAT BLD-MCNC: 0.6 MG/DL (ref 0.57–1)
CRP SERPL-MCNC: 0.54 MG/DL (ref 0–0.5)
DEPRECATED RDW RBC AUTO: 50.3 FL (ref 37–54)
EOSINOPHIL # BLD AUTO: 0 10*3/MM3 (ref 0–0.4)
EOSINOPHIL NFR BLD AUTO: 0 % (ref 0.3–6.2)
ERYTHROCYTE [DISTWIDTH] IN BLOOD BY AUTOMATED COUNT: 15.1 % (ref 12.3–15.4)
FERRITIN SERPL-MCNC: 195.7 NG/ML (ref 13–150)
GFR SERPL CREATININE-BSD FRML MDRD: 106 ML/MIN/1.73
GLOBULIN UR ELPH-MCNC: 3.1 GM/DL
GLUCOSE BLD-MCNC: 152 MG/DL (ref 65–99)
GLUCOSE BLDC GLUCOMTR-MCNC: 144 MG/DL (ref 70–105)
HCT VFR BLD AUTO: 38.9 % (ref 34–46.6)
HGB BLD-MCNC: 13.6 G/DL (ref 12–15.9)
LYMPHOCYTES # BLD AUTO: 0.6 10*3/MM3 (ref 0.7–3.1)
LYMPHOCYTES NFR BLD AUTO: 6.2 % (ref 19.6–45.3)
MCH RBC QN AUTO: 33.3 PG (ref 26.6–33)
MCHC RBC AUTO-ENTMCNC: 34.9 G/DL (ref 31.5–35.7)
MCV RBC AUTO: 95.3 FL (ref 79–97)
MONOCYTES # BLD AUTO: 0.1 10*3/MM3 (ref 0.1–0.9)
MONOCYTES NFR BLD AUTO: 1.4 % (ref 5–12)
NEUTROPHILS # BLD AUTO: 8.9 10*3/MM3 (ref 1.7–7)
NEUTROPHILS NFR BLD AUTO: 91.8 % (ref 42.7–76)
NRBC BLD AUTO-RTO: 0 /100 WBC (ref 0–0.2)
PLATELET # BLD AUTO: 158 10*3/MM3 (ref 140–450)
PMV BLD AUTO: 8.8 FL (ref 6–12)
POTASSIUM BLD-SCNC: 3.8 MMOL/L (ref 3.5–5.2)
PROT SERPL-MCNC: 7 G/DL (ref 6–8.5)
RBC # BLD AUTO: 4.09 10*6/MM3 (ref 3.77–5.28)
SODIUM BLD-SCNC: 142 MMOL/L (ref 136–145)
WBC NRBC COR # BLD: 9.7 10*3/MM3 (ref 3.4–10.8)

## 2020-05-16 PROCEDURE — 71275 CT ANGIOGRAPHY CHEST: CPT

## 2020-05-16 PROCEDURE — 94799 UNLISTED PULMONARY SVC/PX: CPT

## 2020-05-16 PROCEDURE — 82550 ASSAY OF CK (CPK): CPT | Performed by: STUDENT IN AN ORGANIZED HEALTH CARE EDUCATION/TRAINING PROGRAM

## 2020-05-16 PROCEDURE — 0 IOPAMIDOL PER 1 ML: Performed by: INTERNAL MEDICINE

## 2020-05-16 PROCEDURE — 96372 THER/PROPH/DIAG INJ SC/IM: CPT

## 2020-05-16 PROCEDURE — 82962 GLUCOSE BLOOD TEST: CPT

## 2020-05-16 PROCEDURE — 96361 HYDRATE IV INFUSION ADD-ON: CPT

## 2020-05-16 PROCEDURE — 96376 TX/PRO/DX INJ SAME DRUG ADON: CPT

## 2020-05-16 PROCEDURE — 86140 C-REACTIVE PROTEIN: CPT | Performed by: STUDENT IN AN ORGANIZED HEALTH CARE EDUCATION/TRAINING PROGRAM

## 2020-05-16 PROCEDURE — 25010000002 METHYLPREDNISOLONE PER 125 MG: Performed by: INTERNAL MEDICINE

## 2020-05-16 PROCEDURE — 85025 COMPLETE CBC W/AUTO DIFF WBC: CPT | Performed by: INTERNAL MEDICINE

## 2020-05-16 PROCEDURE — 25010000002 ENOXAPARIN PER 10 MG: Performed by: STUDENT IN AN ORGANIZED HEALTH CARE EDUCATION/TRAINING PROGRAM

## 2020-05-16 PROCEDURE — G0378 HOSPITAL OBSERVATION PER HR: HCPCS

## 2020-05-16 PROCEDURE — 83036 HEMOGLOBIN GLYCOSYLATED A1C: CPT | Performed by: INTERNAL MEDICINE

## 2020-05-16 PROCEDURE — 80053 COMPREHEN METABOLIC PANEL: CPT | Performed by: INTERNAL MEDICINE

## 2020-05-16 PROCEDURE — 82728 ASSAY OF FERRITIN: CPT | Performed by: STUDENT IN AN ORGANIZED HEALTH CARE EDUCATION/TRAINING PROGRAM

## 2020-05-16 RX ORDER — CALCIUM CARBONATE 200(500)MG
2 TABLET,CHEWABLE ORAL 3 TIMES DAILY PRN
Status: DISCONTINUED | OUTPATIENT
Start: 2020-05-16 | End: 2020-05-17 | Stop reason: HOSPADM

## 2020-05-16 RX ADMIN — TOPIRAMATE 50 MG: 25 TABLET, FILM COATED ORAL at 08:38

## 2020-05-16 RX ADMIN — METHYLPREDNISOLONE SODIUM SUCCINATE 60 MG: 125 INJECTION, POWDER, FOR SOLUTION INTRAMUSCULAR; INTRAVENOUS at 20:05

## 2020-05-16 RX ADMIN — MELATONIN TAB 5 MG 10 MG: 5 TAB at 20:04

## 2020-05-16 RX ADMIN — BUDESONIDE AND FORMOTEROL FUMARATE DIHYDRATE 2 PUFF: 160; 4.5 AEROSOL RESPIRATORY (INHALATION) at 07:04

## 2020-05-16 RX ADMIN — ALBUTEROL SULFATE 2.5 MG: 2.5 SOLUTION RESPIRATORY (INHALATION) at 23:17

## 2020-05-16 RX ADMIN — ENOXAPARIN SODIUM 40 MG: 40 INJECTION SUBCUTANEOUS at 17:03

## 2020-05-16 RX ADMIN — VILAZODONE HYDROCHLORIDE 20 MG: 10 TABLET ORAL at 20:04

## 2020-05-16 RX ADMIN — IPRATROPIUM BROMIDE AND ALBUTEROL SULFATE 3 ML: .5; 3 SOLUTION RESPIRATORY (INHALATION) at 07:04

## 2020-05-16 RX ADMIN — METOCLOPRAMIDE 10 MG: 10 TABLET ORAL at 13:07

## 2020-05-16 RX ADMIN — LIDOCAINE 1 PATCH: 50 PATCH TOPICAL at 20:05

## 2020-05-16 RX ADMIN — Medication 10 ML: at 20:06

## 2020-05-16 RX ADMIN — AZITHROMYCIN MONOHYDRATE 250 MG: 250 TABLET ORAL at 09:28

## 2020-05-16 RX ADMIN — CALCIUM CARBONATE (ANTACID) CHEW TAB 500 MG 2 TABLET: 500 CHEW TAB at 18:51

## 2020-05-16 RX ADMIN — AMLODIPINE BESYLATE 10 MG: 5 TABLET ORAL at 08:38

## 2020-05-16 RX ADMIN — NICOTINE 1 PATCH: 21 PATCH TRANSDERMAL at 20:18

## 2020-05-16 RX ADMIN — GUAIFENESIN 1200 MG: 600 TABLET, EXTENDED RELEASE ORAL at 08:38

## 2020-05-16 RX ADMIN — METHYLPREDNISOLONE SODIUM SUCCINATE 60 MG: 125 INJECTION, POWDER, FOR SOLUTION INTRAMUSCULAR; INTRAVENOUS at 13:08

## 2020-05-16 RX ADMIN — GUAIFENESIN 1200 MG: 600 TABLET, EXTENDED RELEASE ORAL at 20:04

## 2020-05-16 RX ADMIN — CLONIDINE HYDROCHLORIDE 0.1 MG: 0.1 TABLET ORAL at 08:38

## 2020-05-16 RX ADMIN — GABAPENTIN 600 MG: 300 CAPSULE ORAL at 17:02

## 2020-05-16 RX ADMIN — CETIRIZINE HYDROCHLORIDE 10 MG: 10 TABLET, FILM COATED ORAL at 08:38

## 2020-05-16 RX ADMIN — IOPAMIDOL 100 ML: 755 INJECTION, SOLUTION INTRAVENOUS at 15:30

## 2020-05-16 RX ADMIN — CLONIDINE HYDROCHLORIDE 0.1 MG: 0.1 TABLET ORAL at 17:04

## 2020-05-16 RX ADMIN — Medication 10 ML: at 08:38

## 2020-05-16 RX ADMIN — METOCLOPRAMIDE 10 MG: 10 TABLET ORAL at 17:03

## 2020-05-16 RX ADMIN — METOCLOPRAMIDE 10 MG: 10 TABLET ORAL at 20:04

## 2020-05-16 RX ADMIN — HYDROCODONE BITARTRATE AND ACETAMINOPHEN 1 TABLET: 10; 325 TABLET ORAL at 17:02

## 2020-05-16 RX ADMIN — IPRATROPIUM BROMIDE AND ALBUTEROL SULFATE 3 ML: .5; 3 SOLUTION RESPIRATORY (INHALATION) at 10:49

## 2020-05-16 RX ADMIN — METHYLPREDNISOLONE SODIUM SUCCINATE 60 MG: 125 INJECTION, POWDER, FOR SOLUTION INTRAMUSCULAR; INTRAVENOUS at 05:00

## 2020-05-16 RX ADMIN — GABAPENTIN 600 MG: 300 CAPSULE ORAL at 08:38

## 2020-05-16 RX ADMIN — ACETAZOLAMIDE 250 MG: 250 TABLET ORAL at 08:37

## 2020-05-16 RX ADMIN — IPRATROPIUM BROMIDE AND ALBUTEROL SULFATE 3 ML: .5; 3 SOLUTION RESPIRATORY (INHALATION) at 15:13

## 2020-05-16 RX ADMIN — ALPRAZOLAM 0.5 MG: 0.5 TABLET ORAL at 08:39

## 2020-05-16 RX ADMIN — BUDESONIDE AND FORMOTEROL FUMARATE DIHYDRATE 2 PUFF: 160; 4.5 AEROSOL RESPIRATORY (INHALATION) at 18:24

## 2020-05-16 RX ADMIN — METOPROLOL TARTRATE 25 MG: 25 TABLET, FILM COATED ORAL at 20:05

## 2020-05-16 RX ADMIN — METOCLOPRAMIDE 10 MG: 10 TABLET ORAL at 08:38

## 2020-05-16 RX ADMIN — GABAPENTIN 600 MG: 300 CAPSULE ORAL at 20:04

## 2020-05-16 RX ADMIN — IPRATROPIUM BROMIDE AND ALBUTEROL SULFATE 3 ML: .5; 3 SOLUTION RESPIRATORY (INHALATION) at 18:24

## 2020-05-16 RX ADMIN — LISINOPRIL 20 MG: 20 TABLET ORAL at 08:38

## 2020-05-16 RX ADMIN — SODIUM CHLORIDE 100 ML/HR: 900 INJECTION, SOLUTION INTRAVENOUS at 01:25

## 2020-05-16 RX ADMIN — CLONIDINE HYDROCHLORIDE 0.1 MG: 0.1 TABLET ORAL at 20:04

## 2020-05-16 RX ADMIN — METOPROLOL TARTRATE 25 MG: 25 TABLET, FILM COATED ORAL at 08:38

## 2020-05-16 RX ADMIN — THEOPHYLLINE 300 MG: 300 TABLET, EXTENDED RELEASE ORAL at 08:38

## 2020-05-16 RX ADMIN — BUDESONIDE 9 MG: 3 CAPSULE, COATED PELLETS ORAL at 08:37

## 2020-05-16 NOTE — PROGRESS NOTES
Ms. Thornton has refused to use a hospital BIPAP. She is in hopes that her daughter will bring hers tomorrow. She stated that she wears 2 liters of oxygen at night and would be willing to wear that. I told her we would come back at anytime if she changed her mind and would be willing to try the hospitals BIPAP. She understood all that was said.

## 2020-05-16 NOTE — PLAN OF CARE
Problem: Patient Care Overview  Goal: Plan of Care Review  Outcome: Ongoing (interventions implemented as appropriate)  Flowsheets (Taken 5/16/2020 3601)  Progress: no change  Plan of Care Reviewed With: patient  Outcome Summary: no s/s of distress this shift. CT done earlier today. awaiting results. will continue to monitor.

## 2020-05-16 NOTE — PLAN OF CARE
Problem: Patient Care Overview  Goal: Plan of Care Review  Outcome: Ongoing (interventions implemented as appropriate)  Flowsheets (Taken 5/16/2020 0329)  Progress: improving  Plan of Care Reviewed With: patient  Note:   No c/o of SOA or discomfort.  Will continue with current orders and care plan

## 2020-05-16 NOTE — PROGRESS NOTES
[facility] Medicine Services Daily Progress Note      Hospitalist Team  LOS 0 days      Patient Care Team:  Nivia Alonzo PA as PCP - General  Nivia Alonzo PA as PCP - Family Medicine    Patient Location: 241/1      Subjective   Subjective     Chief Complaint / Subjective  Chief Complaint   Patient presents with   • Shortness of Breath         Brief Synopsis of Hospital Course/HPI  Ms. Thornton is a 49 y.o. female who presents to [facility] ED with a history of lupus, fibromyalgia, migraines, hypertension, ulcerative colitis, COPD,  anxiety, depression, and current smoker complaining of increasing shortness of breath.        Ms. Thornton is a 49 y.o. female who presents to [facility] ED with a history of lupus, fibromyalgia, migraines, hypertension, ulcerative colitis, COPD,  anxiety, depression, and current smoker complaining of increasing shortness of breath.  Patient states she has had increasing shortness of breath for the last week.  She has been nauseous with multiple episodes of emesis for the last 2 days, no emesis today but states she has had anything to eat or drink.  Patient states she has a significant productive cough that has lasted for about 7 days.  She states that she has had a sore throat with progressive hoarseness of voice.  Patient states she has felt weak since this all began.  Both of her ears are sore which she states feels like sinus allergy drainage.  She denies fever, hematochezia, hematemesis, melena, and diarrhea.  There have been no exacerbating or relieving factors noted.  Patient states she is supposed to wear a BiPAP at home but does not she does wear 2 L nasal cannula at night for sleep and has been attempting to quit smoking by using nicotine patches.  She states she did stop smoking marijuana about 2 months ago.  She has no known sick contacts.     In the ER, WBC 11.30, glucose 114, , CRP 0.76, lactic 2.0, Procalcitonin <0.02.  "Blood Cultures and COVID 19 swab pending, Respiratory Virus panel negative. EKG SR 81. Chest XRay no acute findings. Sat 99% on room air.  Patient is admitted for further evaluation and treatment.     5/15/2020: She has negative Covid 19 testing. The Legionella, Strep and Resp panel were also negative. She was transferred to the Med-Surg unit.  She was started on IV steroids for wheezing heard on her exam. Resp therapy was consulted for BiPAP evaluation    5/16/2020: The nurses notes stated the patient refused BiPAP. She asked her family to bring her own to use while in the hospital. Her D Dimer was elevated. CTA-chest was ordered       Date:: 5/16/2020     Review of Systems   Constitution: Negative for chills and fever.   HENT: Positive for ear pain and hoarse voice.    Respiratory: Positive for cough and shortness of breath.    Gastrointestinal: Positive for nausea and vomiting. Negative for diarrhea.   Genitourinary: Negative for dysuria, frequency and urgency.   Psychiatric/Behavioral: The patient is nervous/anxious.    All other systems reviewed and are negative.    ROS      Objective   Objective      Vital Signs  Temp:  [97.5 °F (36.4 °C)-97.6 °F (36.4 °C)] 97.5 °F (36.4 °C)  Heart Rate:  [] 106  Resp:  [18-20] 18  BP: (125-127)/(74-77) 127/74  Oxygen Therapy  SpO2: 99 %  Pulse Oximetry Type: Intermittent  Device (Oxygen Therapy): room air  Device (Oxygen Therapy): room air  Flow (L/min): 2  Oxygen Concentration (%): 21  Flowsheet Rows      First Filed Value   Admission Height  167.6 cm (66\") Documented at 05/14/2020 1703   Admission Weight  108 kg (238 lb 1.6 oz) Documented at 05/14/2020 1703        Intake & Output (last 3 days)       05/13 0701 - 05/14 0700 05/14 0701 - 05/15 0700 05/15 0701 - 05/16 0700 05/16 0701 - 05/17 0700    P.O.  240  240    I.V. (mL/kg)  549 (5) 980 (9.1)     Total Intake(mL/kg)  789 (7.2) 980 (9.1) 240 (2.2)    Net  +789 +980 +240            Urine Unmeasured Occurrence  1 x 1 " x         Lines, Drains & Airways    Active LDAs     Name:   Placement date:   Placement time:   Site:   Days:    Peripheral IV 05/15/20 1530 Posterior;Right Forearm   05/15/20    1530    Forearm   less than 1                  Physical Exam:  Constitutional: She is oriented to person, place, and time. She appears well-developed. She appears calm and relaxed   HEENT:   Head: Normocephalic and atraumatic.   Mouth/Throat: Oropharynx is clear and moist.   Eyes: Pupils are equal, round, and reactive to light. Conjunctivae and EOM are normal.   Neck: Normal range of motion. Neck supple.   Cardiovascular: Normal rate, regular rhythm, normal heart sounds and intact distal pulses.   Pulmonary/Chest: She has wheezes.   Abdominal: Soft. Bowel sounds are normal. She exhibits no distension. There is no tenderness.   Musculoskeletal: Normal range of motion. .   Neurological: She is alert and oriented to person, place, and time.   Skin: Skin is warm and dry. Capillary refill takes less than 2 seconds. She is not diaphoretic.   Psychiatric: She has a normal mood and affect. Judgment and thought content normal.   anxious   Vitals reviewed.  Physical Exam        Procedures:  Results Review:     I reviewed the patient's new clinical results.      Lab Results (last 24 hours)     Procedure Component Value Units Date/Time    Ferritin [[id number]]  (Abnormal) Collected:  05/16/20 0411    Specimen:  Blood Updated:  05/16/20 0627     Ferritin 195.70 ng/mL     Narrative:       Results may be falsely decreased if patient taking Biotin.      Comprehensive Metabolic Panel [[id number]]  (Abnormal) Collected:  05/16/20 0411    Specimen:  Blood Updated:  05/16/20 0621     Glucose 152 mg/dL      BUN 7 mg/dL      Creatinine 0.60 mg/dL      Sodium 142 mmol/L      Potassium 3.8 mmol/L      Chloride 107 mmol/L      CO2 22.0 mmol/L      Calcium 9.0 mg/dL      Total Protein 7.0 g/dL      Albumin 3.90 g/dL      ALT (SGPT) 13 U/L      AST (SGOT) 14 U/L       Alkaline Phosphatase 65 U/L      Total Bilirubin 0.2 mg/dL      eGFR Non African Amer 106 mL/min/1.73      Globulin 3.1 gm/dL      A/G Ratio 1.3 g/dL      BUN/Creatinine Ratio 11.7     Anion Gap 13.0 mmol/L     Narrative:       GFR Normal >60  Chronic Kidney Disease <60  Kidney Failure <15      CK [[id number]]  (Normal) Collected:  05/16/20 0411    Specimen:  Blood Updated:  05/16/20 0621     Creatine Kinase 68 U/L     C-reactive Protein [[id number]]  (Abnormal) Collected:  05/16/20 0411    Specimen:  Blood Updated:  05/16/20 0621     C-Reactive Protein 0.54 mg/dL     CBC & Differential [[id number]] Collected:  05/16/20 0411    Specimen:  Blood Updated:  05/16/20 0548    Narrative:       The following orders were created for panel order CBC & Differential.  Procedure                               Abnormality         Status                     ---------                               -----------         ------                     CBC Auto Differential[[id number]]        Abnormal            Final result                 Please view results for these tests on the individual orders.    CBC Auto Differential [[id number]]  (Abnormal) Collected:  05/16/20 0411    Specimen:  Blood Updated:  05/16/20 0548     WBC 9.70 10*3/mm3      RBC 4.09 10*6/mm3      Hemoglobin 13.6 g/dL      Hematocrit 38.9 %      MCV 95.3 fL      MCH 33.3 pg      MCHC 34.9 g/dL      RDW 15.1 %      RDW-SD 50.3 fl      MPV 8.8 fL      Platelets 158 10*3/mm3      Neutrophil % 91.8 %      Lymphocyte % 6.2 %      Monocyte % 1.4 %      Eosinophil % 0.0 %      Basophil % 0.6 %      Neutrophils, Absolute 8.90 10*3/mm3      Lymphocytes, Absolute 0.60 10*3/mm3      Monocytes, Absolute 0.10 10*3/mm3      Eosinophils, Absolute 0.00 10*3/mm3      Basophils, Absolute 0.10 10*3/mm3      nRBC 0.0 /100 WBC     Blood Culture - Blood, Arm, Left [[id number]] Collected:  05/14/20 1729    Specimen:  Blood from Arm, Left Updated:  05/15/20 1800     Blood Culture No growth at 24  hours    Blood Culture - Blood, Arm, Right [[id number]] Collected:  05/14/20 1728    Specimen:  Blood from Arm, Right Updated:  05/15/20 1800     Blood Culture No growth at 24 hours    POC Glucose Once [[id number]]  (Abnormal) Collected:  05/15/20 1626    Specimen:  Blood Updated:  05/15/20 1627     Glucose 198 mg/dL      Comment: Serial Number: [id number]:  [id number]           Hemoglobin A1C   Date Value Ref Range Status   05/15/2020 5.5 3.5 - 5.6 % Final           Results from last 7 days   Lab Units 05/15/20  0827   PH, ARTERIAL pH units 7.390   PO2 ART mm Hg 84.7   PCO2, ARTERIAL mm Hg 47.4   HCO3 ART mmol/L 28.7*     Lab Results   Component Value Date    LIPASE 18 02/25/2020     Lab Results   Component Value Date    CHOL 146 01/31/2020    TRIG 180 (H) 01/31/2020    HDL 41 01/31/2020    LDL 69 01/31/2020       Lab Results   Lab Value Date/Time    FINALDX  02/26/2020 1343     Colon, random biopsies:    Fragments of benign nonulcerated colon mucosa with no significant histopathology    DESIREE/sms          Microbiology Results (last 10 days)     Procedure Component Value - Date/Time    Legionella Antigen, Urine - Urine, Urine, Clean Catch [[id number]]  (Normal) Collected:  05/15/20 0203    Lab Status:  Final result Specimen:  Urine, Clean Catch Updated:  05/15/20 0522     LEGIONELLA ANTIGEN, URINE Negative    S. Pneumo Ag Urine or CSF - Urine, Urine, Clean Catch [[id number]]  (Normal) Collected:  05/15/20 0203    Lab Status:  Final result Specimen:  Urine, Clean Catch Updated:  05/15/20 0522     Strep Pneumo Ag Negative    Respiratory Panel, PCR - Swab, Nasopharynx [[id number]]  (Normal) Collected:  05/14/20 1731    Lab Status:  Final result Specimen:  Swab from Nasopharynx Updated:  05/14/20 1922     ADENOVIRUS, PCR Not Detected     Coronavirus 229E Not Detected     Coronavirus HKU1 Not Detected     Coronavirus NL63 Not Detected     Coronavirus OC43 Not Detected     Human Metapneumovirus Not Detected     Human  Rhinovirus/Enterovirus Not Detected     Influenza B PCR Not Detected     Parainfluenza Virus 1 Not Detected     Parainfluenza Virus 2 Not Detected     Parainfluenza Virus 3 Not Detected     Parainfluenza Virus 4 Not Detected     Bordetella pertussis pcr Not Detected     Influenza A H1 2009 PCR Not Detected     Chlamydophila pneumoniae PCR Not Detected     Mycoplasma pneumo by PCR Not Detected     Influenza A PCR Not Detected     Influenza A H3 Not Detected     Influenza A H1 Not Detected     RSV, PCR Not Detected    Narrative:       The coronavirus on the RVP is NOT COVID-19 and is NOT indicative of infection with COVID-19.     SARS-CoV-2 PCR ([facility] IN-HOUSE PERFORMED), NP SWAB IN TRANSPORT MEDIA - Swab, Nasopharynx [[id number]]  (Normal) Collected:  05/14/20 1731    Lab Status:  Final result Specimen:  Swab from Nasopharynx Updated:  05/15/20 0126     COVID19 Not Detected    Blood Culture - Blood, Arm, Left [[id number]] Collected:  05/14/20 1729    Lab Status:  Preliminary result Specimen:  Blood from Arm, Left Updated:  05/15/20 1800     Blood Culture No growth at 24 hours    Blood Culture - Blood, Arm, Right [[id number]] Collected:  05/14/20 1728    Lab Status:  Preliminary result Specimen:  Blood from Arm, Right Updated:  05/15/20 1800     Blood Culture No growth at 24 hours          ECG/EMG Results (most recent)     Procedure Component Value Units Date/Time    ECG 12 Lead [[id number]] Collected:  05/14/20 1727     Updated:  05/15/20 0716    Narrative:       HEART RATE= 81  bpm  RR Interval= 720  ms  RI Interval= 168  ms  P Horizontal Axis= -24  deg  P Front Axis= 50  deg  QRSD Interval= 84  ms  QT Interval= 398  ms  QRS Axis= 24  deg  T Wave Axis= 22  deg  - BORDERLINE ECG -  Sinus rhythm  Probable left atrial enlargement  Low voltage, precordial leads  When compared with ECG of 09-Feb-2017 15:23:42,  Significant axis, voltage or hypertrophy change  Electronically Signed By: Amandeep Hood (INEZ) 15-May-2020  07:12:33  Date and Time of Study: 2020-05-14 17:27:18                    Xr Chest Ap    Result Date: 5/14/2020  No radiographic findings of acute cardiopulmonary abnormality.  Electronically Signed By-DR. Ramsey Stone MD On:5/14/2020 5:27 PM This report was finalized on [id number] by DR. Ramsey Stone MD.          Xrays, labs reviewed personally by physician.    Medication Review:   I have reviewed the patient's current medication list      Scheduled Meds    acetaZOLAMIDE 250 mg Oral Daily   albuterol 2.5 mg Nebulization Q4H - RT   amLODIPine 10 mg Oral QAM   azithromycin 250 mg Oral Q24H   Budesonide 9 mg Oral Q24H   budesonide-formoterol 2 puff Inhalation BID - RT   cetirizine 10 mg Oral Daily   cloNIDine 0.1 mg Oral TID   enoxaparin 40 mg Subcutaneous Q24H   gabapentin 600 mg Oral TID   guaiFENesin 1,200 mg Oral Q12H   ipratropium-albuterol 3 mL Nebulization 4x Daily - RT   lidocaine 1 patch Transdermal Q24H   lisinopril 20 mg Oral Daily   melatonin 10 mg Oral Nightly   methylPREDNISolone sodium succinate 60 mg Intravenous Q8H   metoclopramide 10 mg Oral 4x Daily   metoprolol tartrate 25 mg Oral BID   nicotine 1 patch Transdermal Q24H   sodium chloride 10 mL Intravenous Q12H   theophylline 300 mg Oral Daily   topiramate 50 mg Oral Daily   traZODone 100 mg Oral Daily   vilazodone 20 mg Oral Nightly       Meds Infusions    sodium chloride 100 mL/hr Last Rate: 100 mL/hr (05/16/20 0632)       Meds PRN  •  acetaminophen **OR** acetaminophen **OR** acetaminophen  •  albuterol  •  ALPRAZolam  •  benzonatate  •  bisacodyl  •  HYDROcodone-acetaminophen  •  nitroglycerin  •  ondansetron **OR** ondansetron  •  orphenadrine  •  sodium chloride  •  sodium chloride  •  SUMAtriptan  •  SUMAtriptan    I personally reviewed patient's x-ray films   I personally reviewed patient's EKG strips    Assessment/Plan   Assessment/Plan     Active Hospital Problems    Diagnosis  POA   • **Acute respiratory distress [R06.03]  Yes   •  Obesity (BMI 30-39.9) [E66.9]  Yes   • Elevated random blood glucose level [R73.09]  Yes   • Suspected Covid-19 Virus Infection [R68.89]  Yes   • COPD exacerbation (CMS/[facility]) [J44.1]  Yes   • Ulcerative colitis (CMS/[facility]) [K51.90]  Yes   • Vitamin D deficiency [E55.9]  Yes   • Sleep apnea [G47.30]  Yes   • Depression [F32.9]  Yes   • Systemic lupus erythematosus (CMS/[facility]) [M32.9]  Yes   • Allergic rhinitis, seasonal [J30.2]  Yes   • Fibromyalgia [M79.7]  Yes   • Irritable bowel syndrome with diarrhea [K58.0]  Yes   • Tobacco dependency [F17.200]  Yes   • Chronic obstructive pulmonary disease (CMS/[facility]) [J44.9]  Yes   • Hypertension [I10]  Yes   • Migraine without aura, intractable [G43.019]  Yes      Resolved Hospital Problems    Diagnosis Date Resolved POA   • Morbid obesity (CMS/[facility]) [E66.01] 05/14/2020 Yes       MEDICAL DECISION MAKING COMPLEXITY BY PROBLEM:   Acute Respiratory distress    COVID 19-negative  -CXR : no acute process  -LDH 255 down from 313  -Ferritin, CK,   -D-dimer: 0.61, CTA chest:  -Guaifenesin  -Continue Zithromax D#2  -Pulmicort and Duonebs  Solumedrol D#2  -Legionella and S. Penumonia: negative  -Continuous pulse ox  -Oxygen supplementation, wean as tolerated to keep oxygen sats >90%  -No nebulized medications  -PT/OT     COPD, possible exacerbation  -CXR: no acute process  -ABG pH 7.30/pCO2:  47.4/pO2: 84.7/Ox 96.1    -EKG Reviewed    -Continue albuterol, Tessalon Perles, ipratropium-albuterol, theophylline  -Hold Incruse Ellipta, non formulary  - Continue Nystatin swish and spit  -RT: BiPAP eval - patient refused BiPAP, she wants to use her own  Elevated blood glucose level  -Hyperglycemia; steroid induced hyperglycemia order set activated  -A1C pending  -Repeat BMP in am     Essential Hypertension, Chronic, Controlled   -Continue home amlodipine, clonidine, lisinopril, metoprolol  - Monitor with routine vital signs      Irritable bowel and ulcerative colitis  -Continue budesonide,  metoclopramide  -Hold eluxadoline, non form     Fibromyalgia  -Continue Neurontin, Norco, Xanax (inspect verified)  -Continue Lidoderm patch     Lupus  -Hold hydroxychloroquine, unless brings home dose     Open pressure of LP high 2017  -Continue diamox     Migraines  -Hold home Zofran disintegrating tablet, Phenergan  -PRN Zofran for nausea  -Sumatriptan as needed  -Topamax  - Hold Orphenadrine     Anxiety/Depression with Insomnia  -Continue Viibryd  -Continue home melatonin and trazodone     Dietary supplementation  -Hold dietary supplements for now     Seasonal allergies/allergic rhinitis  -Continue loratadine with hospital substitute     Nicotine dependence  -Encourage lifestyle modificiation  -Consider nicotine patch     Obesity  -encourage lifestyle modifications  -Nutrition consult      VTE Prophylaxis -   Mechanical Order History:     None      Pharmalogical Order History:     Ordered     Dose Route Frequency Stop    05/14/20 2128  enoxaparin (LOVENOX) syringe 40 mg  Status:  Discontinued      40 mg SC Once 05/14/20 2136 05/14/20 2135  enoxaparin (LOVENOX) syringe 40 mg      40 mg SC Every 24 Hours --    05/14/20 2124  Pharmacy to Dose enoxaparin (LOVENOX)  Status:  Discontinued     Question:  Indication of use  Answer:  Prophylaxis    -- XX Continuous PRN 05/14/20 2128            Code Status -   Code Status and Medical Interventions:   Ordered at: 05/14/20 2021     Code Status:    CPR     Medical Interventions (Level of Support Prior to Arrest):    Full       This patient has been examined wearing appropriate Personal Protective Equipment and discussed with hospital infection control department. 05/16/20        Discharge Planning          Destination      Coordination has not been started for this encounter.      Durable Medical Equipment      Coordination has not been started for this encounter.      Dialysis/Infusion      Coordination has not been started for this encounter.      Home Medical Care       Coordination has not been started for this encounter.      Therapy      Coordination has not been started for this encounter.      Community Resources      Coordination has not been started for this encounter.            Electronically signed by Maria Eugenia Syed MD, 05/16/20, 13:25.  [facility] Hospitalist Team

## 2020-05-17 ENCOUNTER — READMISSION MANAGEMENT (OUTPATIENT)
Dept: CALL CENTER | Facility: HOSPITAL | Age: 50
End: 2020-05-17

## 2020-05-17 VITALS
BODY MASS INDEX: 38.12 KG/M2 | OXYGEN SATURATION: 97 % | SYSTOLIC BLOOD PRESSURE: 124 MMHG | RESPIRATION RATE: 18 BRPM | HEIGHT: 66 IN | WEIGHT: 237.22 LBS | HEART RATE: 104 BPM | TEMPERATURE: 97.7 F | DIASTOLIC BLOOD PRESSURE: 77 MMHG

## 2020-05-17 PROBLEM — Z20.822 SUSPECTED COVID-19 VIRUS INFECTION: Status: RESOLVED | Noted: 2020-05-14 | Resolved: 2020-05-17

## 2020-05-17 PROBLEM — J44.1 COPD EXACERBATION: Status: RESOLVED | Noted: 2020-05-14 | Resolved: 2020-05-17

## 2020-05-17 PROBLEM — R73.9 ELEVATED RANDOM BLOOD GLUCOSE LEVEL: Status: RESOLVED | Noted: 2020-05-14 | Resolved: 2020-05-17

## 2020-05-17 PROBLEM — R73.09 ELEVATED RANDOM BLOOD GLUCOSE LEVEL: Status: RESOLVED | Noted: 2020-05-14 | Resolved: 2020-05-17

## 2020-05-17 PROBLEM — R06.03 ACUTE RESPIRATORY DISTRESS: Status: RESOLVED | Noted: 2020-05-14 | Resolved: 2020-05-17

## 2020-05-17 LAB
ALBUMIN SERPL-MCNC: 3.7 G/DL (ref 3.5–5.2)
ALBUMIN/GLOB SERPL: 1.2 G/DL
ALP SERPL-CCNC: 64 U/L (ref 39–117)
ALT SERPL W P-5'-P-CCNC: 15 U/L (ref 1–33)
ANION GAP SERPL CALCULATED.3IONS-SCNC: 16 MMOL/L (ref 5–15)
AST SERPL-CCNC: 11 U/L (ref 1–32)
BASOPHILS # BLD AUTO: 0 10*3/MM3 (ref 0–0.2)
BASOPHILS NFR BLD AUTO: 0.2 % (ref 0–1.5)
BILIRUB SERPL-MCNC: <0.2 MG/DL (ref 0.2–1.2)
BUN BLD-MCNC: 13 MG/DL (ref 6–20)
BUN/CREAT SERPL: 20 (ref 7–25)
CALCIUM SPEC-SCNC: 8.8 MG/DL (ref 8.6–10.5)
CHLORIDE SERPL-SCNC: 104 MMOL/L (ref 98–107)
CK SERPL-CCNC: 38 U/L (ref 20–180)
CO2 SERPL-SCNC: 19 MMOL/L (ref 22–29)
CREAT BLD-MCNC: 0.65 MG/DL (ref 0.57–1)
CRP SERPL-MCNC: 0.2 MG/DL (ref 0–0.5)
D DIMER PPP FEU-MCNC: 0.44 MCGFEU/ML (ref 0.17–0.59)
DEPRECATED RDW RBC AUTO: 51.2 FL (ref 37–54)
EOSINOPHIL # BLD AUTO: 0 10*3/MM3 (ref 0–0.4)
EOSINOPHIL NFR BLD AUTO: 0 % (ref 0.3–6.2)
ERYTHROCYTE [DISTWIDTH] IN BLOOD BY AUTOMATED COUNT: 15.2 % (ref 12.3–15.4)
FERRITIN SERPL-MCNC: 200.5 NG/ML (ref 13–150)
FIBRINOGEN PPP-MCNC: 339 MG/DL (ref 210–450)
GFR SERPL CREATININE-BSD FRML MDRD: 97 ML/MIN/1.73
GLOBULIN UR ELPH-MCNC: 3.2 GM/DL
GLUCOSE BLD-MCNC: 166 MG/DL (ref 65–99)
GLUCOSE BLDC GLUCOMTR-MCNC: 116 MG/DL (ref 70–105)
GLUCOSE BLDC GLUCOMTR-MCNC: 120 MG/DL (ref 70–105)
GLUCOSE BLDC GLUCOMTR-MCNC: 170 MG/DL (ref 70–105)
HCT VFR BLD AUTO: 39.6 % (ref 34–46.6)
HGB BLD-MCNC: 13 G/DL (ref 12–15.9)
LDH SERPL-CCNC: 313 U/L (ref 135–214)
LYMPHOCYTES # BLD AUTO: 0.7 10*3/MM3 (ref 0.7–3.1)
LYMPHOCYTES NFR BLD AUTO: 3.9 % (ref 19.6–45.3)
MCH RBC QN AUTO: 31.4 PG (ref 26.6–33)
MCHC RBC AUTO-ENTMCNC: 32.7 G/DL (ref 31.5–35.7)
MCV RBC AUTO: 96 FL (ref 79–97)
MONOCYTES # BLD AUTO: 0.5 10*3/MM3 (ref 0.1–0.9)
MONOCYTES NFR BLD AUTO: 2.6 % (ref 5–12)
NEUTROPHILS # BLD AUTO: 17.7 10*3/MM3 (ref 1.7–7)
NEUTROPHILS NFR BLD AUTO: 93.3 % (ref 42.7–76)
NRBC BLD AUTO-RTO: 0 /100 WBC (ref 0–0.2)
PLATELET # BLD AUTO: 163 10*3/MM3 (ref 140–450)
PMV BLD AUTO: 8.6 FL (ref 6–12)
POTASSIUM BLD-SCNC: 3.7 MMOL/L (ref 3.5–5.2)
PROT SERPL-MCNC: 6.9 G/DL (ref 6–8.5)
RBC # BLD AUTO: 4.13 10*6/MM3 (ref 3.77–5.28)
SODIUM BLD-SCNC: 139 MMOL/L (ref 136–145)
WBC NRBC COR # BLD: 19 10*3/MM3 (ref 3.4–10.8)

## 2020-05-17 PROCEDURE — 80053 COMPREHEN METABOLIC PANEL: CPT | Performed by: INTERNAL MEDICINE

## 2020-05-17 PROCEDURE — 83615 LACTATE (LD) (LDH) ENZYME: CPT | Performed by: STUDENT IN AN ORGANIZED HEALTH CARE EDUCATION/TRAINING PROGRAM

## 2020-05-17 PROCEDURE — 86140 C-REACTIVE PROTEIN: CPT | Performed by: STUDENT IN AN ORGANIZED HEALTH CARE EDUCATION/TRAINING PROGRAM

## 2020-05-17 PROCEDURE — 96376 TX/PRO/DX INJ SAME DRUG ADON: CPT

## 2020-05-17 PROCEDURE — 82962 GLUCOSE BLOOD TEST: CPT

## 2020-05-17 PROCEDURE — 96361 HYDRATE IV INFUSION ADD-ON: CPT

## 2020-05-17 PROCEDURE — 85025 COMPLETE CBC W/AUTO DIFF WBC: CPT | Performed by: INTERNAL MEDICINE

## 2020-05-17 PROCEDURE — 82550 ASSAY OF CK (CPK): CPT | Performed by: STUDENT IN AN ORGANIZED HEALTH CARE EDUCATION/TRAINING PROGRAM

## 2020-05-17 PROCEDURE — 85379 FIBRIN DEGRADATION QUANT: CPT | Performed by: STUDENT IN AN ORGANIZED HEALTH CARE EDUCATION/TRAINING PROGRAM

## 2020-05-17 PROCEDURE — 82728 ASSAY OF FERRITIN: CPT | Performed by: STUDENT IN AN ORGANIZED HEALTH CARE EDUCATION/TRAINING PROGRAM

## 2020-05-17 PROCEDURE — 99217 PR OBSERVATION CARE DISCHARGE MANAGEMENT: CPT | Performed by: INTERNAL MEDICINE

## 2020-05-17 PROCEDURE — 85384 FIBRINOGEN ACTIVITY: CPT | Performed by: STUDENT IN AN ORGANIZED HEALTH CARE EDUCATION/TRAINING PROGRAM

## 2020-05-17 PROCEDURE — 25010000002 METHYLPREDNISOLONE PER 125 MG: Performed by: INTERNAL MEDICINE

## 2020-05-17 PROCEDURE — 94799 UNLISTED PULMONARY SVC/PX: CPT

## 2020-05-17 PROCEDURE — G0378 HOSPITAL OBSERVATION PER HR: HCPCS

## 2020-05-17 RX ORDER — PREDNISONE 50 MG/1
50 TABLET ORAL DAILY
Qty: 3 TABLET | Refills: 0 | Status: SHIPPED | OUTPATIENT
Start: 2020-05-17 | End: 2020-05-21

## 2020-05-17 RX ORDER — PREDNISONE 10 MG/1
30 TABLET ORAL DAILY
Qty: 9 TABLET | Refills: 0 | Status: SHIPPED | OUTPATIENT
Start: 2020-05-17 | End: 2020-05-20

## 2020-05-17 RX ORDER — PREDNISONE 20 MG/1
60 TABLET ORAL DAILY
Qty: 9 TABLET | Refills: 0 | Status: SHIPPED | OUTPATIENT
Start: 2020-05-17 | End: 2020-05-20

## 2020-05-17 RX ORDER — PREDNISONE 20 MG/1
40 TABLET ORAL DAILY
Qty: 6 TABLET | Refills: 0 | Status: SHIPPED | OUTPATIENT
Start: 2020-05-17 | End: 2020-05-20

## 2020-05-17 RX ORDER — PREDNISONE 1 MG/1
5 TABLET ORAL DAILY
Qty: 5 TABLET | Refills: 0 | Status: SHIPPED | OUTPATIENT
Start: 2020-05-17 | End: 2020-05-22

## 2020-05-17 RX ORDER — AZITHROMYCIN 250 MG/1
TABLET, FILM COATED ORAL
Qty: 4 TABLET | Refills: 0 | Status: SHIPPED | OUTPATIENT
Start: 2020-05-18 | End: 2020-05-21

## 2020-05-17 RX ORDER — PREDNISONE 10 MG/1
10 TABLET ORAL DAILY
Qty: 3 TABLET | Refills: 0 | Status: SHIPPED | OUTPATIENT
Start: 2020-05-17 | End: 2020-05-20

## 2020-05-17 RX ORDER — PREDNISONE 20 MG/1
20 TABLET ORAL DAILY
Qty: 3 TABLET | Refills: 0 | Status: SHIPPED | OUTPATIENT
Start: 2020-05-17 | End: 2020-05-20

## 2020-05-17 RX ADMIN — AMLODIPINE BESYLATE 10 MG: 5 TABLET ORAL at 08:56

## 2020-05-17 RX ADMIN — THEOPHYLLINE 300 MG: 300 TABLET, EXTENDED RELEASE ORAL at 08:56

## 2020-05-17 RX ADMIN — IPRATROPIUM BROMIDE AND ALBUTEROL SULFATE 3 ML: .5; 3 SOLUTION RESPIRATORY (INHALATION) at 06:58

## 2020-05-17 RX ADMIN — CALCIUM CARBONATE (ANTACID) CHEW TAB 500 MG 2 TABLET: 500 CHEW TAB at 09:05

## 2020-05-17 RX ADMIN — GUAIFENESIN 1200 MG: 600 TABLET, EXTENDED RELEASE ORAL at 08:56

## 2020-05-17 RX ADMIN — LISINOPRIL 20 MG: 20 TABLET ORAL at 08:57

## 2020-05-17 RX ADMIN — IPRATROPIUM BROMIDE AND ALBUTEROL SULFATE 3 ML: .5; 3 SOLUTION RESPIRATORY (INHALATION) at 10:45

## 2020-05-17 RX ADMIN — CETIRIZINE HYDROCHLORIDE 10 MG: 10 TABLET, FILM COATED ORAL at 08:55

## 2020-05-17 RX ADMIN — GABAPENTIN 600 MG: 300 CAPSULE ORAL at 08:56

## 2020-05-17 RX ADMIN — SODIUM CHLORIDE 100 ML/HR: 900 INJECTION, SOLUTION INTRAVENOUS at 01:53

## 2020-05-17 RX ADMIN — ALPRAZOLAM 0.5 MG: 0.5 TABLET ORAL at 09:05

## 2020-05-17 RX ADMIN — Medication 10 ML: at 09:57

## 2020-05-17 RX ADMIN — METOCLOPRAMIDE 10 MG: 10 TABLET ORAL at 12:35

## 2020-05-17 RX ADMIN — CLONIDINE HYDROCHLORIDE 0.1 MG: 0.1 TABLET ORAL at 08:56

## 2020-05-17 RX ADMIN — ACETAZOLAMIDE 250 MG: 250 TABLET ORAL at 09:40

## 2020-05-17 RX ADMIN — BUDESONIDE 9 MG: 3 CAPSULE, COATED PELLETS ORAL at 08:55

## 2020-05-17 RX ADMIN — METHYLPREDNISOLONE SODIUM SUCCINATE 60 MG: 125 INJECTION, POWDER, FOR SOLUTION INTRAMUSCULAR; INTRAVENOUS at 12:34

## 2020-05-17 RX ADMIN — IPRATROPIUM BROMIDE AND ALBUTEROL SULFATE 3 ML: .5; 3 SOLUTION RESPIRATORY (INHALATION) at 14:54

## 2020-05-17 RX ADMIN — TOPIRAMATE 50 MG: 25 TABLET, FILM COATED ORAL at 08:57

## 2020-05-17 RX ADMIN — CALCIUM CARBONATE (ANTACID) CHEW TAB 500 MG 2 TABLET: 500 CHEW TAB at 15:27

## 2020-05-17 RX ADMIN — METHYLPREDNISOLONE SODIUM SUCCINATE 60 MG: 125 INJECTION, POWDER, FOR SOLUTION INTRAMUSCULAR; INTRAVENOUS at 05:23

## 2020-05-17 RX ADMIN — HYDROCODONE BITARTRATE AND ACETAMINOPHEN 1 TABLET: 10; 325 TABLET ORAL at 15:34

## 2020-05-17 RX ADMIN — METOCLOPRAMIDE 10 MG: 10 TABLET ORAL at 08:56

## 2020-05-17 RX ADMIN — METOPROLOL TARTRATE 25 MG: 25 TABLET, FILM COATED ORAL at 08:56

## 2020-05-17 RX ADMIN — AZITHROMYCIN MONOHYDRATE 250 MG: 250 TABLET ORAL at 09:36

## 2020-05-17 RX ADMIN — BUDESONIDE AND FORMOTEROL FUMARATE DIHYDRATE 2 PUFF: 160; 4.5 AEROSOL RESPIRATORY (INHALATION) at 06:58

## 2020-05-17 NOTE — PLAN OF CARE
Problem: Patient Care Overview  Goal: Plan of Care Review  Outcome: Ongoing (interventions implemented as appropriate)  Flowsheets (Taken 5/17/2020 1601)  Plan of Care Reviewed With: patient  Outcome Summary: patient is discharging at this time.

## 2020-05-17 NOTE — PROGRESS NOTES
[facility] Medicine Services Daily Progress Note      Hospitalist Team  LOS 0 days      Patient Care Team:  Nivia Alonzo PA as PCP - General  Nivia Alonzo PA as PCP - Family Medicine    Patient Location: 241/1      Subjective   Subjective     Chief Complaint / Subjective  Chief Complaint   Patient presents with   • Shortness of Breath         Brief Synopsis of Hospital Course/HPI  Ms. Thornton is a 49 y.o. female who presents to [facility] ED with a history of lupus, fibromyalgia, migraines, hypertension, ulcerative colitis, COPD,  anxiety, depression, and current smoker complaining of increasing shortness of breath.        Ms. Thornton is a 49 y.o. female who presents to [facility] ED with a history of lupus, fibromyalgia, migraines, hypertension, ulcerative colitis, COPD,  anxiety, depression, and current smoker complaining of increasing shortness of breath.  Patient states she has had increasing shortness of breath for the last week.  She has been nauseous with multiple episodes of emesis for the last 2 days, no emesis today but states she has had anything to eat or drink.  Patient states she has a significant productive cough that has lasted for about 7 days.  She states that she has had a sore throat with progressive hoarseness of voice.  Patient states she has felt weak since this all began.  Both of her ears are sore which she states feels like sinus allergy drainage.  She denies fever, hematochezia, hematemesis, melena, and diarrhea.  There have been no exacerbating or relieving factors noted.  Patient states she is supposed to wear a BiPAP at home but does not she does wear 2 L nasal cannula at night for sleep and has been attempting to quit smoking by using nicotine patches.  She states she did stop smoking marijuana about 2 months ago.  She has no known sick contacts.     In the ER, WBC 11.30, glucose 114, , CRP 0.76, lactic 2.0, Procalcitonin <0.02. Blood  "Cultures and COVID 19 swab pending, Respiratory Virus panel negative. EKG SR 81. Chest XRay no acute findings. Sat 99% on room air.  Patient is admitted for further evaluation and treatment.     5/15/2020: She has negative Covid 19 testing. The Legionella, Strep and Resp panel were also negative. She was transferred to the Med-Surg unit.  She was started on IV steroids for wheezing heard on her exam. Resp therapy was consulted for BiPAP evaluation     5/16/2020: The nurses notes stated the patient refused BiPAP. She asked her family to bring her own to use while in the hospital. Her D Dimer was elevated. CTA-chest was ordered. It was negative for pulmonary embolus.    5/17/2020:   Her antibiotics were changed to oral to complete a 7 day course. Her preliminary blood cultures were negative after 2 days.She will have her steroids changed to prednisone to complete a tapered dosing.She states she felt well and was ready for discharge to home.      Date::  5/17/2020      Constitution: Negative for chills and fever.   HENT: Positive for ear pain and hoarse voice.    Respiratory: Positive for cough and shortness of breath.    Gastrointestinal: Positive for nausea and vomiting. Negative for diarrhea.   Genitourinary: Negative for dysuria, frequency and urgency.   Psychiatric/Behavioral: The patient is nervous/anxious.    All other systems reviewed and are negative.     ROS      Objective   Objective      Vital Signs  Temp:  [97.7 °F (36.5 °C)-98 °F (36.7 °C)] 97.7 °F (36.5 °C)  Heart Rate:  [] 94  Resp:  [18] 18  BP: (121-124)/(69-77) 124/77  Oxygen Therapy  SpO2: 96 %  Pulse Oximetry Type: Intermittent  Device (Oxygen Therapy): room air  Device (Oxygen Therapy): room air  Flow (L/min): 2  Oxygen Concentration (%): 21  Flowsheet Rows      First Filed Value   Admission Height  167.6 cm (66\") Documented at 05/14/2020 1703   Admission Weight  108 kg (238 lb 1.6 oz) Documented at 05/14/2020 1703        Intake & Output " (last 3 days)       05/14 0701 - 05/15 0700 05/15 0701 - 05/16 0700 05/16 0701 - 05/17 0700 05/17 0701 - 05/18 0700    P.O. 240  480 480    I.V. (mL/kg) 549 (5) 980 (9.1) 980 (9.1)     Total Intake(mL/kg) 789 (7.2) 980 (9.1) 1460 (13.5) 480 (4.4)    Net +789 +980 +1460 +480            Urine Unmeasured Occurrence 1 x 1 x          Lines, Drains & Airways    Active LDAs     Name:   Placement date:   Placement time:   Site:   Days:    Peripheral IV 05/16/20 1944 Posterior;Right;Upper Arm   05/16/20 1944    Arm   less than 1                  Physical Exam:  Constitutional: She is oriented to person, place, and time. She appears well-developed. She appears calm and relaxed   HEENT:   Head: Normocephalic and atraumatic.   Mouth/Throat: Oropharynx is clear and moist.   Eyes: Pupils are equal, round, and reactive to light. Conjunctivae and EOM are normal.   Neck: Normal range of motion. Neck supple.   Cardiovascular: Normal rate, regular rhythm, normal heart sounds and intact distal pulses.   Pulmonary/Chest: She has wheezes.   Abdominal: Soft. Bowel sounds are normal. She exhibits no distension. There is no tenderness.   Musculoskeletal: Normal range of motion. .   Neurological: She is alert and oriented to person, place, and time.   Skin: Skin is warm and dry. Capillary refill takes less than 2 seconds. She is not diaphoretic.   Psychiatric: She has a normal mood and affect. Judgment and thought content normal.   anxious   Vitals reviewed.  Physical Exam        Procedures:    Results Review:     I reviewed the patient's new clinical results.      Lab Results (last 24 hours)     Procedure Component Value Units Date/Time    POC Glucose Once [[id number]]  (Abnormal) Collected:  05/17/20 1132    Specimen:  Blood Updated:  05/17/20 1134     Glucose 116 mg/dL      Comment: Serial Number: [id number]:  [id number]       Lactate Dehydrogenase [[id number]]  (Abnormal) Collected:  05/17/20 0817    Specimen:  Blood Updated:   05/17/20 1103      U/L      Comment: Specimen hemolyzed.  Results may be affected.       C-reactive Protein [[id number]]  (Normal) Collected:  05/17/20 0817    Specimen:  Blood Updated:  05/17/20 1058     C-Reactive Protein 0.20 mg/dL     Comprehensive Metabolic Panel [[id number]]  (Abnormal) Collected:  05/17/20 0817    Specimen:  Blood Updated:  05/17/20 0931     Glucose 166 mg/dL      BUN 13 mg/dL      Creatinine 0.65 mg/dL      Sodium 139 mmol/L      Potassium 3.7 mmol/L      Chloride 104 mmol/L      CO2 19.0 mmol/L      Calcium 8.8 mg/dL      Total Protein 6.9 g/dL      Albumin 3.70 g/dL      ALT (SGPT) 15 U/L      AST (SGOT) 11 U/L      Alkaline Phosphatase 64 U/L      Total Bilirubin <0.2 mg/dL      eGFR Non African Amer 97 mL/min/1.73      Globulin 3.2 gm/dL      A/G Ratio 1.2 g/dL      BUN/Creatinine Ratio 20.0     Anion Gap 16.0 mmol/L     Narrative:       GFR Normal >60  Chronic Kidney Disease <60  Kidney Failure <15      Ferritin [[id number]]  (Abnormal) Collected:  05/17/20 0817    Specimen:  Blood Updated:  05/17/20 0927     Ferritin 200.50 ng/mL     Narrative:       Results may be falsely decreased if patient taking Biotin.      CK [[id number]]  (Normal) Collected:  05/17/20 0817    Specimen:  Blood Updated:  05/17/20 0924     Creatine Kinase 38 U/L     D-dimer, Quantitative [[id number]]  (Normal) Collected:  05/17/20 0817    Specimen:  Blood Updated:  05/17/20 0911     D-Dimer, Quantitative 0.44 MCGFEU/mL     Narrative:       Reference Range  --------------------------------------------------------------------     < 0.50   Negative Predictive Value  0.50-0.59   Indeterminate    >= 0.60   Probable VTE             A very low percentage of patients with DVT may yield D-Dimer results   below the cut-off of 0.50 MCGFEU/mL.  This is known to be more   prevalent in patients with distal DVT.             Results of this test should always be interpreted in conjunction with   the patient's medical history,  clinical presentation and other   findings.  Clinical diagnosis should not be based on the result of   INNOVANCE D-Dimer alone.    Fibrinogen [[id number]]  (Normal) Collected:  05/17/20 0817    Specimen:  Blood Updated:  05/17/20 0911     Fibrinogen 339 mg/dL     CBC & Differential [[id number]] Collected:  05/17/20 0817    Specimen:  Blood Updated:  05/17/20 0903    Narrative:       The following orders were created for panel order CBC & Differential.  Procedure                               Abnormality         Status                     ---------                               -----------         ------                     CBC Auto Differential[[id number]]        Abnormal            Final result                 Please view results for these tests on the individual orders.    CBC Auto Differential [[id number]]  (Abnormal) Collected:  05/17/20 0817    Specimen:  Blood Updated:  05/17/20 0903     WBC 19.00 10*3/mm3      RBC 4.13 10*6/mm3      Hemoglobin 13.0 g/dL      Hematocrit 39.6 %      MCV 96.0 fL      MCH 31.4 pg      MCHC 32.7 g/dL      RDW 15.2 %      RDW-SD 51.2 fl      MPV 8.6 fL      Platelets 163 10*3/mm3      Neutrophil % 93.3 %      Lymphocyte % 3.9 %      Monocyte % 2.6 %      Eosinophil % 0.0 %      Basophil % 0.2 %      Neutrophils, Absolute 17.70 10*3/mm3      Lymphocytes, Absolute 0.70 10*3/mm3      Monocytes, Absolute 0.50 10*3/mm3      Eosinophils, Absolute 0.00 10*3/mm3      Basophils, Absolute 0.00 10*3/mm3      nRBC 0.0 /100 WBC     POC Glucose Once [[id number]]  (Abnormal) Collected:  05/17/20 0656    Specimen:  Blood Updated:  05/17/20 0658     Glucose 120 mg/dL      Comment: Serial Number: [id number]:  [id number]       POC Glucose Once [[id number]]  (Abnormal) Collected:  05/16/20 1942    Specimen:  Blood Updated:  05/17/20 0456     Glucose 170 mg/dL      Comment: Serial Number: [id number]:  [id number]       Blood Culture - Blood, Arm, Left [[id number]] Collected:  05/14/20 7568     Specimen:  Blood from Arm, Left Updated:  05/16/20 1800     Blood Culture No growth at 2 days    Blood Culture - Blood, Arm, Right [[id number]] Collected:  05/14/20 1728    Specimen:  Blood from Arm, Right Updated:  05/16/20 1800     Blood Culture No growth at 2 days    POC Glucose Once [[id number]]  (Abnormal) Collected:  05/16/20 1614    Specimen:  Blood Updated:  05/16/20 1616     Glucose 144 mg/dL      Comment: Serial Number: [id number]:  [id number]           Hemoglobin A1C   Date Value Ref Range Status   05/15/2020 5.5 3.5 - 5.6 % Final           Results from last 7 days   Lab Units 05/15/20  0827   PH, ARTERIAL pH units 7.390   PO2 ART mm Hg 84.7   PCO2, ARTERIAL mm Hg 47.4   HCO3 ART mmol/L 28.7*     Lab Results   Component Value Date    LIPASE 18 02/25/2020     Lab Results   Component Value Date    CHOL 146 01/31/2020    TRIG 180 (H) 01/31/2020    HDL 41 01/31/2020    LDL 69 01/31/2020       Lab Results   Lab Value Date/Time    FINALDX  02/26/2020 1343     Colon, random biopsies:    Fragments of benign nonulcerated colon mucosa with no significant histopathology    DESIREE/sms          Microbiology Results (last 10 days)     Procedure Component Value - Date/Time    Legionella Antigen, Urine - Urine, Urine, Clean Catch [[id number]]  (Normal) Collected:  05/15/20 0203    Lab Status:  Final result Specimen:  Urine, Clean Catch Updated:  05/15/20 0522     LEGIONELLA ANTIGEN, URINE Negative    S. Pneumo Ag Urine or CSF - Urine, Urine, Clean Catch [[id number]]  (Normal) Collected:  05/15/20 0203    Lab Status:  Final result Specimen:  Urine, Clean Catch Updated:  05/15/20 0522     Strep Pneumo Ag Negative    Respiratory Panel, PCR - Swab, Nasopharynx [[id number]]  (Normal) Collected:  05/14/20 1731    Lab Status:  Final result Specimen:  Swab from Nasopharynx Updated:  05/14/20 1922     ADENOVIRUS, PCR Not Detected     Coronavirus 229E Not Detected     Coronavirus HKU1 Not Detected     Coronavirus NL63 Not Detected      Coronavirus OC43 Not Detected     Human Metapneumovirus Not Detected     Human Rhinovirus/Enterovirus Not Detected     Influenza B PCR Not Detected     Parainfluenza Virus 1 Not Detected     Parainfluenza Virus 2 Not Detected     Parainfluenza Virus 3 Not Detected     Parainfluenza Virus 4 Not Detected     Bordetella pertussis pcr Not Detected     Influenza A H1 2009 PCR Not Detected     Chlamydophila pneumoniae PCR Not Detected     Mycoplasma pneumo by PCR Not Detected     Influenza A PCR Not Detected     Influenza A H3 Not Detected     Influenza A H1 Not Detected     RSV, PCR Not Detected    Narrative:       The coronavirus on the RVP is NOT COVID-19 and is NOT indicative of infection with COVID-19.     SARS-CoV-2 PCR ([place] IN-HOUSE PERFORMED), NP SWAB IN TRANSPORT MEDIA - Swab, Nasopharynx [[id number]]  (Normal) Collected:  05/14/20 1731    Lab Status:  Final result Specimen:  Swab from Nasopharynx Updated:  05/15/20 0126     COVID19 Not Detected    Blood Culture - Blood, Arm, Left [[id number]] Collected:  05/14/20 1729    Lab Status:  Preliminary result Specimen:  Blood from Arm, Left Updated:  05/16/20 1800     Blood Culture No growth at 2 days    Blood Culture - Blood, Arm, Right [[id number]] Collected:  05/14/20 1728    Lab Status:  Preliminary result Specimen:  Blood from Arm, Right Updated:  05/16/20 1800     Blood Culture No growth at 2 days          ECG/EMG Results (most recent)     Procedure Component Value Units Date/Time    ECG 12 Lead [[id number]] Collected:  05/14/20 1727     Updated:  05/15/20 0716    Narrative:       HEART RATE= 81  bpm  RR Interval= 720  ms  CA Interval= 168  ms  P Horizontal Axis= -24  deg  P Front Axis= 50  deg  QRSD Interval= 84  ms  QT Interval= 398  ms  QRS Axis= 24  deg  T Wave Axis= 22  deg  - BORDERLINE ECG -  Sinus rhythm  Probable left atrial enlargement  Low voltage, precordial leads  When compared with ECG of 09-Feb-2017 15:23:42,  Significant axis, voltage or  hypertrophy change  Electronically Signed By: Amandeep Hood (INEZ) 15-May-2020 07:12:33  Date and Time of Study: 2020-05-14 17:27:18                    Ct Angiogram Chest With & Without Contrast    Result Date: 5/16/2020  1. No evidence of aortic aneurysm, dissection, or acute aortic syndrome. 2. No evidence of central pulmonary embolism. 3. No acute cardiopulmonary abnormality. Minimal apical paraseptal emphysema.  Electronically Signed By-Papito Moore On:5/16/2020 6:53 PM This report was finalized on [id number] by  Papito Moore, .    Xr Chest Ap    Result Date: 5/14/2020  No radiographic findings of acute cardiopulmonary abnormality.  Electronically Signed By-DR. Ramsey Stone MD On:5/14/2020 5:27 PM This report was finalized on [id number] by DR. Ramsey Stone MD.          Xrays, labs reviewed personally by physician.    Medication Review:   I have reviewed the patient's current medication list      Scheduled Meds    acetaZOLAMIDE 250 mg Oral Daily   albuterol 2.5 mg Nebulization Q4H - RT   amLODIPine 10 mg Oral QAM   azithromycin 250 mg Oral Q24H   Budesonide 9 mg Oral Q24H   budesonide-formoterol 2 puff Inhalation BID - RT   cetirizine 10 mg Oral Daily   cloNIDine 0.1 mg Oral TID   enoxaparin 40 mg Subcutaneous Q24H   gabapentin 600 mg Oral TID   guaiFENesin 1,200 mg Oral Q12H   insulin lispro 0-7 Units Subcutaneous TID AC   ipratropium-albuterol 3 mL Nebulization 4x Daily - RT   lidocaine 1 patch Transdermal Q24H   lisinopril 20 mg Oral Daily   melatonin 10 mg Oral Nightly   methylPREDNISolone sodium succinate 60 mg Intravenous Q8H   metoclopramide 10 mg Oral 4x Daily   metoprolol tartrate 25 mg Oral BID   nicotine 1 patch Transdermal Q24H   sodium chloride 10 mL Intravenous Q12H   theophylline 300 mg Oral Daily   topiramate 50 mg Oral Daily   traZODone 100 mg Oral Daily   vilazodone 20 mg Oral Nightly       Meds Infusions    Pharmacy Consult - Steroid Insulin Protocol     sodium chloride 100 mL/hr  Last Rate: 100 mL/hr (05/17/20 0529)       Meds PRN  •  acetaminophen **OR** acetaminophen **OR** acetaminophen  •  albuterol  •  ALPRAZolam  •  benzonatate  •  bisacodyl  •  calcium carbonate  •  HYDROcodone-acetaminophen  •  insulin lispro **AND** insulin lispro  •  nitroglycerin  •  ondansetron **OR** ondansetron  •  orphenadrine  •  Pharmacy Consult - Steroid Insulin Protocol  •  sodium chloride  •  sodium chloride  •  SUMAtriptan  •  SUMAtriptan    I personally reviewed patient's x-ray films  I personally reviewed patient's EKG strips     Assessment/Plan   Assessment/Plan     Active Hospital Problems    Diagnosis  POA   • **Acute respiratory distress [R06.03]  Yes   • Obesity (BMI 30-39.9) [E66.9]  Yes   • Elevated random blood glucose level [R73.09]  Yes   • Suspected Covid-19 Virus Infection [R68.89]  Yes   • COPD exacerbation (CMS/[facility]) [J44.1]  Yes   • Ulcerative colitis (CMS/[facility]) [K51.90]  Yes   • Vitamin D deficiency [E55.9]  Yes   • Sleep apnea [G47.30]  Yes   • Depression [F32.9]  Yes   • Systemic lupus erythematosus (CMS/[facility]) [M32.9]  Yes   • Allergic rhinitis, seasonal [J30.2]  Yes   • Fibromyalgia [M79.7]  Yes   • Irritable bowel syndrome with diarrhea [K58.0]  Yes   • Tobacco dependency [F17.200]  Yes   • Chronic obstructive pulmonary disease (CMS/[facility]) [J44.9]  Yes   • Hypertension [I10]  Yes   • Migraine without aura, intractable [G43.019]  Yes      Resolved Hospital Problems    Diagnosis Date Resolved POA   • Morbid obesity (CMS/[facility]) [E66.01] 05/14/2020 Yes       MEDICAL DECISION MAKING COMPLEXITY BY PROBLEM:   Acute Respiratory distress    COVID 19-negative  -CXR : no acute process  -LDH 255 down from 313  -Ferritin, CK,   -D-dimer: 0.61, CTA chest:negative for pulmonary embolus  -Guaifenesin  -Continue Zithromax D#3  -Pulmicort and Duonebs  Solumedrol D#2; change to prednisone taper  -Legionella and S. Penumonia: negative  -Continuous pulse ox  -Oxygen supplementation, wean as tolerated to keep  oxygen sats >90%  -No nebulized medications  -PT/OT     COPD, possible exacerbation  -CXR: no acute process  -feels much better after IV steroids    Change to oral prednisone taper     -EKG Reviewed     -Continue albuterol, Tessalon Perles, ipratropium-albuterol, theophylline  -Hold Incruse Ellipta, non formulary  - Continue Nystatin swish and spit  -RT: BiPAP eval - patient refused BiPAP, she wants to use her own  Elevated blood glucose level  -Hyperglycemia; steroid induced hyperglycemia order set activated  -A1C pending  -Repeat BMP in am     Essential Hypertension, Chronic, Controlled   -Continue home amlodipine, clonidine, lisinopril, metoprolol  - Monitor with routine vital signs      Irritable bowel and ulcerative colitis  -Continue budesonide, metoclopramide  -Hold eluxadoline, non form     Fibromyalgia  -Continue Neurontin, Norco, Xanax (inspect verified)  -Continue Lidoderm patch     Lupus  -Hold hydroxychloroquine, unless brings home dose     Open pressure of LP high 2017  -Continue diamox     Migraines  -Hold home Zofran disintegrating tablet, Phenergan  -PRN Zofran for nausea  -Sumatriptan as needed  -Topamax  - Hold Orphenadrine     Anxiety/Depression with Insomnia  -Continue Viibryd  -Continue home melatonin and trazodone     Dietary supplementation  -Hold dietary supplements for now     Seasonal allergies/allergic rhinitis  -Continue loratadine with hospital substitute     Nicotine dependence  -Encourage lifestyle modificiation  -Consider nicotine patch     Obesity  -encourage lifestyle modifications  -Nutrition consult      VTE Prophylaxis -   Mechanical Order History:     None      Pharmalogical Order History:     Ordered     Dose Route Frequency Stop    05/14/20 2128  enoxaparin (LOVENOX) syringe 40 mg  Status:  Discontinued      40 mg SC Once 05/14/20 2136 05/14/20 2135  enoxaparin (LOVENOX) syringe 40 mg      40 mg SC Every 24 Hours --    05/14/20 2124  Pharmacy to Dose enoxaparin (LOVENOX)   Status:  Discontinued     Question:  Indication of use  Answer:  Prophylaxis    -- XX Continuous PRN 05/14/20 2128            Code Status -   Code Status and Medical Interventions:   Ordered at: 05/14/20 2021     Code Status:    CPR     Medical Interventions (Level of Support Prior to Arrest):    Full       This patient has been examined wearing appropriate Personal Protective Equipment and discussed with hospital infection control department. 05/17/20        Discharge Planning          Destination      Coordination has not been started for this encounter.      Durable Medical Equipment      Coordination has not been started for this encounter.      Dialysis/Infusion      Coordination has not been started for this encounter.      Home Medical Care      Coordination has not been started for this encounter.      Therapy      Coordination has not been started for this encounter.      Community Resources      Coordination has not been started for this encounter.            Electronically signed by Maria Eugenia Syed MD, 05/17/20, 14:29.  [facility] Hospitalist Team

## 2020-05-17 NOTE — PROGRESS NOTES
Continued Stay Note  MARIAM Steen     Patient Name: Merry Thornton  MRN: [id number]  Today's Date: 5/17/2020    Admit Date: 5/14/2020    Discharge Plan     Row Name 05/17/20 1742       Plan    Final Discharge Disposition Code  01 - home or self-care              Expected Discharge Date and Time     Expected Discharge Date Expected Discharge Time    May 17, 2020             Violet Valadez RN

## 2020-05-17 NOTE — DISCHARGE SUMMARY
[facility] Medicine Services  DISCHARGE SUMMARY        Prepared For PCP:  Nivia Alonzo PA    Patient Name: Merry Thornton  : 1970  MRN: [id number]      Date of Admission:   2020    Date of Discharge:  2020    Length of stay:  LOS: 0 days     Hospital Course     Presenting Problem:   Acute respiratory distress [R06.03]  Chronic obstructive pulmonary disease with acute exacerbation (CMS/HCC) [J44.1]  Fever, unspecified fever cause [R50.9]      Active Hospital Problems    Diagnosis  POA   • **Acute respiratory distress [R06.03]  Yes   • Obesity (BMI 30-39.9) [E66.9]  Yes   • Elevated random blood glucose level [R73.09]  Yes   • Suspected Covid-19 Virus Infection [R68.89]  Yes   • COPD exacerbation (CMS/HCC) [J44.1]  Yes   • Ulcerative colitis (CMS/[facility]) [K51.90]  Yes   • Vitamin D deficiency [E55.9]  Yes   • Sleep apnea [G47.30]  Yes   • Depression [F32.9]  Yes   • Systemic lupus erythematosus (CMS/HCC) [M32.9]  Yes   • Allergic rhinitis, seasonal [J30.2]  Yes   • Fibromyalgia [M79.7]  Yes   • Irritable bowel syndrome with diarrhea [K58.0]  Yes   • Tobacco dependency [F17.200]  Yes   • Chronic obstructive pulmonary disease (CMS/HCC) [J44.9]  Yes   • Hypertension [I10]  Yes   • Migraine without aura, intractable [G43.019]  Yes      Resolved Hospital Problems    Diagnosis Date Resolved POA   • Morbid obesity (CMS/HCC) [E66.01] 2020 Yes           Hospital Course:  Merry Thornton is a 49 y.o. female who presented to [facility] ED with a history of lupus, fibromyalgia, migraines, hypertension, ulcerative colitis, COPD,  anxiety, depression, and current smoker complaining of increasing shortness of breath.        She had increasing shortness of breath for the last week.  She had been nauseous with multiple episodes of emesis for the last 2 days, no emesis today but states she has had anything to eat or drink.  Patient states she has a significant productive cough  that has lasted for about 7 days.  She states that she has had a sore throat with progressive hoarseness of voice.  Patient states she has felt weak since this all began.  Both of her ears are sore which she states feels like sinus allergy drainage.  She denies fever, hematochezia, hematemesis, melena, and diarrhea.  There have been no exacerbating or relieving factors noted.  Patient states she is supposed to wear a BiPAP at home but does not she does wear 2 L nasal cannula at night for sleep and has been attempting to quit smoking by using nicotine patches.  She states she did stop smoking marijuana about 2 months ago.  She has no known sick contacts.     In the ER, WBC 11.30, glucose 114, , CRP 0.76, lactic 2.0, Procalcitonin <0.02. Blood Cultures and COVID 19 swab pending, Respiratory Virus panel negative. EKG SR 81. Chest XRay no acute findings. Sat 99% on room air.  Patient is admitted for further evaluation and treatment.     5/15/2020: She has negative Covid 19 testing. The Legionella, Strep and Resp panel were also negative. She was transferred to the Med-Surg unit.  She was started on IV steroids for wheezing heard on her exam. Resp therapy was consulted for BiPAP evaluation     5/16/2020: The nurses notes stated the patient refused BiPAP. She asked her family to bring her own to use while in the hospital. Her D Dimer was elevated. CTA-chest was ordered. It was negative for pulmonary embolus.     5/17/2020:   Her antibiotics were changed to oral to complete a 7 day course. Her preliminary blood cultures were negative after 2 days.She will have her steroids changed to prednisone to complete a tapered dosing.She states she felt well and was ready for discharge to home.          Recommendation for Outpatient Providers:             Reasons For Change In Medications and Indications for New Medications:  Prednisone taper      Day of Discharge     HPI:     Ms. Thornton is a 49 y.o. female who presents to  [facility] ED with a history of lupus, fibromyalgia, migraines, hypertension, ulcerative colitis, COPD,  anxiety, depression, and current smoker complaining of increasing shortness of breath.        Ms. Thornton is a 49 y.o. female who presents to [facility] ED with a history of lupus, fibromyalgia, migraines, hypertension, ulcerative colitis, COPD,  anxiety, depression, and current smoker complaining of increasing shortness of breath.  Patient states she has had increasing shortness of breath for the last week.  She has been nauseous with multiple episodes of emesis for the last 2 days, no emesis today but states she has had anything to eat or drink.  Patient states she has a significant productive cough that has lasted for about 7 days.  She states that she has had a sore throat with progressive hoarseness of voice.  Patient states she has felt weak since this all began.  Both of her ears are sore which she states feels like sinus allergy drainage.  She denies fever, hematochezia, hematemesis, melena, and diarrhea.  There have been no exacerbating or relieving factors noted.  Patient states she is supposed to wear a BiPAP at home but does not she does wear 2 L nasal cannula at night for sleep and has been attempting to quit smoking by using nicotine patches.  She states she did stop smoking marijuana about 2 months ago.  She has no known sick contacts.     In the ER, WBC 11.30, glucose 114, , CRP 0.76, lactic 2.0, Procalcitonin <0.02. Blood Cultures and COVID 19 swab pending, Respiratory Virus panel negative. EKG SR 81. Chest XRay no acute findings. Sat 99% on room air.  Patient is admitted for further evaluation and treatment.     5/15/2020: She has negative Covid 19 testing. The Legionella, Strep and Resp panel were also negative. She was transferred to the Med-Surg unit.  She was started on IV steroids for wheezing heard on her exam. Resp therapy was consulted for BiPAP  evaluation     5/16/2020: The nurses notes stated the patient refused BiPAP. She asked her family to bring her own to use while in the hospital. Her D Dimer was elevated. CTA-chest was ordered. It was negative for pulmonary embolus.     5/17/2020:   Her antibiotics were changed to oral to complete a 7 day course. Her preliminary blood cultures were negative after 2 days.She will have her steroids changed to prednisone to complete a tapered dosing.She states she felt well and was ready for discharge to home.      Vital Signs:   Temp:  [97.7 °F (36.5 °C)-98 °F (36.7 °C)] 97.7 °F (36.5 °C)  Heart Rate:  [] 94  Resp:  [18] 18  BP: (121-124)/(69-77) 124/77     Physical Exam:  Physical Exam    Pertinent  and/or Most Recent Results     Results from last 7 days   Lab Units 05/17/20  0817 05/16/20  0411 05/15/20  0523 05/14/20  1728   WBC 10*3/mm3 19.00* 9.70 8.30 11.30*   HEMOGLOBIN g/dL 13.0 13.6 12.9 13.5   HEMATOCRIT % 39.6 38.9 36.6 39.6   PLATELETS 10*3/mm3 163 158 150 202   SODIUM mmol/L 139 142 141 140   POTASSIUM mmol/L 3.7 3.8 3.5 3.8   CHLORIDE mmol/L 104 107 104 102   CO2 mmol/L 19.0* 22.0 27.0 24.0   BUN mg/dL 13 7 8 8   CREATININE mg/dL 0.65 0.60 0.56* 0.67   GLUCOSE mg/dL 166* 152* 104* 114*   CALCIUM mg/dL 8.8 9.0 9.0 8.9     Results from last 7 days   Lab Units 05/17/20  0817 05/16/20  0411 05/15/20  0523 05/14/20  1728   BILIRUBIN mg/dL <0.2* 0.2 0.3 0.3   ALK PHOS U/L 64 65 56 63   ALT (SGPT) U/L 15 13 14 16   AST (SGOT) U/L 11 14 13 14           Invalid input(s): TG, LDLCALC, LDLREALC  Results from last 7 days   Lab Units 05/15/20  0523 05/14/20  1738 05/14/20  1728   HEMOGLOBIN A1C % 5.5  --   --    PROCALCITONIN ng/mL  --   --  <0.02*   LACTATE mmol/L  --  2.0  --        Brief Urine Lab Results  (Last result in the past 365 days)      Color   Clarity   Blood   Leuk Est   Nitrite   Protein   CREAT   Urine HCG        02/25/20 1733 Yellow Cloudy  Comment:  Result checked  Negative Negative Negative  Negative               Microbiology Results Abnormal     Procedure Component Value - Date/Time    Blood Culture - Blood, Arm, Left [[id number]] Collected:  05/14/20 1729    Lab Status:  Preliminary result Specimen:  Blood from Arm, Left Updated:  05/16/20 1800     Blood Culture No growth at 2 days    Blood Culture - Blood, Arm, Right [[id number]] Collected:  05/14/20 1728    Lab Status:  Preliminary result Specimen:  Blood from Arm, Right Updated:  05/16/20 1800     Blood Culture No growth at 2 days    S. Pneumo Ag Urine or CSF - Urine, Urine, Clean Catch [[id number]]  (Normal) Collected:  05/15/20 0203    Lab Status:  Final result Specimen:  Urine, Clean Catch Updated:  05/15/20 0522     Strep Pneumo Ag Negative    Legionella Antigen, Urine - Urine, Urine, Clean Catch [[id number]]  (Normal) Collected:  05/15/20 0203    Lab Status:  Final result Specimen:  Urine, Clean Catch Updated:  05/15/20 0522     LEGIONELLA ANTIGEN, URINE Negative    SARS-CoV-2 PCR ([facility] IN-HOUSE PERFORMED), NP SWAB IN TRANSPORT MEDIA - Swab, Nasopharynx [[id number]]  (Normal) Collected:  05/14/20 1731    Lab Status:  Final result Specimen:  Swab from Nasopharynx Updated:  05/15/20 0126     COVID19 Not Detected    Respiratory Panel, PCR - Swab, Nasopharynx [[id number]]  (Normal) Collected:  05/14/20 1731    Lab Status:  Final result Specimen:  Swab from Nasopharynx Updated:  05/14/20 1922     ADENOVIRUS, PCR Not Detected     Coronavirus 229E Not Detected     Coronavirus HKU1 Not Detected     Coronavirus NL63 Not Detected     Coronavirus OC43 Not Detected     Human Metapneumovirus Not Detected     Human Rhinovirus/Enterovirus Not Detected     Influenza B PCR Not Detected     Parainfluenza Virus 1 Not Detected     Parainfluenza Virus 2 Not Detected     Parainfluenza Virus 3 Not Detected     Parainfluenza Virus 4 Not Detected     Bordetella pertussis pcr Not Detected     Influenza A H1 2009 PCR Not Detected     Chlamydophila pneumoniae PCR Not  Detected     Mycoplasma pneumo by PCR Not Detected     Influenza A PCR Not Detected     Influenza A H3 Not Detected     Influenza A H1 Not Detected     RSV, PCR Not Detected    Narrative:       The coronavirus on the RVP is NOT COVID-19 and is NOT indicative of infection with COVID-19.           Ct Angiogram Chest With & Without Contrast    Result Date: 5/16/2020  Impression: 1. No evidence of aortic aneurysm, dissection, or acute aortic syndrome. 2. No evidence of central pulmonary embolism. 3. No acute cardiopulmonary abnormality. Minimal apical paraseptal emphysema.  Electronically Signed By-Papito Moore On:5/16/2020 6:53 PM This report was finalized on [id number] by  Papito Moore .    Xr Chest Ap    Result Date: 5/14/2020  Impression: No radiographic findings of acute cardiopulmonary abnormality.  Electronically Signed By-DR. Ramsey Stone MD On:5/14/2020 5:27 PM This report was finalized on [id number] by DR. Ramsey Stone MD.                             Test Results Pending at Discharge   Order Current Status    Hemoglobin A1c In process    Blood Culture - Blood, Arm, Left Preliminary result    Blood Culture - Blood, Arm, Right Preliminary result            Procedures Performed           Consults:   Consults     Date and Time Order Name Status Description    5/14/2020 1911 Hospitalist (on-call MD unless specified) Completed             Discharge Details        Discharge Medications      ASK your doctor about these medications      Instructions Start Date   acetaZOLAMIDE 250 MG tablet  Commonly known as:  DIAMOX   250 mg, Oral, Daily      albuterol sulfate  (90 Base) MCG/ACT inhaler  Commonly known as:  PROVENTIL HFA;VENTOLIN HFA;PROAIR HFA   2 puffs, Inhalation, Every 4 Hours PRN      ALPRAZolam 0.5 MG tablet  Commonly known as:  XANAX  Ask about: Which instructions should I use?   0.5 mg, Oral, 2 Times Daily PRN      amLODIPine 10 MG tablet  Commonly known as:  NORVASC   10 mg, Oral,  Daily      B-12 Compliance Injection 1000 MCG/ML kit  Generic drug:  Cyanocobalamin   Injection, Inject twice monthly       benzonatate 100 MG capsule  Commonly known as:  Tessalon Perles  Ask about: Which instructions should I use?   100 mg, Oral, 3 Times Daily PRN      Budesonide 3 MG 24 hr capsule  Commonly known as:  ENTOCORT EC   TAKE 3 CAPSULES BY MOUTH DAILY      cloNIDine 0.1 MG tablet  Commonly known as:  CATAPRES   0.1 mg, Oral, 3 Times Daily      Eluxadoline 100 MG tablet   100 mg, Oral, 2 Times Daily      gabapentin 600 MG tablet  Commonly known as:  NEURONTIN   600 mg, Oral, 3 Times Daily      HYDROcodone-acetaminophen  MG per tablet  Commonly known as:  NORCO   1 tablet, Oral, Every 8 Hours PRN      hydroxychloroquine 200 MG tablet  Commonly known as:  PLAQUENIL   200 mg, Oral, 2 Times Daily      Incruse Ellipta 62.5 MCG/INH aerosol powder   Generic drug:  Umeclidinium Bromide   1 puff, Inhalation, 2 Times Daily      ipratropium-albuterol 0.5-2.5 mg/3 ml nebulizer  Commonly known as:  DUO-NEB   3 mL, Nebulization, 4 Times Daily      lidocaine 5 %  Commonly known as:  LIDODERM   1 patch, Transdermal, Every 24 Hours, Remove & Discard patch within 12 hours or as directed by MD       lisinopril 20 MG tablet  Commonly known as:  PRINIVIL,ZESTRIL   20 mg, Oral, Daily      loratadine 10 MG tablet  Commonly known as:  CLARITIN   10 mg, Oral, Daily      Melatonin 10 MG tablet   10 mg, Oral, Every Night at Bedtime      metoclopramide 10 MG tablet  Commonly known as:  REGLAN   10 mg, Oral, 4 Times Daily      metoprolol tartrate 50 MG tablet  Commonly known as:  LOPRESSOR   TAKE 1 TABLET BY MOUTH DAILY      ondansetron ODT 4 MG disintegrating tablet  Commonly known as:  ZOFRAN-ODT   4 mg, Translingual, Every 8 Hours PRN      orphenadrine 100 MG 12 hr tablet  Commonly known as:  NORFLEX   100 mg, Oral, 3 Times Daily, As needed      promethazine 25 MG tablet  Commonly known as:  PHENERGAN   25 mg, Oral,  Daily      SUMAtriptan 6 MG/0.5ML injection  Commonly known as:  IMITREX   6 mg, Subcutaneous, Once As Needed      SUMAtriptan 100 MG tablet  Commonly known as:  IMITREX   100 mg, Oral, One tab at onset of HA, after one hour use sumatriptan injection       theophylline 300 MG 12 hr tablet  Commonly known as:  THEODUR   300 mg, Oral, Every Morning      topiramate 50 MG tablet  Commonly known as:  TOPAMAX   50 mg, Oral, Daily, Take dos      vilazodone 20 MG tablet tablet  Commonly known as:  Viibryd   20 mg, Oral, Every Morning      vitamin D 1.25 MG (63289 UT) capsule capsule  Commonly known as:  ERGOCALCIFEROL   TAKE 1 CAPSULE BY MOUTH ONCE WEEKLY             Allergies   Allergen Reactions   • Adhesive Tape Other (See Comments)     Pt ok with paper tape    Abstracted from centricity   • Bupropion Shortness Of Breath   • Morphine Other (See Comments), Rash and Swelling     Abstracted from centricity   • Buspar [Buspirone] Other (See Comments)     Abstracted from centricity   • Augmentin [Amoxicillin-Pot Clavulanate] Diarrhea         Discharge Disposition: home      Diet: cardiac/consistent carb  Hospital:  Diet Order   Procedures   • Diet Cardiac, Diabetic/Consistent Carbs; Healthy Heart; Diabetic - Consistent Carb         Discharge Activity:  As tolerated        CODE STATUS:    Code Status and Medical Interventions:   Ordered at: 05/14/20 2021     Code Status:    CPR     Medical Interventions (Level of Support Prior to Arrest):    Full         Follow-up Appointments  Future Appointments   Date Time Provider Department Center   5/19/2020  1:00 PM LAB [facility]   6/3/2020 11:00 AM Shiraz Shah MD [facility] P [facility]   7/29/2020  1:00 PM Seipel, Joseph F, MD [facility] NEURO [facility]   10/8/2020  2:45 PM Africa Conway MD [facility] BEH NA None             Condition on Discharge:      Stable      This patient has been examined wearing appropriate Personal Protective Equipment and discussed with hospital  infection control department. 05/17/20      Electronically signed by Maria Eugenia Syed MD, 05/17/20, 2:39 PM.      Time: I spent  57 minutes on this discharge activity which included face-to-face encounter with the patient/reviewing the data in the system/coordination of the care with the nursing staff as well as consultants/documentation/entering orders.

## 2020-05-17 NOTE — OUTREACH NOTE
Prep Survey      Responses   [facility] facility patient discharged from?  [facility]   Is LACE score < 7 ?  No   Eligibility  [facility]   Hospital  [facility]   Date of Admission  05/14/20   Date of Discharge  05/17/20   Discharge Disposition  Home or Self Care   Discharge diagnosis  Acute resp distress, Acute exacerbation COPD, suspected covid-19 infection   COVID-19 Test Status  Negative   Does the patient have one of the following disease processes/diagnoses(primary or secondary)?  COPD/Pneumonia   Does the patient have Home health ordered?  No   Is there a DME ordered?  No   Prep survey completed?  Yes          Rylee Merlos RN

## 2020-05-17 NOTE — CONSULTS
Nutrition Services    Patient Name:  Merry Thornton  YOB: 1970  MRN: [id number]  Admit Date:  5/14/2020    Progress note:    Pt eating 100% of meals. Weight 237 lb down 4.8% x 3 months. Not significant weight loss. Pt declined need for supplement at this time. Skin intact. No swallowing or chewing problems. Monitor PRN.     Results from last 7 days   Lab Units 05/17/20  0817 05/16/20  0411 05/15/20  0523   SODIUM mmol/L 139 142 141   POTASSIUM mmol/L 3.7 3.8 3.5   CHLORIDE mmol/L 104 107 104   CO2 mmol/L 19.0* 22.0 27.0   BUN mg/dL 13 7 8   CREATININE mg/dL 0.65 0.60 0.56*   CALCIUM mg/dL 8.8 9.0 9.0   BILIRUBIN mg/dL <0.2* 0.2 0.3   ALK PHOS U/L 64 65 56   ALT (SGPT) U/L 15 13 14   AST (SGOT) U/L 11 14 13   GLUCOSE mg/dL 166* 152* 104*         Electronically signed by:  Candis Aragon RD  05/17/20 15:50

## 2020-05-17 NOTE — PLAN OF CARE
Problem: Patient Care Overview  Goal: Plan of Care Review  Outcome: Ongoing (interventions implemented as appropriate)  Flowsheets (Taken 5/17/2020 0323)  Progress: improving  Plan of Care Reviewed With: patient  Note:   Pt is breathing much easier this shift.  Will continue with current orders and care plan

## 2020-05-18 ENCOUNTER — TRANSITIONAL CARE MANAGEMENT TELEPHONE ENCOUNTER (OUTPATIENT)
Dept: CALL CENTER | Facility: HOSPITAL | Age: 50
End: 2020-05-18

## 2020-05-18 LAB — HBA1C MFR BLD: 5.5 % (ref 3.5–5.6)

## 2020-05-18 NOTE — OUTREACH NOTE
Call Center TCM Note      Responses   [facility] patient discharged from?  [facility]   COVID-19 Test Status  Negative   Does the patient have one of the following disease processes/diagnoses(primary or secondary)?  COPD/Pneumonia   TCM attempt successful?  No   Unsuccessful attempts  Attempt 2          Amisha Johnson LPN    5/18/2020, 14:44

## 2020-05-18 NOTE — OUTREACH NOTE
Call Center TCM Note      Responses   [facility] patient discharged from?  [facility]   COVID-19 Test Status  Negative   Does the patient have one of the following disease processes/diagnoses(primary or secondary)?  COPD/Pneumonia   TCM attempt successful?  No   Unsuccessful attempts  Attempt 1          Amisha Johnson LPN    5/18/2020, 13:56

## 2020-05-19 ENCOUNTER — LAB (OUTPATIENT)
Dept: LAB | Facility: HOSPITAL | Age: 50
End: 2020-05-19

## 2020-05-19 ENCOUNTER — TRANSITIONAL CARE MANAGEMENT TELEPHONE ENCOUNTER (OUTPATIENT)
Dept: CALL CENTER | Facility: HOSPITAL | Age: 50
End: 2020-05-19

## 2020-05-19 ENCOUNTER — TELEPHONE (OUTPATIENT)
Dept: PSYCHIATRY | Facility: CLINIC | Age: 50
End: 2020-05-19

## 2020-05-19 DIAGNOSIS — F41.1 GENERALIZED ANXIETY DISORDER: Chronic | ICD-10-CM

## 2020-05-19 LAB
BACTERIA SPEC AEROBE CULT: NORMAL
BACTERIA SPEC AEROBE CULT: NORMAL

## 2020-05-19 PROCEDURE — C9803 HOPD COVID-19 SPEC COLLECT: HCPCS

## 2020-05-19 PROCEDURE — U0004 COV-19 TEST NON-CDC HGH THRU: HCPCS

## 2020-05-19 NOTE — OUTREACH NOTE
Call Center TCM Note      Responses   [facility] patient discharged from?  [facility]   COVID-19 Test Status  Negative   Does the patient have one of the following disease processes/diagnoses(primary or secondary)?  COPD/Pneumonia   TCM attempt successful?  No   Unsuccessful attempts  Attempt 3          Marielos Choudhury RN    5/19/2020, 14:36

## 2020-05-19 NOTE — TELEPHONE ENCOUNTER
5/19/20-- WAS JUST IN HOSPITAL AT [facility] Sunday----  IS GOING BACK IN Thursday FOR SURGERY ENDOSCOPY DILATION  AT [facility]----  IS ON STEROIDS NOW- HIGH DOSES--AGAIN--- HAS 19 DAYS LEFT -- NEEDS MORE ANXIETY MEDS DUE TO THAT--- IS ON XANAX 0.5  BID CURRENTLY----- USES [facility]-- PT CALL BACK #[phone]

## 2020-05-20 ENCOUNTER — READMISSION MANAGEMENT (OUTPATIENT)
Dept: CALL CENTER | Facility: HOSPITAL | Age: 50
End: 2020-05-20

## 2020-05-20 ENCOUNTER — ANESTHESIA EVENT (OUTPATIENT)
Dept: GASTROENTEROLOGY | Facility: HOSPITAL | Age: 50
End: 2020-05-20

## 2020-05-20 LAB
REF LAB TEST METHOD: NORMAL
SARS-COV-2 RNA RESP QL NAA+PROBE: NOT DETECTED

## 2020-05-20 RX ORDER — ALPRAZOLAM 0.5 MG/1
0.5 TABLET ORAL 3 TIMES DAILY PRN
Qty: 90 TABLET | Refills: 0 | Status: SHIPPED | OUTPATIENT
Start: 2020-05-20 | End: 2020-09-01

## 2020-05-20 RX ORDER — SODIUM CHLORIDE 9 MG/ML
9 INJECTION, SOLUTION INTRAVENOUS CONTINUOUS PRN
Status: CANCELLED | OUTPATIENT
Start: 2020-05-20

## 2020-05-20 RX ORDER — SODIUM CHLORIDE 0.9 % (FLUSH) 0.9 %
10 SYRINGE (ML) INJECTION EVERY 12 HOURS SCHEDULED
Status: CANCELLED | OUTPATIENT
Start: 2020-05-20

## 2020-05-20 RX ORDER — SODIUM CHLORIDE 0.9 % (FLUSH) 0.9 %
10 SYRINGE (ML) INJECTION AS NEEDED
Status: CANCELLED | OUTPATIENT
Start: 2020-05-20

## 2020-05-20 NOTE — OUTREACH NOTE
COPD/PN Week 1 Survey      Responses   [facility] patient discharged from?  [facility]   COVID-19 Test Status  Negative   Does the patient have one of the following disease processes/diagnoses(primary or secondary)?  COPD/Pneumonia   Is there a successful TCM telephone encounter documented?  No   Was the primary reason for admission:  COPD exacerbation   Week 1 attempt successful?  No   Unsuccessful attempts  Attempt 1          Isidra Nicholson RN

## 2020-05-20 NOTE — TELEPHONE ENCOUNTER
5/20/20--  SPOKE TO PHARMACY([facility]) THEY UNDERSTOOD XANAX  RXES TID #90 FIRST AND THEN 2 REFILLS LEFT ON BID-- TOLD THEM TO INFORM PT TO-- TRIED TO GET AHOLD OF PT BUT NO ONE ANSWERED PT PHONE AND MAILBOX WAS FULL/LKS

## 2020-05-20 NOTE — TELEPHONE ENCOUNTER
I sent eRx for xanax 0.5 mg TID #90, no refills , so she will take 3 times per day for 1 month , then she still has 2 refills for BID .  Please call the pharmacy to explain that TID is going to be only for 1 month, then she should have her refills for #60

## 2020-05-21 ENCOUNTER — ON CAMPUS - OUTPATIENT (OUTPATIENT)
Dept: URBAN - METROPOLITAN AREA HOSPITAL 77 | Facility: HOSPITAL | Age: 50
End: 2020-05-21
Payer: COMMERCIAL

## 2020-05-21 ENCOUNTER — ANESTHESIA (OUTPATIENT)
Dept: GASTROENTEROLOGY | Facility: HOSPITAL | Age: 50
End: 2020-05-21

## 2020-05-21 ENCOUNTER — HOSPITAL ENCOUNTER (OUTPATIENT)
Facility: HOSPITAL | Age: 50
Setting detail: HOSPITAL OUTPATIENT SURGERY
Discharge: HOME | End: 2020-05-21
Attending: INTERNAL MEDICINE | Admitting: INTERNAL MEDICINE

## 2020-05-21 VITALS
HEIGHT: 66 IN | DIASTOLIC BLOOD PRESSURE: 89 MMHG | BODY MASS INDEX: 44.5 KG/M2 | OXYGEN SATURATION: 97 % | TEMPERATURE: 98.4 F | WEIGHT: 276.9 LBS | SYSTOLIC BLOOD PRESSURE: 145 MMHG | HEART RATE: 90 BPM | RESPIRATION RATE: 18 BRPM

## 2020-05-21 DIAGNOSIS — R13.10 DYSPHAGIA, UNSPECIFIED: ICD-10-CM

## 2020-05-21 DIAGNOSIS — R11.0 NAUSEA: ICD-10-CM

## 2020-05-21 DIAGNOSIS — K22.2 ESOPHAGEAL OBSTRUCTION: ICD-10-CM

## 2020-05-21 DIAGNOSIS — R10.13 EPIGASTRIC PAIN: ICD-10-CM

## 2020-05-21 PROCEDURE — 43450 DILATE ESOPHAGUS 1/MULT PASS: CPT | Performed by: INTERNAL MEDICINE

## 2020-05-21 PROCEDURE — 43235 EGD DIAGNOSTIC BRUSH WASH: CPT | Performed by: INTERNAL MEDICINE

## 2020-05-21 PROCEDURE — 25010000002 PROPOFOL 10 MG/ML EMULSION: Performed by: ANESTHESIOLOGY

## 2020-05-21 RX ORDER — HYDROMORPHONE HCL 110MG/55ML
0.5 PATIENT CONTROLLED ANALGESIA SYRINGE INTRAVENOUS
Status: DISCONTINUED | OUTPATIENT
Start: 2020-05-21 | End: 2020-05-21 | Stop reason: HOSPADM

## 2020-05-21 RX ORDER — PROMETHAZINE HYDROCHLORIDE 25 MG/ML
6.25 INJECTION, SOLUTION INTRAMUSCULAR; INTRAVENOUS ONCE AS NEEDED
Status: DISCONTINUED | OUTPATIENT
Start: 2020-05-21 | End: 2020-05-21 | Stop reason: HOSPADM

## 2020-05-21 RX ORDER — NALBUPHINE HCL 10 MG/ML
2 AMPUL (ML) INJECTION EVERY 4 HOURS PRN
Status: DISCONTINUED | OUTPATIENT
Start: 2020-05-21 | End: 2020-05-21 | Stop reason: HOSPADM

## 2020-05-21 RX ORDER — EPHEDRINE SULFATE 50 MG/ML
5 INJECTION, SOLUTION INTRAVENOUS ONCE AS NEEDED
Status: DISCONTINUED | OUTPATIENT
Start: 2020-05-21 | End: 2020-05-21 | Stop reason: HOSPADM

## 2020-05-21 RX ORDER — PROPOFOL 10 MG/ML
VIAL (ML) INTRAVENOUS AS NEEDED
Status: DISCONTINUED | OUTPATIENT
Start: 2020-05-21 | End: 2020-05-21 | Stop reason: SURG

## 2020-05-21 RX ORDER — MEPERIDINE HYDROCHLORIDE 25 MG/ML
12.5 INJECTION INTRAMUSCULAR; INTRAVENOUS; SUBCUTANEOUS
Status: DISCONTINUED | OUTPATIENT
Start: 2020-05-21 | End: 2020-05-21 | Stop reason: HOSPADM

## 2020-05-21 RX ORDER — NALBUPHINE HCL 10 MG/ML
10 AMPUL (ML) INJECTION EVERY 4 HOURS PRN
Status: DISCONTINUED | OUTPATIENT
Start: 2020-05-21 | End: 2020-05-21 | Stop reason: HOSPADM

## 2020-05-21 RX ORDER — DIPHENHYDRAMINE HYDROCHLORIDE 50 MG/ML
12.5 INJECTION INTRAMUSCULAR; INTRAVENOUS
Status: DISCONTINUED | OUTPATIENT
Start: 2020-05-21 | End: 2020-05-21 | Stop reason: HOSPADM

## 2020-05-21 RX ORDER — PHENYLEPHRINE HCL IN 0.9% NACL 0.5 MG/5ML
.5-3 SYRINGE (ML) INTRAVENOUS
Status: DISCONTINUED | OUTPATIENT
Start: 2020-05-21 | End: 2020-05-21 | Stop reason: HOSPADM

## 2020-05-21 RX ORDER — PROMETHAZINE HYDROCHLORIDE 25 MG/1
25 SUPPOSITORY RECTAL ONCE AS NEEDED
Status: DISCONTINUED | OUTPATIENT
Start: 2020-05-21 | End: 2020-05-21 | Stop reason: HOSPADM

## 2020-05-21 RX ORDER — HYDRALAZINE HYDROCHLORIDE 20 MG/ML
5 INJECTION INTRAMUSCULAR; INTRAVENOUS
Status: DISCONTINUED | OUTPATIENT
Start: 2020-05-21 | End: 2020-05-21 | Stop reason: HOSPADM

## 2020-05-21 RX ORDER — LABETALOL HYDROCHLORIDE 5 MG/ML
5 INJECTION, SOLUTION INTRAVENOUS
Status: DISCONTINUED | OUTPATIENT
Start: 2020-05-21 | End: 2020-05-21 | Stop reason: HOSPADM

## 2020-05-21 RX ORDER — LORAZEPAM 2 MG/ML
1 INJECTION INTRAMUSCULAR
Status: DISCONTINUED | OUTPATIENT
Start: 2020-05-21 | End: 2020-05-21 | Stop reason: HOSPADM

## 2020-05-21 RX ORDER — PANTOPRAZOLE SODIUM 40 MG/1
40 TABLET, DELAYED RELEASE ORAL DAILY
COMMUNITY
End: 2021-02-02

## 2020-05-21 RX ORDER — PROMETHAZINE HYDROCHLORIDE 25 MG/1
25 TABLET ORAL ONCE AS NEEDED
Status: DISCONTINUED | OUTPATIENT
Start: 2020-05-21 | End: 2020-05-21 | Stop reason: HOSPADM

## 2020-05-21 RX ORDER — ALBUTEROL SULFATE 2.5 MG/3ML
2.5 SOLUTION RESPIRATORY (INHALATION) ONCE AS NEEDED
Status: DISCONTINUED | OUTPATIENT
Start: 2020-05-21 | End: 2020-05-21 | Stop reason: HOSPADM

## 2020-05-21 RX ORDER — ONDANSETRON 2 MG/ML
4 INJECTION INTRAMUSCULAR; INTRAVENOUS ONCE AS NEEDED
Status: DISCONTINUED | OUTPATIENT
Start: 2020-05-21 | End: 2020-05-21 | Stop reason: HOSPADM

## 2020-05-21 RX ORDER — ATROPINE SULFATE 1 MG/ML
0.5 INJECTION, SOLUTION INTRAMUSCULAR; INTRAVENOUS; SUBCUTANEOUS ONCE AS NEEDED
Status: DISCONTINUED | OUTPATIENT
Start: 2020-05-21 | End: 2020-05-21 | Stop reason: HOSPADM

## 2020-05-21 RX ORDER — FLUMAZENIL 0.1 MG/ML
0.2 INJECTION INTRAVENOUS AS NEEDED
Status: DISCONTINUED | OUTPATIENT
Start: 2020-05-21 | End: 2020-05-21 | Stop reason: HOSPADM

## 2020-05-21 RX ORDER — LIDOCAINE HYDROCHLORIDE 10 MG/ML
INJECTION, SOLUTION EPIDURAL; INFILTRATION; INTRACAUDAL; PERINEURAL AS NEEDED
Status: DISCONTINUED | OUTPATIENT
Start: 2020-05-21 | End: 2020-05-21 | Stop reason: SURG

## 2020-05-21 RX ORDER — NALOXONE HCL 0.4 MG/ML
0.4 VIAL (ML) INJECTION AS NEEDED
Status: DISCONTINUED | OUTPATIENT
Start: 2020-05-21 | End: 2020-05-21 | Stop reason: HOSPADM

## 2020-05-21 RX ORDER — SODIUM CHLORIDE 9 MG/ML
INJECTION, SOLUTION INTRAVENOUS CONTINUOUS PRN
Status: DISCONTINUED | OUTPATIENT
Start: 2020-05-21 | End: 2020-05-21 | Stop reason: SURG

## 2020-05-21 RX ORDER — MIDAZOLAM HYDROCHLORIDE 1 MG/ML
1 INJECTION INTRAMUSCULAR; INTRAVENOUS
Status: DISCONTINUED | OUTPATIENT
Start: 2020-05-21 | End: 2020-05-21 | Stop reason: HOSPADM

## 2020-05-21 RX ADMIN — PROPOFOL 425 MG: 10 INJECTION, EMULSION INTRAVENOUS at 09:23

## 2020-05-21 RX ADMIN — SODIUM CHLORIDE: 0.9 INJECTION, SOLUTION INTRAVENOUS at 08:07

## 2020-05-21 RX ADMIN — LIDOCAINE HYDROCHLORIDE 50 MG: 10 INJECTION, SOLUTION EPIDURAL; INFILTRATION; INTRACAUDAL; PERINEURAL at 09:24

## 2020-05-21 NOTE — ANESTHESIA PREPROCEDURE EVALUATION
Anesthesia Evaluation     Patient summary reviewed and Nursing notes reviewed   NPO Solid Status: > 8 hours  NPO Liquid Status: > 8 hours           Airway   Mallampati: II  TM distance: >3 FB  Neck ROM: full  No difficulty expected  Dental - normal exam     Pulmonary - normal exam    breath sounds clear to auscultation  (+) COPD, asthma,sleep apnea,   Cardiovascular - normal exam  Exercise tolerance: unable to assess    ECG reviewed  Rhythm: regular  Rate: normal    (+) hypertension, CAD,       Neuro/Psych  (+) headaches, syncope, numbness, psychiatric history,     GI/Hepatic/Renal/Endo    (+) obesity, morbid obesity, GERD,      Musculoskeletal     (+) neck pain,   Abdominal  - normal exam   Substance History - negative use     OB/GYN negative ob/gyn ROS         Other   arthritis,                      Anesthesia Plan    ASA 4     MAC     intravenous induction     Anesthetic plan, all risks, benefits, and alternatives have been provided, discussed and informed consent has been obtained with: patient.

## 2020-05-21 NOTE — H&P
" LOS: 0 days   Patient Care Team:  Nivia Alonzo PA as PCP - General  Nivia Alonzo PA as PCP - Family Medicine      Subjective     Interval History:     Subjective: Outpt EGD scope  No GI complaints  No chest pains or SOA  No fever or chills  No pain complaints.  No contraindications for MAC anesthesia    ROS:   No chest pain, shortness of breath, or cough. Agreeable to scope and anesthesia  No change since scanned H&P       Medication Review:   No current facility-administered medications for this encounter.     Facility-Administered Medications Ordered in Other Encounters:   •  sodium chloride 0.9 % infusion, , Intravenous, Continuous PRN, Giancarlo Gan MD      Objective     Vital Signs  Vitals:    05/12/20 1550   Weight: 109 kg (240 lb)   Height: 167.6 cm (66\")       Physical Exam:    General Appearance:    Awake and alert, in no acute distress   Head:    Normocephalic, without obvious abnormality   Eyes:          Conjunctivae normal, anicteric sclera   Throat:   No oral lesions, no thrush, oral mucosa moist   Neck:   No adenopathy, supple, no JVD   Lungs:     respirations regular, even and unlabored   Abdomen:     Soft, non-tender, no rebound or guarding, non-distended, no hepatosplenomegaly   Rectal:     Deferred   Extremities:   No edema, no cyanosis   Skin:   No bruising or rash, no jaundice        Results Review:    Lab Results (last 24 hours)     ** No results found for the last 24 hours. **          Imaging Results (Last 24 Hours)     ** No results found for the last 24 hours. **            Assessment/Plan   Proceed with scope and MAC anesthesia    No change from office records    Nia MD  05/21/20  08:40  "

## 2020-05-21 NOTE — ANESTHESIA POSTPROCEDURE EVALUATION
Patient: Merry Thornton    Procedure Summary     Date:  05/21/20 Room / Location:  [facility] ENDOSCOPY 4 / [facility] ENDOSCOPY    Anesthesia Start:  0923 Anesthesia Stop:  0938    Procedure:  ESOPHAGOGASTRODUODENOSCOPY WITH DILATATION (#54, 60 bougie) (N/A ) Diagnosis:       Dysphagia      Nausea      Epigastric pain      (DYSPHAGIA, NAUSEA, EPIGASTRIC PAIN)    Surgeon:  Michael Cheng MD Provider:  Giancarlo Gan MD    Anesthesia Type:  MAC ASA Status:  4          Anesthesia Type: MAC    Vitals  Vitals Value Taken Time   /89 5/21/2020 10:00 AM   Temp     Pulse 90 5/21/2020 10:00 AM   Resp 18 5/21/2020 10:00 AM   SpO2 97 % 5/21/2020 10:00 AM           Post Anesthesia Care and Evaluation    Patient location during evaluation: PACU  Patient participation: complete - patient participated  Level of consciousness: awake  Pain scale: See nurse's notes for pain score.  Pain management: adequate  Airway patency: patent  Anesthetic complications: No anesthetic complications  PONV Status: none  Cardiovascular status: acceptable  Respiratory status: acceptable  Hydration status: acceptable    Comments: Patient seen and examined postoperatively; vital signs stable; SpO2 greater than or equal to 90%; cardiopulmonary status stable; nausea/vomiting adequately controlled; pain adequately controlled; no apparent anesthesia complications; patient discharged from anesthesia care when discharge criteria were met

## 2020-05-21 NOTE — OP NOTE
ESOPHAGOGASTRODUODENOSCOPY Procedure Report    Patient Name:  Merry Thornton  YOB: 1970    Date of Surgery:  5/21/2020     Pre-Op Diagnosis:  DYSPHAGIA, NAUSEA, EPIGASTRIC PAIN     Dilated to 50 Fr 2017 by [facility]--no trauma  Post-Op Diagnosis Codes:     * Dysphagia [R13.10]     * Nausea [R11.0]     * Epigastric pain [R10.13]  Hiatal hernia and stricture (mild)--dilated to 54 Fr or 18 mm without trauma and 60 Fr with moderate UES trauma suggesting stricturing  Normal stomach  Normal duodenum  Candidal esophagitis noted from steroid inhalers--Rx with Diflucan X 7D and PPI long term    Procedure/CPT® Codes:      Procedure(s):  ESOPHAGOGASTRODUODENOSCOPY WITH DILATATION    Staff:  Surgeon(s):  Michael Cheng MD         Anesthesia: Monitored Anesthesia Care    Implants:    Nothing was implanted during the procedure    Specimen:        See Below    Complications:  NONE    Description of Procedure:  Informed consent was obtained for the procedure, including sedation.  Risks of perforation, hemorrhage, adverse drug reaction and aspiration were discussed.  The patient was brought into the endoscopy suite. Continuous cardiopulmonary monitoring was performed. The patient was placed in the left lateral decubitus position.  The bite block was inserted into the patient's mouth. After adequate sedation was attained, the Olympus gastroscope was inserted into the patient's mouth and advanced to the second portion of the duodenum without difficulty.  Circumferential examination was performed. A retroflex exam was performed in the patient's stomach.  On completion of the exam, the bowel was decompressed, the scope was removed from the patient, the patient tolerated the procedure well, there were no immediate post-operative complications.     Examination of the esophagus: Hiatal hernia, stricturing (mild, peptic), dilated to 54 Fr or 18 mm without trauma  Examination of the stomach: NORMAL  Examination of the duodenum:  NORMAL      Impression:     * Dysphagia [R13.10]     * Nausea [R11.0]     * Epigastric pain [R10.13]  Hiatal hernia and stricture (mild)--dilated to 54 Fr or 18 mm without trauma and 60 Fr with moderate UES trauma suggesting stricturing  Normal stomach  Normal duodenum  Candidal esophagitis noted from steroid inhalers--Rx with Diflucan X 7D and PPI long term    Recommendations:  Diflucan X 7D  PPI long term  EGD with dilation 2 years    Nia MD     Date: 5/21/2020  Time: 09:23

## 2020-05-22 RX ORDER — BUDESONIDE 3 MG/1
CAPSULE, COATED PELLETS ORAL
Qty: 90 CAPSULE | Refills: 0 | Status: SHIPPED | OUTPATIENT
Start: 2020-05-22 | End: 2020-06-26

## 2020-05-22 RX ORDER — LISINOPRIL 20 MG/1
TABLET ORAL
Qty: 180 TABLET | Refills: 1 | Status: SHIPPED | OUTPATIENT
Start: 2020-05-22 | End: 2020-11-16

## 2020-05-26 ENCOUNTER — TELEPHONE (OUTPATIENT)
Dept: FAMILY MEDICINE CLINIC | Facility: CLINIC | Age: 50
End: 2020-05-26

## 2020-05-26 RX ORDER — FLUCONAZOLE 100 MG/1
TABLET ORAL
COMMUNITY
Start: 2020-05-21 | End: 2021-02-02

## 2020-05-26 RX ORDER — OMEPRAZOLE 40 MG/1
CAPSULE, DELAYED RELEASE ORAL
COMMUNITY
Start: 2020-05-21 | End: 2020-05-26

## 2020-05-26 NOTE — TELEPHONE ENCOUNTER
I have called the patient numerous times as well as her daughter, Mayte.  No one is answering, there is no way to leave a voicemail.  I am very concerned that she may be in heart failure.  I sent her my chart message which she checks sometimes.  If she calls back I would like to speak with her, ER precautions need to be given.  I cannot call it medication without speaking to the patient first.

## 2020-05-26 NOTE — TELEPHONE ENCOUNTER
Missed appt today because she can hardly walk due to the swelling in her legs. Her daughter called and wanted to know what she can do, or if there are any meds you can call out. She rescheduled for Thursday.

## 2020-05-28 ENCOUNTER — HOSPITAL ENCOUNTER (OUTPATIENT)
Dept: MAMMOGRAPHY | Facility: HOSPITAL | Age: 50
Discharge: HOME | End: 2020-05-28
Admitting: PHYSICIAN ASSISTANT

## 2020-05-28 ENCOUNTER — OFFICE VISIT (OUTPATIENT)
Dept: FAMILY MEDICINE CLINIC | Facility: CLINIC | Age: 50
End: 2020-05-28

## 2020-05-28 VITALS
TEMPERATURE: 97.8 F | OXYGEN SATURATION: 95 % | BODY MASS INDEX: 45.84 KG/M2 | HEART RATE: 96 BPM | DIASTOLIC BLOOD PRESSURE: 82 MMHG | WEIGHT: 284 LBS | SYSTOLIC BLOOD PRESSURE: 132 MMHG

## 2020-05-28 DIAGNOSIS — R07.9 CHEST PAIN, UNSPECIFIED TYPE: ICD-10-CM

## 2020-05-28 DIAGNOSIS — R60.0 LOWER EXTREMITY EDEMA: ICD-10-CM

## 2020-05-28 DIAGNOSIS — R92.8 ABNORMALITY OF RIGHT BREAST ON SCREENING MAMMOGRAM: Primary | ICD-10-CM

## 2020-05-28 DIAGNOSIS — R49.0 HOARSENESS OF VOICE: Primary | ICD-10-CM

## 2020-05-28 DIAGNOSIS — R06.09 DYSPNEA ON EXERTION: ICD-10-CM

## 2020-05-28 PROBLEM — G96.89: Status: ACTIVE | Noted: 2020-05-28

## 2020-05-28 PROBLEM — E86.0 DEHYDRATION: Status: RESOLVED | Noted: 2020-02-25 | Resolved: 2020-05-28

## 2020-05-28 PROBLEM — R10.9 ACUTE ABDOMINAL PAIN: Status: RESOLVED | Noted: 2020-02-25 | Resolved: 2020-05-28

## 2020-05-28 LAB
ANION GAP SERPL CALCULATED.3IONS-SCNC: 17 MMOL/L (ref 5–15)
BUN BLD-MCNC: 6 MG/DL (ref 6–20)
BUN/CREAT SERPL: 7.7 (ref 7–25)
CALCIUM SPEC-SCNC: 9.4 MG/DL (ref 8.6–10.5)
CHLORIDE SERPL-SCNC: 101 MMOL/L (ref 98–107)
CO2 SERPL-SCNC: 26 MMOL/L (ref 22–29)
CREAT BLD-MCNC: 0.78 MG/DL (ref 0.57–1)
GFR SERPL CREATININE-BSD FRML MDRD: 78 ML/MIN/1.73
GLUCOSE BLD-MCNC: 135 MG/DL (ref 65–99)
NT-PROBNP SERPL-MCNC: 246.7 PG/ML (ref 5–900)
POTASSIUM BLD-SCNC: 4.1 MMOL/L (ref 3.5–5.2)
SODIUM BLD-SCNC: 144 MMOL/L (ref 136–145)
TROPONIN T SERPL-MCNC: <0.01 NG/ML (ref 0–0.03)

## 2020-05-28 PROCEDURE — 84484 ASSAY OF TROPONIN QUANT: CPT | Performed by: PHYSICIAN ASSISTANT

## 2020-05-28 PROCEDURE — 36415 COLL VENOUS BLD VENIPUNCTURE: CPT | Performed by: PHYSICIAN ASSISTANT

## 2020-05-28 PROCEDURE — 77063 BREAST TOMOSYNTHESIS BI: CPT

## 2020-05-28 PROCEDURE — 99214 OFFICE O/P EST MOD 30 MIN: CPT | Performed by: PHYSICIAN ASSISTANT

## 2020-05-28 PROCEDURE — 83880 ASSAY OF NATRIURETIC PEPTIDE: CPT | Performed by: PHYSICIAN ASSISTANT

## 2020-05-28 PROCEDURE — 80048 BASIC METABOLIC PNL TOTAL CA: CPT | Performed by: PHYSICIAN ASSISTANT

## 2020-05-28 PROCEDURE — 93000 ELECTROCARDIOGRAM COMPLETE: CPT | Performed by: PHYSICIAN ASSISTANT

## 2020-05-28 PROCEDURE — 77067 SCR MAMMO BI INCL CAD: CPT

## 2020-05-28 RX ORDER — POTASSIUM CHLORIDE 750 MG/1
10 TABLET, FILM COATED, EXTENDED RELEASE ORAL 2 TIMES DAILY
Qty: 60 TABLET | Refills: 2 | Status: SHIPPED | OUTPATIENT
Start: 2020-05-28 | End: 2020-07-23

## 2020-05-28 RX ORDER — FUROSEMIDE 20 MG/1
20 TABLET ORAL 2 TIMES DAILY
Qty: 60 TABLET | Refills: 2 | Status: SHIPPED | OUTPATIENT
Start: 2020-05-28 | End: 2020-06-08 | Stop reason: SDUPTHER

## 2020-05-28 RX ORDER — PREDNISONE 10 MG/1
10 TABLET ORAL DAILY
Qty: 90 TABLET | Refills: 1 | Status: SHIPPED | OUTPATIENT
Start: 2020-05-28 | End: 2020-12-21 | Stop reason: SDUPTHER

## 2020-05-28 RX ORDER — PREDNISONE 10 MG/1
TABLET ORAL
Qty: 20 TABLET | Refills: 0 | Status: SHIPPED | OUTPATIENT
Start: 2020-05-28 | End: 2020-05-28

## 2020-05-28 NOTE — PROGRESS NOTES
Subjective  Merry Thornton is a 50 y.o. female     History of Present Illness  Patient is a 50-year-old white female coming in today complaining of lower extremity swelling, shortness of breath, mild chest pain with left arm heaviness, hoarseness, fatigue.  She has chronic fatigue and has COPD which she battles daily.  She states she quit smoking 17 days ago.  She was hospitalized on May 14 for COPD exacerbation, treated with steroids and antibiotics and she improved.  She underwent an upper endoscopy with  within the last week, she needed esophageal dilation, and she was diagnosed with a yeast infection in the throat.  She was treated.    Patient complains of the lower extremity swelling for the past 1 month, she states it is worsening.  She reports gaining 20 pounds in the last 10 days.  She has also noted increased shortness of breath, orthopnea, and chest discomfort.  She had an EKG done at her most recent hospitalization, which was essentially normal.  During her most recent hospitalization she was also found to have an elevated d-dimer and a chest CT scan was done, a very small 3 mm nodule was appreciated, it was recommended that be followed up in the future.    Patient has an appointment with ENT upcoming regarding her hoarseness which has been present for the past 6 months.    Patient has an appointment with cardiology upcoming on Kami 3, routine follow-up.    The following portions of the patient's history were reviewed and updated as appropriate: allergies, current medications, past family history, past medical history, past social history, past surgical history and problem list.    Review of Systems   Constitutional: Positive for fatigue and unexpected weight gain.   HENT: Positive for sore throat and voice change.    Respiratory: Positive for shortness of breath. Negative for cough.    Cardiovascular: Positive for chest pain and leg swelling.   Gastrointestinal: Negative for diarrhea, nausea and  vomiting.   Neurological: Positive for weakness.       Objective  Physical Exam   Constitutional: She is oriented to person, place, and time. She appears well-developed and well-nourished. She is morbidly obese.  HENT:   Head: Normocephalic and atraumatic.   Right Ear: External ear normal.   Left Ear: External ear normal.   Mouth/Throat: Oropharynx is clear and moist.   hoarse   Eyes: Pupils are equal, round, and reactive to light.   Neck: Normal range of motion. Neck supple.   Cardiovascular: Normal rate, regular rhythm and normal heart sounds.   Pulmonary/Chest: Effort normal. She has decreased breath sounds. She has wheezes in the right upper field, the right middle field, the right lower field, the left upper field, the left middle field and the left lower field.   Abdominal: Soft. Bowel sounds are normal.   Lymphadenopathy:   2+ pitting edema from the knee down bilaterally   Neurological: She is alert and oriented to person, place, and time.   Psychiatric: She has a normal mood and affect. Her behavior is normal.       Vitals:    05/28/20 1432   BP: 132/82   BP Location: Right arm   Patient Position: Sitting   Cuff Size: Adult   Pulse: 96   Temp: 97.8 °F (36.6 °C)   TempSrc: Oral   SpO2: 95%   Weight: 129 kg (284 lb)     Body mass index is 45.84 kg/m².    PHQ-9 Total Score:        ECG 12 Lead  Date/Time: 5/28/2020 3:22 PM  Performed by: Nivia Alonzo PA  Authorized by: Nivia Alonzo PA   Comparison: not compared with previous ECG   Rhythm: sinus rhythm  Rate: normal  Conduction: conduction normal  T Waves: T waves normal  QRS axis: normal    Clinical impression: normal ECG                Assessment/Plan  Diagnoses and all orders for this visit:    1. Hoarseness of voice (Primary)  Comments:  Chronic, patient to follow-up with ENT.  Orders:  -     Ambulatory Referral to ENT (Otolaryngology)    2. Dyspnea on exertion  Comments:  Symptoms most consistent with congestive heart failure, labs and echocardiogram  ordered.  Lasix prescription provided to patient for diuresis.  ER precautions discussed at length.  6-minute walk was completed today, O2 sat started at 97 and went down to 95.  Patient did not tolerate the exertion well, she became very short of breath.  Orders:  -     BNP  -     Adult Transthoracic Echo Complete W/ Cont if Necessary Per Protocol  -     Troponin  -     ECG 12 Lead    3. Lower extremity edema  Comments:  Symptoms most consistent with congestive heart failure, labs and echocardiogram ordered.  Lasix prescription provided to patient for diuresis.  Orders:  -     Basic Metabolic Panel    4. Chest pain, unspecified type  Comments:  EKG today within normal limits, troponin ordered.  Patient has a follow-up appointment with cardiology on Kami 3, Dr. Shah.  Orders:  -     Troponin  -     ECG 12 Lead    Other orders  -     Discontinue: predniSONE (DELTASONE) 10 MG tablet; 4 tabs daily x 2 days then 3 tabs daily x 2 days then 2 tabs daily x 2 days then 1 tab daily x 2 days  Dispense: 20 tablet; Refill: 0  -     furosemide (Lasix) 20 MG tablet; Take 1 tablet by mouth 2 (Two) Times a Day.  Dispense: 60 tablet; Refill: 2  -     potassium chloride (KLOR-CON) 10 MEQ CR tablet; Take 1 tablet by mouth 2 (Two) Times a Day.  Dispense: 60 tablet; Refill: 2  -     nystatin (MYCOSTATIN) 307225 UNIT/ML suspension; Swish and swallow 5 mL 4 (Four) Times a Day.  Dispense: 473 mL; Refill: 5  -     predniSONE (DELTASONE) 10 MG tablet; Take 1 tablet by mouth Daily.  Dispense: 90 tablet; Refill: 1

## 2020-06-02 PROBLEM — I25.10 CORONARY ARTERY DISEASE INVOLVING NATIVE CORONARY ARTERY OF NATIVE HEART WITHOUT ANGINA PECTORIS: Status: ACTIVE | Noted: 2020-06-02

## 2020-06-03 ENCOUNTER — OFFICE VISIT (OUTPATIENT)
Dept: CARDIOLOGY | Facility: CLINIC | Age: 50
End: 2020-06-03

## 2020-06-03 VITALS
HEART RATE: 87 BPM | WEIGHT: 282 LBS | DIASTOLIC BLOOD PRESSURE: 72 MMHG | SYSTOLIC BLOOD PRESSURE: 122 MMHG | BODY MASS INDEX: 45.32 KG/M2 | HEIGHT: 66 IN

## 2020-06-03 DIAGNOSIS — I10 ESSENTIAL HYPERTENSION: Primary | ICD-10-CM

## 2020-06-03 DIAGNOSIS — I49.3 PVC'S (PREMATURE VENTRICULAR CONTRACTIONS): ICD-10-CM

## 2020-06-03 DIAGNOSIS — R60.0 BILATERAL LEG EDEMA: ICD-10-CM

## 2020-06-03 DIAGNOSIS — I25.10 CORONARY ARTERY DISEASE INVOLVING NATIVE CORONARY ARTERY OF NATIVE HEART WITHOUT ANGINA PECTORIS: ICD-10-CM

## 2020-06-03 DIAGNOSIS — J44.9 CHRONIC OBSTRUCTIVE PULMONARY DISEASE, UNSPECIFIED COPD TYPE (HCC): Chronic | ICD-10-CM

## 2020-06-03 PROCEDURE — 99214 OFFICE O/P EST MOD 30 MIN: CPT | Performed by: INTERNAL MEDICINE

## 2020-06-03 PROCEDURE — 93000 ELECTROCARDIOGRAM COMPLETE: CPT | Performed by: INTERNAL MEDICINE

## 2020-06-03 NOTE — PROGRESS NOTES
Date of Office Visit: 2020  Encounter Provider: Dr. Shiraz Shah  Place of Service: [facility] CARDIOLOGY [place]  Patient Name: eMrry Thornton  :1970  Nivia Alonzo PA    Chief Complaint   Patient presents with   • Coronary Artery Disease     1 year follow up   • Hypertension   • COPD   • Edema     History of Present Illness    I am pleased to see Mrs. Thornton in my office today as a follow-up.    As you know, patient is 50 years old white female whose past medical history significant for SLE, chronic pain syndrome, COPD, history of SVT who came today for follow-up..    Patient was initially presented to me for symptom of syncope. In 2016, patient had syncopal episode.  Patient underwent stress test which was abnormal consistent with apical ischemia.  Echocardiogram showed EF of 60% and no significant valvular heart disease.  Holter monitor showed few PVCs.  Patient underwent cardiac catheterization in  and showed no significant CAD.  Patient was started on  bisoprolol. In , patient had EP study and ablation details and record of these procedures are not available to me.  It was done in the [facility].  In 2017, Holter monitor showed moderate density of PVCs but no SVT or VT noted. Patient underwent loop recorder implantation further monitoring of palpitation and arrhythmia and syncope.    Patient came today after 1 year.    Patient is having symptom of shortness of breath and bilateral leg edema.  Patient denies any chest pain.  Patient is mildly orthopneic.  Patient denies any syncope or presyncope.  Bilateral leg edema is noted.  Patient denies any palpitation or recurrence of SVT.    Patient has history of sleep apnea but she is not using BiPAP.  I encouraged her to use CPAP.  She has extensive bilateral leg edema.  I would recommend to discontinue amlodipine.  I would increase Lasix to 40 mg in the morning and 20 mg in the evening.  Patient is advised to decrease fluid  and salt intake.  She is drinking extensive amount of water every day.  I suspect patient has right heart failure due to underlying pulmonary hypertension and sleep apnea.    Past Medical History:   Diagnosis Date   • CAD (coronary artery disease) 12/20/2016    dr. singh   • Carpal tunnel syndrome on right 03/08/2017   • Common migraine 12/20/2016   • COPD (chronic obstructive pulmonary disease) (CMS/HCC) 12/20/2016   • Cyst, ovarian 12/20/2016    mass   • DDD (degenerative disc disease), cervical 03/11/2016   • Dental caries 01/14/2019   • Depression 11/02/2017   • Depression, major, recurrent, moderate (CMS/[facility]) 08/25/2016   • Dietary counseling 09/21/2017   • Dysphagia 05/2020   • Elevated random blood glucose level 5/14/2020   • Encounter for smoking cessation counseling 09/21/2017   • Exacerbation of systemic lupus (CMS/[facility]) 04/30/2019   • Fibromyalgia 12/20/2016   • Generalized anxiety disorder 03/11/2016   • GERD (gastroesophageal reflux disease) 12/20/2016   • Heartburn 11/02/2017   • Hypertension 03/16/2016   • Hyperthyroidism 03/11/2016   • Hypocalcemia 09/21/2017   • IBS (irritable colon syndrome) 09/13/2016   • Immobility syndrome 09/14/2017   • Intracranial pressure increased 12/20/2016   • Left knee pain 09/21/2017   • Lower back pain 02/01/2018   • Lung nodules 09/07/2016   • Morbid obesity (CMS/HCC) 11/02/2017   • Muscle cramp 09/21/2017   • Neck pain 01/14/2019   • Need for prophylactic vaccination and inoculation against influenza 09/21/2017   • Neurogenic bladder 12/20/2016   • Obesity 03/07/2017   • Obesity (BMI 30-39.9) 5/14/2020   • Orthostatic hypotension 07/17/2017   • Osteoarthritis 03/07/2017   • Osteopenia 01/14/2019   • Osteoporosis 12/20/2016   • Other instability, right ankle 04/13/2017   • 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 Mixxed connective Tissue D/o   • Pain, joint, ankle, left 09/21/2017   • Palpitations 10/04/2017   • Paresthesia 06/12/2017    facial   •  Peripheral neuropathy 12/20/2016    bilateral lower extremities   • Poor vision    • Prediabetes    • Raynaud's syndrome 12/20/2016   • Retinopathy    • Right knee pain 11/08/2016   • Scleroderma (CMS/HCC) 12/20/2016   • Seasonal allergic rhinitis 12/20/2016   • SLE (systemic lupus erythematosus) (CMS/[facility]) 03/16/2017   • Sleep apnea 11/02/2017    BIPAP   • Sleep disorder 01/14/2019   • Sprain of unspecified site of right knee, subsequent encounter 12/01/2016   • Status post placement of implantable loop recorder 05/09/2018    presence   • Syncope and collapse 2020    still has occassionally   • Ulcerative colitis (CMS/[facility]) 02/2020   • Urine incontinence    • Vasovagal syncope    • Ventricular arrhythmia 03/16/2016   • Visit for screening mammogram 12/20/2016   • Vitamin D deficiency 01/14/2019   • Wheezing          Past Surgical History:   Procedure Laterality Date   • ANKLE SURGERY Right 02/2017   • BREAST EXCISIONAL BIOPSY Right     years ago   • BRONCHOSCOPY     • CARDIAC ABLATION  01/2000   • CARDIAC ELECTROPHYSIOLOGY PROCEDURE     • CHOLECYSTECTOMY     • COLON SURGERY     • COLONOSCOPY N/A 2/26/2020    Procedure: COLONOSCOPY WITH BIOPSY X1 AREA;  Surgeon: Michael Cheng MD;  Location: [facility] ENDOSCOPY;  Service: Gastroenterology;  Laterality: N/A;  diarrhea   • ENDOSCOPY     • ENDOSCOPY N/A 5/21/2020    Procedure: ESOPHAGOGASTRODUODENOSCOPY WITH DILATATION (#54, 60 bougie);  Surgeon: Michael Cheng MD;  Location: [facility] ENDOSCOPY;  Service: Gastroenterology;  Laterality: N/A;  Post: candidiasis, upper esophagus sphincter stricture   • KNEE SURGERY Right 09/2017   • OTHER SURGICAL HISTORY      Urerine hysterectomy   • OTHER SURGICAL HISTORY      t and a   • OTHER SURGICAL HISTORY      d&c   • OTHER SURGICAL HISTORY      bladder stim   • OTHER SURGICAL HISTORY      Loop recorder placement   • WRIST SURGERY Right            Current Outpatient Medications:   •  acetaZOLAMIDE (DIAMOX) 250 MG tablet, Take 250  mg by mouth Daily., Disp: , Rfl:   •  albuterol sulfate  (90 Base) MCG/ACT inhaler, Inhale 2 puffs Every 4 (Four) Hours As Needed for Wheezing., Disp: , Rfl:   •  ALPRAZolam (XANAX) 0.5 MG tablet, Take 1 tablet by mouth 3 (Three) Times a Day As Needed for Anxiety., Disp: 90 tablet, Rfl: 0  •  amLODIPine (NORVASC) 10 MG tablet, TAKE 1 TABLET BY MOUTH DAILY. (Patient taking differently: Take 10 mg by mouth Every Morning.), Disp: 90 tablet, Rfl: 1  •  Budesonide (ENTOCORT EC) 3 MG 24 hr capsule, TAKE 3 CAPSULES BY MOUTH DAILY, Disp: 90 capsule, Rfl: 0  •  cloNIDine (CATAPRES) 0.1 MG tablet, Take 1 tablet by mouth 3 (Three) Times a Day., Disp: 270 tablet, Rfl: 1  •  Cyanocobalamin (B-12 COMPLIANCE INJECTION) 1000 MCG/ML kit, Inject  as directed. Inject twice monthly, Disp: , Rfl:   •  Eluxadoline 100 MG tablet, Take 100 mg by mouth 2 (Two) Times a Day., Disp: 60 tablet, Rfl: 2  •  fluconazole (DIFLUCAN) 100 MG tablet, , Disp: , Rfl:   •  furosemide (Lasix) 20 MG tablet, Take 1 tablet by mouth 2 (Two) Times a Day., Disp: 60 tablet, Rfl: 2  •  gabapentin (NEURONTIN) 600 MG tablet, Take 600 mg by mouth 3 (Three) Times a Day., Disp: , Rfl:   •  HYDROcodone-acetaminophen (NORCO)  MG per tablet, Take 1 tablet by mouth Every 8 (Eight) Hours As Needed for Moderate Pain ., Disp: , Rfl:   •  hydroxychloroquine (PLAQUENIL) 200 MG tablet, Take 1 tablet by mouth 2 (Two) Times a Day., Disp: 180 tablet, Rfl: 0  •  ipratropium-albuterol (DUO-NEB) 0.5-2.5 mg/3 ml nebulizer, Take 3 mL by nebulization 4 (Four) Times a Day., Disp: 360 mL, Rfl: 1  •  lidocaine (LIDODERM) 5 %, Place 1 patch on the skin as directed by provider Daily. Remove & Discard patch within 12 hours or as directed by MD, Disp: , Rfl:   •  lisinopril (PRINIVIL,ZESTRIL) 20 MG tablet, TAKE ONE TABLET BY MOUTH EVERY 12 HOURS, Disp: 180 tablet, Rfl: 1  •  loratadine (CLARITIN) 10 MG tablet, Take 10 mg by mouth Daily., Disp: , Rfl:   •  Melatonin 10 MG tablet,  Take 1 tablet by mouth every night at bedtime., Disp: 30 tablet, Rfl: 2  •  metoclopramide (REGLAN) 10 MG tablet, Take 1 tablet by mouth 4 (Four) Times a Day., Disp: 120 tablet, Rfl: 1  •  metoprolol tartrate (LOPRESSOR) 50 MG tablet, TAKE 1 TABLET BY MOUTH DAILY (Patient taking differently: Take 25 mg by mouth 2 (Two) Times a Day. Take dos), Disp: 30 tablet, Rfl: 5  •  nystatin (MYCOSTATIN) 629554 UNIT/ML suspension, Swish and swallow 5 mL 4 (Four) Times a Day., Disp: 473 mL, Rfl: 5  •  ondansetron ODT (ZOFRAN-ODT) 4 MG disintegrating tablet, Place 1 tablet on the tongue Every 8 (Eight) Hours As Needed for Nausea or Vomiting. (Patient taking differently: Place 4 mg on the tongue Every 8 (Eight) Hours As Needed for Nausea or Vomiting (can take up to 8 mg as needed).), Disp: 60 tablet, Rfl: 5  •  orphenadrine (NORFLEX) 100 MG 12 hr tablet, Take 100 mg by mouth 3 (Three) Times a Day. As needed, Disp: , Rfl:   •  pantoprazole (PROTONIX) 40 MG EC tablet, Take 40 mg by mouth Daily., Disp: , Rfl:   •  potassium chloride (KLOR-CON) 10 MEQ CR tablet, Take 1 tablet by mouth 2 (Two) Times a Day., Disp: 60 tablet, Rfl: 2  •  predniSONE (DELTASONE) 10 MG tablet, Take 1 tablet by mouth Daily., Disp: 90 tablet, Rfl: 1  •  promethazine (PHENERGAN) 25 MG tablet, Take 25 mg by mouth Daily., Disp: , Rfl:   •  SUMAtriptan (IMITREX) 100 MG tablet, Take 100 mg by mouth. One tab at onset of HA, after one hour use sumatriptan injection, Disp: , Rfl:   •  SUMAtriptan (IMITREX) 6 MG/0.5ML injection, Inject prescribed dose at onset of headache. May repeat dose one time in 1 hour(s) if headache not relieved., Disp: , Rfl:   •  theophylline (THEODUR) 300 MG 12 hr tablet, Take 300 mg by mouth Every Morning., Disp: , Rfl:   •  topiramate (TOPAMAX) 50 MG tablet, Take 50 mg by mouth Daily. Take dos, Disp: , Rfl:   •  Umeclidinium Bromide (Incruse Ellipta) 62.5 MCG/INH aerosol powder , Inhale 1 puff 2 (Two) Times a Day., Disp: , Rfl:   •   vilazodone (Viibryd) 20 MG tablet tablet, Take 1 tablet by mouth Every Morning. (Patient taking differently: Take 20 mg by mouth every night at bedtime.), Disp: 90 tablet, Rfl: 1  •  vitamin D (ERGOCALCIFEROL) 1.25 MG (07068 UT) capsule capsule, TAKE 1 CAPSULE BY MOUTH ONCE WEEKLY (Patient taking differently: Take 50,000 Units by mouth Every 7 (Seven) Days. Sunday), Disp: 12 capsule, Rfl: 1      Social History     Socioeconomic History   • Marital status: Single     Spouse name: Not on file   • Number of children: Not on file   • Years of education: Not on file   • Highest education level: Not on file   Tobacco Use   • Smoking status: Current Some Day Smoker     Packs/day: 1.50     Years: 35.00     Pack years: 52.50     Types: Cigarettes   • Smokeless tobacco: Former User     Quit date: 5/11/2020   • Tobacco comment: using nicotine patches to quit   Substance and Sexual Activity   • Alcohol use: No     Frequency: Never   • Drug use: Not Currently     Frequency: 1.0 times per week     Types: Marijuana     Comment: stopped 2 months ago   • Sexual activity: Defer         Review of Systems   Constitution: Negative for chills and fever.   HENT: Negative for ear discharge and nosebleeds.    Eyes: Negative for discharge and redness.   Cardiovascular: Positive for leg swelling and palpitations. Negative for chest pain, orthopnea, paroxysmal nocturnal dyspnea and syncope.   Respiratory: Positive for shortness of breath. Negative for cough and wheezing.    Endocrine: Negative for heat intolerance.   Skin: Negative for rash.   Musculoskeletal: Positive for arthritis and joint pain. Negative for myalgias.   Gastrointestinal: Negative for abdominal pain, melena, nausea and vomiting.   Genitourinary: Negative for dysuria and hematuria.   Neurological: Negative for dizziness, light-headedness, numbness and tremors.   Psychiatric/Behavioral: Negative for depression. The patient is not nervous/anxious.        Procedures      ECG 12  "Lead  Date/Time: 6/3/2020 5:18 PM  Performed by: Shiraz Shah MD  Authorized by: Shiraz Shah MD   Comparison: compared with previous ECG   Similar to previous ECG  Rhythm: sinus rhythm    Clinical impression: normal ECG            ECG 12 Lead    (Results Pending)           Objective:    /72   Pulse 87   Ht 167.6 cm (65.98\")   Wt 128 kg (282 lb)   BMI 45.54 kg/m²         Physical Exam   Constitutional: She is oriented to person, place, and time. She appears well-developed and well-nourished.   HENT:   Head: Normocephalic and atraumatic.   Eyes: No scleral icterus.   Neck: No thyromegaly present.   Cardiovascular: Normal rate, regular rhythm and normal heart sounds. Exam reveals no gallop and no friction rub.   No murmur heard.  Pulmonary/Chest: Effort normal and breath sounds normal. No respiratory distress. She has no wheezes. She has no rales.   Abdominal: There is no tenderness.   Musculoskeletal: She exhibits edema.   Lymphadenopathy:     She has no cervical adenopathy.   Neurological: She is alert and oriented to person, place, and time.   Skin: No rash noted. No erythema.   Psychiatric: She has a normal mood and affect.           Assessment:       Diagnosis Plan   1. Essential hypertension  ECG 12 Lead   2. Coronary artery disease involving native coronary artery of native heart without angina pectoris  ECG 12 Lead   3. PVC's (premature ventricular contractions)  ECG 12 Lead   4. Chronic obstructive pulmonary disease, unspecified COPD type (CMS/HCC)  ECG 12 Lead   5. Bilateral leg edema              Plan:       Patient is having significant amount of bilateral leg edema.  I have advised her to increase Lasix to 40 mg in the morning and 20 in the evening.  Discontinue Norvasc/amlodipine.  Proceed with echocardiogram.  I will see the patient in 2 months  "

## 2020-06-08 RX ORDER — FUROSEMIDE 20 MG/1
TABLET ORAL
Qty: 90 TABLET | Refills: 2 | Status: SHIPPED | OUTPATIENT
Start: 2020-06-08 | End: 2020-06-25

## 2020-06-17 ENCOUNTER — HOSPITAL ENCOUNTER (OUTPATIENT)
Dept: CARDIOLOGY | Facility: HOSPITAL | Age: 50
Discharge: HOME | End: 2020-06-17

## 2020-06-17 ENCOUNTER — HOSPITAL ENCOUNTER (OUTPATIENT)
Dept: MAMMOGRAPHY | Facility: HOSPITAL | Age: 50
Discharge: HOME | End: 2020-06-17
Admitting: PHYSICIAN ASSISTANT

## 2020-06-17 ENCOUNTER — HOSPITAL ENCOUNTER (OUTPATIENT)
Dept: ULTRASOUND IMAGING | Facility: HOSPITAL | Age: 50
Discharge: HOME | End: 2020-06-17

## 2020-06-17 VITALS
SYSTOLIC BLOOD PRESSURE: 127 MMHG | HEIGHT: 65 IN | WEIGHT: 282 LBS | BODY MASS INDEX: 46.98 KG/M2 | DIASTOLIC BLOOD PRESSURE: 80 MMHG

## 2020-06-17 DIAGNOSIS — R92.8 ABNORMALITY OF RIGHT BREAST ON SCREENING MAMMOGRAM: ICD-10-CM

## 2020-06-17 PROCEDURE — G0279 TOMOSYNTHESIS, MAMMO: HCPCS

## 2020-06-17 PROCEDURE — 93306 TTE W/DOPPLER COMPLETE: CPT

## 2020-06-17 PROCEDURE — 77065 DX MAMMO INCL CAD UNI: CPT

## 2020-06-17 PROCEDURE — 76642 ULTRASOUND BREAST LIMITED: CPT

## 2020-06-18 RX ORDER — HYDROXYCHLOROQUINE SULFATE 200 MG/1
200 TABLET, FILM COATED ORAL 2 TIMES DAILY
Qty: 180 TABLET | Refills: 0 | Status: CANCELLED | OUTPATIENT
Start: 2020-06-18

## 2020-06-18 RX ORDER — HYDROXYCHLOROQUINE SULFATE 200 MG/1
200 TABLET, FILM COATED ORAL 2 TIMES DAILY
Qty: 180 TABLET | Refills: 1 | Status: SHIPPED | OUTPATIENT
Start: 2020-06-18 | End: 2020-09-16

## 2020-06-21 LAB
BH CV ECHO MEAS - AO MAX PG (FULL): 3.8 MMHG
BH CV ECHO MEAS - AO MAX PG: 9.3 MMHG
BH CV ECHO MEAS - AO MEAN PG (FULL): 2.2 MMHG
BH CV ECHO MEAS - AO MEAN PG: 5.2 MMHG
BH CV ECHO MEAS - AO ROOT AREA (BSA CORRECTED): 1.2
BH CV ECHO MEAS - AO ROOT AREA: 5.6 CM^2
BH CV ECHO MEAS - AO ROOT DIAM: 2.7 CM
BH CV ECHO MEAS - AO V2 MAX: 152.9 CM/SEC
BH CV ECHO MEAS - AO V2 MEAN: 106.4 CM/SEC
BH CV ECHO MEAS - AO V2 VTI: 31.2 CM
BH CV ECHO MEAS - AORTIC HR: 78.8 BPM
BH CV ECHO MEAS - AORTIC R-R: 0.76 SEC
BH CV ECHO MEAS - ASC AORTA: 2.2 CM
BH CV ECHO MEAS - AVA(I,A): 2.9 CM^2
BH CV ECHO MEAS - AVA(I,D): 2.9 CM^2
BH CV ECHO MEAS - AVA(V,A): 3.1 CM^2
BH CV ECHO MEAS - AVA(V,D): 3.1 CM^2
BH CV ECHO MEAS - BSA(HAYCOCK): 2.5 M^2
BH CV ECHO MEAS - BSA: 2.3 M^2
BH CV ECHO MEAS - BZI_BMI: 46.9 KILOGRAMS/M^2
BH CV ECHO MEAS - BZI_METRIC_HEIGHT: 165.1 CM
BH CV ECHO MEAS - BZI_METRIC_WEIGHT: 127.9 KG
BH CV ECHO MEAS - CI(AO): 6.1 L/MIN/M^2
BH CV ECHO MEAS - CI(LVOT): 3.1 L/MIN/M^2
BH CV ECHO MEAS - CO(AO): 13.8 L/MIN
BH CV ECHO MEAS - CO(LVOT): 7.1 L/MIN
BH CV ECHO MEAS - EDV(CUBED): 89.2 ML
BH CV ECHO MEAS - EDV(MOD-SP4): 108.9 ML
BH CV ECHO MEAS - EDV(TEICH): 90.9 ML
BH CV ECHO MEAS - EF(CUBED): 67.7 %
BH CV ECHO MEAS - EF(MOD-BP): 68 %
BH CV ECHO MEAS - EF(MOD-SP4): 68.1 %
BH CV ECHO MEAS - EF(TEICH): 59.4 %
BH CV ECHO MEAS - ESV(CUBED): 28.8 ML
BH CV ECHO MEAS - ESV(MOD-SP4): 34.8 ML
BH CV ECHO MEAS - ESV(TEICH): 36.9 ML
BH CV ECHO MEAS - FS: 31.4 %
BH CV ECHO MEAS - IVS/LVPW: 0.95
BH CV ECHO MEAS - IVSD: 1.1 CM
BH CV ECHO MEAS - LA DIMENSION(2D): 4 CM
BH CV ECHO MEAS - LV DIASTOLIC VOL/BSA (35-75): 47.6 ML/M^2
BH CV ECHO MEAS - LV MASS(C)D: 182.3 GRAMS
BH CV ECHO MEAS - LV MASS(C)DI: 79.6 GRAMS/M^2
BH CV ECHO MEAS - LV MAX PG: 5.6 MMHG
BH CV ECHO MEAS - LV MEAN PG: 3 MMHG
BH CV ECHO MEAS - LV SYSTOLIC VOL/BSA (12-30): 15.2 ML/M^2
BH CV ECHO MEAS - LV V1 MAX: 118.3 CM/SEC
BH CV ECHO MEAS - LV V1 MEAN: 80.7 CM/SEC
BH CV ECHO MEAS - LV V1 VTI: 22.4 CM
BH CV ECHO MEAS - LVIDD: 4.5 CM
BH CV ECHO MEAS - LVIDS: 3.1 CM
BH CV ECHO MEAS - LVOT AREA: 4 CM^2
BH CV ECHO MEAS - LVOT DIAM: 2.3 CM
BH CV ECHO MEAS - LVPWD: 1.2 CM
BH CV ECHO MEAS - MV A MAX VEL: 114.4 CM/SEC
BH CV ECHO MEAS - MV DEC SLOPE: 594.9 CM/SEC^2
BH CV ECHO MEAS - MV DEC TIME: 0.15 SEC
BH CV ECHO MEAS - MV E MAX VEL: 88.9 CM/SEC
BH CV ECHO MEAS - MV E/A: 0.78
BH CV ECHO MEAS - MV MAX PG: 6.1 MMHG
BH CV ECHO MEAS - MV MEAN PG: 3.7 MMHG
BH CV ECHO MEAS - MV V2 MAX: 123.8 CM/SEC
BH CV ECHO MEAS - MV V2 MEAN: 93 CM/SEC
BH CV ECHO MEAS - MV V2 VTI: 33.3 CM
BH CV ECHO MEAS - MVA(VTI): 2.7 CM^2
BH CV ECHO MEAS - PA ACC TIME: 0.11 SEC
BH CV ECHO MEAS - PA MAX PG (FULL): 0.56 MMHG
BH CV ECHO MEAS - PA MAX PG: 6 MMHG
BH CV ECHO MEAS - PA MEAN PG (FULL): 0.31 MMHG
BH CV ECHO MEAS - PA MEAN PG: 3 MMHG
BH CV ECHO MEAS - PA PR(ACCEL): 27.6 MMHG
BH CV ECHO MEAS - PA V2 MAX: 122.5 CM/SEC
BH CV ECHO MEAS - PA V2 MEAN: 78.1 CM/SEC
BH CV ECHO MEAS - PA V2 VTI: 22.3 CM
BH CV ECHO MEAS - PULM A REVS DUR: 0.08 SEC
BH CV ECHO MEAS - PULM A REVS VEL: 30 CM/SEC
BH CV ECHO MEAS - PULM DIAS VEL: 56.6 CM/SEC
BH CV ECHO MEAS - PULM S/D: 1.3
BH CV ECHO MEAS - PULM SYS VEL: 73.9 CM/SEC
BH CV ECHO MEAS - PVA(I,A): 3.7 CM^2
BH CV ECHO MEAS - PVA(I,D): 3.7 CM^2
BH CV ECHO MEAS - PVA(V,A): 3.4 CM^2
BH CV ECHO MEAS - PVA(V,D): 3.4 CM^2
BH CV ECHO MEAS - QP/QS: 0.92
BH CV ECHO MEAS - RAP SYSTOLE: 3 MMHG
BH CV ECHO MEAS - RV MAX PG: 5.4 MMHG
BH CV ECHO MEAS - RV MEAN PG: 2.7 MMHG
BH CV ECHO MEAS - RV V1 MAX: 116.7 CM/SEC
BH CV ECHO MEAS - RV V1 MEAN: 72 CM/SEC
BH CV ECHO MEAS - RV V1 VTI: 23.3 CM
BH CV ECHO MEAS - RVDD: 2.3 CM
BH CV ECHO MEAS - RVOT AREA: 3.6 CM^2
BH CV ECHO MEAS - RVOT DIAM: 2.1 CM
BH CV ECHO MEAS - RVSP: 23.2 MMHG
BH CV ECHO MEAS - SI(AO): 76.8 ML/M^2
BH CV ECHO MEAS - SI(CUBED): 26.4 ML/M^2
BH CV ECHO MEAS - SI(LVOT): 39.5 ML/M^2
BH CV ECHO MEAS - SI(MOD-SP4): 32.4 ML/M^2
BH CV ECHO MEAS - SI(TEICH): 23.6 ML/M^2
BH CV ECHO MEAS - SV(AO): 175.7 ML
BH CV ECHO MEAS - SV(CUBED): 60.4 ML
BH CV ECHO MEAS - SV(LVOT): 90.5 ML
BH CV ECHO MEAS - SV(MOD-SP4): 74.1 ML
BH CV ECHO MEAS - SV(RVOT): 83.5 ML
BH CV ECHO MEAS - SV(TEICH): 54 ML
BH CV ECHO MEAS - TR MAX VEL: 224.5 CM/SEC

## 2020-06-21 PROCEDURE — 93306 TTE W/DOPPLER COMPLETE: CPT | Performed by: INTERNAL MEDICINE

## 2020-06-25 ENCOUNTER — OFFICE VISIT (OUTPATIENT)
Dept: FAMILY MEDICINE CLINIC | Facility: CLINIC | Age: 50
End: 2020-06-25

## 2020-06-25 VITALS
BODY MASS INDEX: 45.93 KG/M2 | DIASTOLIC BLOOD PRESSURE: 65 MMHG | OXYGEN SATURATION: 93 % | TEMPERATURE: 96.6 F | HEART RATE: 84 BPM | WEIGHT: 276 LBS | SYSTOLIC BLOOD PRESSURE: 102 MMHG

## 2020-06-25 DIAGNOSIS — R49.0 HOARSENESS OF VOICE: ICD-10-CM

## 2020-06-25 DIAGNOSIS — J44.9 CHRONIC OBSTRUCTIVE PULMONARY DISEASE, UNSPECIFIED COPD TYPE (HCC): Chronic | ICD-10-CM

## 2020-06-25 DIAGNOSIS — M79.89 SWELLING OF LOWER EXTREMITY: ICD-10-CM

## 2020-06-25 DIAGNOSIS — R32 URINARY INCONTINENCE, UNSPECIFIED TYPE: ICD-10-CM

## 2020-06-25 DIAGNOSIS — F17.200 TOBACCO DEPENDENCY: Chronic | ICD-10-CM

## 2020-06-25 DIAGNOSIS — I10 ESSENTIAL HYPERTENSION: Primary | ICD-10-CM

## 2020-06-25 PROBLEM — N39.3 STRESS INCONTINENCE: Status: ACTIVE | Noted: 2020-06-23

## 2020-06-25 PROBLEM — R11.2 NAUSEA VOMITING AND DIARRHEA: Status: RESOLVED | Noted: 2020-02-25 | Resolved: 2020-06-25

## 2020-06-25 PROBLEM — R19.7 NAUSEA VOMITING AND DIARRHEA: Status: RESOLVED | Noted: 2020-02-25 | Resolved: 2020-06-25

## 2020-06-25 PROCEDURE — 99214 OFFICE O/P EST MOD 30 MIN: CPT | Performed by: PHYSICIAN ASSISTANT

## 2020-06-25 RX ORDER — UREA 10 %
1 LOTION (ML) TOPICAL DAILY
COMMUNITY

## 2020-06-25 RX ORDER — ESTRADIOL 0.1 MG/G
CREAM VAGINAL
COMMUNITY
Start: 2020-06-23 | End: 2022-09-08

## 2020-06-25 RX ORDER — FUROSEMIDE 20 MG/1
TABLET ORAL
Qty: 90 TABLET | Refills: 2
Start: 2020-06-25 | End: 2020-07-06

## 2020-06-25 NOTE — PROGRESS NOTES
Subjective  Merry Thornton is a 50 y.o. female     History of Present Illness  Patient is a 50-year-old white female here for 4-week follow-up on numerous medical conditions.  She is giving me an update today regarding her current medical problems which include:    1.  Hoarseness: Patient states she was seen by gastroenterology of [facility], and upper to endoscopy was done, she was found to have a fungal infection/yeast on the vocal cords which is what they suspect is causing her hoarseness.  She was referred to ear nose and throat specialist.  They did an upper scope and looked at the vocal cords, they have her scheduled to follow-up with them tomorrow for an additional scope and July 7 to discuss vocal cord surgery.  She was then referred to Dr. Varela, infectious disease, due to chronic fungal infection.  She states she follows up with him in July.    2.  Urinary incontinence: Patient has followed up with urogynecology, Dr. Laguerre, who recommended a pessary.  Patient has an appointment to follow-up with her on July 8 to have the pessary placed.  She is scheduled for pelvic floor physical therapy July 15.    3.  COPD: Patient is currently seeing Dr. Jesus, her next appointment is July 9 for routine follow-up appointment.    4.  Lower extremity edema: Patient was seen by Dr. Shah, he increased her Lasix to 40 mg in the morning and 20 mg in the evening.  She had an echocardiogram done in June 17 which I reviewed.  She states that she continues to have some lower extremity swelling that bothers her.    5.  Cervical cancer screening: Patient is scheduled with OB/GYN of [facility] for a routine Pap and pelvic on July 15.    The following portions of the patient's history were reviewed and updated as appropriate: allergies, current medications, past family history, past medical history, past social history, past surgical history and problem list.    Review of Systems   Constitutional: Positive for fatigue.    HENT: Positive for sore throat and voice change.    Respiratory: Positive for wheezing. Negative for shortness of breath.    Cardiovascular: Positive for leg swelling. Negative for chest pain.   Genitourinary: Positive for urinary incontinence.   Psychiatric/Behavioral: Positive for stress.       Objective  Physical Exam   Constitutional: She is oriented to person, place, and time. She appears well-developed and well-nourished. She is morbidly obese.  Eyes: Pupils are equal, round, and reactive to light.   Cardiovascular: Normal rate, regular rhythm and normal heart sounds.   Pulmonary/Chest: Effort normal. She has wheezes (expiratory throughout).   Neurological: She is alert and oriented to person, place, and time.   Skin:        Psychiatric: She has a normal mood and affect. Her behavior is normal.       Vitals:    06/25/20 1331   BP: 102/65   BP Location: Right arm   Patient Position: Sitting   Cuff Size: Adult   Pulse: 84   Temp: 96.6 °F (35.9 °C)   TempSrc: Temporal   SpO2: 93%   Weight: 125 kg (276 lb)     Body mass index is 45.93 kg/m².    PHQ-9 Total Score:        Assessment/Plan  Diagnoses and all orders for this visit:    1. Essential hypertension (Primary)  Comments:  Blood pressure is on the low side, likely due to the Lasix increased dose.  Recommended she discontinue the amlodipine and increase Lasix.    2. Chronic obstructive pulmonary disease, unspecified COPD type (CMS/[facility])  Comments:  Stable, patient to continue following up with Dr. Jesus.      3. Tobacco dependency  Comments:  Patient continues to smoke.    4. Swelling of lower extremity  Comments:  Recommended patient discontinue the amlodipine and increase Lasix to 40 mg twice daily.      5. Urinary incontinence, unspecified type  Comments:  Patient to continue following up with urogynecology for the pessary.    6. Hoarseness of voice  Comments:  Patient to continue following up with ENT and infectious disease.  Requesting records from their  offices.    Other orders  -     furosemide (Lasix) 20 MG tablet; Take 2 tablets in am and 2 in the pm  Dispense: 90 tablet; Refill: 2

## 2020-06-26 RX ORDER — BUDESONIDE 3 MG/1
CAPSULE, COATED PELLETS ORAL
Qty: 90 CAPSULE | Refills: 0 | Status: SHIPPED | OUTPATIENT
Start: 2020-06-26 | End: 2020-07-14

## 2020-06-30 ENCOUNTER — TELEPHONE (OUTPATIENT)
Dept: CARDIOLOGY | Facility: CLINIC | Age: 50
End: 2020-06-30

## 2020-07-03 DIAGNOSIS — F41.1 GENERALIZED ANXIETY DISORDER: Chronic | ICD-10-CM

## 2020-07-06 RX ORDER — FUROSEMIDE 20 MG/1
TABLET ORAL
Qty: 120 TABLET | Refills: 2
Start: 2020-07-06 | End: 2020-07-14 | Stop reason: SDUPTHER

## 2020-07-06 RX ORDER — ALPRAZOLAM 0.5 MG/1
TABLET ORAL
Qty: 90 TABLET | Refills: 0 | OUTPATIENT
Start: 2020-07-06

## 2020-07-08 ENCOUNTER — TELEPHONE (OUTPATIENT)
Dept: CARDIOLOGY | Facility: CLINIC | Age: 50
End: 2020-07-08

## 2020-07-08 NOTE — TELEPHONE ENCOUNTER
Nany from Dr Khan's office called to check on status of cardiac clearance sent to us on 6/30. [phone]

## 2020-07-13 RX ORDER — TRAZODONE HYDROCHLORIDE 100 MG/1
TABLET ORAL
Qty: 90 TABLET | Refills: 1 | OUTPATIENT
Start: 2020-07-13

## 2020-07-14 RX ORDER — FUROSEMIDE 20 MG/1
20 TABLET ORAL NIGHTLY
Qty: 90 TABLET | Refills: 1 | Status: SHIPPED | OUTPATIENT
Start: 2020-07-14 | End: 2021-02-02

## 2020-07-14 RX ORDER — TRAZODONE HYDROCHLORIDE 100 MG/1
TABLET ORAL
Qty: 90 TABLET | Refills: 1 | OUTPATIENT
Start: 2020-07-14

## 2020-07-14 RX ORDER — BUDESONIDE 3 MG/1
CAPSULE, COATED PELLETS ORAL
Qty: 90 CAPSULE | Refills: 1 | Status: SHIPPED | OUTPATIENT
Start: 2020-07-14 | End: 2020-08-21

## 2020-07-14 RX ORDER — FUROSEMIDE 20 MG/1
TABLET ORAL
Qty: 120 TABLET | Refills: 2
Start: 2020-07-14 | End: 2020-08-06 | Stop reason: SDUPTHER

## 2020-07-14 NOTE — TELEPHONE ENCOUNTER
I called the patient and verify that she is not taking trazodone.  Please call the pharmacy and let them know to discontinue it.

## 2020-07-14 NOTE — TELEPHONE ENCOUNTER
[facility] is calling because they do her medicine on a tray and she is completely out of the Trazadone.   I see that it was denied.

## 2020-07-14 NOTE — TELEPHONE ENCOUNTER
Called Tiny at [facility] and informed her of this and she will update the system.

## 2020-07-23 RX ORDER — POTASSIUM CHLORIDE 750 MG/1
10 TABLET, FILM COATED, EXTENDED RELEASE ORAL 2 TIMES DAILY
Qty: 180 TABLET | Refills: 1 | Status: SHIPPED | OUTPATIENT
Start: 2020-07-23 | End: 2021-01-05 | Stop reason: SDUPTHER

## 2020-07-28 ENCOUNTER — E-VISIT (OUTPATIENT)
Dept: FAMILY MEDICINE CLINIC | Facility: CLINIC | Age: 50
End: 2020-07-28

## 2020-07-28 DIAGNOSIS — R05.9 COUGH: ICD-10-CM

## 2020-07-28 DIAGNOSIS — J44.1 COPD EXACERBATION (HCC): Primary | ICD-10-CM

## 2020-07-28 PROCEDURE — 99213 OFFICE O/P EST LOW 20 MIN: CPT | Performed by: PHYSICIAN ASSISTANT

## 2020-07-28 RX ORDER — PREDNISONE 10 MG/1
TABLET ORAL
Qty: 20 TABLET | Refills: 0 | Status: SHIPPED | OUTPATIENT
Start: 2020-07-28 | End: 2020-09-21

## 2020-07-28 RX ORDER — LEVOFLOXACIN 750 MG/1
750 TABLET ORAL DAILY
Qty: 10 TABLET | Refills: 0 | Status: SHIPPED | OUTPATIENT
Start: 2020-07-28 | End: 2020-08-07

## 2020-07-28 RX ORDER — GUAIFENESIN AND CODEINE PHOSPHATE 100; 10 MG/5ML; MG/5ML
5 SOLUTION ORAL 4 TIMES DAILY PRN
Qty: 236 ML | Refills: 0 | Status: SHIPPED | OUTPATIENT
Start: 2020-07-28 | End: 2021-02-02

## 2020-07-28 NOTE — PROGRESS NOTES
Merry MORA Norberto    1970  [id number]    I have reviewed the e-Visit questionnaire and patient's answers, my assessment and plan are as follows:    HPI  Patient is a 50-year-old white female complaining of cough, shortness of breath, wheezing for the past 5 days since having a polyp removed from her vocal cord.  She does have COPD and exacerbates frequently.  Prior to the surgery she was tested for COVID-19 and was found to be negative.  Today she states that she was started on cefdinir twice daily following her surgery, however she is continued to have a 100.4 degree temperature, cough, congestion, chest pressure and wheezing despite taking the medication.      Review of Systems - History obtained from the patient, I spoke with her on the phone, reference questionnaire.          Diagnoses and all orders for this visit:    COPD exacerbation (CMS/[facility])  Comments:  New, treating with Levaquin, prednisone, codeine cough syrup.  She is to follow-up in the office if not improving.    Cough  Comments:  New, treating with codeine cough syrup due to recent polypectomy from vocal cord.  Orders:  -     guaiFENesin-codeine (GUAIFENESIN AC) 100-10 MG/5ML liquid; Take 5 mL by mouth 4 (Four) Times a Day As Needed for Cough.    Other orders  -     predniSONE (DELTASONE) 10 MG tablet; 4 tabs daily x 2 days then 3 tabs daily x 2 days then 2 tabs daily x 2 days then 1 tab daily x 2 days  -     levoFLOXacin (Levaquin) 750 MG tablet; Take 1 tablet by mouth Daily for 10 days.        Any medications prescribed have been sent electronically to   [facility] - [place], [place] - [address] [place] [address] [address] - [phone]  - [phone] [address]  [place] [address]  Phone: [phone] Fax: [phone]        SHUBHAM Russ  07/28/20  4:05 PM

## 2020-07-29 DIAGNOSIS — F33.1 MAJOR DEPRESSIVE DISORDER, RECURRENT EPISODE, MODERATE (HCC): ICD-10-CM

## 2020-07-30 RX ORDER — VILAZODONE HYDROCHLORIDE 20 MG/1
20 TABLET ORAL
Qty: 90 TABLET | Refills: 1 | Status: SHIPPED | OUTPATIENT
Start: 2020-07-30 | End: 2020-11-11 | Stop reason: SDUPTHER

## 2020-08-04 ENCOUNTER — PATIENT MESSAGE (OUTPATIENT)
Dept: FAMILY MEDICINE CLINIC | Facility: CLINIC | Age: 50
End: 2020-08-04

## 2020-08-04 PROBLEM — F12.10 MARIJUANA ABUSE: Status: RESOLVED | Noted: 2020-02-25 | Resolved: 2020-08-04

## 2020-08-04 RX ORDER — METOCLOPRAMIDE 10 MG/1
10 TABLET ORAL 4 TIMES DAILY
Qty: 120 TABLET | Refills: 1 | Status: SHIPPED | OUTPATIENT
Start: 2020-08-04 | End: 2020-09-10

## 2020-08-05 DIAGNOSIS — F41.1 GENERALIZED ANXIETY DISORDER: Chronic | ICD-10-CM

## 2020-08-06 ENCOUNTER — HOSPITAL ENCOUNTER (OUTPATIENT)
Dept: CARDIOLOGY | Facility: HOSPITAL | Age: 50
Discharge: HOME | End: 2020-08-06
Admitting: PHYSICIAN ASSISTANT

## 2020-08-06 ENCOUNTER — OFFICE VISIT (OUTPATIENT)
Dept: CARDIOLOGY | Facility: CLINIC | Age: 50
End: 2020-08-06

## 2020-08-06 VITALS
DIASTOLIC BLOOD PRESSURE: 78 MMHG | BODY MASS INDEX: 46.32 KG/M2 | OXYGEN SATURATION: 98 % | HEART RATE: 97 BPM | SYSTOLIC BLOOD PRESSURE: 126 MMHG | HEIGHT: 65 IN | WEIGHT: 278 LBS

## 2020-08-06 DIAGNOSIS — I10 ESSENTIAL HYPERTENSION: ICD-10-CM

## 2020-08-06 DIAGNOSIS — I25.10 CORONARY ARTERY DISEASE INVOLVING NATIVE CORONARY ARTERY OF NATIVE HEART WITHOUT ANGINA PECTORIS: Primary | ICD-10-CM

## 2020-08-06 DIAGNOSIS — R06.02 SHORTNESS OF BREATH: ICD-10-CM

## 2020-08-06 DIAGNOSIS — G47.33 OBSTRUCTIVE SLEEP APNEA: ICD-10-CM

## 2020-08-06 DIAGNOSIS — J43.9 PULMONARY EMPHYSEMA, UNSPECIFIED EMPHYSEMA TYPE (HCC): ICD-10-CM

## 2020-08-06 DIAGNOSIS — I73.9 PVD (PERIPHERAL VASCULAR DISEASE) WITH CLAUDICATION (HCC): ICD-10-CM

## 2020-08-06 DIAGNOSIS — R22.42 LOCALIZED SWELLING OF LEFT LOWER EXTREMITY: Primary | ICD-10-CM

## 2020-08-06 PROBLEM — J38.1 POLYP OF VOCAL CORD OR LARYNX: Status: ACTIVE | Noted: 2020-06-29

## 2020-08-06 PROBLEM — R13.10 DYSPHAGIA: Status: ACTIVE | Noted: 2020-08-03

## 2020-08-06 PROBLEM — F17.210 CIGARETTE SMOKER: Status: ACTIVE | Noted: 2020-08-03

## 2020-08-06 LAB
BH CV LOWER VASCULAR LEFT COMMON FEMORAL AUGMENT: NORMAL
BH CV LOWER VASCULAR LEFT COMMON FEMORAL COMPETENT: NORMAL
BH CV LOWER VASCULAR LEFT COMMON FEMORAL COMPRESS: NORMAL
BH CV LOWER VASCULAR LEFT COMMON FEMORAL PHASIC: NORMAL
BH CV LOWER VASCULAR LEFT COMMON FEMORAL SPONT: NORMAL
BH CV LOWER VASCULAR LEFT DISTAL FEMORAL COMPRESS: NORMAL
BH CV LOWER VASCULAR LEFT GASTRONEMIUS COMPRESS: NORMAL
BH CV LOWER VASCULAR LEFT GREATER SAPH AK COMPRESS: NORMAL
BH CV LOWER VASCULAR LEFT GREATER SAPH BK COMPRESS: NORMAL
BH CV LOWER VASCULAR LEFT LESSER SAPH COMPRESS: NORMAL
BH CV LOWER VASCULAR LEFT MID FEMORAL AUGMENT: NORMAL
BH CV LOWER VASCULAR LEFT MID FEMORAL COMPETENT: NORMAL
BH CV LOWER VASCULAR LEFT MID FEMORAL COMPRESS: NORMAL
BH CV LOWER VASCULAR LEFT MID FEMORAL PHASIC: NORMAL
BH CV LOWER VASCULAR LEFT MID FEMORAL SPONT: NORMAL
BH CV LOWER VASCULAR LEFT PERONEAL COMPRESS: NORMAL
BH CV LOWER VASCULAR LEFT POPLITEAL AUGMENT: NORMAL
BH CV LOWER VASCULAR LEFT POPLITEAL COMPETENT: NORMAL
BH CV LOWER VASCULAR LEFT POPLITEAL COMPRESS: NORMAL
BH CV LOWER VASCULAR LEFT POPLITEAL PHASIC: NORMAL
BH CV LOWER VASCULAR LEFT POPLITEAL SPONT: NORMAL
BH CV LOWER VASCULAR LEFT POSTERIOR TIBIAL COMPRESS: NORMAL
BH CV LOWER VASCULAR LEFT PROXIMAL FEMORAL COMPRESS: NORMAL
BH CV LOWER VASCULAR LEFT SAPHENOFEMORAL JUNCTION COMPRESS: NORMAL
BH CV LOWER VASCULAR RIGHT COMMON FEMORAL AUGMENT: NORMAL
BH CV LOWER VASCULAR RIGHT COMMON FEMORAL COMPETENT: NORMAL
BH CV LOWER VASCULAR RIGHT COMMON FEMORAL COMPRESS: NORMAL
BH CV LOWER VASCULAR RIGHT COMMON FEMORAL PHASIC: NORMAL
BH CV LOWER VASCULAR RIGHT COMMON FEMORAL SPONT: NORMAL

## 2020-08-06 PROCEDURE — 99214 OFFICE O/P EST MOD 30 MIN: CPT | Performed by: INTERNAL MEDICINE

## 2020-08-06 PROCEDURE — 93971 EXTREMITY STUDY: CPT

## 2020-08-06 RX ORDER — MULTIVITAMIN WITH IRON
250 TABLET ORAL DAILY
COMMUNITY

## 2020-08-06 RX ORDER — ONDANSETRON 4 MG/1
4 TABLET, ORALLY DISINTEGRATING ORAL EVERY 8 HOURS PRN
Qty: 60 TABLET | Refills: 5 | Status: SHIPPED | OUTPATIENT
Start: 2020-08-06 | End: 2021-09-29 | Stop reason: SDUPTHER

## 2020-08-06 RX ORDER — ROPINIROLE 0.25 MG/1
0.25 TABLET, FILM COATED ORAL NIGHTLY
COMMUNITY
End: 2020-08-11

## 2020-08-06 NOTE — PROGRESS NOTES
Date of Office Visit: 2020  Encounter Provider: Dr. Shiraz Shah  Place of Service: [facility] CARDIOLOGY [place]  Patient Name: Merry Thornton  :1970  Nivia Alonzo PA    Chief Complaint   Patient presents with   • Hypertension     History of Present Illness    I am pleased to see Mrs. Thornton in my office today as a follow-up.    As you know, patient is 50 years old white female whose past medical history significant for SLE, chronic pain syndrome, COPD, history of SVT who came today for follow-up..    Patient was initially presented to me for symptom of syncope. In 2016, patient had syncopal episode.  Patient underwent stress test which was abnormal consistent with apical ischemia.  Echocardiogram showed EF of 60% and no significant valvular heart disease.  Holter monitor showed few PVCs.  Patient underwent cardiac catheterization in  and showed no significant CAD.  Patient was started on  bisoprolol. In , patient had EP study and ablation details and record of these procedures are not available to me.  It was done in the [facility].  In 2017, Holter monitor showed moderate density of PVCs but no SVT or VT noted. Patient underwent loop recorder implantation further monitoring of palpitation and arrhythmia and syncope.    On previous visit I increased diuretic and asked her to decrease fluid and water intake.  Salt restrictions are also requested.  Patient reports that her edema has improved.  She still have leg edema but it is much better.  Patient underwent echocardiogram which showed EF of 60 to 65% and normal pulmonary artery/RV systolic pressure was noted.    Patient continues to have shortness of breath.  Patient denies any chest pain.  Patient denies any orthopnea PND.  Exertional shortness of breath is present.  No syncope or presyncope.    Patient complaining of leg pain and claudication I would proceed with KEYONNA index.    Past Medical History:   Diagnosis Date   •  CAD (coronary artery disease) 12/20/2016    dr. singh   • Carpal tunnel syndrome on right 03/08/2017   • Common migraine 12/20/2016   • COPD (chronic obstructive pulmonary disease) (CMS/HCC) 12/20/2016   • Cyst, ovarian 12/20/2016    mass   • DDD (degenerative disc disease), cervical 03/11/2016   • Dental caries 01/14/2019   • Depression 11/02/2017   • Depression, major, recurrent, moderate (CMS/HCC) 08/25/2016   • Dietary counseling 09/21/2017   • Dysphagia 05/2020   • Elevated random blood glucose level 5/14/2020   • Encounter for smoking cessation counseling 09/21/2017   • Exacerbation of systemic lupus (CMS/HCC) 04/30/2019   • Fibromyalgia 12/20/2016   • Generalized anxiety disorder 03/11/2016   • GERD (gastroesophageal reflux disease) 12/20/2016   • Heartburn 11/02/2017   • Hypertension 03/16/2016   • Hyperthyroidism 03/11/2016   • Hypocalcemia 09/21/2017   • IBS (irritable colon syndrome) 09/13/2016   • Immobility syndrome 09/14/2017   • Intracranial pressure increased 12/20/2016   • Left knee pain 09/21/2017   • Lower back pain 02/01/2018   • Lung nodules 09/07/2016   • Morbid obesity (CMS/HCC) 11/02/2017   • Muscle cramp 09/21/2017   • Neck pain 01/14/2019   • Need for prophylactic vaccination and inoculation against influenza 09/21/2017   • Neurogenic bladder 12/20/2016   • Obesity 03/07/2017   • Obesity (BMI 30-39.9) 5/14/2020   • Orthostatic hypotension 07/17/2017   • Osteoarthritis 03/07/2017   • Osteopenia 01/14/2019   • Osteoporosis 12/20/2016   • Other instability, right ankle 04/13/2017   • Overlap syndrome (CMS/HCC)     scleroderma, lupus, Raynaud's. Mixxed connective Tissue D/o   • Pain, joint, ankle, left 09/21/2017   • Palpitations 10/04/2017   • Paresthesia 06/12/2017    facial   • Peripheral neuropathy 12/20/2016    bilateral lower extremities   • Poor vision    • Prediabetes    • Raynaud's syndrome 12/20/2016   • Retinopathy    • Right knee pain 11/08/2016   • Scleroderma (CMS/HCC) 12/20/2016    • Seasonal allergic rhinitis 12/20/2016   • SLE (systemic lupus erythematosus) (CMS/[facility]) 03/16/2017   • Sleep apnea 11/02/2017    BIPAP   • Sleep disorder 01/14/2019   • Sprain of unspecified site of right knee, subsequent encounter 12/01/2016   • Status post placement of implantable loop recorder 05/09/2018    presence   • Syncope and collapse 2020    still has occassionally   • Ulcerative colitis (CMS/[facility]) 02/2020   • Urine incontinence    • Vasovagal syncope    • Ventricular arrhythmia 03/16/2016   • Visit for screening mammogram 12/20/2016   • Vitamin D deficiency 01/14/2019   • Wheezing          Past Surgical History:   Procedure Laterality Date   • ANKLE SURGERY Right 02/2017   • BREAST EXCISIONAL BIOPSY Right     years ago   • BRONCHOSCOPY     • CARDIAC ABLATION  01/2000   • CARDIAC ELECTROPHYSIOLOGY PROCEDURE     • CHOLECYSTECTOMY     • COLON SURGERY     • COLONOSCOPY N/A 2/26/2020    Procedure: COLONOSCOPY WITH BIOPSY X1 AREA;  Surgeon: Michael Cheng MD;  Location: [facility] ENDOSCOPY;  Service: Gastroenterology;  Laterality: N/A;  diarrhea   • ECHO - CONVERTED  2020   • ENDOSCOPY     • ENDOSCOPY N/A 5/21/2020    Procedure: ESOPHAGOGASTRODUODENOSCOPY WITH DILATATION (#54, 60 bougie);  Surgeon: Michael Cheng MD;  Location: [facility] ENDOSCOPY;  Service: Gastroenterology;  Laterality: N/A;  Post: candidiasis, upper esophagus sphincter stricture   • KNEE SURGERY Right 09/2017   • OTHER SURGICAL HISTORY      Urerine hysterectomy   • OTHER SURGICAL HISTORY      t and a   • OTHER SURGICAL HISTORY      d&c   • OTHER SURGICAL HISTORY      bladder stim   • OTHER SURGICAL HISTORY      Loop recorder placement   • WRIST SURGERY Right            Current Outpatient Medications:   •  acetaZOLAMIDE (DIAMOX) 250 MG tablet, Take 250 mg by mouth Daily., Disp: , Rfl:   •  albuterol sulfate  (90 Base) MCG/ACT inhaler, Inhale 2 puffs Every 4 (Four) Hours As Needed for Wheezing., Disp: , Rfl:   •  ALPRAZolam (XANAX)  0.5 MG tablet, Take 1 tablet by mouth 3 (Three) Times a Day As Needed for Anxiety., Disp: 90 tablet, Rfl: 0  •  Budesonide (ENTOCORT EC) 3 MG 24 hr capsule, TAKE 3 CAPSULES BY MOUTH DAILY, Disp: 90 capsule, Rfl: 1  •  cloNIDine (CATAPRES) 0.1 MG tablet, Take 1 tablet by mouth 3 (Three) Times a Day., Disp: 270 tablet, Rfl: 1  •  clotrimazole (MYCELEX) 10 MG abundio, Take 1 tablet by mouth Take As Directed., Disp: , Rfl:   •  Cyanocobalamin (B-12 COMPLIANCE INJECTION) 1000 MCG/ML kit, Inject  as directed. Inject twice monthly, Disp: , Rfl:   •  Eluxadoline 100 MG tablet, Take 100 mg by mouth 2 (Two) Times a Day., Disp: 60 tablet, Rfl: 2  •  estradiol (ESTRACE) 0.1 MG/GM vaginal cream, Do not use Applicator. Apply pea-size amount with fingertip nightly x2 weeks, then apply 2-3 times per week thereafter, Disp: , Rfl:   •  fluconazole (DIFLUCAN) 100 MG tablet, , Disp: , Rfl:   •  folic acid (FOLVITE) 800 MCG tablet, Take 1 tablet by mouth Daily., Disp: , Rfl:   •  furosemide (Lasix) 20 MG tablet, Take 1 tablet by mouth Every Night. PRN swelling, Disp: 90 tablet, Rfl: 1  •  gabapentin (NEURONTIN) 600 MG tablet, Take 600 mg by mouth 3 (Three) Times a Day., Disp: , Rfl:   •  guaiFENesin-codeine (GUAIFENESIN AC) 100-10 MG/5ML liquid, Take 5 mL by mouth 4 (Four) Times a Day As Needed for Cough., Disp: 236 mL, Rfl: 0  •  HYDROcodone-acetaminophen (NORCO)  MG per tablet, Take 1 tablet by mouth Every 8 (Eight) Hours As Needed for Moderate Pain ., Disp: , Rfl:   •  hydroxychloroquine (PLAQUENIL) 200 MG tablet, Take 1 tablet by mouth 2 (Two) Times a Day for 90 days. Indications: Rheumatoid Arthritis, Disp: 180 tablet, Rfl: 1  •  ipratropium-albuterol (DUO-NEB) 0.5-2.5 mg/3 ml nebulizer, Take 3 mL by nebulization 4 (Four) Times a Day., Disp: 360 mL, Rfl: 1  •  levoFLOXacin (Levaquin) 750 MG tablet, Take 1 tablet by mouth Daily for 10 days., Disp: 10 tablet, Rfl: 0  •  lidocaine (LIDODERM) 5 %, Place 1 patch on the skin as  directed by provider Daily. Remove & Discard patch within 12 hours or as directed by MD, Disp: , Rfl:   •  lisinopril (PRINIVIL,ZESTRIL) 20 MG tablet, TAKE ONE TABLET BY MOUTH EVERY 12 HOURS, Disp: 180 tablet, Rfl: 1  •  loratadine (CLARITIN) 10 MG tablet, Take 10 mg by mouth Daily., Disp: , Rfl:   •  Magnesium 250 MG tablet, Take 250 mg by mouth Daily., Disp: , Rfl:   •  Melatonin 10 MG tablet, Take 1 tablet by mouth every night at bedtime., Disp: 30 tablet, Rfl: 2  •  metoclopramide (REGLAN) 10 MG tablet, TAKE 1 TABLET BY MOUTH 4 (FOUR) TIMES A DAY., Disp: 120 tablet, Rfl: 1  •  metoprolol tartrate (LOPRESSOR) 50 MG tablet, TAKE 1 TABLET BY MOUTH DAILY (Patient taking differently: Take 25 mg by mouth 2 (Two) Times a Day. Take dos), Disp: 30 tablet, Rfl: 5  •  nystatin (MYCOSTATIN) 750050 UNIT/ML suspension, Swish and swallow 5 mL 4 (Four) Times a Day., Disp: 473 mL, Rfl: 5  •  ondansetron ODT (ZOFRAN-ODT) 4 MG disintegrating tablet, Place 1 tablet on the tongue Every 8 (Eight) Hours As Needed for Nausea or Vomiting. (Patient taking differently: Place 4 mg on the tongue Every 8 (Eight) Hours As Needed for Nausea or Vomiting (can take up to 8 mg as needed).), Disp: 60 tablet, Rfl: 5  •  orphenadrine (NORFLEX) 100 MG 12 hr tablet, Take 100 mg by mouth 3 (Three) Times a Day. As needed, Disp: , Rfl:   •  pantoprazole (PROTONIX) 40 MG EC tablet, Take 40 mg by mouth Daily., Disp: , Rfl:   •  potassium chloride (K-DUR) 10 MEQ CR tablet, TAKE 1 TABLET BY MOUTH 2 (TWO) TIMES A DAY., Disp: 180 tablet, Rfl: 1  •  predniSONE (DELTASONE) 10 MG tablet, Take 1 tablet by mouth Daily., Disp: 90 tablet, Rfl: 1  •  predniSONE (DELTASONE) 10 MG tablet, 4 tabs daily x 2 days then 3 tabs daily x 2 days then 2 tabs daily x 2 days then 1 tab daily x 2 days, Disp: 20 tablet, Rfl: 0  •  promethazine (PHENERGAN) 25 MG tablet, Take 25 mg by mouth Daily., Disp: , Rfl:   •  rOPINIRole (REQUIP) 0.25 MG tablet, Take 0.25 mg by mouth Every Night.  Take 1 hour before bedtime., Disp: , Rfl:   •  SUMAtriptan (IMITREX) 100 MG tablet, Take 100 mg by mouth. One tab at onset of HA, after one hour use sumatriptan injection, Disp: , Rfl:   •  SUMAtriptan (IMITREX) 6 MG/0.5ML injection, Inject prescribed dose at onset of headache. May repeat dose one time in 1 hour(s) if headache not relieved., Disp: , Rfl:   •  theophylline (THEODUR) 300 MG 12 hr tablet, Take 300 mg by mouth Every Morning., Disp: , Rfl:   •  topiramate (TOPAMAX) 50 MG tablet, Take 50 mg by mouth Daily. Take dos, Disp: , Rfl:   •  Umeclidinium Bromide (Incruse Ellipta) 62.5 MCG/INH aerosol powder , Inhale 1 puff 2 (Two) Times a Day., Disp: , Rfl:   •  vilazodone (Viibryd) 20 MG tablet tablet, Take 1 tablet by mouth every night at bedtime., Disp: 90 tablet, Rfl: 1  •  vitamin D (ERGOCALCIFEROL) 1.25 MG (52901 UT) capsule capsule, TAKE 1 CAPSULE BY MOUTH ONCE WEEKLY (Patient taking differently: Take 50,000 Units by mouth Every 7 (Seven) Days. Sunday), Disp: 12 capsule, Rfl: 1      Social History     Socioeconomic History   • Marital status: Single     Spouse name: Not on file   • Number of children: Not on file   • Years of education: Not on file   • Highest education level: Not on file   Tobacco Use   • Smoking status: Current Some Day Smoker     Packs/day: 1.50     Years: 35.00     Pack years: 52.50     Types: Cigarettes   • Smokeless tobacco: Former User     Quit date: 5/11/2020   • Tobacco comment: using nicotine patches to quit   Substance and Sexual Activity   • Alcohol use: No     Frequency: Never   • Drug use: Not Currently     Frequency: 1.0 times per week     Types: Marijuana     Comment: stopped 2 months ago   • Sexual activity: Defer         Review of Systems   Constitution: Negative for chills and fever.   HENT: Negative for ear discharge and nosebleeds.    Eyes: Negative for discharge and redness.   Cardiovascular: Positive for leg swelling and palpitations. Negative for chest pain,  "orthopnea, paroxysmal nocturnal dyspnea and syncope.   Respiratory: Positive for shortness of breath. Negative for cough and wheezing.    Endocrine: Negative for heat intolerance.   Skin: Negative for rash.   Musculoskeletal: Positive for arthritis. Negative for myalgias.   Gastrointestinal: Negative for abdominal pain, melena, nausea and vomiting.   Genitourinary: Negative for dysuria and hematuria.   Neurological: Negative for dizziness, light-headedness, numbness and tremors.   Psychiatric/Behavioral: Negative for depression. The patient is not nervous/anxious.        Procedures    Procedures    No orders to display           Objective:    /78   Pulse 97   Ht 165.1 cm (65\")   Wt 126 kg (278 lb)   SpO2 98%   BMI 46.26 kg/m²         Physical Exam   Constitutional: She is oriented to person, place, and time. She appears well-developed and well-nourished.   HENT:   Head: Normocephalic and atraumatic.   Eyes: No scleral icterus.   Neck: No thyromegaly present.   Cardiovascular: Normal rate, regular rhythm and normal heart sounds. Exam reveals no gallop and no friction rub.   No murmur heard.  Pulmonary/Chest: Effort normal. No respiratory distress. She has wheezes. She has no rales.   Abdominal: There is no tenderness.   Musculoskeletal: She exhibits no edema.   Lymphadenopathy:     She has no cervical adenopathy.   Neurological: She is alert and oriented to person, place, and time.   Skin: No rash noted. No erythema.   Psychiatric: She has a normal mood and affect.           Assessment:       Diagnosis Plan   1. Coronary artery disease involving native coronary artery of native heart without angina pectoris  Doppler Ankle Brachial Index Single Level CAR   2. Essential hypertension  Doppler Ankle Brachial Index Single Level CAR   3. PVD (peripheral vascular disease) with claudication (CMS/[facility])  Doppler Ankle Brachial Index Single Level CAR   4. Shortness of breath  Doppler Ankle Brachial Index Single Level " CAR   5. Pulmonary emphysema, unspecified emphysema type (CMS/HCC)  Doppler Ankle Brachial Index Single Level CAR   6. Obstructive sleep apnea  Doppler Ankle Brachial Index Single Level CAR            Plan:       From a cardiac standpoint patient is doing well.  She has no significant CAD on cardiac catheterization previously echocardiogram showed preserved left ventricular function.  Her cardiac status is stable.  Her shortness of breath is probably due to underlying COPD, obesity, and obstructive sleep apnea.  Continue diuresis.  Salt and water restriction discussed.  Weight loss emphasized

## 2020-08-07 DIAGNOSIS — R60.0 EDEMA OF LEFT LOWER EXTREMITY: Primary | ICD-10-CM

## 2020-08-07 RX ORDER — ALPRAZOLAM 0.5 MG/1
TABLET ORAL
Qty: 60 TABLET | Refills: 2 | OUTPATIENT
Start: 2020-08-07

## 2020-08-07 RX ORDER — FUROSEMIDE 20 MG/1
TABLET ORAL
Qty: 60 TABLET | Refills: 2 | Status: SHIPPED | OUTPATIENT
Start: 2020-08-07 | End: 2021-08-12

## 2020-08-11 ENCOUNTER — OFFICE VISIT (OUTPATIENT)
Dept: NEUROLOGY | Facility: CLINIC | Age: 50
End: 2020-08-11

## 2020-08-11 VITALS
BODY MASS INDEX: 46.98 KG/M2 | HEIGHT: 65 IN | SYSTOLIC BLOOD PRESSURE: 160 MMHG | WEIGHT: 282 LBS | HEART RATE: 99 BPM | TEMPERATURE: 97.3 F | DIASTOLIC BLOOD PRESSURE: 89 MMHG

## 2020-08-11 DIAGNOSIS — E66.9 OBESITY (BMI 30-39.9): Chronic | ICD-10-CM

## 2020-08-11 DIAGNOSIS — G43.019 MIGRAINE WITHOUT AURA, INTRACTABLE: Primary | Chronic | ICD-10-CM

## 2020-08-11 DIAGNOSIS — G57.93 NEUROPATHY INVOLVING BOTH LOWER EXTREMITIES: Chronic | ICD-10-CM

## 2020-08-11 DIAGNOSIS — G47.33 OBSTRUCTIVE SLEEP APNEA SYNDROME: Chronic | ICD-10-CM

## 2020-08-11 DIAGNOSIS — G91.2 (IDIOPATHIC) NORMAL PRESSURE HYDROCEPHALUS (HCC): ICD-10-CM

## 2020-08-11 PROCEDURE — 99204 OFFICE O/P NEW MOD 45 MIN: CPT | Performed by: PSYCHIATRY & NEUROLOGY

## 2020-08-11 NOTE — PROGRESS NOTES
Subjective: Intracranial pressure    Patient ID: Merry Thornton is a 50 y.o. female.    CHIEF COMPLAINT: Leg numbness and tingling    Neuropathy, lower extremities   constant trouble with balance onset for 8 yrs. Started with left leg giving out on her. Over time noted numbness in lower ext from hips to feet.  Now sore to the touch.  Swelling in both legs , feet.   Has had emg years ago which showed some neuropathy, treated with gabapentin.   Mri of back shows ddd djd.   CTS. Had surgery on right arm for fracture, and cts, surgery    Has migraine headaches about three times per month severe    Previously seeing Dr. Loomis in [place], [place] for headaches, neuropathy and    Patient currently taking Imitrex, will get migraines about 3 a month. Starts in behind eyes, light sensitive they come on randomly.    Intracranial pressure, had LP for work up of possible MS, no MS, no vision changes reported  Follows with an opthalmologist, every 6 months, has retinopathy, not told of any sign of increased intracranial pressure.     Obesity, BMI-46.9  -----------------------------------  (Sleep apnea, see's Dr. Jesus currently not compliant uses oxygen every night 2 liters every night.   Sleep study done about 6 months ago waiting on getting implant.  Pt unable to use.)  -------------------------------  ( dx with lupus, fibromyalgia and connected tissue diz. Has markers with mixed auto immune dz. )    The following portions of the patient's history were reviewed and updated as appropriate: allergies, current medications, past family history, past medical history, past social history, past surgical history and problem list.      Family History   Problem Relation Age of Onset   • Cancer Mother    • Heart disease Father    • Lung disease Father    • Diabetes Sister    • Heart disease Sister    • Hypertension Sister    • Other Sister         weight disorder       Past Medical History:   Diagnosis Date   • CAD (coronary artery  disease) 12/20/2016    dr. singh   • Carpal tunnel syndrome on right 03/08/2017   • Common migraine 12/20/2016   • COPD (chronic obstructive pulmonary disease) (CMS/HCC) 12/20/2016   • Cyst, ovarian 12/20/2016    mass   • DDD (degenerative disc disease), cervical 03/11/2016   • Dental caries 01/14/2019   • Depression 11/02/2017   • Depression, major, recurrent, moderate (CMS/HCC) 08/25/2016   • Dietary counseling 09/21/2017   • Dysphagia 05/2020   • Elevated random blood glucose level 5/14/2020   • Encounter for smoking cessation counseling 09/21/2017   • Exacerbation of systemic lupus (CMS/HCC) 04/30/2019   • Fibromyalgia 12/20/2016   • Generalized anxiety disorder 03/11/2016   • GERD (gastroesophageal reflux disease) 12/20/2016   • Heartburn 11/02/2017   • Hypertension 03/16/2016   • Hyperthyroidism 03/11/2016   • Hypocalcemia 09/21/2017   • IBS (irritable colon syndrome) 09/13/2016   • Immobility syndrome 09/14/2017   • Intracranial pressure increased 12/20/2016   • Left knee pain 09/21/2017   • Lower back pain 02/01/2018   • Lung nodules 09/07/2016   • Morbid obesity (CMS/HCC) 11/02/2017   • Muscle cramp 09/21/2017   • Neck pain 01/14/2019   • Need for prophylactic vaccination and inoculation against influenza 09/21/2017   • Neurogenic bladder 12/20/2016   • Obesity 03/07/2017   • Obesity (BMI 30-39.9) 5/14/2020   • Orthostatic hypotension 07/17/2017   • Osteoarthritis 03/07/2017   • Osteopenia 01/14/2019   • Osteoporosis 12/20/2016   • Other instability, right ankle 04/13/2017   • Overlap syndrome (CMS/HCC)     scleroderma, lupus, Raynaud's. Mixxed connective Tissue D/o   • Pain, joint, ankle, left 09/21/2017   • Palpitations 10/04/2017   • Paresthesia 06/12/2017    facial   • Peripheral neuropathy 12/20/2016    bilateral lower extremities   • Poor vision    • Prediabetes    • Raynaud's syndrome 12/20/2016   • Retinopathy    • Right knee pain 11/08/2016   • Scleroderma (CMS/HCC) 12/20/2016   • Seasonal allergic  rhinitis 12/20/2016   • SLE (systemic lupus erythematosus) (CMS/[facility]) 03/16/2017   • Sleep apnea 11/02/2017    BIPAP   • Sleep disorder 01/14/2019   • Sprain of unspecified site of right knee, subsequent encounter 12/01/2016   • Status post placement of implantable loop recorder 05/09/2018    presence   • Syncope and collapse 2020    still has occassionally   • Ulcerative colitis (CMS/[facility]) 02/2020   • Urine incontinence    • Vasovagal syncope    • Ventricular arrhythmia 03/16/2016   • Visit for screening mammogram 12/20/2016   • Vitamin D deficiency 01/14/2019   • Wheezing        Social History     Socioeconomic History   • Marital status: Single     Spouse name: Not on file   • Number of children: Not on file   • Years of education: Not on file   • Highest education level: Not on file   Tobacco Use   • Smoking status: Current Some Day Smoker     Packs/day: 1.50     Years: 35.00     Pack years: 52.50     Types: Cigarettes   • Smokeless tobacco: Former User     Quit date: 5/11/2020   • Tobacco comment: using nicotine patches to quit   Substance and Sexual Activity   • Alcohol use: No     Frequency: Never   • Drug use: Not Currently     Frequency: 1.0 times per week     Types: Marijuana     Comment: stopped 2 months ago   • Sexual activity: Defer         Current Outpatient Medications:   •  acetaZOLAMIDE (DIAMOX) 250 MG tablet, Take 250 mg by mouth Daily., Disp: , Rfl:   •  albuterol sulfate  (90 Base) MCG/ACT inhaler, Inhale 2 puffs Every 4 (Four) Hours As Needed for Wheezing., Disp: , Rfl:   •  ALPRAZolam (XANAX) 0.5 MG tablet, Take 1 tablet by mouth 3 (Three) Times a Day As Needed for Anxiety., Disp: 90 tablet, Rfl: 0  •  Budesonide (ENTOCORT EC) 3 MG 24 hr capsule, TAKE 3 CAPSULES BY MOUTH DAILY, Disp: 90 capsule, Rfl: 1  •  cloNIDine (CATAPRES) 0.1 MG tablet, Take 1 tablet by mouth 3 (Three) Times a Day., Disp: 270 tablet, Rfl: 1  •  clotrimazole (MYCELEX) 10 MG abundio, Take 1 tablet by mouth Take As  Directed., Disp: , Rfl:   •  Cyanocobalamin (B-12 COMPLIANCE INJECTION) 1000 MCG/ML kit, Inject  as directed. Inject twice monthly, Disp: , Rfl:   •  Eluxadoline 100 MG tablet, Take 100 mg by mouth 2 (Two) Times a Day., Disp: 60 tablet, Rfl: 2  •  estradiol (ESTRACE) 0.1 MG/GM vaginal cream, Do not use Applicator. Apply pea-size amount with fingertip nightly x2 weeks, then apply 2-3 times per week thereafter, Disp: , Rfl:   •  fluconazole (DIFLUCAN) 100 MG tablet, , Disp: , Rfl:   •  folic acid (FOLVITE) 800 MCG tablet, Take 1 tablet by mouth Daily., Disp: , Rfl:   •  furosemide (Lasix) 20 MG tablet, Take 1 tablet by mouth Every Night. PRN swelling, Disp: 90 tablet, Rfl: 1  •  furosemide (Lasix) 20 MG tablet, Take 1-2 tabs p.o. in the afternoon as needed swelling in addition to your usual Lasix dosage.  Take with potassium., Disp: 60 tablet, Rfl: 2  •  gabapentin (NEURONTIN) 600 MG tablet, Take 600 mg by mouth 3 (Three) Times a Day., Disp: , Rfl:   •  guaiFENesin-codeine (GUAIFENESIN AC) 100-10 MG/5ML liquid, Take 5 mL by mouth 4 (Four) Times a Day As Needed for Cough., Disp: 236 mL, Rfl: 0  •  HYDROcodone-acetaminophen (NORCO)  MG per tablet, Take 1 tablet by mouth Every 8 (Eight) Hours As Needed for Moderate Pain ., Disp: , Rfl:   •  hydroxychloroquine (PLAQUENIL) 200 MG tablet, Take 1 tablet by mouth 2 (Two) Times a Day for 90 days. Indications: Rheumatoid Arthritis, Disp: 180 tablet, Rfl: 1  •  ipratropium-albuterol (DUO-NEB) 0.5-2.5 mg/3 ml nebulizer, Take 3 mL by nebulization 4 (Four) Times a Day., Disp: 360 mL, Rfl: 1  •  lidocaine (LIDODERM) 5 %, Place 1 patch on the skin as directed by provider Daily. Remove & Discard patch within 12 hours or as directed by MD, Disp: , Rfl:   •  lisinopril (PRINIVIL,ZESTRIL) 20 MG tablet, TAKE ONE TABLET BY MOUTH EVERY 12 HOURS, Disp: 180 tablet, Rfl: 1  •  loratadine (CLARITIN) 10 MG tablet, Take 10 mg by mouth Daily., Disp: , Rfl:   •  Magnesium 250 MG tablet, Take  250 mg by mouth Daily., Disp: , Rfl:   •  Melatonin 10 MG tablet, Take 1 tablet by mouth every night at bedtime., Disp: 30 tablet, Rfl: 2  •  metoclopramide (REGLAN) 10 MG tablet, TAKE 1 TABLET BY MOUTH 4 (FOUR) TIMES A DAY., Disp: 120 tablet, Rfl: 1  •  metoprolol tartrate (LOPRESSOR) 50 MG tablet, TAKE 1 TABLET BY MOUTH DAILY (Patient taking differently: Take 25 mg by mouth 2 (Two) Times a Day. Take dos), Disp: 30 tablet, Rfl: 5  •  nystatin (MYCOSTATIN) 436830 UNIT/ML suspension, Swish and swallow 5 mL 4 (Four) Times a Day., Disp: 473 mL, Rfl: 5  •  ondansetron ODT (ZOFRAN-ODT) 4 MG disintegrating tablet, Place 1 tablet on the tongue Every 8 (Eight) Hours As Needed for Nausea or Vomiting (can take up to 8 mg as needed)., Disp: 60 tablet, Rfl: 5  •  orphenadrine (NORFLEX) 100 MG 12 hr tablet, Take 100 mg by mouth 3 (Three) Times a Day. As needed, Disp: , Rfl:   •  pantoprazole (PROTONIX) 40 MG EC tablet, Take 40 mg by mouth Daily., Disp: , Rfl:   •  potassium chloride (K-DUR) 10 MEQ CR tablet, TAKE 1 TABLET BY MOUTH 2 (TWO) TIMES A DAY., Disp: 180 tablet, Rfl: 1  •  predniSONE (DELTASONE) 10 MG tablet, Take 1 tablet by mouth Daily., Disp: 90 tablet, Rfl: 1  •  predniSONE (DELTASONE) 10 MG tablet, 4 tabs daily x 2 days then 3 tabs daily x 2 days then 2 tabs daily x 2 days then 1 tab daily x 2 days, Disp: 20 tablet, Rfl: 0  •  promethazine (PHENERGAN) 25 MG tablet, Take 25 mg by mouth Daily., Disp: , Rfl:   •  SUMAtriptan (IMITREX) 100 MG tablet, Take 100 mg by mouth. One tab at onset of HA, after one hour use sumatriptan injection, Disp: , Rfl:   •  SUMAtriptan (IMITREX) 6 MG/0.5ML injection, Inject prescribed dose at onset of headache. May repeat dose one time in 1 hour(s) if headache not relieved., Disp: , Rfl:   •  theophylline (THEODUR) 300 MG 12 hr tablet, Take 300 mg by mouth Every Morning., Disp: , Rfl:   •  topiramate (TOPAMAX) 50 MG tablet, Take 50 mg by mouth Daily. Take dos, Disp: , Rfl:   •   Umeclidinium Bromide (Incruse Ellipta) 62.5 MCG/INH aerosol powder , Inhale 1 puff 2 (Two) Times a Day., Disp: , Rfl:   •  vilazodone (Viibryd) 20 MG tablet tablet, Take 1 tablet by mouth every night at bedtime., Disp: 90 tablet, Rfl: 1  •  vitamin D (ERGOCALCIFEROL) 1.25 MG (92330 UT) capsule capsule, TAKE 1 CAPSULE BY MOUTH ONCE WEEKLY (Patient taking differently: Take 50,000 Units by mouth Every 7 (Seven) Days. Sunday), Disp: 12 capsule, Rfl: 1    Review of Systems   Constitutional: Positive for fatigue. Negative for appetite change.   HENT: Negative for sinus pressure and sinus pain.    Eyes: Positive for visual disturbance. Negative for pain and itching.   Respiratory: Positive for shortness of breath. Negative for cough.    Cardiovascular: Negative for chest pain and palpitations.   Gastrointestinal: Positive for constipation and diarrhea.   Endocrine: Negative for cold intolerance and heat intolerance.   Genitourinary: Negative for difficulty urinating and frequency.   Musculoskeletal: Positive for back pain and gait problem. Negative for neck pain.   Allergic/Immunologic: Negative for environmental allergies and food allergies.   Neurological: Positive for syncope, weakness, numbness and headaches. Negative for dizziness, tremors, seizures, facial asymmetry, speech difficulty and light-headedness.   Psychiatric/Behavioral: Negative for agitation and confusion.        I have reviewed ROS completed by medical assistant.     Objective:    Neurologic Exam     Mental Status   Oriented to person, place, and time.   Attention: normal.   Level of consciousness: alert    Cranial Nerves     CN III, IV, VI   Pupils are equal, round, and reactive to light.  Extraocular motions are normal.     CN V   Facial sensation intact.     CN VII   Facial expression full, symmetric.     CN VIII   CN VIII normal.     Motor Exam   Muscle bulk: normal    Strength   Strength 5/5 throughout.     Sensory Exam   Right leg light touch:  decreased from knee  Left leg light touch: decreased from knee  Right leg vibration: decreased from ankle  Left leg vibration: decreased from ankle    Gait, Coordination, and Reflexes     Gait  Gait: wide-based    Tremor   Resting tremor: absent  Intention tremor: absent    Reflexes   Right biceps: 1+  Left biceps: 1+  Right patellar: 1+  Left patellar: 1+  Right achilles: 0  Left achilles: 0      Physical Exam   Constitutional: She is oriented to person, place, and time. She appears well-developed and well-nourished.   HENT:   Nose: Nose normal.   Mouth/Throat: Oropharynx is clear and moist.   Eyes: Pupils are equal, round, and reactive to light. EOM are normal.   Cardiovascular: Normal rate and regular rhythm.   No murmur heard.  Pulmonary/Chest: Effort normal and breath sounds normal. No respiratory distress.   Musculoskeletal: Normal range of motion. She exhibits no edema or deformity.   Neurological: She is alert and oriented to person, place, and time. She has normal strength.   Reflex Scores:       Bicep reflexes are 1+ on the right side and 1+ on the left side.       Patellar reflexes are 1+ on the right side and 1+ on the left side.       Achilles reflexes are 0 on the right side and 0 on the left side.  Psychiatric: She has a normal mood and affect.   Vitals reviewed.      Assessment/Plan:    Merry was seen today for intracranial pressure.    Diagnoses and all orders for this visit:    Migraine without aura, intractable    Obstructive sleep apnea syndrome    Obesity (BMI 30-39.9)    Peripheral visual field defect, unspecified laterality    Increased cerebrospinal fluid pressure    Neuropathy involving both lower extremities    history of increased ICP  ---Will request records from opthamologist, and neurologist  ---continue diamox and topamax for the history of ICP, may be able to dc diamox and increase the topamax  ADDENDUM;  REVIEWED PATHOBIOLOGIST RECORDS, NO EVIDENCE OF PAPILLEDEMA             FOR  THE MIGRAINE WILL INCREASE THE TOPAMAX TO     Migraine   ---continue the topamax and imitrex     josé miguel fu with dr polanco--important to treat for the migraine and history of increased icp  Obesity-pt encouraged to lose weight    Neuropathy likely due to CTD,   --request records from prior neurologist  --continue gabapentin, dc requip as not helping.      EPWORTH SLEEPINESS SCALE  Sitting and reading  3  WatchingTV  3  Sitting, inactive, in a public place  0  As a passenger in a car for 1 hour w/o a break  3  Lying down to rest in the afternoon  3  Sitting and talking to someone  3  Sitting quietly after a lunch  3  In a car, while stopped for traffic or a light  0  Total 18        This document has been electronically signed by Joseph Seipel, MD on August 11, 2020 10:40

## 2020-08-12 ENCOUNTER — TELEPHONE (OUTPATIENT)
Dept: NEUROLOGY | Facility: CLINIC | Age: 50
End: 2020-08-12

## 2020-08-12 RX ORDER — TOPIRAMATE 50 MG/1
50 TABLET, FILM COATED ORAL 2 TIMES DAILY
Qty: 180 TABLET | Refills: 3 | Status: SHIPPED | OUTPATIENT
Start: 2020-08-12 | End: 2021-02-02

## 2020-08-12 NOTE — TELEPHONE ENCOUNTER
CALL patient and tell her I have reviewed the pathobiologist records    Discontinue the diamox and increase the Topamax to 50mg one ab bid

## 2020-08-13 ENCOUNTER — TELEPHONE (OUTPATIENT)
Dept: FAMILY MEDICINE CLINIC | Facility: CLINIC | Age: 50
End: 2020-08-13

## 2020-08-14 NOTE — TELEPHONE ENCOUNTER
Please let pt know the imaging I ordered was denied by her insurance.  I'll have to see if something else can get approved.

## 2020-08-21 RX ORDER — BUDESONIDE 3 MG/1
CAPSULE, COATED PELLETS ORAL
Qty: 90 CAPSULE | Refills: 1 | Status: SHIPPED | OUTPATIENT
Start: 2020-08-21 | End: 2020-10-22

## 2020-08-24 DIAGNOSIS — F41.1 GENERALIZED ANXIETY DISORDER: Chronic | ICD-10-CM

## 2020-08-24 RX ORDER — METOPROLOL TARTRATE 50 MG/1
TABLET, FILM COATED ORAL
Qty: 30 TABLET | Refills: 5 | Status: SHIPPED | OUTPATIENT
Start: 2020-08-24 | End: 2020-10-08 | Stop reason: SDUPTHER

## 2020-08-25 ENCOUNTER — HOSPITAL ENCOUNTER (OUTPATIENT)
Dept: CARDIOLOGY | Facility: HOSPITAL | Age: 50
Discharge: HOME | End: 2020-08-25
Admitting: INTERNAL MEDICINE

## 2020-08-25 DIAGNOSIS — R06.02 SHORTNESS OF BREATH: ICD-10-CM

## 2020-08-25 DIAGNOSIS — I25.10 CORONARY ARTERY DISEASE INVOLVING NATIVE CORONARY ARTERY OF NATIVE HEART WITHOUT ANGINA PECTORIS: ICD-10-CM

## 2020-08-25 DIAGNOSIS — I73.9 PVD (PERIPHERAL VASCULAR DISEASE) WITH CLAUDICATION (HCC): ICD-10-CM

## 2020-08-25 DIAGNOSIS — G47.33 OBSTRUCTIVE SLEEP APNEA: ICD-10-CM

## 2020-08-25 DIAGNOSIS — I10 ESSENTIAL HYPERTENSION: ICD-10-CM

## 2020-08-25 DIAGNOSIS — J43.9 PULMONARY EMPHYSEMA, UNSPECIFIED EMPHYSEMA TYPE (HCC): ICD-10-CM

## 2020-08-25 LAB
BH CV LOWER ARTERIAL LEFT ABI RATIO: 0.98
BH CV LOWER ARTERIAL LEFT DORSALIS PEDIS SYS MAX: 136 MMHG
BH CV LOWER ARTERIAL LEFT GREAT TOE SYS MAX: 116 MMHG
BH CV LOWER ARTERIAL LEFT POST TIBIAL SYS MAX: 130 MMHG
BH CV LOWER ARTERIAL LEFT TBI RATIO: 0.83
BH CV LOWER ARTERIAL RIGHT ABI RATIO: 1.01
BH CV LOWER ARTERIAL RIGHT DORSALIS PEDIS SYS MAX: 141 MMHG
BH CV LOWER ARTERIAL RIGHT GREAT TOE SYS MAX: 106 MMHG
BH CV LOWER ARTERIAL RIGHT POST TIBIAL SYS MAX: 136 MMHG
BH CV LOWER ARTERIAL RIGHT TBI RATIO: 0.76
UPPER ARTERIAL LEFT ARM BRACHIAL SYS MAX: 133 MMHG
UPPER ARTERIAL RIGHT ARM BRACHIAL SYS MAX: 139 MMHG

## 2020-08-25 PROCEDURE — 93922 UPR/L XTREMITY ART 2 LEVELS: CPT

## 2020-08-25 RX ORDER — ALPRAZOLAM 0.5 MG/1
TABLET ORAL
Qty: 60 TABLET | Refills: 2 | OUTPATIENT
Start: 2020-08-25

## 2020-08-28 RX ORDER — CLONIDINE HYDROCHLORIDE 0.1 MG/1
0.1 TABLET ORAL 3 TIMES DAILY
Qty: 270 TABLET | Refills: 0 | Status: SHIPPED | OUTPATIENT
Start: 2020-08-28 | End: 2021-03-03

## 2020-08-29 DIAGNOSIS — F41.1 GENERALIZED ANXIETY DISORDER: Chronic | ICD-10-CM

## 2020-09-01 ENCOUNTER — SPECIMEN COLLECTION (OUTPATIENT)
Dept: PSYCHIATRY | Facility: CLINIC | Age: 50
End: 2020-09-01

## 2020-09-01 RX ORDER — ALPRAZOLAM 0.5 MG/1
TABLET ORAL
Qty: 60 TABLET | Refills: 0 | Status: SHIPPED | OUTPATIENT
Start: 2020-09-01 | End: 2020-11-11 | Stop reason: SDUPTHER

## 2020-09-08 ENCOUNTER — TELEPHONE (OUTPATIENT)
Dept: FAMILY MEDICINE CLINIC | Facility: CLINIC | Age: 50
End: 2020-09-08

## 2020-09-08 PROBLEM — I82.431 ACUTE DEEP VEIN THROMBOSIS (DVT) OF POPLITEAL VEIN OF RIGHT LOWER EXTREMITY (HCC): Status: ACTIVE | Noted: 2020-09-08

## 2020-09-08 NOTE — TELEPHONE ENCOUNTER
I received the report that the patient had an ultrasound of the right lower extremity done today at the hospital on an outpatient basis.  She had been seen in the ER at [facility] for right lower extremity pain recently, however ultrasound was not done at that time.  She was started on injectable anticoagulants due to elevated d-dimer.  Report today shows a thrombus in the right popliteal vein.  I am starting her on Eliquis 10 mg twice daily for 7 days, then 5 mg twice daily thereafter for at least 6 months.  We scanned the right lower extremity in 6 months to ensure resolution of the DVT.  Patient verbalized understanding a prescription was sent to the pharmacy.  I asked the patient if she was having any chest pains or shortness of breath and she stated that yes she had new onset shortness of breath within the last week, her chest felt very tight and she had sweating and trouble breathing with any type of exertion.  This is new and outside of her normal shortness of breath, she has COPD and is a current smoker.  For this reason, I recommended that she have a CT of the chest done with PE protocol.  I attempted to get her in through the central scheduling department at the hospital, however they were unable to accommodate her this evening due to lack of prior authorization.  For that reason I spoke with the emergency room nurse practitioner at [facility] and let her know that I am sending the patient into the emergency department for the scan and for further evaluation of her shortness of breath.  The nurse practitioner verbalized understanding, the patient was informed by the radiology technician that she needed to go into the ER.  Eliquis was sent to the patient's local pharmacy to get started on after her discharge from the hospital.  Follow-up in the office with me after discharge.

## 2020-09-10 RX ORDER — METOCLOPRAMIDE 10 MG/1
TABLET ORAL
Qty: 120 TABLET | Refills: 5 | Status: SHIPPED | OUTPATIENT
Start: 2020-09-10 | End: 2021-04-09 | Stop reason: SDUPTHER

## 2020-09-11 ENCOUNTER — TELEPHONE (OUTPATIENT)
Dept: FAMILY MEDICINE CLINIC | Facility: CLINIC | Age: 50
End: 2020-09-11

## 2020-09-11 NOTE — TELEPHONE ENCOUNTER
Could you call Merry Norberto and find out exactly where she had her CT scan of the chest done when she was diagnosed with pulmonary embolism and call for those results to be faxed over?  She is requesting a result but I do not have anything.

## 2020-09-14 NOTE — TELEPHONE ENCOUNTER
Spoke with Orly Beal informed her POA  we have not received the results patient states she will call and ask facility to send it over. Also called facility [facility] [phone] request CT scan

## 2020-09-21 ENCOUNTER — OFFICE VISIT (OUTPATIENT)
Dept: FAMILY MEDICINE CLINIC | Facility: CLINIC | Age: 50
End: 2020-09-21

## 2020-09-21 ENCOUNTER — LAB (OUTPATIENT)
Dept: LAB | Facility: HOSPITAL | Age: 50
End: 2020-09-21

## 2020-09-21 VITALS
SYSTOLIC BLOOD PRESSURE: 106 MMHG | HEART RATE: 95 BPM | WEIGHT: 291 LBS | BODY MASS INDEX: 48.42 KG/M2 | TEMPERATURE: 96.9 F | DIASTOLIC BLOOD PRESSURE: 71 MMHG | OXYGEN SATURATION: 96 %

## 2020-09-21 DIAGNOSIS — J44.1 COPD WITH EXACERBATION (HCC): ICD-10-CM

## 2020-09-21 DIAGNOSIS — D72.829 LEUKOCYTOSIS, UNSPECIFIED TYPE: ICD-10-CM

## 2020-09-21 DIAGNOSIS — I82.4Z1 DEEP VEIN THROMBOSIS (DVT) OF DISTAL VEIN OF RIGHT LOWER EXTREMITY, UNSPECIFIED CHRONICITY (HCC): ICD-10-CM

## 2020-09-21 DIAGNOSIS — R63.5 WEIGHT GAIN WITH EDEMA: ICD-10-CM

## 2020-09-21 DIAGNOSIS — R60.9 WEIGHT GAIN WITH EDEMA: ICD-10-CM

## 2020-09-21 DIAGNOSIS — I26.99 ACUTE PULMONARY EMBOLISM WITHOUT ACUTE COR PULMONALE, UNSPECIFIED PULMONARY EMBOLISM TYPE (HCC): Primary | ICD-10-CM

## 2020-09-21 PROBLEM — J44.9 CHRONIC OBSTRUCTIVE PULMONARY DISEASE: Status: ACTIVE | Noted: 2020-09-21

## 2020-09-21 PROBLEM — I49.9 IRREGULAR HEART RHYTHM: Status: ACTIVE | Noted: 2020-09-21

## 2020-09-21 PROBLEM — M81.0 OSTEOPOROSIS: Status: ACTIVE | Noted: 2020-09-21

## 2020-09-21 PROBLEM — M51.36 DEGENERATION OF INTERVERTEBRAL DISC OF LUMBAR REGION: Status: ACTIVE | Noted: 2020-09-21

## 2020-09-21 PROBLEM — G43.909 MIGRAINE HEADACHE: Status: ACTIVE | Noted: 2020-09-21

## 2020-09-21 PROBLEM — M51.369 DEGENERATION OF INTERVERTEBRAL DISC OF LUMBAR REGION: Status: ACTIVE | Noted: 2020-09-21

## 2020-09-21 PROBLEM — G62.9 NEUROPATHY: Status: ACTIVE | Noted: 2020-09-21

## 2020-09-21 PROBLEM — M35.9 AUTOIMMUNE DISEASE: Status: ACTIVE | Noted: 2020-09-21

## 2020-09-21 PROBLEM — L93.0 LUPUS ERYTHEMATOSUS: Status: ACTIVE | Noted: 2020-09-21

## 2020-09-21 PROBLEM — M50.30 DEGENERATION OF INTERVERTEBRAL DISC OF CERVICAL REGION: Status: ACTIVE | Noted: 2020-09-21

## 2020-09-21 LAB
ALBUMIN SERPL-MCNC: 3.8 G/DL (ref 3.5–5.2)
ALBUMIN/GLOB SERPL: 1.3 G/DL
ALP SERPL-CCNC: 90 U/L (ref 39–117)
ALT SERPL W P-5'-P-CCNC: 32 U/L (ref 1–33)
ANION GAP SERPL CALCULATED.3IONS-SCNC: 11 MMOL/L (ref 5–15)
AST SERPL-CCNC: 30 U/L (ref 1–32)
BASOPHILS # BLD AUTO: 0.03 10*3/MM3 (ref 0–0.2)
BASOPHILS NFR BLD AUTO: 0.2 % (ref 0–1.5)
BILIRUB SERPL-MCNC: 0.2 MG/DL (ref 0–1.2)
BUN SERPL-MCNC: 11 MG/DL (ref 6–20)
BUN/CREAT SERPL: 13.4 (ref 7–25)
CALCIUM SPEC-SCNC: 9.1 MG/DL (ref 8.6–10.5)
CHLORIDE SERPL-SCNC: 100 MMOL/L (ref 98–107)
CO2 SERPL-SCNC: 30 MMOL/L (ref 22–29)
CREAT SERPL-MCNC: 0.82 MG/DL (ref 0.57–1)
DEPRECATED RDW RBC AUTO: 56.2 FL (ref 37–54)
EOSINOPHIL # BLD AUTO: 0.05 10*3/MM3 (ref 0–0.4)
EOSINOPHIL NFR BLD AUTO: 0.3 % (ref 0.3–6.2)
ERYTHROCYTE [DISTWIDTH] IN BLOOD BY AUTOMATED COUNT: 15.8 % (ref 12.3–15.4)
GFR SERPL CREATININE-BSD FRML MDRD: 74 ML/MIN/1.73
GLOBULIN UR ELPH-MCNC: 2.9 GM/DL
GLUCOSE SERPL-MCNC: 153 MG/DL (ref 65–99)
HCT VFR BLD AUTO: 37 % (ref 34–46.6)
HGB BLD-MCNC: 12.1 G/DL (ref 12–15.9)
IMM GRANULOCYTES # BLD AUTO: 0.15 10*3/MM3 (ref 0–0.05)
IMM GRANULOCYTES NFR BLD AUTO: 1 % (ref 0–0.5)
LYMPHOCYTES # BLD AUTO: 1.82 10*3/MM3 (ref 0.7–3.1)
LYMPHOCYTES NFR BLD AUTO: 12.5 % (ref 19.6–45.3)
MCH RBC QN AUTO: 31.8 PG (ref 26.6–33)
MCHC RBC AUTO-ENTMCNC: 32.7 G/DL (ref 31.5–35.7)
MCV RBC AUTO: 97.1 FL (ref 79–97)
MONOCYTES # BLD AUTO: 0.9 10*3/MM3 (ref 0.1–0.9)
MONOCYTES NFR BLD AUTO: 6.2 % (ref 5–12)
NEUTROPHILS NFR BLD AUTO: 11.57 10*3/MM3 (ref 1.7–7)
NEUTROPHILS NFR BLD AUTO: 79.8 % (ref 42.7–76)
NRBC BLD AUTO-RTO: 0.1 /100 WBC (ref 0–0.2)
NT-PROBNP SERPL-MCNC: 93.5 PG/ML (ref 0–900)
PLATELET # BLD AUTO: 214 10*3/MM3 (ref 140–450)
PMV BLD AUTO: 11.3 FL (ref 6–12)
POTASSIUM SERPL-SCNC: 3.7 MMOL/L (ref 3.5–5.2)
PROT SERPL-MCNC: 6.7 G/DL (ref 6–8.5)
RBC # BLD AUTO: 3.81 10*6/MM3 (ref 3.77–5.28)
SODIUM SERPL-SCNC: 141 MMOL/L (ref 136–145)
WBC # BLD AUTO: 14.52 10*3/MM3 (ref 3.4–10.8)

## 2020-09-21 PROCEDURE — 36415 COLL VENOUS BLD VENIPUNCTURE: CPT | Performed by: PHYSICIAN ASSISTANT

## 2020-09-21 PROCEDURE — 83880 ASSAY OF NATRIURETIC PEPTIDE: CPT | Performed by: PHYSICIAN ASSISTANT

## 2020-09-21 PROCEDURE — 85025 COMPLETE CBC W/AUTO DIFF WBC: CPT | Performed by: PHYSICIAN ASSISTANT

## 2020-09-21 PROCEDURE — 99214 OFFICE O/P EST MOD 30 MIN: CPT | Performed by: PHYSICIAN ASSISTANT

## 2020-09-21 PROCEDURE — 80053 COMPREHEN METABOLIC PANEL: CPT | Performed by: PHYSICIAN ASSISTANT

## 2020-09-21 RX ORDER — HYDROXYCHLOROQUINE SULFATE 200 MG/1
1 TABLET, FILM COATED ORAL
COMMUNITY
End: 2021-02-02 | Stop reason: SDUPTHER

## 2020-09-21 RX ORDER — ROPINIROLE 0.25 MG/1
0.25 TABLET, FILM COATED ORAL 2 TIMES DAILY
COMMUNITY
Start: 2020-09-06 | End: 2021-02-02

## 2020-09-21 RX ORDER — PREDNISONE 10 MG/1
TABLET ORAL
Qty: 20 TABLET | Refills: 0 | Status: SHIPPED | OUTPATIENT
Start: 2020-09-21 | End: 2020-12-21 | Stop reason: SDUPTHER

## 2020-09-21 NOTE — PROGRESS NOTES
Transitional Care Follow Up Visit  Subjective     Merry Thornton is a 50 y.o. female who presents for a transitional care management visit.    Within 48 business hours after discharge our office contacted her via telephone to coordinate her care and needs.      I reviewed and discussed the details of that call along with the discharge summary, hospital problems, inpatient lab results, inpatient diagnostic studies, and consultation reports with Merry.     Current outpatient and discharge medications have been reconciled for the patient.  Reviewed by: SHUBHAM Russ      Date of TCM Phone Call 5/17/2020   H[facility]   Date of Admission 5/14/2020   Date of Discharge 5/17/2020   Discharge Disposition Home or Self Care     Risk for Readmission (LACE) No data recorded    History of Present Illness   Course During Hospital Stay:  Patient is a 50-year-old white female here for hospital follow-up.  She states she went to the hospital on 9/6, right lower extremity.  She was diagnosed with DVT and started on Eliquis.  He then called me and told me she was having shortness of breath that was severe.  I sent her to the emergency room for this reason.  She was then diagnosed with pulmonary embolism.  She is currently taking Eliquis twice daily.    Today patient states she is feeling better overall, however she continues to have shortness of breath and has increased wheezing.  She feels like she is having a COPD exacerbation.  She reports that her follow-up with a cardiologist is October 8 and pulmonologist is October 1.  Denies fevers.    The following portions of the patient's history were reviewed and updated as appropriate: allergies, current medications, past family history, past medical history, past social history, past surgical history and problem list.    Review of Systems   Constitutional: Positive for unexpected weight change (gain). Negative for fever.   HENT: Negative for ear pain and sore throat.    Eyes:  Negative for pain.   Respiratory: Positive for cough, chest tightness and shortness of breath.    Cardiovascular: Positive for leg swelling. Negative for chest pain.   Gastrointestinal: Negative for abdominal pain, diarrhea, nausea and vomiting.   Endocrine: Negative for polydipsia.   Genitourinary: Negative for flank pain.   Skin: Negative for rash.   Neurological: Negative for dizziness.   Psychiatric/Behavioral: Negative for suicidal ideas.       Objective   Physical Exam  Constitutional:       Appearance: Normal appearance. She is well-developed. She is obese.   Eyes:      Pupils: Pupils are equal, round, and reactive to light.   Cardiovascular:      Rate and Rhythm: Normal rate and regular rhythm.      Heart sounds: Normal heart sounds.   Pulmonary:      Effort: Pulmonary effort is normal.      Breath sounds: Examination of the right-upper field reveals wheezing. Examination of the left-upper field reveals wheezing. Examination of the right-middle field reveals wheezing. Examination of the left-middle field reveals wheezing. Examination of the right-lower field reveals wheezing. Examination of the left-lower field reveals wheezing. Wheezing present.   Neurological:      Mental Status: She is alert and oriented to person, place, and time.   Psychiatric:         Behavior: Behavior normal.         Assessment/Plan   Merry was seen today for transitional care management.    Diagnoses and all orders for this visit:    Acute pulmonary embolism without acute cor pulmonale, unspecified pulmonary embolism type (CMS/HCC)  Comments:  Improved, patient to continue current medications.  Orders:  -     Comprehensive Metabolic Panel    Deep vein thrombosis (DVT) of distal vein of right lower extremity, unspecified chronicity (CMS/HCC)  Comments:  Improved, patient to continue current medications.  Orders:  -     Comprehensive Metabolic Panel    Weight gain with edema  Comments:  Worsening, labs today.  Patient to follow-up  with cardiology.  Orders:  -     BNP    Leukocytosis, unspecified type  Comments:  Repeat CBC today.  Orders:  -     CBC & Differential  -     CBC Auto Differential    COPD with exacerbation (CMS/[facility])  Comments:  New, treating with prednisone for wheezing.  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    Other orders  -     predniSONE (DELTASONE) 10 MG tablet; 4 tabs daily x 2 days then 3 tabs daily x 2 days then 2 tabs daily x 2 days then 1 tab daily x 2 days    Patient is due for flu and pneumonia vaccines, recommended she have these done once she has completed her steroid taper.

## 2020-09-22 ENCOUNTER — TELEPHONE (OUTPATIENT)
Dept: FAMILY MEDICINE CLINIC | Facility: CLINIC | Age: 50
End: 2020-09-22

## 2020-09-22 RX ORDER — LANCING DEVICE/LANCETS
1 KIT MISCELLANEOUS 2 TIMES DAILY
Qty: 1 KIT | Refills: 5 | Status: SHIPPED | OUTPATIENT
Start: 2020-09-22 | End: 2022-09-08

## 2020-09-22 RX ORDER — BLOOD-GLUCOSE METER
1 EACH MISCELLANEOUS 2 TIMES DAILY
Qty: 1 KIT | Refills: 0 | Status: SHIPPED | OUTPATIENT
Start: 2020-09-22 | End: 2022-09-08

## 2020-09-22 NOTE — TELEPHONE ENCOUNTER
Accu chek jerilyn is not covered. Asking to change to accu chek guide. Can you send new scripts for meter,strips, and fastclick lancets ?

## 2020-09-24 DIAGNOSIS — D72.829 LEUKOCYTOSIS, UNSPECIFIED TYPE: Primary | ICD-10-CM

## 2020-10-08 ENCOUNTER — OFFICE VISIT (OUTPATIENT)
Dept: CARDIOLOGY | Facility: CLINIC | Age: 50
End: 2020-10-08

## 2020-10-08 VITALS
WEIGHT: 293 LBS | HEIGHT: 65 IN | SYSTOLIC BLOOD PRESSURE: 162 MMHG | DIASTOLIC BLOOD PRESSURE: 84 MMHG | HEART RATE: 105 BPM | BODY MASS INDEX: 48.82 KG/M2

## 2020-10-08 DIAGNOSIS — I49.3 PVC'S (PREMATURE VENTRICULAR CONTRACTIONS): ICD-10-CM

## 2020-10-08 DIAGNOSIS — R07.89 CHEST PAIN, ATYPICAL: ICD-10-CM

## 2020-10-08 DIAGNOSIS — I25.10 CORONARY ARTERY DISEASE INVOLVING NATIVE CORONARY ARTERY OF NATIVE HEART WITHOUT ANGINA PECTORIS: Primary | ICD-10-CM

## 2020-10-08 DIAGNOSIS — R00.0 SINUS TACHYCARDIA: ICD-10-CM

## 2020-10-08 DIAGNOSIS — R60.0 BILATERAL LEG EDEMA: ICD-10-CM

## 2020-10-08 DIAGNOSIS — I10 ESSENTIAL HYPERTENSION: ICD-10-CM

## 2020-10-08 PROCEDURE — 93000 ELECTROCARDIOGRAM COMPLETE: CPT | Performed by: INTERNAL MEDICINE

## 2020-10-08 PROCEDURE — 99214 OFFICE O/P EST MOD 30 MIN: CPT | Performed by: INTERNAL MEDICINE

## 2020-10-08 RX ORDER — METOPROLOL TARTRATE 50 MG/1
50 TABLET, FILM COATED ORAL 2 TIMES DAILY
Qty: 120 TABLET | Refills: 1 | Status: SHIPPED | OUTPATIENT
Start: 2020-10-08 | End: 2020-12-11

## 2020-10-08 RX ORDER — ACETAZOLAMIDE 250 MG/1
1 TABLET ORAL 2 TIMES DAILY
COMMUNITY
Start: 2020-09-23 | End: 2021-02-02

## 2020-10-08 RX ORDER — METOLAZONE 2.5 MG/1
2.5 TABLET ORAL DAILY
Qty: 90 TABLET | Refills: 3 | Status: SHIPPED | OUTPATIENT
Start: 2020-10-08 | End: 2021-06-18

## 2020-10-08 NOTE — PROGRESS NOTES
Date of Office Visit: 10/08/2020  Encounter Provider: Dr. Shiraz Shah  Place of Service: [facility] CARDIOLOGY [place]  Patient Name: Merry Thornton  :1970  Nivia Alonzo PA    Chief Complaint   Patient presents with   • Coronary Artery Disease     2 month follow up   • Hypertension   • Chest Pain     History of Present Illness    I am pleased to see Mrs. Thornton in my office today as a follow-up.    As you know, patient is 50 years old white female whose past medical history significant for SLE, chronic pain syndrome, COPD, history of SVT who came today for follow-up..    In 2016, patient had syncopal episode.  Patient underwent stress test which was abnormal and showed apical ischemia.  Echocardiogram showed EF of 60% and no significant valvular heart disease.  Patient underwent Holter monitor which showed few PVCs.  Patient underwent cardiac catheterization which showed no significant coronary artery disease.  Patient was started on bisoprolol.  In , patient underwent EP study and had PVC ablation.  It was done in [facility].  In 2017, patient had repeat Holter monitor which showed moderate density of PVCs but no SVT or VT was noted.  Patient underwent loop recorder implantation for further monitoring of palpitation and arrhythmia and syncope.    In 2020, patient underwent ultrasound of lower extremities and was found to have DVT and CAT scan showed pulmonary embolism.  Patient has been started on Eliquis 5 mg twice daily.    Patient continues to have shortness of breath.  She complained of palpitation.  Patient has bilateral leg edema.  Patient continues to drink soda and also increase amount of water.  About 2 weeks ago, patient had an episode of chest pain and it lasted for 5 to 10 minutes.  It was intense.  Patient did not seek any medical attention.  Patient had few mild episode of chest pain since then.  Patient does not have orthopnea or PND.      EKG showed sinus  tachycardia with heart rate of 105 bpm.    I would recommend to increase Lopressor to 50 mg twice daily.  I would discontinue amlodipine.  I would add metolazone 5 mg Monday Wednesday Friday.  Echocardiogram would be done.    Past Medical History:   Diagnosis Date   • CAD (coronary artery disease) 12/20/2016    dr. singh   • Carpal tunnel syndrome on right 03/08/2017   • Common migraine 12/20/2016   • COPD (chronic obstructive pulmonary disease) (CMS/HCC) 12/20/2016   • Cyst, ovarian 12/20/2016    mass   • DDD (degenerative disc disease), cervical 03/11/2016   • Dental caries 01/14/2019   • Depression 11/02/2017   • Depression, major, recurrent, moderate (CMS/HCC) 08/25/2016   • Dietary counseling 09/21/2017   • Dysphagia 05/2020   • Elevated random blood glucose level 5/14/2020   • Encounter for smoking cessation counseling 09/21/2017   • Exacerbation of systemic lupus (CMS/[facility]) 04/30/2019   • Fibromyalgia 12/20/2016   • Generalized anxiety disorder 03/11/2016   • GERD (gastroesophageal reflux disease) 12/20/2016   • Heartburn 11/02/2017   • Hypertension 03/16/2016   • Hyperthyroidism 03/11/2016   • Hypocalcemia 09/21/2017   • IBS (irritable colon syndrome) 09/13/2016   • Immobility syndrome 09/14/2017   • Intracranial pressure increased 12/20/2016   • Left knee pain 09/21/2017   • Lower back pain 02/01/2018   • Lung nodules 09/07/2016   • Morbid obesity (CMS/HCC) 11/02/2017   • Muscle cramp 09/21/2017   • Neck pain 01/14/2019   • Need for prophylactic vaccination and inoculation against influenza 09/21/2017   • Neurogenic bladder 12/20/2016   • Obesity 03/07/2017   • Obesity (BMI 30-39.9) 5/14/2020   • Orthostatic hypotension 07/17/2017   • Osteoarthritis 03/07/2017   • Osteopenia 01/14/2019   • Osteoporosis 12/20/2016   • Other instability, right ankle 04/13/2017   • Overlap syndrome (CMS/HCC)     scleroderma, lupus, Raynaud's. Mixxed connective Tissue D/o   • Pain, joint, ankle, left 09/21/2017   • Palpitations  10/04/2017   • Paresthesia 06/12/2017    facial   • Peripheral neuropathy 12/20/2016    bilateral lower extremities   • Poor vision    • Prediabetes    • Raynaud's syndrome 12/20/2016   • Retinopathy    • Right knee pain 11/08/2016   • Scleroderma (CMS/[facility]) 12/20/2016   • Seasonal allergic rhinitis 12/20/2016   • SLE (systemic lupus erythematosus) (CMS/[facility]) 03/16/2017   • Sleep apnea 11/02/2017    BIPAP   • Sleep disorder 01/14/2019   • Sprain of unspecified site of right knee, subsequent encounter 12/01/2016   • Status post placement of implantable loop recorder 05/09/2018    presence   • Syncope and collapse 2020    still has occassionally   • Ulcerative colitis (CMS/[facility]) 02/2020   • Urine incontinence    • Vasovagal syncope    • Ventricular arrhythmia 03/16/2016   • Visit for screening mammogram 12/20/2016   • Vitamin D deficiency 01/14/2019   • Wheezing          Past Surgical History:   Procedure Laterality Date   • ANKLE SURGERY Right 02/2017   • BREAST EXCISIONAL BIOPSY Right     years ago   • BRONCHOSCOPY     • CARDIAC ABLATION  01/2000   • CARDIAC ELECTROPHYSIOLOGY PROCEDURE     • CHOLECYSTECTOMY     • COLON SURGERY     • COLONOSCOPY N/A 2/26/2020    Procedure: COLONOSCOPY WITH BIOPSY X1 AREA;  Surgeon: Michael Cheng MD;  Location: [facility] ENDOSCOPY;  Service: Gastroenterology;  Laterality: N/A;  diarrhea   • ECHO - CONVERTED  2020   • ENDOSCOPY     • ENDOSCOPY N/A 5/21/2020    Procedure: ESOPHAGOGASTRODUODENOSCOPY WITH DILATATION (#54, 60 bougie);  Surgeon: Michael Cheng MD;  Location: [facility] ENDOSCOPY;  Service: Gastroenterology;  Laterality: N/A;  Post: candidiasis, upper esophagus sphincter stricture   • KNEE SURGERY Right 09/2017   • OTHER SURGICAL HISTORY      Urerine hysterectomy   • OTHER SURGICAL HISTORY      t and a   • OTHER SURGICAL HISTORY      d&c   • OTHER SURGICAL HISTORY      bladder stim   • OTHER SURGICAL HISTORY      Loop recorder placement   • WRIST SURGERY Right             Current Outpatient Medications:   •  acetaZOLAMIDE (DIAMOX) 250 MG tablet, 1 tablet 2 (two) times a day., Disp: , Rfl:   •  albuterol sulfate  (90 Base) MCG/ACT inhaler, Inhale 2 puffs Every 4 (Four) Hours As Needed for Wheezing., Disp: , Rfl:   •  ALPRAZolam (XANAX) 0.5 MG tablet, TAKE 1 TABLET BY MOUTH 2 (TWO) TIMES A DAY AS NEEDED FOR ANXIETY., Disp: 60 tablet, Rfl: 0  •  apixaban (ELIQUIS) 5 MG tablet tablet, Take 1 tablet by mouth Every 12 (Twelve) Hours., Disp: 60 tablet, Rfl: 5  •  Blood Glucose Monitoring Suppl (Accu-Chek Guide) w/Device kit, 1 Device 2 (two) times a day., Disp: 1 kit, Rfl: 0  •  Budesonide (ENTOCORT EC) 3 MG 24 hr capsule, TAKE 3 CAPSULES BY MOUTH DAILY, Disp: 90 capsule, Rfl: 1  •  cloNIDine (CATAPRES) 0.1 MG tablet, TAKE 1 TABLET BY MOUTH 3 (THREE) TIMES A DAY., Disp: 270 tablet, Rfl: 0  •  clotrimazole (MYCELEX) 10 MG abundio, Take 1 tablet by mouth Take As Directed., Disp: , Rfl:   •  Cyanocobalamin (B-12 COMPLIANCE INJECTION) 1000 MCG/ML kit, Inject  as directed. Inject twice monthly, Disp: , Rfl:   •  Eluxadoline 100 MG tablet, Take 100 mg by mouth 2 (Two) Times a Day., Disp: 60 tablet, Rfl: 2  •  estradiol (ESTRACE) 0.1 MG/GM vaginal cream, Do not use Applicator. Apply pea-size amount with fingertip nightly x2 weeks, then apply 2-3 times per week thereafter, Disp: , Rfl:   •  fluconazole (DIFLUCAN) 100 MG tablet, , Disp: , Rfl:   •  folic acid (FOLVITE) 800 MCG tablet, Take 1 tablet by mouth Daily., Disp: , Rfl:   •  furosemide (Lasix) 20 MG tablet, Take 1 tablet by mouth Every Night. PRN swelling, Disp: 90 tablet, Rfl: 1  •  furosemide (Lasix) 20 MG tablet, Take 1-2 tabs p.o. in the afternoon as needed swelling in addition to your usual Lasix dosage.  Take with potassium., Disp: 60 tablet, Rfl: 2  •  gabapentin (NEURONTIN) 600 MG tablet, Take 600 mg by mouth 3 (Three) Times a Day., Disp: , Rfl:   •  glucose blood test strip, Use as instructed to check BS BID, Disp:  180 each, Rfl: 5  •  guaiFENesin-codeine (GUAIFENESIN AC) 100-10 MG/5ML liquid, Take 5 mL by mouth 4 (Four) Times a Day As Needed for Cough., Disp: 236 mL, Rfl: 0  •  HYDROcodone-acetaminophen (NORCO)  MG per tablet, Take 1 tablet by mouth Every 8 (Eight) Hours As Needed for Moderate Pain ., Disp: , Rfl:   •  hydroxychloroquine (Plaquenil) 200 MG tablet, Take 1 tablet by mouth., Disp: , Rfl:   •  ipratropium-albuterol (DUO-NEB) 0.5-2.5 mg/3 ml nebulizer, Take 3 mL by nebulization 4 (Four) Times a Day., Disp: 360 mL, Rfl: 1  •  Lancets Misc. (Accu-Chek FastClix Lancet) kit, 1 Device 2 (two) times a day., Disp: 1 kit, Rfl: 5  •  lidocaine (LIDODERM) 5 %, Place 1 patch on the skin as directed by provider Daily. Remove & Discard patch within 12 hours or as directed by MD, Disp: , Rfl:   •  lisinopril (PRINIVIL,ZESTRIL) 20 MG tablet, TAKE ONE TABLET BY MOUTH EVERY 12 HOURS, Disp: 180 tablet, Rfl: 1  •  loratadine (CLARITIN) 10 MG tablet, Take 10 mg by mouth Daily., Disp: , Rfl:   •  Magnesium 250 MG tablet, Take 250 mg by mouth Daily., Disp: , Rfl:   •  Melatonin 10 MG tablet, Take 1 tablet by mouth every night at bedtime., Disp: 30 tablet, Rfl: 2  •  metoclopramide (REGLAN) 10 MG tablet, TAKE 1 TABLET BY MOUTH 4 TIMES A DAY., Disp: 120 tablet, Rfl: 5  •  metoprolol tartrate (LOPRESSOR) 50 MG tablet, Take 1 tablet by mouth 2 (Two) Times a Day., Disp: 120 tablet, Rfl: 1  •  nystatin (MYCOSTATIN) 557837 UNIT/ML suspension, Swish and swallow 5 mL 4 (Four) Times a Day., Disp: 473 mL, Rfl: 5  •  ondansetron ODT (ZOFRAN-ODT) 4 MG disintegrating tablet, Place 1 tablet on the tongue Every 8 (Eight) Hours As Needed for Nausea or Vomiting (can take up to 8 mg as needed)., Disp: 60 tablet, Rfl: 5  •  orphenadrine (NORFLEX) 100 MG 12 hr tablet, Take 100 mg by mouth 3 (Three) Times a Day. As needed, Disp: , Rfl:   •  pantoprazole (PROTONIX) 40 MG EC tablet, Take 40 mg by mouth Daily., Disp: , Rfl:   •  potassium chloride  (K-DUR) 10 MEQ CR tablet, TAKE 1 TABLET BY MOUTH 2 (TWO) TIMES A DAY., Disp: 180 tablet, Rfl: 1  •  predniSONE (DELTASONE) 10 MG tablet, Take 1 tablet by mouth Daily., Disp: 90 tablet, Rfl: 1  •  predniSONE (DELTASONE) 10 MG tablet, 4 tabs daily x 2 days then 3 tabs daily x 2 days then 2 tabs daily x 2 days then 1 tab daily x 2 days, Disp: 20 tablet, Rfl: 0  •  promethazine (PHENERGAN) 25 MG tablet, Take 25 mg by mouth Daily., Disp: , Rfl:   •  rOPINIRole (REQUIP) 0.25 MG tablet, Take 0.25 mg by mouth 2 (two) times a day., Disp: , Rfl:   •  SUMAtriptan (IMITREX) 100 MG tablet, Take 100 mg by mouth. One tab at onset of HA, after one hour use sumatriptan injection, Disp: , Rfl:   •  SUMAtriptan (IMITREX) 6 MG/0.5ML injection, Inject prescribed dose at onset of headache. May repeat dose one time in 1 hour(s) if headache not relieved., Disp: , Rfl:   •  theophylline (THEODUR) 300 MG 12 hr tablet, Take 300 mg by mouth Every Morning., Disp: , Rfl:   •  topiramate (TOPAMAX) 50 MG tablet, Take 1 tablet by mouth 2 (Two) Times a Day. Take dos, Disp: 180 tablet, Rfl: 3  •  Umeclidinium Bromide (Incruse Ellipta) 62.5 MCG/INH aerosol powder , Inhale 1 puff 2 (Two) Times a Day., Disp: , Rfl:   •  vilazodone (Viibryd) 20 MG tablet tablet, Take 1 tablet by mouth every night at bedtime., Disp: 90 tablet, Rfl: 1  •  vitamin D (ERGOCALCIFEROL) 1.25 MG (67438 UT) capsule capsule, TAKE 1 CAPSULE BY MOUTH ONCE WEEKLY (Patient taking differently: Take 50,000 Units by mouth Every 7 (Seven) Days. Sunday), Disp: 12 capsule, Rfl: 1  •  metOLazone (ZAROXOLYN) 2.5 MG tablet, Take 1 tablet by mouth Daily., Disp: 90 tablet, Rfl: 3      Social History     Socioeconomic History   • Marital status: Single     Spouse name: Not on file   • Number of children: Not on file   • Years of education: Not on file   • Highest education level: Not on file   Tobacco Use   • Smoking status: Current Some Day Smoker     Packs/day: 1.50     Years: 35.00     Pack  "years: 52.50     Types: Cigarettes   • Smokeless tobacco: Former User     Quit date: 5/11/2020   • Tobacco comment: using nicotine patches to quit   Substance and Sexual Activity   • Alcohol use: No   • Drug use: Not Currently     Frequency: 1.0 times per week     Types: Marijuana     Comment: stopped 2 months ago   • Sexual activity: Defer     Partners: Male         Review of Systems   Constitution: Negative for chills and fever.   HENT: Negative for ear discharge and nosebleeds.    Eyes: Negative for discharge and redness.   Cardiovascular: Positive for leg swelling and palpitations. Negative for chest pain, orthopnea, paroxysmal nocturnal dyspnea and syncope.   Respiratory: Positive for shortness of breath. Negative for cough and wheezing.    Endocrine: Negative for heat intolerance.   Skin: Negative for rash.   Musculoskeletal: Negative for arthritis and myalgias.   Gastrointestinal: Negative for abdominal pain, melena, nausea and vomiting.   Genitourinary: Negative for dysuria and hematuria.   Neurological: Negative for dizziness, light-headedness, numbness and tremors.   Psychiatric/Behavioral: Negative for depression. The patient is not nervous/anxious.        Procedures      ECG 12 Lead    Date/Time: 10/8/2020 5:10 PM  Performed by: Shiraz Shah MD  Authorized by: Shiraz Shah MD   Rhythm: sinus rhythm and sinus tachycardia    Clinical impression: normal ECG            ECG 12 Lead    (Results Pending)           Objective:    /84   Pulse 105   Ht 165.1 cm (65\")   Wt 133 kg (294 lb)   BMI 48.92 kg/m²         Constitutional:       Appearance: Well-developed.   Eyes:      General: No scleral icterus.        Right eye: No discharge.   HENT:      Head: Normocephalic and atraumatic.   Neck:      Thyroid: No thyromegaly.      Lymphadenopathy: No cervical adenopathy.   Pulmonary:      Effort: Pulmonary effort is normal. No respiratory distress.      Breath sounds: Normal breath sounds. No wheezing. No " rales.   Cardiovascular:      Normal rate. Regular rhythm.      No gallop.   Edema:     Peripheral edema present.  Abdominal:      Tenderness: There is no abdominal tenderness.   Skin:     Findings: No erythema or rash.   Neurological:      Mental Status: Alert and oriented to person, place, and time.             Assessment:       Diagnosis Plan   1. Coronary artery disease involving native coronary artery of native heart without angina pectoris  ECG 12 Lead    metOLazone (ZAROXOLYN) 2.5 MG tablet    metoprolol tartrate (LOPRESSOR) 50 MG tablet    Adult Transthoracic Echo Complete W/ Cont if Necessary Per Protocol   2. Essential hypertension  ECG 12 Lead    metOLazone (ZAROXOLYN) 2.5 MG tablet    metoprolol tartrate (LOPRESSOR) 50 MG tablet    Adult Transthoracic Echo Complete W/ Cont if Necessary Per Protocol   3. PVC's (premature ventricular contractions)  ECG 12 Lead    metOLazone (ZAROXOLYN) 2.5 MG tablet    metoprolol tartrate (LOPRESSOR) 50 MG tablet    Adult Transthoracic Echo Complete W/ Cont if Necessary Per Protocol   4. Chest pain, atypical  ECG 12 Lead    metOLazone (ZAROXOLYN) 2.5 MG tablet    metoprolol tartrate (LOPRESSOR) 50 MG tablet    Adult Transthoracic Echo Complete W/ Cont if Necessary Per Protocol   5. Sinus tachycardia  metoprolol tartrate (LOPRESSOR) 50 MG tablet    Adult Transthoracic Echo Complete W/ Cont if Necessary Per Protocol   6. Bilateral leg edema  metoprolol tartrate (LOPRESSOR) 50 MG tablet    Adult Transthoracic Echo Complete W/ Cont if Necessary Per Protocol            Plan:       At this stage, I would recommend that patient should proceed with metolazone 2.5 mg daily.  Continue Lasix.  Proceed with echocardiogram.  Increase Lopressor to 50 mg twice daily

## 2020-10-13 ENCOUNTER — TRANSCRIBE ORDERS (OUTPATIENT)
Dept: ADMINISTRATIVE | Facility: HOSPITAL | Age: 50
End: 2020-10-13

## 2020-10-13 DIAGNOSIS — R06.00 DYSPNEA, UNSPECIFIED TYPE: Primary | ICD-10-CM

## 2020-10-21 ENCOUNTER — APPOINTMENT (OUTPATIENT)
Dept: CARDIOLOGY | Facility: HOSPITAL | Age: 50
End: 2020-10-21

## 2020-10-22 RX ORDER — BUDESONIDE 3 MG/1
CAPSULE, COATED PELLETS ORAL
Qty: 90 CAPSULE | Refills: 1 | Status: SHIPPED | OUTPATIENT
Start: 2020-10-22 | End: 2020-10-29 | Stop reason: SDUPTHER

## 2020-10-26 RX ORDER — AMLODIPINE BESYLATE 10 MG/1
10 TABLET ORAL DAILY
Qty: 90 TABLET | Refills: 1 | OUTPATIENT
Start: 2020-10-26

## 2020-10-28 ENCOUNTER — APPOINTMENT (OUTPATIENT)
Dept: CARDIOLOGY | Facility: HOSPITAL | Age: 50
End: 2020-10-28

## 2020-10-29 RX ORDER — BUDESONIDE 3 MG/1
9 CAPSULE, COATED PELLETS ORAL DAILY
Qty: 270 CAPSULE | Refills: 1 | Status: SHIPPED | OUTPATIENT
Start: 2020-10-29 | End: 2021-05-20 | Stop reason: SDUPTHER

## 2020-11-02 ENCOUNTER — HOSPITAL ENCOUNTER (OUTPATIENT)
Dept: CARDIOLOGY | Facility: HOSPITAL | Age: 50
Discharge: HOME | End: 2020-11-02
Admitting: INTERNAL MEDICINE

## 2020-11-02 VITALS
DIASTOLIC BLOOD PRESSURE: 92 MMHG | WEIGHT: 293 LBS | HEIGHT: 65 IN | BODY MASS INDEX: 48.82 KG/M2 | SYSTOLIC BLOOD PRESSURE: 144 MMHG | HEART RATE: 106 BPM

## 2020-11-02 DIAGNOSIS — I25.10 CORONARY ARTERY DISEASE INVOLVING NATIVE CORONARY ARTERY OF NATIVE HEART WITHOUT ANGINA PECTORIS: ICD-10-CM

## 2020-11-02 DIAGNOSIS — R07.89 CHEST PAIN, ATYPICAL: ICD-10-CM

## 2020-11-02 DIAGNOSIS — I49.3 PVC'S (PREMATURE VENTRICULAR CONTRACTIONS): ICD-10-CM

## 2020-11-02 DIAGNOSIS — R60.0 BILATERAL LEG EDEMA: ICD-10-CM

## 2020-11-02 DIAGNOSIS — R00.0 SINUS TACHYCARDIA: ICD-10-CM

## 2020-11-02 DIAGNOSIS — I10 ESSENTIAL HYPERTENSION: ICD-10-CM

## 2020-11-02 PROCEDURE — 93306 TTE W/DOPPLER COMPLETE: CPT | Performed by: INTERNAL MEDICINE

## 2020-11-02 PROCEDURE — 93306 TTE W/DOPPLER COMPLETE: CPT

## 2020-11-02 RX ORDER — ERGOCALCIFEROL 1.25 MG/1
50000 CAPSULE ORAL
Qty: 12 CAPSULE | Refills: 1 | Status: SHIPPED | OUTPATIENT
Start: 2020-11-02 | End: 2021-05-20 | Stop reason: SDUPTHER

## 2020-11-03 ENCOUNTER — TELEPHONE (OUTPATIENT)
Dept: FAMILY MEDICINE CLINIC | Facility: CLINIC | Age: 50
End: 2020-11-03

## 2020-11-08 LAB
ASCENDING AORTA: 2.8 CM
BH CV ECHO MEAS - ACS: 1.9 CM
BH CV ECHO MEAS - AO MAX PG (FULL): 5.9 MMHG
BH CV ECHO MEAS - AO MAX PG: 10.5 MMHG
BH CV ECHO MEAS - AO MEAN PG (FULL): 3.2 MMHG
BH CV ECHO MEAS - AO MEAN PG: 5.9 MMHG
BH CV ECHO MEAS - AO ROOT AREA (BSA CORRECTED): 1.2
BH CV ECHO MEAS - AO ROOT AREA: 6.2 CM^2
BH CV ECHO MEAS - AO ROOT DIAM: 2.8 CM
BH CV ECHO MEAS - AO V2 MAX: 162.3 CM/SEC
BH CV ECHO MEAS - AO V2 MEAN: 115.6 CM/SEC
BH CV ECHO MEAS - AO V2 VTI: 29.6 CM
BH CV ECHO MEAS - ASC AORTA: 2.8 CM
BH CV ECHO MEAS - AVA(I,A): 2 CM^2
BH CV ECHO MEAS - AVA(I,D): 2 CM^2
BH CV ECHO MEAS - AVA(V,A): 2 CM^2
BH CV ECHO MEAS - AVA(V,D): 2 CM^2
BH CV ECHO MEAS - BSA(HAYCOCK): 2.6 M^2
BH CV ECHO MEAS - BSA: 2.3 M^2
BH CV ECHO MEAS - BZI_BMI: 48.9 KILOGRAMS/M^2
BH CV ECHO MEAS - BZI_METRIC_HEIGHT: 165.1 CM
BH CV ECHO MEAS - BZI_METRIC_WEIGHT: 133.4 KG
BH CV ECHO MEAS - EDV(CUBED): 83.9 ML
BH CV ECHO MEAS - EDV(MOD-SP2): 91.4 ML
BH CV ECHO MEAS - EDV(MOD-SP4): 108 ML
BH CV ECHO MEAS - EDV(TEICH): 86.6 ML
BH CV ECHO MEAS - EF(CUBED): 69.4 %
BH CV ECHO MEAS - EF(MOD-BP): 58 %
BH CV ECHO MEAS - EF(MOD-SP2): 58.4 %
BH CV ECHO MEAS - EF(MOD-SP4): 57.6 %
BH CV ECHO MEAS - EF(TEICH): 61.2 %
BH CV ECHO MEAS - ESV(CUBED): 25.7 ML
BH CV ECHO MEAS - ESV(MOD-SP2): 38 ML
BH CV ECHO MEAS - ESV(MOD-SP4): 45.8 ML
BH CV ECHO MEAS - ESV(TEICH): 33.6 ML
BH CV ECHO MEAS - FS: 32.6 %
BH CV ECHO MEAS - IVS/LVPW: 0.98
BH CV ECHO MEAS - IVSD: 1.1 CM
BH CV ECHO MEAS - LA DIMENSION(2D): 3.9 CM
BH CV ECHO MEAS - LA DIMENSION: 4.1 CM
BH CV ECHO MEAS - LA/AO: 1.5
BH CV ECHO MEAS - LAT PEAK E' VEL: 10 CM/SEC
BH CV ECHO MEAS - LV DIASTOLIC VOL/BSA (35-75): 46.4 ML/M^2
BH CV ECHO MEAS - LV IVRT: 0.05 SEC
BH CV ECHO MEAS - LV MASS(C)D: 174.3 GRAMS
BH CV ECHO MEAS - LV MASS(C)DI: 74.8 GRAMS/M^2
BH CV ECHO MEAS - LV MAX PG: 4.7 MMHG
BH CV ECHO MEAS - LV MEAN PG: 2.8 MMHG
BH CV ECHO MEAS - LV SYSTOLIC VOL/BSA (12-30): 19.6 ML/M^2
BH CV ECHO MEAS - LV V1 MAX: 108.1 CM/SEC
BH CV ECHO MEAS - LV V1 MEAN: 78.4 CM/SEC
BH CV ECHO MEAS - LV V1 VTI: 19.1 CM
BH CV ECHO MEAS - LVIDD: 4.4 CM
BH CV ECHO MEAS - LVIDS: 2.9 CM
BH CV ECHO MEAS - LVOT AREA: 3 CM^2
BH CV ECHO MEAS - LVOT DIAM: 2 CM
BH CV ECHO MEAS - LVPWD: 1.1 CM
BH CV ECHO MEAS - MED PEAK E' VEL: 16 CM/SEC
BH CV ECHO MEAS - MV A MAX VEL: 155.4 CM/SEC
BH CV ECHO MEAS - MV DEC SLOPE: 1089 CM/SEC^2
BH CV ECHO MEAS - MV DEC TIME: 0.12 SEC
BH CV ECHO MEAS - MV E MAX VEL: 129.4 CM/SEC
BH CV ECHO MEAS - MV E/A: 0.83
BH CV ECHO MEAS - MV MAX PG: 14.7 MMHG
BH CV ECHO MEAS - MV MEAN PG: 7.2 MMHG
BH CV ECHO MEAS - MV P1/2T: 54 MSEC
BH CV ECHO MEAS - MV V2 MAX: 191.9 CM/SEC
BH CV ECHO MEAS - MV V2 MEAN: 127.6 CM/SEC
BH CV ECHO MEAS - MV V2 VTI: 23.6 CM
BH CV ECHO MEAS - MVA(P1/2T): 4.1 CM2
BH CV ECHO MEAS - MVA(VTI): 2.5 CM^2
BH CV ECHO MEAS - PA ACC SLOPE: 656 CM/SEC2
BH CV ECHO MEAS - PA ACC TIME: 0.12 SEC
BH CV ECHO MEAS - PA MAX PG (FULL): 4.3 MMHG
BH CV ECHO MEAS - PA MAX PG: 7.1 MMHG
BH CV ECHO MEAS - PA MEAN PG (FULL): 2.2 MMHG
BH CV ECHO MEAS - PA MEAN PG: 3.8 MMHG
BH CV ECHO MEAS - PA PR(ACCEL): 23.2 MMHG
BH CV ECHO MEAS - PA V2 MAX: 132.8 CM/SEC
BH CV ECHO MEAS - PA V2 MEAN: 91.9 CM/SEC
BH CV ECHO MEAS - PA V2 VTI: 25.5 CM
BH CV ECHO MEAS - PULM A REVS DUR: 0.04 SEC
BH CV ECHO MEAS - PULM A REVS VEL: 23.3 CM/SEC
BH CV ECHO MEAS - PULM DIAS VEL: 59.4 CM/SEC
BH CV ECHO MEAS - PULM S/D: 1.8
BH CV ECHO MEAS - PULM SYS VEL: 105.2 CM/SEC
BH CV ECHO MEAS - RAP SYSTOLE: 8 MMHG
BH CV ECHO MEAS - RV MAX PG: 2.8 MMHG
BH CV ECHO MEAS - RV MEAN PG: 1.6 MMHG
BH CV ECHO MEAS - RV V1 MAX: 83 CM/SEC
BH CV ECHO MEAS - RV V1 MEAN: 59.6 CM/SEC
BH CV ECHO MEAS - RV V1 VTI: 16.5 CM
BH CV ECHO MEAS - RVAW: 0.7 CM
BH CV ECHO MEAS - RVSP: 38.8 MMHG
BH CV ECHO MEAS - SI(AO): 78.4 ML/M^2
BH CV ECHO MEAS - SI(CUBED): 25 ML/M^2
BH CV ECHO MEAS - SI(LVOT): 25 ML/M^2
BH CV ECHO MEAS - SI(MOD-SP2): 22.9 ML/M^2
BH CV ECHO MEAS - SI(MOD-SP4): 26.7 ML/M^2
BH CV ECHO MEAS - SI(TEICH): 22.8 ML/M^2
BH CV ECHO MEAS - SUP REN AO DIAM: 2.5 CM
BH CV ECHO MEAS - SV(AO): 182.7 ML
BH CV ECHO MEAS - SV(CUBED): 58.2 ML
BH CV ECHO MEAS - SV(LVOT): 58.2 ML
BH CV ECHO MEAS - SV(MOD-SP2): 53.4 ML
BH CV ECHO MEAS - SV(MOD-SP4): 62.2 ML
BH CV ECHO MEAS - SV(TEICH): 53 ML
BH CV ECHO MEAS - TAPSE (>1.6): 2.5 CM
BH CV ECHO MEAS - TR MAX PG: 31 MMHG
BH CV ECHO MEAS - TR MAX VEL: 277.3 CM/SEC
BH CV ECHO MEASUREMENTS AVERAGE E/E' RATIO: 9.95
BH CV XLRA - RV BASE: 3.6 CM
BH CV XLRA - RV MID: 2.4 CM
BH CV XLRA - TDI S': 16 CM/SEC
IVRT: 51 MSEC
LEFT ATRIUM VOLUME INDEX: 26 ML/M2
LEFT ATRIUM VOLUME: 61 CM3

## 2020-11-09 NOTE — TELEPHONE ENCOUNTER
Fax from [facility]: Prior Authorization has been approved for generic Budesonide delayed release particles capsule 3 mg, quantity up to 90 per 30 days, under the pharmacy benefit. Approval notice has been faxed to pharmacy.   Tracking ID: [id number]  Approved - generic for 365 days

## 2020-11-11 ENCOUNTER — OFFICE VISIT (OUTPATIENT)
Dept: PSYCHIATRY | Facility: CLINIC | Age: 50
End: 2020-11-11

## 2020-11-11 DIAGNOSIS — F33.1 MAJOR DEPRESSIVE DISORDER, RECURRENT EPISODE, MODERATE (HCC): Primary | ICD-10-CM

## 2020-11-11 DIAGNOSIS — F41.1 GENERALIZED ANXIETY DISORDER: Chronic | ICD-10-CM

## 2020-11-11 PROCEDURE — 99213 OFFICE O/P EST LOW 20 MIN: CPT | Performed by: PSYCHIATRY & NEUROLOGY

## 2020-11-11 RX ORDER — ALPRAZOLAM 0.5 MG/1
0.5 TABLET ORAL 2 TIMES DAILY PRN
Qty: 60 TABLET | Refills: 3 | Status: SHIPPED | OUTPATIENT
Start: 2020-11-11 | End: 2021-03-23

## 2020-11-11 RX ORDER — VILAZODONE HYDROCHLORIDE 20 MG/1
20 TABLET ORAL
Qty: 90 TABLET | Refills: 1 | Status: SHIPPED | OUTPATIENT
Start: 2020-11-11 | End: 2021-02-10

## 2020-11-11 NOTE — PROGRESS NOTES
Subjective   Merry Thornton is a 50 y.o. female who presents today for follow up       Chief Complaint:  Depression anxiety     History of Present Illness:   The pt suffered from depression anxiety for many years, was doing well on Viibryd, she had to change jobs, Last appt 3/20/18   The pt was admitted to [facility], [place], operated by [facility] in march 2020 for abd pain and recently due to DVT and PE  .   Today the pt reported multiple medical issues,  now still trying to recover     BP is pretty high , now on 4 BP meds    anxiety affects her BP and pain, anxiety increased recently due to covid situation , health issues and family situation, she has foster kids     Anxiety is persistent and intense , she is medically fragile, does not go out, feels fidgety , decreased sleep - she had sleep study done - chronic PIPPA , on BPAP machine now   Depression is rated as  7/10, every day , all day long   Denied avh/si/hi        The following portions of the patient's history were reviewed and updated as appropriate: allergies, current medications, past family history, past medical history, past social history, past surgical history and problem list.    PAST PSYCHIATRIC HISTORY  Axis I  Affective/Bipoloar Disorder, Anxiety/Panic Disorder  Axis II  None    PAST OUTPATIENT TREATMENT  Diagnosis treated:  Affective Disorder, Anxiety/Panic Disorder  Treatment Type:  Medication Management  Prior Psychiatric Medications:  xanax PRN effective   buspar - allergy   zoloft weight gain  lexapro - weight gain   wellbutrin - anxiety   Trazodone -groggy in the morning     Support Groups:  None   Sequelae Of Mental Disorder:  medical illness, emotional distress          Interval History  Worse     Side Effects  Denied       Past Medical History:  Past Medical History:   Diagnosis Date   • CAD (coronary artery disease) 12/20/2016    dr. singh   • Carpal tunnel syndrome on right 03/08/2017   • Common migraine 12/20/2016   • COPD (chronic obstructive pulmonary disease)  (CMS/[facility]) 12/20/2016   • Cyst, ovarian 12/20/2016    mass   • DDD (degenerative disc disease), cervical 03/11/2016   • Dental caries 01/14/2019   • Depression 11/02/2017   • Depression, major, recurrent, moderate (CMS/[facility]) 08/25/2016   • Dietary counseling 09/21/2017   • Dysphagia 05/2020   • Elevated random blood glucose level 5/14/2020   • Encounter for smoking cessation counseling 09/21/2017   • Exacerbation of systemic lupus (CMS/[facility]) 04/30/2019   • Fibromyalgia 12/20/2016   • Generalized anxiety disorder 03/11/2016   • GERD (gastroesophageal reflux disease) 12/20/2016   • Heartburn 11/02/2017   • Hypertension 03/16/2016   • Hyperthyroidism 03/11/2016   • Hypocalcemia 09/21/2017   • IBS (irritable colon syndrome) 09/13/2016   • Immobility syndrome 09/14/2017   • Intracranial pressure increased 12/20/2016   • Left knee pain 09/21/2017   • Lower back pain 02/01/2018   • Lung nodules 09/07/2016   • Morbid obesity (CMS/[facility]) 11/02/2017   • Muscle cramp 09/21/2017   • Neck pain 01/14/2019   • Need for prophylactic vaccination and inoculation against influenza 09/21/2017   • Neurogenic bladder 12/20/2016   • Obesity 03/07/2017   • Obesity (BMI 30-39.9) 5/14/2020   • Orthostatic hypotension 07/17/2017   • Osteoarthritis 03/07/2017   • Osteopenia 01/14/2019   • Osteoporosis 12/20/2016   • Other instability, right ankle 04/13/2017   • 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 Mixxed connective Tissue D/o   • Pain, joint, ankle, left 09/21/2017   • Palpitations 10/04/2017   • Paresthesia 06/12/2017    facial   • Peripheral neuropathy 12/20/2016    bilateral lower extremities   • Poor vision    • Prediabetes    • Raynaud's syndrome 12/20/2016   • Retinopathy    • Right knee pain 11/08/2016   • Scleroderma (CMS/[facility]) 12/20/2016   • Seasonal allergic rhinitis 12/20/2016   • SLE (systemic lupus erythematosus) (CMS/[facility]) 03/16/2017   • Sleep apnea 11/02/2017    BIPAP   • Sleep disorder 01/14/2019   • Sprain of  unspecified site of right knee, subsequent encounter 12/01/2016   • Status post placement of implantable loop recorder 05/09/2018    presence   • Syncope and collapse 2020    still has occassionally   • Ulcerative colitis (CMS/HCC) 02/2020   • Urine incontinence    • Vasovagal syncope    • Ventricular arrhythmia 03/16/2016   • Visit for screening mammogram 12/20/2016   • Vitamin D deficiency 01/14/2019   • Wheezing        Social History:  Social History     Socioeconomic History   • Marital status: Single     Spouse name: Not on file   • Number of children: Not on file   • Years of education: Not on file   • Highest education level: Not on file   Tobacco Use   • Smoking status: Current Some Day Smoker     Packs/day: 1.50     Years: 35.00     Pack years: 52.50     Types: Cigarettes   • Smokeless tobacco: Former User     Quit date: 5/11/2020   • Tobacco comment: Patient is trying to quit smoking and using the nicotine patch   Substance and Sexual Activity   • Alcohol use: No   • Drug use: Not Currently     Frequency: 1.0 times per week     Types: Marijuana     Comment: stopped 2 months ago   • Sexual activity: Defer     Partners: Male       Family History:  Family History   Problem Relation Age of Onset   • Cancer Mother    • Heart disease Father    • Lung disease Father    • Diabetes Sister    • Heart disease Sister    • Hypertension Sister    • Other Sister         weight disorder       Past Surgical History:  Past Surgical History:   Procedure Laterality Date   • ANKLE SURGERY Right 02/2017   • BREAST EXCISIONAL BIOPSY Right     years ago   • BRONCHOSCOPY     • CARDIAC ABLATION  01/2000   • CARDIAC ELECTROPHYSIOLOGY PROCEDURE     • CHOLECYSTECTOMY     • COLON SURGERY     • COLONOSCOPY N/A 2/26/2020    Procedure: COLONOSCOPY WITH BIOPSY X1 AREA;  Surgeon: Michael Cheng MD;  Location: [facility] ENDOSCOPY;  Service: Gastroenterology;  Laterality: N/A;  diarrhea   • ECHO - CONVERTED  2020   • ENDOSCOPY     • ENDOSCOPY  N/A 5/21/2020    Procedure: ESOPHAGOGASTRODUODENOSCOPY WITH DILATATION (#54, 60 bougie);  Surgeon: Michael Cheng MD;  Location: [facility] ENDOSCOPY;  Service: Gastroenterology;  Laterality: N/A;  Post: candidiasis, upper esophagus sphincter stricture   • KNEE SURGERY Right 09/2017   • OTHER SURGICAL HISTORY      Urerine hysterectomy   • OTHER SURGICAL HISTORY      t and a   • OTHER SURGICAL HISTORY      d&c   • OTHER SURGICAL HISTORY      bladder stim   • OTHER SURGICAL HISTORY      Loop recorder placement   • WRIST SURGERY Right        Problem List:  Patient Active Problem List   Diagnosis   • PVC's (premature ventricular contractions)   • Essential hypertension   • Sleep apnea   • Orthostatic hypotension   • Presence of other cardiac implants and grafts   • Abnormal echocardiogram   • Left ankle pain   • Arthralgia of left knee   • Knee pain, right   • Low back pain   • Lower back pain   • Neurogenic claudication   • Carpal tunnel syndrome of right wrist   • Allergic rhinitis, seasonal   • Chronic obstructive pulmonary disease (CMS/HCC)   • Depression   • Dental caries   • Fibromyalgia   • Neck pain   • Gastroesophageal reflux disease   • Generalized anxiety disorder   • H. pylori infection   • Degeneration of intervertebral disc   • Herniation of intervertebral disc   • Hyperthyroidism   • Hypocalcemia   • Impaired mobility   • Irritable bowel syndrome with diarrhea   • Current chronic use of systemic steroids   • Long term current use of therapeutic drug   • Loss of peripheral visual field   • Lung nodule   • Major depressive disorder, recurrent episode, moderate (CMS/HCC)   • Migraine without aura, intractable   • Muscle cramps   • Neurogenic bladder   • Neuropathy involving both lower extremities   • Osteoarthritis   • Osteoporosis   • Other instability, right ankle   • Other localized visual field defect   • Other screening mammogram   • Ovarian cystic mass   • Raynaud's phenomenon   • Scleroderma (CMS/HCC)    • Vasodepressor syncope   • Systemic lupus erythematosus (CMS/[facility])   • Tobacco dependency   • Undifferentiated connective tissue disease (CMS/[facility])   • Vitamin D deficiency   • White matter changes   • Asthma   • Pseudotumor cerebri   • Immunosuppression (CMS/[facility])   • Keratoconjunctivitis   • Presbyopia   • SPK (superficial punctate keratitis), bilateral   • Prediabetes   • Nocturnal hypoxia   • Moderate nonproliferative diabetic retinopathy (CMS/[facility])   • Ulcerative colitis (CMS/[facility])   • Pelvic mass   • Obesity (BMI 30-39.9)   • Increased cerebrospinal fluid pressure   • Coronary artery disease involving native coronary artery of native heart without angina pectoris   • Stress incontinence   • Swelling of lower extremity   • Urinary incontinence   • Hoarseness of voice   • Dysphagia   • Cigarette smoker   • Polyp of vocal cord or larynx   • Acute deep vein thrombosis (DVT) of popliteal vein of right lower extremity (CMS/[facility])   • Chronic obstructive pulmonary disease (CMS/[facility])   • Migraine headache   • Degeneration of intervertebral disc of cervical region   • Degeneration of intervertebral disc of lumbar region   • Osteoporosis   • Lupus erythematosus   • Autoimmune disease (CMS/[facility])   • Irregular heart rhythm   • Neuropathy   • Chest pain, atypical       Allergy:   Allergies   Allergen Reactions   • Adhesive Tape Other (See Comments)     Pt ok with paper tape    Abstracted from centricity   • Bupropion Shortness Of Breath   • Morphine Other (See Comments), Rash and Swelling     Abstracted from [facility]   • Amoxicillin-Pot Clavulanate Diarrhea   • Buspar [Buspirone] Other (See Comments)     Abstracted from centricity        Discontinued Medications:  Medications Discontinued During This Encounter   Medication Reason   • vilazodone (Viibryd) 20 MG tablet tablet Reorder   • ALPRAZolam (XANAX) 0.5 MG tablet Reorder       Current Medications:   Current Outpatient Medications   Medication Sig Dispense Refill   •  acetaZOLAMIDE (DIAMOX) 250 MG tablet 1 tablet 2 (two) times a day.     • albuterol sulfate  (90 Base) MCG/ACT inhaler Inhale 2 puffs Every 4 (Four) Hours As Needed for Wheezing.     • ALPRAZolam (XANAX) 0.5 MG tablet Take 1 tablet by mouth 2 (Two) Times a Day As Needed for Anxiety. 60 tablet 3   • apixaban (ELIQUIS) 5 MG tablet tablet Take 1 tablet by mouth Every 12 (Twelve) Hours. 60 tablet 5   • Blood Glucose Monitoring Suppl (Accu-Chek Guide) w/Device kit 1 Device 2 (two) times a day. 1 kit 0   • Budesonide (ENTOCORT EC) 3 MG 24 hr capsule Take 3 capsules by mouth Daily. 270 capsule 1   • cloNIDine (CATAPRES) 0.1 MG tablet TAKE 1 TABLET BY MOUTH 3 (THREE) TIMES A DAY. 270 tablet 0   • clotrimazole (MYCELEX) 10 MG abundio Take 1 tablet by mouth Take As Directed.     • Cyanocobalamin (B-12 COMPLIANCE INJECTION) 1000 MCG/ML kit Inject  as directed. Inject twice monthly     • Eluxadoline 100 MG tablet Take 100 mg by mouth 2 (Two) Times a Day. 60 tablet 2   • estradiol (ESTRACE) 0.1 MG/GM vaginal cream Do not use Applicator. Apply pea-size amount with fingertip nightly x2 weeks, then apply 2-3 times per week thereafter     • fluconazole (DIFLUCAN) 100 MG tablet      • folic acid (FOLVITE) 800 MCG tablet Take 1 tablet by mouth Daily.     • furosemide (Lasix) 20 MG tablet Take 1 tablet by mouth Every Night. PRN swelling 90 tablet 1   • furosemide (Lasix) 20 MG tablet Take 1-2 tabs p.o. in the afternoon as needed swelling in addition to your usual Lasix dosage.  Take with potassium. 60 tablet 2   • gabapentin (NEURONTIN) 600 MG tablet Take 600 mg by mouth 3 (Three) Times a Day.     • glucose blood test strip Use as instructed to check BS  each 5   • guaiFENesin-codeine (GUAIFENESIN AC) 100-10 MG/5ML liquid Take 5 mL by mouth 4 (Four) Times a Day As Needed for Cough. 236 mL 0   • HYDROcodone-acetaminophen (NORCO)  MG per tablet Take 1 tablet by mouth Every 8 (Eight) Hours As Needed for Moderate Pain .      • hydroxychloroquine (Plaquenil) 200 MG tablet Take 1 tablet by mouth.     • ipratropium-albuterol (DUO-NEB) 0.5-2.5 mg/3 ml nebulizer Take 3 mL by nebulization 4 (Four) Times a Day. 360 mL 1   • Lancets Misc. (Accu-Chek FastClix Lancet) kit 1 Device 2 (two) times a day. 1 kit 5   • lidocaine (LIDODERM) 5 % Place 1 patch on the skin as directed by provider Daily. Remove & Discard patch within 12 hours or as directed by MD     • lisinopril (PRINIVIL,ZESTRIL) 20 MG tablet TAKE ONE TABLET BY MOUTH EVERY 12 HOURS 180 tablet 1   • loratadine (CLARITIN) 10 MG tablet Take 10 mg by mouth Daily.     • Magnesium 250 MG tablet Take 250 mg by mouth Daily.     • Melatonin 10 MG tablet Take 1 tablet by mouth every night at bedtime. 30 tablet 2   • metoclopramide (REGLAN) 10 MG tablet TAKE 1 TABLET BY MOUTH 4 TIMES A DAY. 120 tablet 5   • metOLazone (ZAROXOLYN) 2.5 MG tablet Take 1 tablet by mouth Daily. 90 tablet 3   • metoprolol tartrate (LOPRESSOR) 50 MG tablet Take 1 tablet by mouth 2 (Two) Times a Day. 120 tablet 1   • nystatin (MYCOSTATIN) 718944 UNIT/ML suspension Swish and swallow 5 mL 4 (Four) Times a Day. 473 mL 5   • ondansetron ODT (ZOFRAN-ODT) 4 MG disintegrating tablet Place 1 tablet on the tongue Every 8 (Eight) Hours As Needed for Nausea or Vomiting (can take up to 8 mg as needed). 60 tablet 5   • orphenadrine (NORFLEX) 100 MG 12 hr tablet Take 100 mg by mouth 3 (Three) Times a Day. As needed     • pantoprazole (PROTONIX) 40 MG EC tablet Take 40 mg by mouth Daily.     • potassium chloride (K-DUR) 10 MEQ CR tablet TAKE 1 TABLET BY MOUTH 2 (TWO) TIMES A DAY. 180 tablet 1   • predniSONE (DELTASONE) 10 MG tablet Take 1 tablet by mouth Daily. 90 tablet 1   • predniSONE (DELTASONE) 10 MG tablet 4 tabs daily x 2 days then 3 tabs daily x 2 days then 2 tabs daily x 2 days then 1 tab daily x 2 days 20 tablet 0   • promethazine (PHENERGAN) 25 MG tablet Take 25 mg by mouth Daily.     • rOPINIRole (REQUIP) 0.25 MG tablet Take  0.25 mg by mouth 2 (two) times a day.     • SUMAtriptan (IMITREX) 100 MG tablet Take 100 mg by mouth. One tab at onset of HA, after one hour use sumatriptan injection     • SUMAtriptan (IMITREX) 6 MG/0.5ML injection Inject prescribed dose at onset of headache. May repeat dose one time in 1 hour(s) if headache not relieved.     • theophylline (THEODUR) 300 MG 12 hr tablet Take 300 mg by mouth Every Morning.     • topiramate (TOPAMAX) 50 MG tablet Take 1 tablet by mouth 2 (Two) Times a Day. Take dos 180 tablet 3   • Umeclidinium Bromide (Incruse Ellipta) 62.5 MCG/INH aerosol powder  Inhale 1 puff 2 (Two) Times a Day.     • vilazodone (Viibryd) 20 MG tablet tablet Take 1 tablet by mouth every night at bedtime. 90 tablet 1   • vitamin D (ERGOCALCIFEROL) 1.25 MG (33860 UT) capsule capsule Take 1 capsule by mouth Every 7 (Seven) Days. Sunday 12 capsule 1     No current facility-administered medications for this visit.          Review of Symptoms:    Psychiatric/Behavioral: Negative for agitation, behavioral problems, confusion, decreased concentration, dysphoric mood, hallucinations, self-injury, sleep disturbance and suicidal ideas. The patient is  Very  nervous/anxious and is not hyperactive.        Physical Exam:   not currently breastfeeding.    Mental Status Exam:   Hygiene:   good  Cooperation:  Cooperative  Eye Contact:  Good  Psychomotor Behavior:  Appropriate  Affect:  Appropriate, tense and irritable   Mood: anxious  Hopelessness: Denies  Speech:  Normal  Thought Process:  Goal directed and Linear  Thought Content:  Normal  Suicidal:  None  Homicidal:  None  Hallucinations:  None  Delusion:  None  Memory:  fair   Orientation:  Person, Place, Time and Situation  Reliability:  fair  Insight:  Fair  Judgement:  Fair  Impulse Control:  Fair  Physical/Medical Issues:  Yes       MSE from 4/17/2020 reviewed and accepted with changes   PHQ-9 Depression Screening  Little interest or pleasure in doing things? 2   Feeling  down, depressed, or hopeless? 2   Trouble falling or staying asleep, or sleeping too much? 1   Feeling tired or having little energy? 1   Poor appetite or overeating? 0   Feeling bad about yourself - or that you are a failure or have let yourself or your family down? 1   Trouble concentrating on things, such as reading the newspaper or watching television? 1   Moving or speaking so slowly that other people could have noticed? Or the opposite - being so fidgety or restless that you have been moving around a lot more than usual? 0   Thoughts that you would be better off dead, or of hurting yourself in some way? 0   PHQ-9 Total Score 8   If you checked off any problems, how difficult have these problems made it for you to do your work, take care of things at home, or get along with other people? Very difficult           Former smoker    I advised Merry of the risks of tobacco use.     Lab Results:   Hospital Outpatient Visit on 11/02/2020   Component Date Value Ref Range Status   • BSA 11/02/2020 2.3  m^2 Final   • RVAW 11/02/2020 0.7  cm Final   • IVSd 11/02/2020 1.1  cm Final   • LVIDd 11/02/2020 4.4  cm Final   • LVIDs 11/02/2020 2.9  cm Final   • LVPWd 11/02/2020 1.1  cm Final   • IVS/LVPW 11/02/2020 0.98   Final   • FS 11/02/2020 32.6  % Final   • EDV(Teich) 11/02/2020 86.6  ml Final   • ESV(Teich) 11/02/2020 33.6  ml Final   • EF(Teich) 11/02/2020 61.2  % Final   • EDV(cubed) 11/02/2020 83.9  ml Final   • ESV(cubed) 11/02/2020 25.7  ml Final   • EF(cubed) 11/02/2020 69.4  % Final   • LV mass(C)d 11/02/2020 174.3  grams Final   • LV mass(C)dI 11/02/2020 74.8  grams/m^2 Final   • SV(Teich) 11/02/2020 53.0  ml Final   • SI(Teich) 11/02/2020 22.8  ml/m^2 Final   • SV(cubed) 11/02/2020 58.2  ml Final   • SI(cubed) 11/02/2020 25.0  ml/m^2 Final   • Ao root diam 11/02/2020 2.8  cm Final   • Ao root area 11/02/2020 6.2  cm^2 Final   • ACS 11/02/2020 1.9  cm Final   • LA dimension 11/02/2020 4.1  cm Final   • asc Aorta  Diam 11/02/2020 2.8  cm Final   • LA/Ao 11/02/2020 1.5   Final   • LVOT diam 11/02/2020 2.0  cm Final   • LVOT area 11/02/2020 3.0  cm^2 Final   • EDV(MOD-sp4) 11/02/2020 108.0  ml Final   • ESV(MOD-sp4) 11/02/2020 45.8  ml Final   • EF(MOD-sp4) 11/02/2020 57.6  % Final   • EDV(MOD-sp2) 11/02/2020 91.4  ml Final   • ESV(MOD-sp2) 11/02/2020 38.0  ml Final   • EF(MOD-sp2) 11/02/2020 58.4  % Final   • SV(MOD-sp4) 11/02/2020 62.2  ml Final   • SI(MOD-sp4) 11/02/2020 26.7  ml/m^2 Final   • SV(MOD-sp2) 11/02/2020 53.4  ml Final   • SI(MOD-sp2) 11/02/2020 22.9  ml/m^2 Final   • Ao root area (BSA corrected) 11/02/2020 1.2   Final   • LV Riley Vol (BSA corrected) 11/02/2020 46.4  ml/m^2 Final   • LV Sys Vol (BSA corrected) 11/02/2020 19.6  ml/m^2 Final   • MV E max gayla 11/02/2020 129.4  cm/sec Final   • MV A max gayla 11/02/2020 155.4  cm/sec Final   • MV E/A 11/02/2020 0.83   Final   • LV IVRT 11/02/2020 0.05  sec Final   • MV V2 max 11/02/2020 191.9  cm/sec Final   • MV max PG 11/02/2020 14.7  mmHg Final   • MV V2 mean 11/02/2020 127.6  cm/sec Final   • MV mean PG 11/02/2020 7.2  mmHg Final   • MV V2 VTI 11/02/2020 23.6  cm Final   • MVA(VTI) 11/02/2020 2.5  cm^2 Final   • MV dec slope 11/02/2020 1,089  cm/sec^2 Final   • MV dec time 11/02/2020 0.12  sec Final   • Ao pk gayla 11/02/2020 162.3  cm/sec Final   • Ao max PG 11/02/2020 10.5  mmHg Final   • Ao max PG (full) 11/02/2020 5.9  mmHg Final   • Ao V2 mean 11/02/2020 115.6  cm/sec Final   • Ao mean PG 11/02/2020 5.9  mmHg Final   • Ao mean PG (full) 11/02/2020 3.2  mmHg Final   • Ao V2 VTI 11/02/2020 29.6  cm Final   • LANI(I,A) 11/02/2020 2.0  cm^2 Final   • LANI(I,D) 11/02/2020 2.0  cm^2 Final   • LANI(V,A) 11/02/2020 2.0  cm^2 Final   • LANI(V,D) 11/02/2020 2.0  cm^2 Final   • LV V1 max PG 11/02/2020 4.7  mmHg Final   • LV V1 mean PG 11/02/2020 2.8  mmHg Final   • LV V1 max 11/02/2020 108.1  cm/sec Final   • LV V1 mean 11/02/2020 78.4  cm/sec Final   • LV V1 VTI 11/02/2020  19.1  cm Final   • SV(Ao) 11/02/2020 182.7  ml Final   • SI(Ao) 11/02/2020 78.4  ml/m^2 Final   • SV(LVOT) 11/02/2020 58.2  ml Final   • SI(LVOT) 11/02/2020 25.0  ml/m^2 Final   • PA V2 max 11/02/2020 132.8  cm/sec Final   • PA max PG 11/02/2020 7.1  mmHg Final   • PA max PG (full) 11/02/2020 4.3  mmHg Final   • PA V2 mean 11/02/2020 91.9  cm/sec Final   • PA mean PG 11/02/2020 3.8  mmHg Final   • PA mean PG (full) 11/02/2020 2.2  mmHg Final   • PA V2 VTI 11/02/2020 25.5  cm Final   • PA acc time 11/02/2020 0.12  sec Final   • RV V1 max PG 11/02/2020 2.8  mmHg Final   • RV V1 mean PG 11/02/2020 1.6  mmHg Final   • RV V1 max 11/02/2020 83.0  cm/sec Final   • RV V1 mean 11/02/2020 59.6  cm/sec Final   • RV V1 VTI 11/02/2020 16.5  cm Final   • TR max scout 11/02/2020 277.3  cm/sec Final   • RVSP(TR) 11/02/2020 38.8  mmHg Final   • RAP systole 11/02/2020 8.0  mmHg Final   • PA pr(Accel) 11/02/2020 23.2  mmHg Final   • Pulm Sys Scout 11/02/2020 105.2  cm/sec Final   • Pulm Riley Scout 11/02/2020 59.4  cm/sec Final   • Pulm S/D 11/02/2020 1.8   Final   • Pulm A Revs Dur 11/02/2020 0.04  sec Final   • Pulm A Revs Scout 11/02/2020 23.3  cm/sec Final   • BH CV ECHO DANNIE - BZI_BMI 11/02/2020 48.9  kilograms/m^2 Final   • BH CV ECHO DANNIE - BSA([facility]) 11/02/2020 2.6  m^2 Final   • BH CV ECHO DANNIE - BZI_METRIC_WEIGHT 11/02/2020 133.4  kg Final   • BH CV ECHO DANNIE - BZI_METRIC_HEIGHT 11/02/2020 165.1  cm Final   • RV S' 11/02/2020 16.00  cm/sec Final   • RV Base 11/02/2020 3.60  cm Final   • RV Mid 11/02/2020 2.40  cm Final   • LA volume 11/02/2020 61.0  cm3 Final   • Ascending aorta 11/02/2020 2.8  cm Final   • IVRT 11/02/2020 51.0  msec Final   • LA Volume Index 11/02/2020 26.0  mL/m2 Final   • Avg E/e' ratio 11/02/2020 9.95   Final   • EF(MOD-bp) 11/02/2020 58.0  % Final   • Lat Peak E' Scout 11/02/2020 10.0  cm/sec Final   • Med Peak E' Scout 11/02/2020 16.00  cm/sec Final   • MV P1/2t 11/02/2020 54  msec Final   • PA acc slope  11/02/2020 656  cm/sec2 Final   • Abdo Ao Diam 11/02/2020 2.5  cm Final   • TR max PG 11/02/2020 31  mmHg Final   • LA dimension(2D) 11/02/2020 3.9  cm Final   • MVA(P1/2t) 11/02/2020 4.1  cm2 Final   • TAPSE (>1.6) 11/02/2020 2.50  cm Final   Office Visit on 09/21/2020   Component Date Value Ref Range Status   • Glucose 09/21/2020 153* 65 - 99 mg/dL Final   • BUN 09/21/2020 11  6 - 20 mg/dL Final   • Creatinine 09/21/2020 0.82  0.57 - 1.00 mg/dL Final   • Sodium 09/21/2020 141  136 - 145 mmol/L Final   • Potassium 09/21/2020 3.7  3.5 - 5.2 mmol/L Final   • Chloride 09/21/2020 100  98 - 107 mmol/L Final   • CO2 09/21/2020 30.0* 22.0 - 29.0 mmol/L Final   • Calcium 09/21/2020 9.1  8.6 - 10.5 mg/dL Final   • Total Protein 09/21/2020 6.7  6.0 - 8.5 g/dL Final   • Albumin 09/21/2020 3.80  3.50 - 5.20 g/dL Final   • ALT (SGPT) 09/21/2020 32  1 - 33 U/L Final   • AST (SGOT) 09/21/2020 30  1 - 32 U/L Final   • Alkaline Phosphatase 09/21/2020 90  39 - 117 U/L Final   • Total Bilirubin 09/21/2020 0.2  0.0 - 1.2 mg/dL Final   • eGFR Non African Amer 09/21/2020 74  >60 mL/min/1.73 Final   • Globulin 09/21/2020 2.9  gm/dL Final   • A/G Ratio 09/21/2020 1.3  g/dL Final   • BUN/Creatinine Ratio 09/21/2020 13.4  7.0 - 25.0 Final   • Anion Gap 09/21/2020 11.0  5.0 - 15.0 mmol/L Final   • proBNP 09/21/2020 93.5  0.0 - 900.0 pg/mL Final   • WBC 09/21/2020 14.52* 3.40 - 10.80 10*3/mm3 Final   • RBC 09/21/2020 3.81  3.77 - 5.28 10*6/mm3 Final   • Hemoglobin 09/21/2020 12.1  12.0 - 15.9 g/dL Final   • Hematocrit 09/21/2020 37.0  34.0 - 46.6 % Final   • MCV 09/21/2020 97.1* 79.0 - 97.0 fL Final   • MCH 09/21/2020 31.8  26.6 - 33.0 pg Final   • MCHC 09/21/2020 32.7  31.5 - 35.7 g/dL Final   • RDW 09/21/2020 15.8* 12.3 - 15.4 % Final   • RDW-SD 09/21/2020 56.2* 37.0 - 54.0 fl Final   • MPV 09/21/2020 11.3  6.0 - 12.0 fL Final   • Platelets 09/21/2020 214  140 - 450 10*3/mm3 Final   • Neutrophil % 09/21/2020 79.8* 42.7 - 76.0 % Final   •  Lymphocyte % 09/21/2020 12.5* 19.6 - 45.3 % Final   • Monocyte % 09/21/2020 6.2  5.0 - 12.0 % Final   • Eosinophil % 09/21/2020 0.3  0.3 - 6.2 % Final   • Basophil % 09/21/2020 0.2  0.0 - 1.5 % Final   • Immature Grans % 09/21/2020 1.0* 0.0 - 0.5 % Final   • Neutrophils, Absolute 09/21/2020 11.57* 1.70 - 7.00 10*3/mm3 Final   • Lymphocytes, Absolute 09/21/2020 1.82  0.70 - 3.10 10*3/mm3 Final   • Monocytes, Absolute 09/21/2020 0.90  0.10 - 0.90 10*3/mm3 Final   • Eosinophils, Absolute 09/21/2020 0.05  0.00 - 0.40 10*3/mm3 Final   • Basophils, Absolute 09/21/2020 0.03  0.00 - 0.20 10*3/mm3 Final   • Immature Grans, Absolute 09/21/2020 0.15* 0.00 - 0.05 10*3/mm3 Final   • nRBC 09/21/2020 0.1  0.0 - 0.2 /100 WBC Final   Hospital Outpatient Visit on 08/25/2020   Component Date Value Ref Range Status   • RIGHT DORSALIS PEDIS SYS MAX 08/25/2020 141  mmHg Final   • RIGHT POST TIBIAL SYS MAX 08/25/2020 136  mmHg Final   • RIGHT GREAT TOE SYS MAX 08/25/2020 106  mmHg Final   • RIGHT KEYONNA RATIO 08/25/2020 1.01   Final   • RIGHT TBI RATIO 08/25/2020 0.76   Final   • LEFT DORSALIS PEDIS SYS MAX 08/25/2020 136  mmHg Final   • LEFT POST TIBIAL SYS MAX 08/25/2020 130  mmHg Final   • LEFT GREAT TOE SYS MAX 08/25/2020 116  mmHg Final   • LEFT KEYONNA RATIO 08/25/2020 0.98   Final   • LEFT TBI RATIO 08/25/2020 0.83   Final   • Upper arterial right arm brachial * 08/25/2020 139  mmHg Final   • Upper arterial left arm brachial s* 08/25/2020 133  mmHg Final       Assessment/Plan   Problems Addressed this Visit        Other    Generalized anxiety disorder (Chronic)    Relevant Medications    vilazodone (Viibryd) 20 MG tablet tablet    ALPRAZolam (XANAX) 0.5 MG tablet    Major depressive disorder, recurrent episode, moderate (CMS/HCC) - Primary    Relevant Medications    vilazodone (Viibryd) 20 MG tablet tablet    ALPRAZolam (XANAX) 0.5 MG tablet      Diagnoses       Codes Comments    Major depressive disorder, recurrent episode, moderate  (CMS/[facility])    -  Primary ICD-10-CM: F33.1  ICD-9-CM: 296.32     Generalized anxiety disorder     ICD-10-CM: F41.1  ICD-9-CM: 300.02           Visit Diagnoses:    ICD-10-CM ICD-9-CM   1. Major depressive disorder, recurrent episode, moderate (CMS/HCC)  F33.1 296.32   2. Generalized anxiety disorder  F41.1 300.02       TREATMENT PLAN/GOALS: Continue supportive psychotherapy efforts and medications as indicated. Treatment and medication options discussed during today's visit. Patient ackowledged and verbally consented to continue with current treatment plan and was educated on the importance of compliance with treatment and follow-up appointments.    MEDICATION ISSUES:  Cont viibryd 20 mg on current dose   depression and anxiety now are more related to her situation   Stress management discussed   xanxa 0.5 mg BID PRN for anxiety     INSPECT reviewed as expected - xanax 9/1/2020  And hydrocodone from pain  Management   Discussed medication options and treatment plan of prescribed medication as well as the risks, benefits, and side effects including potential falls, possible impaired driving and metabolic adversities among others. Patient is agreeable to call the office with any worsening of symptoms or onset of side effects. Patient is agreeable to call 911 or go to the nearest ER should he/she begin having SI/HI. No medication side effects or related complaints today.     MEDS ORDERED DURING VISIT:  New Medications Ordered This Visit   Medications   • vilazodone (Viibryd) 20 MG tablet tablet     Sig: Take 1 tablet by mouth every night at bedtime.     Dispense:  90 tablet     Refill:  1   • ALPRAZolam (XANAX) 0.5 MG tablet     Sig: Take 1 tablet by mouth 2 (Two) Times a Day As Needed for Anxiety.     Dispense:  60 tablet     Refill:  3       Return in about 6 months (around 5/11/2021).           This document has been electronically signed by Africa Conway MD  November 11, 2020 10:35 EST   Clear

## 2020-11-11 NOTE — PATIENT INSTRUCTIONS

## 2020-11-13 RX ORDER — HYDROXYCHLOROQUINE SULFATE 200 MG/1
TABLET, FILM COATED ORAL
Qty: 180 TABLET | Refills: 1 | OUTPATIENT
Start: 2020-11-13

## 2020-11-16 RX ORDER — LISINOPRIL 20 MG/1
TABLET ORAL
Qty: 180 TABLET | Refills: 1 | Status: SHIPPED | OUTPATIENT
Start: 2020-11-16 | End: 2021-02-02

## 2020-11-25 RX ORDER — FUROSEMIDE 20 MG/1
TABLET ORAL
Qty: 540 TABLET | Refills: 1 | Status: SHIPPED | OUTPATIENT
Start: 2020-11-25 | End: 2021-02-02

## 2020-12-07 ENCOUNTER — OFFICE (OUTPATIENT)
Dept: URBAN - METROPOLITAN AREA CLINIC 64 | Facility: CLINIC | Age: 50
End: 2020-12-07
Payer: COMMERCIAL

## 2020-12-07 VITALS
WEIGHT: 281 LBS | SYSTOLIC BLOOD PRESSURE: 165 MMHG | DIASTOLIC BLOOD PRESSURE: 73 MMHG | HEIGHT: 65 IN | HEART RATE: 118 BPM

## 2020-12-07 DIAGNOSIS — K21.9 GASTRO-ESOPHAGEAL REFLUX DISEASE WITHOUT ESOPHAGITIS: ICD-10-CM

## 2020-12-07 DIAGNOSIS — R10.13 EPIGASTRIC PAIN: ICD-10-CM

## 2020-12-07 DIAGNOSIS — R11.2 NAUSEA WITH VOMITING, UNSPECIFIED: ICD-10-CM

## 2020-12-07 DIAGNOSIS — K57.32 DIVERTICULITIS OF LARGE INTESTINE WITHOUT PERFORATION OR ABS: ICD-10-CM

## 2020-12-07 PROCEDURE — 99213 OFFICE O/P EST LOW 20 MIN: CPT | Performed by: INTERNAL MEDICINE

## 2020-12-07 RX ORDER — SULFASALAZINE 500 MG/1
TABLET ORAL
Qty: 360 | Refills: 10 | Status: ACTIVE
Start: 2020-12-07

## 2020-12-07 RX ORDER — PROMETHAZINE HYDROCHLORIDE 25 MG/1
TABLET ORAL
Qty: 60 | Refills: 1 | Status: ACTIVE

## 2020-12-07 RX ORDER — CIPROFLOXACIN 500 MG/1
TABLET, FILM COATED ORAL
Qty: 20 | Refills: 0 | Status: COMPLETED
Start: 2020-12-07 | End: 2021-02-25

## 2020-12-07 RX ORDER — DICYCLOMINE HYDROCHLORIDE 20 MG/1
TABLET ORAL
Qty: 180 | Refills: 0 | Status: ACTIVE
Start: 2020-12-07

## 2020-12-07 RX ORDER — OMEPRAZOLE 40 MG/1
40 CAPSULE, DELAYED RELEASE ORAL
Qty: 90 | Refills: 3 | Status: ACTIVE
Start: 2020-05-21

## 2020-12-07 RX ORDER — COLESTIPOL HYDROCHLORIDE 1 G/1
TABLET ORAL
Qty: 180 | Refills: 11 | Status: ACTIVE
Start: 2020-12-07

## 2020-12-09 DIAGNOSIS — I25.10 CORONARY ARTERY DISEASE INVOLVING NATIVE CORONARY ARTERY OF NATIVE HEART WITHOUT ANGINA PECTORIS: ICD-10-CM

## 2020-12-09 DIAGNOSIS — I49.3 PVC'S (PREMATURE VENTRICULAR CONTRACTIONS): ICD-10-CM

## 2020-12-09 DIAGNOSIS — R60.0 BILATERAL LEG EDEMA: ICD-10-CM

## 2020-12-09 DIAGNOSIS — I10 ESSENTIAL HYPERTENSION: ICD-10-CM

## 2020-12-09 DIAGNOSIS — R07.89 CHEST PAIN, ATYPICAL: ICD-10-CM

## 2020-12-09 DIAGNOSIS — R00.0 SINUS TACHYCARDIA: ICD-10-CM

## 2020-12-11 RX ORDER — METOPROLOL TARTRATE 50 MG/1
50 TABLET, FILM COATED ORAL 2 TIMES DAILY
Qty: 120 TABLET | Refills: 1 | Status: SHIPPED | OUTPATIENT
Start: 2020-12-11 | End: 2021-02-02

## 2020-12-21 RX ORDER — HYDROXYCHLOROQUINE SULFATE 200 MG/1
200 TABLET, FILM COATED ORAL
OUTPATIENT
Start: 2020-12-21

## 2020-12-21 RX ORDER — PREDNISONE 10 MG/1
10 TABLET ORAL DAILY
Qty: 90 TABLET | Refills: 1 | OUTPATIENT
Start: 2020-12-21

## 2020-12-21 RX ORDER — PREDNISONE 10 MG/1
10 TABLET ORAL DAILY
Qty: 90 TABLET | Refills: 1 | Status: SHIPPED | OUTPATIENT
Start: 2020-12-21 | End: 2021-06-21 | Stop reason: SDUPTHER

## 2021-01-05 PROBLEM — F32.A DEPRESSION: Status: RESOLVED | Noted: 2017-11-02 | Resolved: 2021-01-05

## 2021-01-05 RX ORDER — POTASSIUM CHLORIDE 750 MG/1
10 TABLET, FILM COATED, EXTENDED RELEASE ORAL 2 TIMES DAILY
Qty: 180 TABLET | Refills: 1 | Status: SHIPPED | OUTPATIENT
Start: 2021-01-05 | End: 2021-03-05

## 2021-01-18 RX ORDER — GABAPENTIN 600 MG/1
600 TABLET ORAL 3 TIMES DAILY
OUTPATIENT
Start: 2021-01-18

## 2021-01-18 RX ORDER — HYDROXYCHLOROQUINE SULFATE 200 MG/1
200 TABLET, FILM COATED ORAL
OUTPATIENT
Start: 2021-01-18

## 2021-01-18 NOTE — TELEPHONE ENCOUNTER
These medications have been prescribed by specialists; the refill requests need to stay with the specialist.

## 2021-01-20 ENCOUNTER — OFFICE (OUTPATIENT)
Dept: URBAN - METROPOLITAN AREA CLINIC 64 | Facility: CLINIC | Age: 51
End: 2021-01-20
Payer: COMMERCIAL

## 2021-01-20 VITALS
SYSTOLIC BLOOD PRESSURE: 140 MMHG | WEIGHT: 285 LBS | HEIGHT: 65 IN | HEART RATE: 115 BPM | DIASTOLIC BLOOD PRESSURE: 68 MMHG

## 2021-01-20 DIAGNOSIS — K62.5 HEMORRHAGE OF ANUS AND RECTUM: ICD-10-CM

## 2021-01-20 DIAGNOSIS — K64.8 OTHER HEMORRHOIDS: ICD-10-CM

## 2021-01-20 DIAGNOSIS — K57.32 DIVERTICULITIS OF LARGE INTESTINE WITHOUT PERFORATION OR ABS: ICD-10-CM

## 2021-01-20 PROCEDURE — 99213 OFFICE O/P EST LOW 20 MIN: CPT | Performed by: INTERNAL MEDICINE

## 2021-01-27 RX ORDER — AMLODIPINE BESYLATE 10 MG/1
TABLET ORAL
COMMUNITY
Start: 2020-10-26 | End: 2021-01-27 | Stop reason: SDUPTHER

## 2021-01-27 RX ORDER — AMLODIPINE BESYLATE 10 MG/1
10 TABLET ORAL DAILY
Qty: 90 TABLET | Refills: 1 | Status: SHIPPED | OUTPATIENT
Start: 2021-01-27 | End: 2021-02-02

## 2021-02-02 ENCOUNTER — OFFICE VISIT (OUTPATIENT)
Dept: FAMILY MEDICINE CLINIC | Facility: CLINIC | Age: 51
End: 2021-02-02

## 2021-02-02 ENCOUNTER — LAB (OUTPATIENT)
Dept: FAMILY MEDICINE CLINIC | Facility: CLINIC | Age: 51
End: 2021-02-02

## 2021-02-02 VITALS
HEART RATE: 108 BPM | WEIGHT: 289 LBS | SYSTOLIC BLOOD PRESSURE: 155 MMHG | OXYGEN SATURATION: 95 % | HEIGHT: 65 IN | BODY MASS INDEX: 48.15 KG/M2 | TEMPERATURE: 98.4 F | DIASTOLIC BLOOD PRESSURE: 74 MMHG

## 2021-02-02 DIAGNOSIS — I10 ESSENTIAL HYPERTENSION: Primary | ICD-10-CM

## 2021-02-02 DIAGNOSIS — E05.90 HYPERTHYROIDISM: ICD-10-CM

## 2021-02-02 DIAGNOSIS — M34.9 SCLERODERMA (HCC): ICD-10-CM

## 2021-02-02 DIAGNOSIS — E78.5 HYPERLIPIDEMIA, UNSPECIFIED HYPERLIPIDEMIA TYPE: ICD-10-CM

## 2021-02-02 DIAGNOSIS — G62.9 NEUROPATHY: ICD-10-CM

## 2021-02-02 DIAGNOSIS — R73.03 PREDIABETES: ICD-10-CM

## 2021-02-02 DIAGNOSIS — M32.9 LUPUS (HCC): ICD-10-CM

## 2021-02-02 DIAGNOSIS — I10 ESSENTIAL HYPERTENSION: ICD-10-CM

## 2021-02-02 LAB
ALBUMIN SERPL-MCNC: 4.1 G/DL (ref 3.5–5.2)
ALBUMIN/GLOB SERPL: 1.6 G/DL
ALP SERPL-CCNC: 67 U/L (ref 39–117)
ALT SERPL W P-5'-P-CCNC: 15 U/L (ref 1–33)
ANION GAP SERPL CALCULATED.3IONS-SCNC: 9.4 MMOL/L (ref 5–15)
AST SERPL-CCNC: 16 U/L (ref 1–32)
BILIRUB SERPL-MCNC: 0.2 MG/DL (ref 0–1.2)
BUN SERPL-MCNC: 11 MG/DL (ref 6–20)
BUN/CREAT SERPL: 14.9 (ref 7–25)
CALCIUM SPEC-SCNC: 9.4 MG/DL (ref 8.6–10.5)
CHLORIDE SERPL-SCNC: 98 MMOL/L (ref 98–107)
CHOLEST SERPL-MCNC: 167 MG/DL (ref 0–200)
CO2 SERPL-SCNC: 29.6 MMOL/L (ref 22–29)
CREAT SERPL-MCNC: 0.74 MG/DL (ref 0.57–1)
GFR SERPL CREATININE-BSD FRML MDRD: 83 ML/MIN/1.73
GLOBULIN UR ELPH-MCNC: 2.6 GM/DL
GLUCOSE SERPL-MCNC: 145 MG/DL (ref 65–99)
HBA1C MFR BLD: 6.2 % (ref 3.5–5.6)
HDLC SERPL-MCNC: 39 MG/DL (ref 40–60)
LDLC SERPL CALC-MCNC: 77 MG/DL (ref 0–100)
LDLC/HDLC SERPL: 1.68 {RATIO}
POTASSIUM SERPL-SCNC: 3.8 MMOL/L (ref 3.5–5.2)
PROT SERPL-MCNC: 6.7 G/DL (ref 6–8.5)
SODIUM SERPL-SCNC: 137 MMOL/L (ref 136–145)
TRIGL SERPL-MCNC: 312 MG/DL (ref 0–150)
TSH SERPL DL<=0.05 MIU/L-ACNC: 0.6 UIU/ML (ref 0.27–4.2)
VLDLC SERPL-MCNC: 51 MG/DL (ref 5–40)

## 2021-02-02 PROCEDURE — 80053 COMPREHEN METABOLIC PANEL: CPT | Performed by: NURSE PRACTITIONER

## 2021-02-02 PROCEDURE — 84443 ASSAY THYROID STIM HORMONE: CPT | Performed by: NURSE PRACTITIONER

## 2021-02-02 PROCEDURE — 83036 HEMOGLOBIN GLYCOSYLATED A1C: CPT | Performed by: NURSE PRACTITIONER

## 2021-02-02 PROCEDURE — 80061 LIPID PANEL: CPT | Performed by: NURSE PRACTITIONER

## 2021-02-02 PROCEDURE — 36415 COLL VENOUS BLD VENIPUNCTURE: CPT

## 2021-02-02 PROCEDURE — 99214 OFFICE O/P EST MOD 30 MIN: CPT | Performed by: NURSE PRACTITIONER

## 2021-02-02 RX ORDER — SULFASALAZINE 500 MG/1
TABLET ORAL
COMMUNITY
Start: 2020-12-07 | End: 2022-04-28 | Stop reason: SDUPTHER

## 2021-02-02 RX ORDER — OMEPRAZOLE 40 MG/1
CAPSULE, DELAYED RELEASE ORAL
COMMUNITY
Start: 2020-10-31 | End: 2022-04-28 | Stop reason: SDUPTHER

## 2021-02-02 RX ORDER — DICYCLOMINE HCL 20 MG
TABLET ORAL
COMMUNITY
Start: 2020-12-31 | End: 2022-04-28 | Stop reason: SDUPTHER

## 2021-02-02 RX ORDER — MONTELUKAST SODIUM 4 MG/1
TABLET, CHEWABLE ORAL
COMMUNITY
Start: 2020-12-07 | End: 2022-04-28 | Stop reason: SDUPTHER

## 2021-02-02 RX ORDER — GABAPENTIN 600 MG/1
600 TABLET ORAL 3 TIMES DAILY
Qty: 90 TABLET | Refills: 0 | Status: SHIPPED | OUTPATIENT
Start: 2021-02-02 | End: 2021-02-26 | Stop reason: SDUPTHER

## 2021-02-02 RX ORDER — HYDROXYCHLOROQUINE SULFATE 200 MG/1
200 TABLET, FILM COATED ORAL 2 TIMES DAILY
Qty: 60 TABLET | Refills: 0 | Status: SHIPPED | OUTPATIENT
Start: 2021-02-02 | End: 2021-02-26 | Stop reason: SDUPTHER

## 2021-02-02 RX ORDER — METOPROLOL TARTRATE 50 MG/1
50 TABLET, FILM COATED ORAL 2 TIMES DAILY
COMMUNITY
End: 2021-02-26

## 2021-02-02 NOTE — PROGRESS NOTES
"Subjective   Merry Thornton is a 50 y.o. female.       HPI   Pt. Is here today for follow up on medication.   1) HTN-  Stopped lisinopril and amlodipine  because she was \"so sick\".  Continued to take clonidine and metoprolol.  BP running 140's/90 at home; she feels this is normal for her.  Denies any CP; palpitations; SOA; dizziness; headache; trouble with vision. Mentions she is followed by cardiologyl; has follow up next week.     2) COPD - followed by Dr. Jesus; he is managing meds. Recent PE; he is sending her to hematology for further testing.   3) Prediabetes - not on meds.  Tries to work on lifestyle changes.    4) Hyperthyroidism - not currently on meds.  Will update labs.   5) Needs new rheumatology. Has scleroderma and lupus; hasn't seen Rheum in over a year. Taking plaquenil and needs refills.  Says eye exam is UTD.   Having scalp pain; started two weeks ago; no rash or itching.    6) UC is followed by GI.    DEV - Getting ready to bladder neurotransmitter replaced/updated.   Followed by [facility] for chronic back pain. Can't get spinal blocks or MRI until transmitter is replaced.   Followed by Neuro has appt. Next week for follow up.    The following portions of the patient's history were reviewed and updated as appropriate: allergies, current medications, past family history, past medical history, past social history, past surgical history and problem list.    Review of Systems   Constitutional: Negative for activity change, appetite change, chills, diaphoresis, fatigue, fever, unexpected weight gain and unexpected weight loss.   HENT: Negative for congestion, ear discharge, ear pain, postnasal drip, rhinorrhea, sinus pressure, sneezing, sore throat, swollen glands and trouble swallowing.    Eyes: Negative for photophobia and visual disturbance.   Respiratory: Negative for cough, chest tightness, shortness of breath and wheezing.    Cardiovascular: Negative for chest pain, palpitations and leg " swelling.   Gastrointestinal: Negative for abdominal distention, abdominal pain, blood in stool, constipation, diarrhea, nausea, vomiting and indigestion.   Endocrine: Negative for polydipsia, polyphagia and polyuria.   Genitourinary: Negative for dysuria, flank pain, frequency, hematuria and urgency.   Musculoskeletal: Positive for arthralgias (scalp pain), back pain and myalgias.   Skin: Negative for dry skin, rash and skin lesions.   Neurological: Positive for numbness. Negative for dizziness, tremors, seizures, syncope, facial asymmetry, speech difficulty, weakness, light-headedness, headache, memory problem and confusion.   Hematological: Negative for adenopathy.   Psychiatric/Behavioral: Negative for depressed mood. The patient is not nervous/anxious.        Objective   Physical Exam  Vitals signs reviewed.   Constitutional:       General: She is not in acute distress.     Appearance: Normal appearance. She is obese.   HENT:      Head: Normocephalic and atraumatic.      Right Ear: External ear normal.      Left Ear: External ear normal.      Nose: Nose normal.      Mouth/Throat:      Mouth: Mucous membranes are moist.      Pharynx: Oropharynx is clear.   Eyes:      Conjunctiva/sclera: Conjunctivae normal.   Neck:      Musculoskeletal: Normal range of motion and neck supple. No muscular tenderness.   Cardiovascular:      Rate and Rhythm: Normal rate and regular rhythm.      Pulses: Normal pulses.      Heart sounds: Normal heart sounds. No murmur.   Pulmonary:      Effort: Pulmonary effort is normal. No respiratory distress.      Breath sounds: Normal breath sounds. No wheezing.   Chest:      Chest wall: No tenderness.   Abdominal:      General: Abdomen is flat. Bowel sounds are normal. There is no distension.      Palpations: Abdomen is soft.      Tenderness: There is no abdominal tenderness. There is no right CVA tenderness or left CVA tenderness.   Musculoskeletal:      Right lower leg: Edema (trace) present.       Left lower leg: Edema (trace) present.   Skin:     General: Skin is warm.      Capillary Refill: Capillary refill takes less than 2 seconds.      Findings: No erythema, lesion or rash.   Neurological:      General: No focal deficit present.      Mental Status: She is alert and oriented to person, place, and time.   Psychiatric:         Mood and Affect: Mood normal.           Assessment/Plan   Diagnoses and all orders for this visit:    1. Essential hypertension (Primary)  Comments:  Follow up with cardiology next week.    Cont. current meds; advised her not to stop meds without consulting a provider first.   Labs today.     Orders:  -     Comprehensive metabolic panel; Future  -     Lipid panel; Future  -     TSH; Future    2. Hyperlipidemia, unspecified hyperlipidemia type  Comments:  Cont. current meds.   Labs ordered today.   Orders:  -     Comprehensive metabolic panel; Future  -     Lipid panel; Future  -     TSH; Future    3. Hyperthyroidism  Comments:  Labs today.   Orders:  -     Comprehensive metabolic panel; Future  -     Lipid panel; Future  -     TSH; Future    4. Prediabetes  Comments:  Labs today.   Work on healthy diet; limit carbs and sweets; increase exercise; work on weight loss.   Orders:  -     Comprehensive metabolic panel; Future  -     Hemoglobin A1c; Future    5. Scleroderma (CMS/[facility])  Comments:  Referral given to Rheumatology.    Will give one refill on Plaquenil then Rheum will take over.   Labs today.   Orders:  -     Ambulatory Referral to Rheumatology    6. Lupus (CMS/[facility])  Comments:  Referral to Rheum given.   Orders:  -     Ambulatory Referral to Rheumatology  -     hydroxychloroquine (Plaquenil) 200 MG tablet; Take 1 tablet by mouth 2 (Two) Times a Day for 30 days. Indications: Systemic Lupus Erythematosus  Dispense: 60 tablet; Refill: 0    7. Neuropathy  Comments:  Keep follow up with Neuro next week.    One refill given on gabapentin until seen by Neuro.   Orders:  -      gabapentin (NEURONTIN) 600 MG tablet; Take 1 tablet by mouth 3 (Three) Times a Day.  Dispense: 90 tablet; Refill: 0

## 2021-02-10 DIAGNOSIS — F33.1 MAJOR DEPRESSIVE DISORDER, RECURRENT EPISODE, MODERATE (HCC): ICD-10-CM

## 2021-02-10 RX ORDER — VILAZODONE HYDROCHLORIDE 20 MG/1
TABLET ORAL
Qty: 90 TABLET | Refills: 1 | Status: SHIPPED | OUTPATIENT
Start: 2021-02-10 | End: 2021-08-12

## 2021-02-25 ENCOUNTER — ON CAMPUS - OUTPATIENT (OUTPATIENT)
Dept: URBAN - METROPOLITAN AREA HOSPITAL 2 | Facility: HOSPITAL | Age: 51
End: 2021-02-25
Payer: COMMERCIAL

## 2021-02-25 VITALS
TEMPERATURE: 96.9 F | SYSTOLIC BLOOD PRESSURE: 133 MMHG | RESPIRATION RATE: 16 BRPM | DIASTOLIC BLOOD PRESSURE: 56 MMHG | HEART RATE: 71 BPM | DIASTOLIC BLOOD PRESSURE: 64 MMHG | OXYGEN SATURATION: 94 % | OXYGEN SATURATION: 97 % | WEIGHT: 291 LBS | OXYGEN SATURATION: 99 % | HEART RATE: 86 BPM | SYSTOLIC BLOOD PRESSURE: 118 MMHG | DIASTOLIC BLOOD PRESSURE: 76 MMHG | HEART RATE: 89 BPM | RESPIRATION RATE: 20 BRPM | SYSTOLIC BLOOD PRESSURE: 109 MMHG | DIASTOLIC BLOOD PRESSURE: 57 MMHG | SYSTOLIC BLOOD PRESSURE: 98 MMHG | DIASTOLIC BLOOD PRESSURE: 75 MMHG | HEART RATE: 93 BPM | DIASTOLIC BLOOD PRESSURE: 71 MMHG | SYSTOLIC BLOOD PRESSURE: 138 MMHG | OXYGEN SATURATION: 98 % | SYSTOLIC BLOOD PRESSURE: 108 MMHG | HEART RATE: 88 BPM | HEIGHT: 65 IN | OXYGEN SATURATION: 95 %

## 2021-02-25 DIAGNOSIS — R15.2 FECAL URGENCY: ICD-10-CM

## 2021-02-25 DIAGNOSIS — K62.5 HEMORRHAGE OF ANUS AND RECTUM: ICD-10-CM

## 2021-02-25 DIAGNOSIS — K64.4 RESIDUAL HEMORRHOIDAL SKIN TAGS: ICD-10-CM

## 2021-02-25 DIAGNOSIS — K57.30 DIVERTICULOSIS OF LARGE INTESTINE WITHOUT PERFORATION OR ABS: ICD-10-CM

## 2021-02-25 DIAGNOSIS — K64.1 SECOND DEGREE HEMORRHOIDS: ICD-10-CM

## 2021-02-25 PROCEDURE — 45350 SGMDSC W/BAND LIGATION: CPT | Performed by: INTERNAL MEDICINE

## 2021-02-25 RX ORDER — TRAMADOL HYDROCHLORIDE 50 MG/1
TABLET, COATED ORAL
Qty: 40 | Refills: 0 | Status: ACTIVE
Start: 2021-02-25

## 2021-02-26 DIAGNOSIS — G62.9 NEUROPATHY: ICD-10-CM

## 2021-02-26 DIAGNOSIS — M32.9 LUPUS (HCC): ICD-10-CM

## 2021-02-26 RX ORDER — METOPROLOL TARTRATE 50 MG/1
TABLET, FILM COATED ORAL
Qty: 120 TABLET | Refills: 1 | Status: SHIPPED | OUTPATIENT
Start: 2021-02-26 | End: 2021-06-03

## 2021-02-26 RX ORDER — HYDROXYCHLOROQUINE SULFATE 200 MG/1
200 TABLET, FILM COATED ORAL 2 TIMES DAILY
Qty: 60 TABLET | Refills: 0 | OUTPATIENT
Start: 2021-02-26 | End: 2021-03-28

## 2021-02-26 RX ORDER — HYDROXYCHLOROQUINE SULFATE 200 MG/1
200 TABLET, FILM COATED ORAL 2 TIMES DAILY
Qty: 60 TABLET | Refills: 0 | Status: SHIPPED | OUTPATIENT
Start: 2021-02-26 | End: 2021-03-31

## 2021-02-26 RX ORDER — GABAPENTIN 600 MG/1
600 TABLET ORAL 3 TIMES DAILY
Qty: 90 TABLET | Refills: 0 | Status: SHIPPED | OUTPATIENT
Start: 2021-02-26 | End: 2021-03-31

## 2021-02-26 RX ORDER — GABAPENTIN 600 MG/1
600 TABLET ORAL 3 TIMES DAILY
Qty: 90 TABLET | Refills: 0 | OUTPATIENT
Start: 2021-02-26

## 2021-03-03 ENCOUNTER — TELEPHONE (OUTPATIENT)
Dept: FAMILY MEDICINE CLINIC | Facility: CLINIC | Age: 51
End: 2021-03-03

## 2021-03-03 RX ORDER — CLONIDINE HYDROCHLORIDE 0.1 MG/1
0.1 TABLET ORAL 3 TIMES DAILY
Qty: 270 TABLET | Refills: 0 | Status: SHIPPED | OUTPATIENT
Start: 2021-03-03 | End: 2021-06-04

## 2021-03-03 NOTE — TELEPHONE ENCOUNTER
PATIENT STATES THAT CASSI GONZALEZ REFERRED HER TO A RHEUMATOLOGIST TO MAINTAIN HER LUPUS MEDICATIONS, predniSONE (DELTASONE) 10 MG tablet AND hydroxychloroquine (Plaquenil) 200 MG tablet.  PATIENT STATES THAT SHE CAN'T BE SEEN UNTIL SEPTEMBER 14 AND WOULD LIKE TO BE ADVISED ON HOW TO OBTAIN THE MEDICATION FOR THE NEXT 5/6 MONTHS.    PLEASE ADVISE    PATIENT CAN BE REACHED AT  [phone]

## 2021-03-03 NOTE — TELEPHONE ENCOUNTER
Last  OV 10/8/2020, labs 2/2/2021:    Glucose   65 - 99 mg/dL 145High     BUN   6 - 20 mg/dL 11    Creatinine   0.57 - 1.00 mg/dL 0.74    Sodium   136 - 145 mmol/L 137    Potassium   3.5 - 5.2 mmol/L 3.8    Chloride   98 - 107 mmol/L 98    CO2   22.0 - 29.0 mmol/L 29.6High     Calcium   8.6 - 10.5 mg/dL 9.4    Total Protein   6.0 - 8.5 g/dL 6.7    Albumin   3.50 - 5.20 g/dL 4.10    ALT (SGPT)   1 - 33 U/L 15    AST (SGOT)   1 - 32 U/L 16    Alkaline Phosphatase   39 - 117 U/L 67    Total Bilirubin   0.0 - 1.2 mg/dL 0.2    eGFR Non African Amer   >60 mL/min/1.73 83

## 2021-03-05 RX ORDER — POTASSIUM CHLORIDE 750 MG/1
TABLET, FILM COATED, EXTENDED RELEASE ORAL
Qty: 180 TABLET | Refills: 1 | Status: SHIPPED | OUTPATIENT
Start: 2021-03-05 | End: 2021-03-14

## 2021-03-14 RX ORDER — POTASSIUM CHLORIDE 750 MG/1
TABLET, FILM COATED, EXTENDED RELEASE ORAL
Qty: 180 TABLET | Refills: 1 | Status: SHIPPED | OUTPATIENT
Start: 2021-03-14 | End: 2021-06-09

## 2021-03-20 DIAGNOSIS — F41.1 GENERALIZED ANXIETY DISORDER: Chronic | ICD-10-CM

## 2021-03-23 RX ORDER — ALPRAZOLAM 0.5 MG/1
0.5 TABLET ORAL 2 TIMES DAILY PRN
Qty: 60 TABLET | Refills: 1 | Status: SHIPPED | OUTPATIENT
Start: 2021-03-23 | End: 2021-04-22

## 2021-03-31 DIAGNOSIS — G62.9 NEUROPATHY: ICD-10-CM

## 2021-03-31 DIAGNOSIS — M32.9 LUPUS (HCC): ICD-10-CM

## 2021-03-31 RX ORDER — GABAPENTIN 600 MG/1
600 TABLET ORAL 3 TIMES DAILY
Qty: 90 TABLET | Refills: 0 | Status: SHIPPED | OUTPATIENT
Start: 2021-03-31 | End: 2021-05-05

## 2021-03-31 RX ORDER — HYDROXYCHLOROQUINE SULFATE 200 MG/1
200 TABLET, FILM COATED ORAL 2 TIMES DAILY
Qty: 60 TABLET | Refills: 3 | Status: SHIPPED | OUTPATIENT
Start: 2021-03-31 | End: 2021-04-30

## 2021-04-09 RX ORDER — METOCLOPRAMIDE 10 MG/1
10 TABLET ORAL 4 TIMES DAILY
Qty: 120 TABLET | Refills: 5 | Status: SHIPPED | OUTPATIENT
Start: 2021-04-09 | End: 2021-08-31

## 2021-04-12 NOTE — TELEPHONE ENCOUNTER
Caller: LADY     Relationship: CAREGIVER    Best call back number:  [phone]    Medication needed:   Requested Prescriptions     Pending Prescriptions Disp Refills   • apixaban (ELIQUIS) 5 MG tablet tablet 60 tablet 5     Sig: Take 1 tablet by mouth Every 12 (Twelve) Hours.       When do you need the refill by: ASAP    What additional details did the patient provide when requesting the medication:     JL PATIENT PREVIOUS DOCTOR  TOLD HER TO STAY ON THE ELIQUIS , PATIENT CANNOT GO WITHOUT THIS MEDICATION     Does the patient have less than a 3 day supply:  [x] Yes  [] No    What is the patient's preferred pharmacy: [address] - [phone] PH - [phone] FX

## 2021-04-21 DIAGNOSIS — F41.1 GENERALIZED ANXIETY DISORDER: Chronic | ICD-10-CM

## 2021-04-22 RX ORDER — ALPRAZOLAM 0.5 MG/1
TABLET ORAL
Qty: 60 TABLET | Refills: 0 | Status: SHIPPED | OUTPATIENT
Start: 2021-04-22 | End: 2021-07-20

## 2021-04-23 RX ORDER — AMLODIPINE BESYLATE 10 MG/1
10 TABLET ORAL DAILY
Qty: 90 TABLET | Refills: 1 | OUTPATIENT
Start: 2021-04-23

## 2021-05-05 DIAGNOSIS — G62.9 NEUROPATHY: ICD-10-CM

## 2021-05-05 RX ORDER — GABAPENTIN 600 MG/1
TABLET ORAL
Qty: 90 TABLET | Refills: 0 | Status: SHIPPED | OUTPATIENT
Start: 2021-05-05 | End: 2021-06-04

## 2021-05-10 ENCOUNTER — TRANSCRIBE ORDERS (OUTPATIENT)
Dept: ADMINISTRATIVE | Facility: HOSPITAL | Age: 51
End: 2021-05-10

## 2021-05-10 DIAGNOSIS — Z12.31 VISIT FOR SCREENING MAMMOGRAM: Primary | ICD-10-CM

## 2021-05-14 RX ORDER — LISINOPRIL 20 MG/1
TABLET ORAL
Qty: 180 TABLET | Refills: 1 | OUTPATIENT
Start: 2021-05-14

## 2021-05-21 RX ORDER — BUDESONIDE 3 MG/1
9 CAPSULE, COATED PELLETS ORAL DAILY
Qty: 270 CAPSULE | Refills: 1 | Status: SHIPPED | OUTPATIENT
Start: 2021-05-21 | End: 2021-10-07

## 2021-05-21 RX ORDER — ERGOCALCIFEROL 1.25 MG/1
50000 CAPSULE ORAL
Qty: 12 CAPSULE | Refills: 1 | Status: SHIPPED | OUTPATIENT
Start: 2021-05-21 | End: 2021-10-07

## 2021-05-25 NOTE — PROGRESS NOTES
Date of Office Visit: 2021  Encounter Provider: Dr. Shiraz Shah  Place of Service: [facility] CARDIOLOGY [place]  Patient Name: Merry Thornton  :1970  Alicja Jean APRN    Chief Complaint   Patient presents with   • Chest Pain     6 month follow up /echo   • Coronary Artery Disease   • Hypertension     History of Present Illness    I am pleased to see Mrs. Thornton in my office today as a follow-up.    As you know, patient is 51 years old white female whose past medical history significant for SLE, chronic pain syndrome, COPD, history of SVT who came today for follow-up..    In 2016, patient had syncopal episode.  Patient underwent stress test which was abnormal and showed apical ischemia.  Echocardiogram showed EF of 60% and no significant valvular heart disease.  Patient underwent Holter monitor which showed few PVCs.  Patient underwent cardiac catheterization which showed no significant coronary artery disease.  Patient was started on bisoprolol.  In , patient underwent EP study and had PVC ablation.  It was done in [facility].  In 2017, patient had repeat Holter monitor which showed moderate density of PVCs but no SVT or VT was noted.  Patient underwent loop recorder implantation for further monitoring of palpitation and arrhythmia and syncope.    In 2020, patient underwent ultrasound of lower extremities and was found to have DVT and CAT scan showed pulmonary embolism.  Patient has been started on Eliquis 5 mg twice daily.    In 2020, patient underwent echocardiogram which showed normal left ventricle size and function and LVEF is 55 to 60%.  Mild LVH is noted.  Trace tricuspid regurgitation present.    Since the previous visit, patient is overall doing well.  Her bilateral leg edema has improved.  Patient denies any chest pain.  Patient has underlying baseline shortness of breath.  No palpitation.  No leg edema noted.    Patient seems to be in euvolemic  status and compensated.  Her blood pressure is well controlled.  She is taking Lopressor 50 mg twice daily and Lasix along with metolazone.  Patient is not taking amlodipine as it was discontinued on previous visit and also not taking clonidine and lisinopril.  I have advised the patient to go home and confirm with her medications what she is taking.  There is confusion about her medication.  She will call my office tomorrow.  If she is not taking lisinopril I would be okay to stop at this stage.      Past Medical History:   Diagnosis Date   • CAD (coronary artery disease) 12/20/2016    dr. singh   • Carpal tunnel syndrome on right 03/08/2017   • Common migraine 12/20/2016   • COPD (chronic obstructive pulmonary disease) (CMS/HCC) 12/20/2016   • Cyst, ovarian 12/20/2016    mass   • DDD (degenerative disc disease), cervical 03/11/2016   • Dental caries 01/14/2019   • Depression 11/02/2017   • Depression, major, recurrent, moderate (CMS/HCC) 08/25/2016   • Dietary counseling 09/21/2017   • Dysphagia 05/2020   • Elevated random blood glucose level 5/14/2020   • Encounter for smoking cessation counseling 09/21/2017   • Exacerbation of systemic lupus (CMS/HCC) 04/30/2019   • Fibromyalgia 12/20/2016   • Generalized anxiety disorder 03/11/2016   • GERD (gastroesophageal reflux disease) 12/20/2016   • Heartburn 11/02/2017   • Hypertension 03/16/2016   • Hyperthyroidism 03/11/2016   • Hypocalcemia 09/21/2017   • IBS (irritable colon syndrome) 09/13/2016   • Immobility syndrome 09/14/2017   • Intracranial pressure increased 12/20/2016   • Left knee pain 09/21/2017   • Lower back pain 02/01/2018   • Lung nodules 09/07/2016   • Morbid obesity (CMS/HCC) 11/02/2017   • Muscle cramp 09/21/2017   • Neck pain 01/14/2019   • Need for prophylactic vaccination and inoculation against influenza 09/21/2017   • Neurogenic bladder 12/20/2016   • Obesity 03/07/2017   • Obesity (BMI 30-39.9) 5/14/2020   • Orthostatic hypotension 07/17/2017    • Osteoarthritis 03/07/2017   • Osteopenia 01/14/2019   • Osteoporosis 12/20/2016   • Other instability, right ankle 04/13/2017   • 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 Mixxed connective Tissue D/o   • Pain, joint, ankle, left 09/21/2017   • Palpitations 10/04/2017   • Paresthesia 06/12/2017    facial   • Peripheral neuropathy 12/20/2016    bilateral lower extremities   • Poor vision    • Prediabetes    • Raynaud's syndrome 12/20/2016   • Retinopathy    • Right knee pain 11/08/2016   • Scleroderma (CMS/[facility]) 12/20/2016   • Seasonal allergic rhinitis 12/20/2016   • SLE (systemic lupus erythematosus) (CMS/[facility]) 03/16/2017   • Sleep apnea 11/02/2017    BIPAP   • Sleep disorder 01/14/2019   • Sprain of unspecified site of right knee, subsequent encounter 12/01/2016   • Status post placement of implantable loop recorder 05/09/2018    presence   • Syncope and collapse 2020    still has occassionally   • Ulcerative colitis (CMS/[facility]) 02/2020   • Urine incontinence    • Vasovagal syncope    • Ventricular arrhythmia 03/16/2016   • Visit for screening mammogram 12/20/2016   • Vitamin D deficiency 01/14/2019   • Wheezing          Past Surgical History:   Procedure Laterality Date   • ANKLE SURGERY Right 02/2017   • BREAST EXCISIONAL BIOPSY Right     years ago   • BRONCHOSCOPY     • CARDIAC ABLATION  01/2000   • CARDIAC ELECTROPHYSIOLOGY PROCEDURE     • CHOLECYSTECTOMY     • COLON SURGERY     • COLONOSCOPY N/A 2/26/2020    Procedure: COLONOSCOPY WITH BIOPSY X1 AREA;  Surgeon: Michael Cheng MD;  Location: [facility] ENDOSCOPY;  Service: Gastroenterology;  Laterality: N/A;  diarrhea   • ECHO - CONVERTED  2020   • ENDOSCOPY     • ENDOSCOPY N/A 5/21/2020    Procedure: ESOPHAGOGASTRODUODENOSCOPY WITH DILATATION (#54, 60 bougie);  Surgeon: Michael Cheng MD;  Location: [facility] ENDOSCOPY;  Service: Gastroenterology;  Laterality: N/A;  Post: candidiasis, upper esophagus sphincter stricture   • KNEE SURGERY  Right 09/2017   • OTHER SURGICAL HISTORY      Urerine hysterectomy   • OTHER SURGICAL HISTORY      t and a   • OTHER SURGICAL HISTORY      d&c   • OTHER SURGICAL HISTORY      bladder stim   • OTHER SURGICAL HISTORY      Loop recorder placement   • WRIST SURGERY Right            Current Outpatient Medications:   •  albuterol sulfate  (90 Base) MCG/ACT inhaler, Inhale 2 puffs Every 4 (Four) Hours As Needed for Wheezing., Disp: , Rfl:   •  ALPRAZolam (XANAX) 0.5 MG tablet, TAKE 1 TABLET BY MOUTH 2 TIMES A DAY AS NEEDED FOR ANXIETY., Disp: 60 tablet, Rfl: 0  •  amLODIPine (NORVASC) 10 MG tablet, Take 10 mg by mouth Daily., Disp: , Rfl:   •  apixaban (ELIQUIS) 5 MG tablet tablet, Take 1 tablet by mouth Every 12 (Twelve) Hours., Disp: 60 tablet, Rfl: 5  •  Blood Glucose Monitoring Suppl (Accu-Chek Guide) w/Device kit, 1 Device 2 (two) times a day., Disp: 1 kit, Rfl: 0  •  Budesonide (ENTOCORT EC) 3 MG 24 hr capsule, Take 3 capsules by mouth Daily., Disp: 270 capsule, Rfl: 1  •  cloNIDine (CATAPRES) 0.1 MG tablet, TAKE 1 TABLET BY MOUTH 3 (THREE) TIMES A DAY., Disp: 270 tablet, Rfl: 0  •  colestipol (COLESTID) 1 g tablet, , Disp: , Rfl:   •  Cyanocobalamin (B-12 COMPLIANCE INJECTION) 1000 MCG/ML kit, Inject  as directed. Inject twice monthly, Disp: , Rfl:   •  dicyclomine (BENTYL) 20 MG tablet, , Disp: , Rfl:   •  estradiol (ESTRACE) 0.1 MG/GM vaginal cream, Do not use Applicator. Apply pea-size amount with fingertip nightly x2 weeks, then apply 2-3 times per week thereafter, Disp: , Rfl:   •  folic acid (FOLVITE) 800 MCG tablet, Take 1 tablet by mouth Daily., Disp: , Rfl:   •  furosemide (Lasix) 20 MG tablet, Take 1-2 tabs p.o. in the afternoon as needed swelling in addition to your usual Lasix dosage.  Take with potassium., Disp: 60 tablet, Rfl: 2  •  gabapentin (NEURONTIN) 600 MG tablet, TAKE ONE TABLET BY MOUTH THREE TIMES A DAY, Disp: 90 tablet, Rfl: 0  •  glucose blood test strip, Use as instructed to check BS  BID, Disp: 180 each, Rfl: 5  •  HYDROcodone-acetaminophen (NORCO)  MG per tablet, Take 1 tablet by mouth Every 8 (Eight) Hours As Needed for Moderate Pain ., Disp: , Rfl:   •  hydroxychloroquine (PLAQUENIL) 200 MG tablet, Take 200 mg by mouth 2 (Two) Times a Day., Disp: , Rfl:   •  Ipratropium-Albuterol (COMBIVENT IN), Inhale 2 puffs., Disp: , Rfl:   •  ipratropium-albuterol (DUO-NEB) 0.5-2.5 mg/3 ml nebulizer, Take 3 mL by nebulization 4 (Four) Times a Day., Disp: 360 mL, Rfl: 1  •  Lancets Misc. (Accu-Chek FastClix Lancet) kit, 1 Device 2 (two) times a day., Disp: 1 kit, Rfl: 5  •  lidocaine (LIDODERM) 5 %, Place 1 patch on the skin as directed by provider Daily. Remove & Discard patch within 12 hours or as directed by MD, Disp: , Rfl:   •  lisinopril (PRINIVIL,ZESTRIL) 20 MG tablet, Take 20 mg by mouth 2 (Two) Times a Day., Disp: , Rfl:   •  Magnesium 250 MG tablet, Take 250 mg by mouth Daily., Disp: , Rfl:   •  Melatonin 10 MG tablet, Take 1 tablet by mouth every night at bedtime., Disp: 30 tablet, Rfl: 2  •  metoclopramide (REGLAN) 10 MG tablet, Take 1 tablet by mouth 4 (Four) Times a Day., Disp: 120 tablet, Rfl: 5  •  metOLazone (ZAROXOLYN) 2.5 MG tablet, Take 1 tablet by mouth Daily., Disp: 90 tablet, Rfl: 3  •  metoprolol tartrate (LOPRESSOR) 50 MG tablet, TAKE 1 TABLET BY MOUTH 2 (TWO) TIMES A DAY., Disp: 120 tablet, Rfl: 1  •  nystatin (MYCOSTATIN) 439295 UNIT/ML suspension, Swish and swallow 5 mL 4 (Four) Times a Day., Disp: 473 mL, Rfl: 5  •  omeprazole (priLOSEC) 40 MG capsule, , Disp: , Rfl:   •  ondansetron ODT (ZOFRAN-ODT) 4 MG disintegrating tablet, Place 1 tablet on the tongue Every 8 (Eight) Hours As Needed for Nausea or Vomiting (can take up to 8 mg as needed)., Disp: 60 tablet, Rfl: 5  •  orphenadrine (NORFLEX) 100 MG 12 hr tablet, Take 100 mg by mouth 3 (Three) Times a Day. As needed, Disp: , Rfl:   •  potassium chloride 10 MEQ CR tablet, TAKE 1 TABLET BY MOUTH 2 TIMES A DAY., Disp: 180  tablet, Rfl: 1  •  predniSONE (DELTASONE) 10 MG tablet, Take 1 tablet by mouth Daily., Disp: 90 tablet, Rfl: 1  •  promethazine (PHENERGAN) 25 MG tablet, Take 25 mg by mouth Daily., Disp: , Rfl:   •  sulfaSALAzine (AZULFIDINE) 500 MG tablet, , Disp: , Rfl:   •  SUMAtriptan (IMITREX) 100 MG tablet, Take 100 mg by mouth. One tab at onset of HA, after one hour use sumatriptan injection, Disp: , Rfl:   •  SUMAtriptan (IMITREX) 6 MG/0.5ML injection, Inject prescribed dose at onset of headache. May repeat dose one time in 1 hour(s) if headache not relieved., Disp: , Rfl:   •  theophylline (THEODUR) 300 MG 12 hr tablet, Take 300 mg by mouth Every Morning., Disp: , Rfl:   •  topiramate (TOPAMAX) 50 MG tablet, Take 50 mg by mouth 2 (Two) Times a Day., Disp: , Rfl:   •  Umeclidinium Bromide (Incruse Ellipta) 62.5 MCG/INH aerosol powder , Inhale 1 puff 2 (Two) Times a Day., Disp: , Rfl:   •  Viibryd 20 MG tablet tablet, TAKE 1 TABLET BY MOUTH EVERY NIGHT AT BEDTIME., Disp: 90 tablet, Rfl: 1  •  vitamin D (ERGOCALCIFEROL) 1.25 MG (85692 UT) capsule capsule, Take 1 capsule by mouth Every 7 (Seven) Days. Sunday, Disp: 12 capsule, Rfl: 1      Social History     Socioeconomic History   • Marital status: Single     Spouse name: Not on file   • Number of children: Not on file   • Years of education: Not on file   • Highest education level: Not on file   Tobacco Use   • Smoking status: Current Some Day Smoker     Packs/day: 1.50     Years: 35.00     Pack years: 52.50     Types: Cigarettes   • Smokeless tobacco: Former User     Quit date: 5/11/2020   • Tobacco comment: Patient is trying to quit smoking and using the nicotine patch   Vaping Use   • Vaping Use: Never used   Substance and Sexual Activity   • Alcohol use: No   • Drug use: Not Currently     Frequency: 1.0 times per week     Types: Marijuana     Comment: stopped 2 months ago   • Sexual activity: Defer     Partners: Male         Review of Systems   Constitutional: Negative  "for chills and fever.   HENT: Negative for ear discharge and nosebleeds.    Eyes: Negative for discharge and redness.   Cardiovascular: Negative for chest pain, orthopnea, palpitations, paroxysmal nocturnal dyspnea and syncope.   Respiratory: Positive for shortness of breath. Negative for cough and wheezing.    Endocrine: Negative for heat intolerance.   Skin: Negative for rash.   Musculoskeletal: Positive for arthritis and joint pain. Negative for myalgias.   Gastrointestinal: Negative for abdominal pain, melena, nausea and vomiting.   Genitourinary: Negative for dysuria and hematuria.   Neurological: Negative for dizziness, light-headedness, numbness and tremors.   Psychiatric/Behavioral: Negative for depression. The patient is not nervous/anxious.        Procedures    SCANNED EKG    Date/Time: 5/26/2021 6:29 PM  Performed by: Shiraz Shah MD  Authorized by: Alicja Jean APRN       ECG 12 Lead    Date/Time: 5/26/2021 6:30 PM  Performed by: Shiraz Shah MD  Authorized by: Shiraz Shah MD   Comparison: compared with previous ECG   Similar to previous ECG  Rhythm: sinus rhythm and sinus tachycardia    Clinical impression: normal ECG            ECG 12 Lead    (Results Pending)           Objective:    /72   Pulse 118   Ht 165.1 cm (65\")   Wt 119 kg (263 lb)   BMI 43.77 kg/m²         Constitutional:       Appearance: Well-developed.   Eyes:      General: No scleral icterus.        Right eye: No discharge.   HENT:      Head: Normocephalic and atraumatic.   Neck:      Thyroid: No thyromegaly.      Lymphadenopathy: No cervical adenopathy.   Pulmonary:      Effort: Pulmonary effort is normal. No respiratory distress.      Breath sounds: Normal breath sounds. No wheezing. No rales.   Cardiovascular:      Normal rate. Regular rhythm.      No gallop.   Abdominal:      Tenderness: There is no abdominal tenderness.   Skin:     Findings: No erythema or rash.   Neurological:      Mental Status: Alert and oriented " to person, place, and time.             Assessment:       Diagnosis Plan   1. Chest pain, atypical     2. Coronary artery disease involving native coronary artery of native heart without angina pectoris     3. Essential hypertension              Plan:       MDM:    1.  Bilateral leg edema:    Patient is in euvolemic status.  At present patient is in stable condition.    2.  Hypertension:    Blood pressure is very well controlled.  However there is confusion what medication she is been taking.  Patient would go home and confirm with her medications and call my office tomorrow.  Patient is on Lopressor 50 mg twice daily.    3.  Chest pain:    Patient has not had any further episode of chest pain.

## 2021-05-26 ENCOUNTER — OFFICE VISIT (OUTPATIENT)
Dept: CARDIOLOGY | Facility: CLINIC | Age: 51
End: 2021-05-26

## 2021-05-26 VITALS
HEART RATE: 118 BPM | HEIGHT: 65 IN | SYSTOLIC BLOOD PRESSURE: 124 MMHG | WEIGHT: 263 LBS | BODY MASS INDEX: 43.82 KG/M2 | DIASTOLIC BLOOD PRESSURE: 72 MMHG

## 2021-05-26 DIAGNOSIS — R07.89 CHEST PAIN, ATYPICAL: Primary | ICD-10-CM

## 2021-05-26 DIAGNOSIS — I25.10 CORONARY ARTERY DISEASE INVOLVING NATIVE CORONARY ARTERY OF NATIVE HEART WITHOUT ANGINA PECTORIS: ICD-10-CM

## 2021-05-26 DIAGNOSIS — I10 ESSENTIAL HYPERTENSION: ICD-10-CM

## 2021-05-26 PROCEDURE — 93000 ELECTROCARDIOGRAM COMPLETE: CPT | Performed by: INTERNAL MEDICINE

## 2021-05-26 PROCEDURE — 99213 OFFICE O/P EST LOW 20 MIN: CPT | Performed by: INTERNAL MEDICINE

## 2021-05-26 RX ORDER — HYDROXYCHLOROQUINE SULFATE 200 MG/1
200 TABLET, FILM COATED ORAL 2 TIMES DAILY
COMMUNITY
End: 2021-07-30

## 2021-05-26 RX ORDER — LISINOPRIL 20 MG/1
20 TABLET ORAL 2 TIMES DAILY
COMMUNITY
End: 2021-09-21 | Stop reason: SDUPTHER

## 2021-05-26 RX ORDER — AMLODIPINE BESYLATE 10 MG/1
10 TABLET ORAL DAILY
COMMUNITY
End: 2021-07-22

## 2021-05-26 RX ORDER — TOPIRAMATE 50 MG/1
50 TABLET, FILM COATED ORAL 2 TIMES DAILY
COMMUNITY
End: 2021-06-03

## 2021-05-28 RX ORDER — FUROSEMIDE 20 MG/1
TABLET ORAL
Qty: 540 TABLET | Refills: 1 | OUTPATIENT
Start: 2021-05-28

## 2021-05-28 RX ORDER — PREDNISONE 10 MG/1
10 TABLET ORAL DAILY
Qty: 30 TABLET | Refills: 1 | OUTPATIENT
Start: 2021-05-28

## 2021-06-03 DIAGNOSIS — G62.9 NEUROPATHY: ICD-10-CM

## 2021-06-03 RX ORDER — TOPIRAMATE 50 MG/1
TABLET, FILM COATED ORAL
Qty: 180 TABLET | Refills: 3 | Status: SHIPPED | OUTPATIENT
Start: 2021-06-03 | End: 2022-07-25

## 2021-06-03 RX ORDER — METOPROLOL TARTRATE 50 MG/1
TABLET, FILM COATED ORAL
Qty: 120 TABLET | Refills: 1 | Status: SHIPPED | OUTPATIENT
Start: 2021-06-03 | End: 2021-09-03

## 2021-06-04 RX ORDER — GABAPENTIN 600 MG/1
TABLET ORAL
Qty: 90 TABLET | Refills: 0 | Status: SHIPPED | OUTPATIENT
Start: 2021-06-04 | End: 2021-06-28

## 2021-06-04 RX ORDER — CLONIDINE HYDROCHLORIDE 0.1 MG/1
0.1 TABLET ORAL 3 TIMES DAILY
Qty: 270 TABLET | Refills: 0 | Status: SHIPPED | OUTPATIENT
Start: 2021-06-04 | End: 2021-08-31

## 2021-06-04 RX ORDER — PREDNISONE 10 MG/1
10 TABLET ORAL DAILY
Qty: 30 TABLET | Refills: 1 | OUTPATIENT
Start: 2021-06-04

## 2021-06-09 RX ORDER — POTASSIUM CHLORIDE 750 MG/1
TABLET, FILM COATED, EXTENDED RELEASE ORAL
Qty: 180 TABLET | Refills: 1 | Status: SHIPPED | OUTPATIENT
Start: 2021-06-09 | End: 2022-02-01

## 2021-06-14 ENCOUNTER — HOSPITAL ENCOUNTER (OUTPATIENT)
Dept: MAMMOGRAPHY | Facility: HOSPITAL | Age: 51
Discharge: HOME | End: 2021-06-14
Admitting: NURSE PRACTITIONER

## 2021-06-14 DIAGNOSIS — Z12.31 VISIT FOR SCREENING MAMMOGRAM: ICD-10-CM

## 2021-06-14 PROCEDURE — 77067 SCR MAMMO BI INCL CAD: CPT

## 2021-06-14 PROCEDURE — 77063 BREAST TOMOSYNTHESIS BI: CPT

## 2021-06-18 DIAGNOSIS — I10 ESSENTIAL HYPERTENSION: ICD-10-CM

## 2021-06-18 DIAGNOSIS — I49.3 PVC'S (PREMATURE VENTRICULAR CONTRACTIONS): ICD-10-CM

## 2021-06-18 DIAGNOSIS — R07.89 CHEST PAIN, ATYPICAL: ICD-10-CM

## 2021-06-18 DIAGNOSIS — I25.10 CORONARY ARTERY DISEASE INVOLVING NATIVE CORONARY ARTERY OF NATIVE HEART WITHOUT ANGINA PECTORIS: ICD-10-CM

## 2021-06-18 RX ORDER — METOLAZONE 2.5 MG/1
TABLET ORAL
Qty: 90 TABLET | Refills: 0 | Status: SHIPPED | OUTPATIENT
Start: 2021-06-18 | End: 2021-12-07

## 2021-06-18 RX ORDER — PREDNISONE 10 MG/1
10 TABLET ORAL DAILY
Qty: 30 TABLET | Refills: 1 | OUTPATIENT
Start: 2021-06-18

## 2021-06-21 RX ORDER — PREDNISONE 10 MG/1
10 TABLET ORAL DAILY
Qty: 90 TABLET | Refills: 1 | Status: SHIPPED | OUTPATIENT
Start: 2021-06-21 | End: 2021-11-16

## 2021-06-28 DIAGNOSIS — G62.9 NEUROPATHY: ICD-10-CM

## 2021-06-28 RX ORDER — GABAPENTIN 600 MG/1
TABLET ORAL
Qty: 90 TABLET | Refills: 0 | Status: SHIPPED | OUTPATIENT
Start: 2021-06-28 | End: 2021-07-30

## 2021-07-14 ENCOUNTER — TELEPHONE (OUTPATIENT)
Dept: FAMILY MEDICINE CLINIC | Facility: CLINIC | Age: 51
End: 2021-07-14

## 2021-07-14 NOTE — TELEPHONE ENCOUNTER
Patient called to schedule an appt. Stated she was sitting on the side of the bed with her 10 year old grandson and her face got really red. She said her eyes got really big and she began shaking uncontrollably and lost control of all bodily fluids. Her grandson said she had saliva running out of her mouth the whole time. I advised the patient to go to the ER. Patient agreed.

## 2021-07-19 DIAGNOSIS — F41.1 GENERALIZED ANXIETY DISORDER: Chronic | ICD-10-CM

## 2021-07-20 RX ORDER — ALPRAZOLAM 0.5 MG/1
TABLET ORAL
Qty: 60 TABLET | Refills: 0 | Status: SHIPPED | OUTPATIENT
Start: 2021-07-20 | End: 2021-08-19

## 2021-07-22 RX ORDER — AMLODIPINE BESYLATE 10 MG/1
TABLET ORAL
Qty: 90 TABLET | Refills: 1 | Status: SHIPPED | OUTPATIENT
Start: 2021-07-22 | End: 2021-10-07

## 2021-07-28 DIAGNOSIS — G62.9 NEUROPATHY: ICD-10-CM

## 2021-07-30 RX ORDER — HYDROXYCHLOROQUINE SULFATE 200 MG/1
TABLET, FILM COATED ORAL
Qty: 60 TABLET | Refills: 0 | Status: SHIPPED | OUTPATIENT
Start: 2021-07-30 | End: 2021-08-25

## 2021-07-30 RX ORDER — GABAPENTIN 600 MG/1
TABLET ORAL
Qty: 90 TABLET | Refills: 0 | Status: SHIPPED | OUTPATIENT
Start: 2021-07-30 | End: 2021-08-31

## 2021-08-12 DIAGNOSIS — F33.1 MAJOR DEPRESSIVE DISORDER, RECURRENT EPISODE, MODERATE (HCC): ICD-10-CM

## 2021-08-12 RX ORDER — FUROSEMIDE 20 MG/1
TABLET ORAL
Qty: 540 TABLET | Refills: 1 | Status: SHIPPED | OUTPATIENT
Start: 2021-08-12 | End: 2022-02-01

## 2021-08-12 RX ORDER — VILAZODONE HYDROCHLORIDE 20 MG/1
TABLET ORAL
Qty: 90 TABLET | Refills: 0 | Status: SHIPPED | OUTPATIENT
Start: 2021-08-12 | End: 2022-03-09

## 2021-08-17 DIAGNOSIS — F41.1 GENERALIZED ANXIETY DISORDER: Chronic | ICD-10-CM

## 2021-08-19 RX ORDER — ALPRAZOLAM 0.5 MG/1
TABLET ORAL
Qty: 60 TABLET | Refills: 1 | Status: SHIPPED | OUTPATIENT
Start: 2021-08-19 | End: 2021-10-15

## 2021-08-23 ENCOUNTER — TELEPHONE (OUTPATIENT)
Dept: NEUROLOGY | Facility: CLINIC | Age: 51
End: 2021-08-23

## 2021-08-23 NOTE — TELEPHONE ENCOUNTER
Schedule next available with either.    If patient is concerned in mean time, please have them see PCP.

## 2021-08-23 NOTE — TELEPHONE ENCOUNTER
Provider:  DR SEIPEL   Caller:  DELICIA   Relationship to Patient:  PT     Phone Number:  [phone]  Reason for Call:  PT CALLING IN TO SCHED F/U AS SHE STATES SHE HAD AN EPISODE  ABOUT 3 WEEKS AGO WHERE  HER GRANDSON STATES SHE WAS SHAKING   , GRABBED THE TABLE THEN COLLAPSED ON THE FLOOR  PT HAS NO RECOLLECTION OF THIS . PT STATES THIS HAS NEVER HAPPENED BEFORE   When was the patient last seen: 02/11/2021   ATTEMPTED TO SCHED F/U FIRST AVAILABLE WITH SEIPEL WAS IN DEC  PT WOULD LIKE TO KNOW IF ANYTHING SOONER WITH SUDHA MICHAELS LIKE SUDHA IS OUT UNTIL November    PLEASE ADVISE

## 2021-08-25 RX ORDER — HYDROXYCHLOROQUINE SULFATE 200 MG/1
TABLET, FILM COATED ORAL
Qty: 60 TABLET | Refills: 0 | Status: SHIPPED | OUTPATIENT
Start: 2021-08-25 | End: 2021-10-07

## 2021-08-31 DIAGNOSIS — G62.9 NEUROPATHY: ICD-10-CM

## 2021-08-31 RX ORDER — CLONIDINE HYDROCHLORIDE 0.1 MG/1
0.1 TABLET ORAL 3 TIMES DAILY
Qty: 270 TABLET | Refills: 0 | Status: SHIPPED | OUTPATIENT
Start: 2021-08-31 | End: 2021-12-07

## 2021-08-31 RX ORDER — GABAPENTIN 600 MG/1
TABLET ORAL
Qty: 90 TABLET | Refills: 0 | Status: SHIPPED | OUTPATIENT
Start: 2021-08-31 | End: 2021-10-07

## 2021-08-31 RX ORDER — METOCLOPRAMIDE 10 MG/1
TABLET ORAL
Qty: 120 TABLET | Refills: 5 | Status: SHIPPED | OUTPATIENT
Start: 2021-08-31 | End: 2022-04-28 | Stop reason: SDUPTHER

## 2021-09-03 RX ORDER — METOPROLOL TARTRATE 50 MG/1
TABLET, FILM COATED ORAL
Qty: 120 TABLET | Refills: 1 | Status: SHIPPED | OUTPATIENT
Start: 2021-09-03 | End: 2022-01-05

## 2021-09-21 RX ORDER — LISINOPRIL 20 MG/1
20 TABLET ORAL 2 TIMES DAILY
Qty: 60 TABLET | Refills: 3 | Status: SHIPPED | OUTPATIENT
Start: 2021-09-21 | End: 2021-12-07

## 2021-09-29 ENCOUNTER — TELEPHONE (OUTPATIENT)
Dept: FAMILY MEDICINE CLINIC | Facility: CLINIC | Age: 51
End: 2021-09-29

## 2021-09-29 RX ORDER — ONDANSETRON 4 MG/1
4 TABLET, ORALLY DISINTEGRATING ORAL EVERY 8 HOURS PRN
Qty: 30 TABLET | Refills: 0 | Status: SHIPPED | OUTPATIENT
Start: 2021-09-29 | End: 2023-02-01 | Stop reason: SDUPTHER

## 2021-09-29 NOTE — TELEPHONE ENCOUNTER
Refill sent on Zofran.    She will have to talk to her surgeon regarding the COVID test and symptoms.

## 2021-09-29 NOTE — TELEPHONE ENCOUNTER
PATIENT'S CAREGIVER LADY CALLED AND STATES PATIENT HAS BEEN COVID EXPOSED, SHE IS EXPERIENCING NAUSEA, VOMITING, FEVER, COUGH. LADY IS TAKING HER TO THE HEALTH DEPARTMENT TO BE TESTED TODAY. SHE IS SCHEDULED FOR ABLATION OF BACK SURGERY TOMORROW.    SHE IS OUT OF ZOFRAN. REQUESTING MED REFILL OF   ondansetron ODT (ZOFRAN-ODT) 4 MG disintegrating tablet    [facility] [place] - [address] [place] [address] [address] - [phone]  - [phone]   [phone]    LADY'S CALL BACK NUMBER [phone]    LADY WANTS TO KNOW IF SHE IS NEGATIVE WILL SHE BE ABLE TO HAVE HER SURGERY TOMORROW.

## 2021-09-30 DIAGNOSIS — G62.9 NEUROPATHY: ICD-10-CM

## 2021-09-30 RX ORDER — HYDROXYCHLOROQUINE SULFATE 200 MG/1
TABLET, FILM COATED ORAL
Qty: 60 TABLET | Refills: 0 | OUTPATIENT
Start: 2021-09-30 | End: 2021-10-30

## 2021-09-30 RX ORDER — GABAPENTIN 600 MG/1
TABLET ORAL
Qty: 90 TABLET | Refills: 0 | OUTPATIENT
Start: 2021-09-30

## 2021-09-30 RX ORDER — BUDESONIDE 3 MG/1
9 CAPSULE, COATED PELLETS ORAL DAILY
Qty: 90 CAPSULE | Refills: 1 | OUTPATIENT
Start: 2021-09-30

## 2021-10-07 DIAGNOSIS — G62.9 NEUROPATHY: ICD-10-CM

## 2021-10-07 RX ORDER — ERGOCALCIFEROL 1.25 MG/1
50000 CAPSULE ORAL
Qty: 12 CAPSULE | Refills: 1 | Status: SHIPPED | OUTPATIENT
Start: 2021-10-07 | End: 2022-05-27

## 2021-10-07 RX ORDER — HYDROXYCHLOROQUINE SULFATE 200 MG/1
TABLET, FILM COATED ORAL
Qty: 60 TABLET | Refills: 0 | Status: SHIPPED | OUTPATIENT
Start: 2021-10-07 | End: 2021-11-06

## 2021-10-07 RX ORDER — BUDESONIDE 3 MG/1
9 CAPSULE, COATED PELLETS ORAL DAILY
Qty: 90 CAPSULE | Refills: 1 | Status: SHIPPED | OUTPATIENT
Start: 2021-10-07 | End: 2021-12-07

## 2021-10-07 RX ORDER — AMLODIPINE BESYLATE 10 MG/1
TABLET ORAL
Qty: 90 TABLET | Refills: 1 | Status: SHIPPED | OUTPATIENT
Start: 2021-10-07 | End: 2022-06-22

## 2021-10-07 RX ORDER — GABAPENTIN 600 MG/1
TABLET ORAL
Qty: 90 TABLET | Refills: 0 | Status: SHIPPED | OUTPATIENT
Start: 2021-10-07 | End: 2021-11-12

## 2021-10-15 ENCOUNTER — TELEPHONE (OUTPATIENT)
Dept: FAMILY MEDICINE CLINIC | Facility: CLINIC | Age: 51
End: 2021-10-15

## 2021-10-15 DIAGNOSIS — F41.1 GENERALIZED ANXIETY DISORDER: Chronic | ICD-10-CM

## 2021-10-15 RX ORDER — ALPRAZOLAM 0.5 MG/1
TABLET ORAL
Qty: 60 TABLET | Refills: 0 | Status: SHIPPED | OUTPATIENT
Start: 2021-10-15 | End: 2021-11-17

## 2021-10-15 NOTE — TELEPHONE ENCOUNTER
Caller: ALY[employer] LAW OFFICEMIRIAM    Relationship to patient: Other    Best call back number: [phone]    Patient is needing: LAW OFFICE CALLED TO CHECK ON STATUS OF FAXED REQUEST FROM AUGUST. THEY NEED PATIENT'S MEDICAL AND BILLING RECORDS. PROVIDED CALLER WITH [facility] BILLING AND MEDICAL RECORDS.

## 2021-11-04 DIAGNOSIS — G62.9 NEUROPATHY: ICD-10-CM

## 2021-11-04 RX ORDER — FUROSEMIDE 20 MG/1
TABLET ORAL
Qty: 540 TABLET | Refills: 1 | OUTPATIENT
Start: 2021-11-04

## 2021-11-04 RX ORDER — HYDROXYCHLOROQUINE SULFATE 200 MG/1
TABLET, FILM COATED ORAL
Qty: 60 TABLET | Refills: 0 | OUTPATIENT
Start: 2021-11-04 | End: 2021-12-04

## 2021-11-04 RX ORDER — GABAPENTIN 600 MG/1
TABLET ORAL
Qty: 90 TABLET | Refills: 0 | OUTPATIENT
Start: 2021-11-04

## 2021-11-04 NOTE — TELEPHONE ENCOUNTER
Patient is suppose to be seeing Rheumatology for the Plaquenil.  We have previously discussed this.

## 2021-11-12 DIAGNOSIS — G62.9 NEUROPATHY: ICD-10-CM

## 2021-11-12 RX ORDER — HYDROXYCHLOROQUINE SULFATE 200 MG/1
TABLET, FILM COATED ORAL
Qty: 60 TABLET | Refills: 0 | OUTPATIENT
Start: 2021-11-12 | End: 2021-12-12

## 2021-11-12 RX ORDER — GABAPENTIN 600 MG/1
TABLET ORAL
Qty: 90 TABLET | Refills: 0 | Status: SHIPPED | OUTPATIENT
Start: 2021-11-12 | End: 2021-12-10

## 2021-11-12 NOTE — TELEPHONE ENCOUNTER
It looks like anibal referred to rheumatology previously. They should be filling the plaquenil. Check this with the pt. I will send in gabapentin

## 2021-11-16 RX ORDER — PREDNISONE 10 MG/1
10 TABLET ORAL DAILY
Qty: 30 TABLET | Refills: 0 | Status: SHIPPED | OUTPATIENT
Start: 2021-11-16 | End: 2021-12-14

## 2021-11-16 RX ORDER — HYDROXYCHLOROQUINE SULFATE 200 MG/1
TABLET, FILM COATED ORAL
Qty: 60 TABLET | Refills: 0 | OUTPATIENT
Start: 2021-11-16 | End: 2021-12-16

## 2021-11-17 DIAGNOSIS — F41.1 GENERALIZED ANXIETY DISORDER: Chronic | ICD-10-CM

## 2021-11-17 RX ORDER — ALPRAZOLAM 0.5 MG/1
TABLET ORAL
Qty: 60 TABLET | Refills: 0 | Status: SHIPPED | OUTPATIENT
Start: 2021-11-17 | End: 2022-10-27 | Stop reason: SDDI

## 2021-12-01 DIAGNOSIS — G62.9 NEUROPATHY: ICD-10-CM

## 2021-12-01 RX ORDER — GABAPENTIN 600 MG/1
TABLET ORAL
Qty: 90 TABLET | Refills: 0 | OUTPATIENT
Start: 2021-12-01

## 2021-12-07 DIAGNOSIS — I10 ESSENTIAL HYPERTENSION: ICD-10-CM

## 2021-12-07 DIAGNOSIS — I25.10 CORONARY ARTERY DISEASE INVOLVING NATIVE CORONARY ARTERY OF NATIVE HEART WITHOUT ANGINA PECTORIS: ICD-10-CM

## 2021-12-07 DIAGNOSIS — I49.3 PVC'S (PREMATURE VENTRICULAR CONTRACTIONS): ICD-10-CM

## 2021-12-07 DIAGNOSIS — R07.89 CHEST PAIN, ATYPICAL: ICD-10-CM

## 2021-12-07 DIAGNOSIS — G62.9 NEUROPATHY: ICD-10-CM

## 2021-12-07 RX ORDER — BUDESONIDE 3 MG/1
9 CAPSULE, COATED PELLETS ORAL DAILY
Qty: 90 CAPSULE | Refills: 1 | Status: SHIPPED | OUTPATIENT
Start: 2021-12-07 | End: 2022-04-28 | Stop reason: SDUPTHER

## 2021-12-07 RX ORDER — GABAPENTIN 600 MG/1
TABLET ORAL
Qty: 90 TABLET | Refills: 0 | OUTPATIENT
Start: 2021-12-07

## 2021-12-07 RX ORDER — CLONIDINE HYDROCHLORIDE 0.1 MG/1
0.1 TABLET ORAL 3 TIMES DAILY
Qty: 90 TABLET | Refills: 0 | Status: SHIPPED | OUTPATIENT
Start: 2021-12-07 | End: 2022-01-11

## 2021-12-07 RX ORDER — METOLAZONE 2.5 MG/1
TABLET ORAL
Qty: 30 TABLET | Refills: 0 | Status: SHIPPED | OUTPATIENT
Start: 2021-12-07 | End: 2022-01-05

## 2021-12-07 RX ORDER — LISINOPRIL 20 MG/1
20 TABLET ORAL 2 TIMES DAILY
Qty: 60 TABLET | Refills: 3 | Status: SHIPPED | OUTPATIENT
Start: 2021-12-07 | End: 2022-05-19 | Stop reason: SDUPTHER

## 2021-12-10 DIAGNOSIS — G62.9 NEUROPATHY: ICD-10-CM

## 2021-12-10 RX ORDER — GABAPENTIN 600 MG/1
TABLET ORAL
Qty: 90 TABLET | Refills: 0 | Status: SHIPPED | OUTPATIENT
Start: 2021-12-10 | End: 2022-01-05

## 2021-12-14 RX ORDER — PREDNISONE 10 MG/1
TABLET ORAL
Qty: 30 TABLET | Refills: 1 | Status: SHIPPED | OUTPATIENT
Start: 2021-12-14 | End: 2022-04-28 | Stop reason: SDUPTHER

## 2022-01-05 DIAGNOSIS — I10 ESSENTIAL HYPERTENSION: ICD-10-CM

## 2022-01-05 DIAGNOSIS — I25.10 CORONARY ARTERY DISEASE INVOLVING NATIVE CORONARY ARTERY OF NATIVE HEART WITHOUT ANGINA PECTORIS: ICD-10-CM

## 2022-01-05 DIAGNOSIS — G62.9 NEUROPATHY: ICD-10-CM

## 2022-01-05 DIAGNOSIS — R07.89 CHEST PAIN, ATYPICAL: ICD-10-CM

## 2022-01-05 DIAGNOSIS — I49.3 PVC'S (PREMATURE VENTRICULAR CONTRACTIONS): ICD-10-CM

## 2022-01-05 RX ORDER — METOPROLOL TARTRATE 50 MG/1
TABLET, FILM COATED ORAL
Qty: 60 TABLET | Refills: 0 | Status: SHIPPED | OUTPATIENT
Start: 2022-01-05 | End: 2022-03-09

## 2022-01-05 RX ORDER — METOLAZONE 2.5 MG/1
TABLET ORAL
Qty: 30 TABLET | Refills: 0 | Status: SHIPPED | OUTPATIENT
Start: 2022-01-05 | End: 2022-09-08

## 2022-01-05 RX ORDER — GABAPENTIN 600 MG/1
TABLET ORAL
Qty: 90 TABLET | Refills: 0 | Status: SHIPPED | OUTPATIENT
Start: 2022-01-05 | End: 2022-02-01

## 2022-01-11 RX ORDER — CLONIDINE HYDROCHLORIDE 0.1 MG/1
0.1 TABLET ORAL 3 TIMES DAILY
Qty: 90 TABLET | Refills: 0 | Status: SHIPPED | OUTPATIENT
Start: 2022-01-11 | End: 2022-02-14

## 2022-01-24 DIAGNOSIS — I10 ESSENTIAL HYPERTENSION: ICD-10-CM

## 2022-01-24 DIAGNOSIS — F33.1 MAJOR DEPRESSIVE DISORDER, RECURRENT EPISODE, MODERATE: ICD-10-CM

## 2022-01-24 DIAGNOSIS — R07.89 CHEST PAIN, ATYPICAL: ICD-10-CM

## 2022-01-24 DIAGNOSIS — I25.10 CORONARY ARTERY DISEASE INVOLVING NATIVE CORONARY ARTERY OF NATIVE HEART WITHOUT ANGINA PECTORIS: ICD-10-CM

## 2022-01-24 DIAGNOSIS — I49.3 PVC'S (PREMATURE VENTRICULAR CONTRACTIONS): ICD-10-CM

## 2022-01-24 RX ORDER — METOLAZONE 2.5 MG/1
TABLET ORAL
Qty: 30 TABLET | Refills: 0 | OUTPATIENT
Start: 2022-01-24

## 2022-01-25 RX ORDER — VILAZODONE HYDROCHLORIDE 20 MG/1
TABLET ORAL
Qty: 30 TABLET | Refills: 0 | OUTPATIENT
Start: 2022-01-25

## 2022-02-01 DIAGNOSIS — F33.1 MAJOR DEPRESSIVE DISORDER, RECURRENT EPISODE, MODERATE: ICD-10-CM

## 2022-02-01 DIAGNOSIS — G62.9 NEUROPATHY: ICD-10-CM

## 2022-02-01 RX ORDER — GABAPENTIN 600 MG/1
600 TABLET ORAL 3 TIMES DAILY
Qty: 90 TABLET | Refills: 0 | Status: SHIPPED | OUTPATIENT
Start: 2022-02-01 | End: 2022-04-28 | Stop reason: SDUPTHER

## 2022-02-01 RX ORDER — POTASSIUM CHLORIDE 750 MG/1
TABLET, FILM COATED, EXTENDED RELEASE ORAL
Qty: 60 TABLET | Refills: 1 | Status: SHIPPED | OUTPATIENT
Start: 2022-02-01 | End: 2022-03-29

## 2022-02-01 RX ORDER — FUROSEMIDE 20 MG/1
TABLET ORAL
Qty: 540 TABLET | Refills: 1 | Status: SHIPPED | OUTPATIENT
Start: 2022-02-01 | End: 2022-07-22

## 2022-02-02 RX ORDER — VILAZODONE HYDROCHLORIDE 20 MG/1
TABLET ORAL
Qty: 30 TABLET | Refills: 0 | OUTPATIENT
Start: 2022-02-02

## 2022-02-14 DIAGNOSIS — F33.1 MAJOR DEPRESSIVE DISORDER, RECURRENT EPISODE, MODERATE: ICD-10-CM

## 2022-02-14 RX ORDER — CLONIDINE HYDROCHLORIDE 0.1 MG/1
0.1 TABLET ORAL 3 TIMES DAILY
Qty: 45 TABLET | Refills: 0 | Status: SHIPPED | OUTPATIENT
Start: 2022-02-14 | End: 2022-05-10

## 2022-02-14 RX ORDER — VILAZODONE HYDROCHLORIDE 20 MG/1
TABLET ORAL
Qty: 30 TABLET | Refills: 0 | OUTPATIENT
Start: 2022-02-14

## 2022-02-25 RX ORDER — CLONIDINE HYDROCHLORIDE 0.1 MG/1
0.1 TABLET ORAL 3 TIMES DAILY
Qty: 45 TABLET | Refills: 0 | OUTPATIENT
Start: 2022-02-25

## 2022-03-09 DIAGNOSIS — G62.9 NEUROPATHY: ICD-10-CM

## 2022-03-09 DIAGNOSIS — F33.1 MAJOR DEPRESSIVE DISORDER, RECURRENT EPISODE, MODERATE: ICD-10-CM

## 2022-03-09 RX ORDER — GABAPENTIN 600 MG/1
600 TABLET ORAL 3 TIMES DAILY
Qty: 90 TABLET | Refills: 0 | OUTPATIENT
Start: 2022-03-09

## 2022-03-09 RX ORDER — PREDNISONE 10 MG/1
TABLET ORAL
Qty: 30 TABLET | Refills: 1 | OUTPATIENT
Start: 2022-03-09

## 2022-03-09 RX ORDER — VILAZODONE HYDROCHLORIDE 20 MG/1
TABLET ORAL
Qty: 30 TABLET | Refills: 0 | Status: SHIPPED | OUTPATIENT
Start: 2022-03-09 | End: 2022-03-30

## 2022-03-09 RX ORDER — AMLODIPINE BESYLATE 10 MG/1
TABLET ORAL
Qty: 90 TABLET | Refills: 1 | OUTPATIENT
Start: 2022-03-09

## 2022-03-09 RX ORDER — METOCLOPRAMIDE 10 MG/1
TABLET ORAL
Qty: 120 TABLET | Refills: 5 | OUTPATIENT
Start: 2022-03-09

## 2022-03-09 RX ORDER — METOPROLOL TARTRATE 50 MG/1
TABLET, FILM COATED ORAL
Qty: 60 TABLET | Refills: 0 | Status: SHIPPED | OUTPATIENT
Start: 2022-03-09 | End: 2022-04-28 | Stop reason: SDUPTHER

## 2022-03-09 RX ORDER — CLONIDINE HYDROCHLORIDE 0.1 MG/1
0.1 TABLET ORAL 3 TIMES DAILY
Qty: 45 TABLET | Refills: 0 | OUTPATIENT
Start: 2022-03-09

## 2022-03-29 DIAGNOSIS — F33.1 MAJOR DEPRESSIVE DISORDER, RECURRENT EPISODE, MODERATE: ICD-10-CM

## 2022-03-29 RX ORDER — METOCLOPRAMIDE 10 MG/1
TABLET ORAL
Qty: 120 TABLET | Refills: 5 | OUTPATIENT
Start: 2022-03-29

## 2022-03-29 RX ORDER — PREDNISONE 10 MG/1
TABLET ORAL
Qty: 30 TABLET | Refills: 1 | OUTPATIENT
Start: 2022-03-29

## 2022-03-29 RX ORDER — POTASSIUM CHLORIDE 750 MG/1
TABLET, FILM COATED, EXTENDED RELEASE ORAL
Qty: 60 TABLET | Refills: 1 | Status: SHIPPED | OUTPATIENT
Start: 2022-03-29 | End: 2022-06-07

## 2022-03-30 RX ORDER — VILAZODONE HYDROCHLORIDE 20 MG/1
TABLET ORAL
Qty: 30 TABLET | Refills: 0 | Status: SHIPPED | OUTPATIENT
Start: 2022-03-30 | End: 2022-04-21

## 2022-04-05 DIAGNOSIS — G62.9 NEUROPATHY: ICD-10-CM

## 2022-04-05 RX ORDER — LISINOPRIL 20 MG/1
20 TABLET ORAL 2 TIMES DAILY
Qty: 60 TABLET | Refills: 3 | OUTPATIENT
Start: 2022-04-05

## 2022-04-05 RX ORDER — PREDNISONE 10 MG/1
TABLET ORAL
Qty: 30 TABLET | Refills: 1 | OUTPATIENT
Start: 2022-04-05

## 2022-04-05 RX ORDER — GABAPENTIN 600 MG/1
600 TABLET ORAL 3 TIMES DAILY
Qty: 90 TABLET | Refills: 0 | OUTPATIENT
Start: 2022-04-05

## 2022-04-05 RX ORDER — METOCLOPRAMIDE 10 MG/1
TABLET ORAL
Qty: 120 TABLET | Refills: 5 | OUTPATIENT
Start: 2022-04-05

## 2022-04-05 RX ORDER — CLONIDINE HYDROCHLORIDE 0.1 MG/1
0.1 TABLET ORAL 3 TIMES DAILY
Qty: 45 TABLET | Refills: 0 | OUTPATIENT
Start: 2022-04-05

## 2022-04-05 RX ORDER — METOPROLOL TARTRATE 50 MG/1
TABLET, FILM COATED ORAL
Qty: 60 TABLET | Refills: 0 | OUTPATIENT
Start: 2022-04-05

## 2022-04-20 RX ORDER — ERGOCALCIFEROL 1.25 MG/1
50000 CAPSULE ORAL
Qty: 4 CAPSULE | Refills: 1 | OUTPATIENT
Start: 2022-04-20

## 2022-04-21 ENCOUNTER — TELEPHONE (OUTPATIENT)
Dept: FAMILY MEDICINE CLINIC | Facility: CLINIC | Age: 52
End: 2022-04-21

## 2022-04-21 DIAGNOSIS — F33.1 MAJOR DEPRESSIVE DISORDER, RECURRENT EPISODE, MODERATE: ICD-10-CM

## 2022-04-21 RX ORDER — LISINOPRIL 20 MG/1
20 TABLET ORAL 2 TIMES DAILY
Qty: 60 TABLET | Refills: 3 | OUTPATIENT
Start: 2022-04-21

## 2022-04-21 RX ORDER — VILAZODONE HYDROCHLORIDE 20 MG/1
TABLET ORAL
Qty: 30 TABLET | Refills: 0 | Status: SHIPPED | OUTPATIENT
Start: 2022-04-21 | End: 2022-07-26 | Stop reason: SDUPTHER

## 2022-04-21 NOTE — TELEPHONE ENCOUNTER
Caller: Merry Thornton    Relationship to patient: Self    Best call back number: [phone]    Chief complaint: VOMITING, DIARRHEA, CAN'T EAT. BEEN GOING ON SINCE MONDAY    Type of visit: OFFICE    Requested date: ASAP     Additional notes:NO OPENINGS UNTIL 5/6, PLEASE ADVISE IF PATIENT CAN GET SEEN THIS WEEK. PATIENT LIVES TWO HOURS AWAY, HAS TO HAVE ENOUGH NOTICE.

## 2022-04-21 NOTE — TELEPHONE ENCOUNTER
Patient notified that we can not fill any medications until she is seen since it has been over a year. Transferred her to scheduling

## 2022-04-28 ENCOUNTER — OFFICE VISIT (OUTPATIENT)
Dept: FAMILY MEDICINE CLINIC | Facility: CLINIC | Age: 52
End: 2022-04-28

## 2022-04-28 VITALS
HEIGHT: 65 IN | WEIGHT: 251 LBS | TEMPERATURE: 97.1 F | RESPIRATION RATE: 16 BRPM | BODY MASS INDEX: 41.82 KG/M2 | DIASTOLIC BLOOD PRESSURE: 82 MMHG | HEART RATE: 92 BPM | SYSTOLIC BLOOD PRESSURE: 139 MMHG | OXYGEN SATURATION: 97 %

## 2022-04-28 DIAGNOSIS — Z13.29 THYROID DISORDER SCREEN: ICD-10-CM

## 2022-04-28 DIAGNOSIS — J30.2 SEASONAL ALLERGIC RHINITIS, UNSPECIFIED TRIGGER: ICD-10-CM

## 2022-04-28 DIAGNOSIS — K51.90 ULCERATIVE COLITIS WITHOUT COMPLICATIONS, UNSPECIFIED LOCATION: Primary | ICD-10-CM

## 2022-04-28 DIAGNOSIS — I49.3 PVC'S (PREMATURE VENTRICULAR CONTRACTIONS): ICD-10-CM

## 2022-04-28 DIAGNOSIS — E78.5 HYPERLIPIDEMIA, UNSPECIFIED HYPERLIPIDEMIA TYPE: ICD-10-CM

## 2022-04-28 DIAGNOSIS — R73.09 ABNORMAL GLUCOSE: ICD-10-CM

## 2022-04-28 DIAGNOSIS — I10 ESSENTIAL HYPERTENSION: ICD-10-CM

## 2022-04-28 DIAGNOSIS — K21.9 GASTROESOPHAGEAL REFLUX DISEASE WITHOUT ESOPHAGITIS: ICD-10-CM

## 2022-04-28 DIAGNOSIS — G62.9 NEUROPATHY: ICD-10-CM

## 2022-04-28 PROBLEM — M17.0 OSTEOARTHRITIS OF BOTH KNEES: Status: ACTIVE | Noted: 2021-11-01

## 2022-04-28 PROCEDURE — 99214 OFFICE O/P EST MOD 30 MIN: CPT | Performed by: PHYSICIAN ASSISTANT

## 2022-04-28 RX ORDER — GABAPENTIN 600 MG/1
600 TABLET ORAL 3 TIMES DAILY
Qty: 90 TABLET | Refills: 5 | Status: SHIPPED | OUTPATIENT
Start: 2022-04-28 | End: 2022-09-16

## 2022-04-28 RX ORDER — MONTELUKAST SODIUM 4 MG/1
1 TABLET, CHEWABLE ORAL 2 TIMES DAILY
Qty: 60 TABLET | Refills: 5 | Status: SHIPPED | OUTPATIENT
Start: 2022-04-28

## 2022-04-28 RX ORDER — DICYCLOMINE HCL 20 MG
20 TABLET ORAL EVERY 6 HOURS
Qty: 120 TABLET | Refills: 5 | Status: SHIPPED | OUTPATIENT
Start: 2022-04-28 | End: 2022-11-10

## 2022-04-28 RX ORDER — PREDNISONE 10 MG/1
10 TABLET ORAL DAILY
Qty: 30 TABLET | Refills: 5 | Status: SHIPPED | OUTPATIENT
Start: 2022-04-28 | End: 2022-09-16

## 2022-04-28 RX ORDER — BUDESONIDE 3 MG/1
9 CAPSULE, COATED PELLETS ORAL DAILY
Qty: 90 CAPSULE | Refills: 5 | Status: SHIPPED | OUTPATIENT
Start: 2022-04-28

## 2022-04-28 RX ORDER — OMEPRAZOLE 40 MG/1
40 CAPSULE, DELAYED RELEASE ORAL 2 TIMES DAILY
Qty: 60 CAPSULE | Refills: 5 | Status: SHIPPED | OUTPATIENT
Start: 2022-04-28 | End: 2022-10-12

## 2022-04-28 RX ORDER — LEVOCETIRIZINE DIHYDROCHLORIDE 5 MG/1
5 TABLET, FILM COATED ORAL EVERY EVENING
Qty: 30 TABLET | Refills: 5 | Status: SHIPPED | OUTPATIENT
Start: 2022-04-28 | End: 2022-09-16

## 2022-04-28 RX ORDER — SULFASALAZINE 500 MG/1
500 TABLET ORAL 2 TIMES DAILY
Qty: 120 TABLET | Refills: 5 | Status: SHIPPED | OUTPATIENT
Start: 2022-04-28 | End: 2023-03-30

## 2022-04-28 RX ORDER — METOCLOPRAMIDE 10 MG/1
10 TABLET ORAL 4 TIMES DAILY
Qty: 120 TABLET | Refills: 5 | Status: SHIPPED | OUTPATIENT
Start: 2022-04-28 | End: 2022-09-16

## 2022-04-28 RX ORDER — METOPROLOL TARTRATE 50 MG/1
50 TABLET, FILM COATED ORAL 2 TIMES DAILY
Qty: 60 TABLET | Refills: 5 | Status: SHIPPED | OUTPATIENT
Start: 2022-04-28 | End: 2022-09-16

## 2022-04-28 NOTE — PROGRESS NOTES
Subjective   Merry Thornton is a 51 y.o. female.     Pt presents to follow up on her ulcerative colitis and hypertension.   She started having vomiting and diarrhea about 3 weeks ago. She has been taking zofran as needed which has helped with the vomiting but she continues to have diarrhea. She does have hx of ulcerative colitis and feels that it has flared.  She has also been out of her medications and needs refills.  She was going to [facility] but they no longer take her insurance.  She needs new referral for GI specialist.    Dr. Tom 5/24/22.  Dr. Jesus 5/31/22  Dr. Goodrich 5/26/22  Dr. Conway 5/12/22       The following portions of the patient's history were reviewed and updated as appropriate: allergies, current medications, past family history, past medical history, past social history, past surgical history and problem list.  Past Medical History:   Diagnosis Date   • CAD (coronary artery disease) 12/20/2016    dr. singh   • Carpal tunnel syndrome on right 03/08/2017   • Common migraine 12/20/2016   • COPD (chronic obstructive pulmonary disease) (HCC) 12/20/2016   • Cyst, ovarian 12/20/2016    mass   • DDD (degenerative disc disease), cervical 03/11/2016   • Dental caries 01/14/2019   • Depression 11/02/2017   • Depression, major, recurrent, moderate ([facility]) 08/25/2016   • Dietary counseling 09/21/2017   • Dysphagia 05/2020   • Elevated random blood glucose level 05/14/2020   • Encounter for smoking cessation counseling 09/21/2017   • Exacerbation of systemic lupus ([facility]) 04/30/2019   • Fibromyalgia 12/20/2016   • Generalized anxiety disorder 03/11/2016   • GERD (gastroesophageal reflux disease) 12/20/2016   • Heartburn 11/02/2017   • Hypertension 03/16/2016   • Hyperthyroidism 03/11/2016   • Hypocalcemia 09/21/2017   • IBS (irritable colon syndrome) 09/13/2016   • Immobility syndrome 09/14/2017   • Intracranial pressure increased 12/20/2016   • Left knee pain 09/21/2017   • Lower back pain 02/01/2018   • Lung nodules  09/07/2016   • Morbid obesity (HCC) 11/02/2017   • Muscle cramp 09/21/2017   • Neck pain 01/14/2019   • Need for prophylactic vaccination and inoculation against influenza 09/21/2017   • Neurogenic bladder 12/20/2016   • Obesity 03/07/2017   • Obesity (BMI 30-39.9) 05/14/2020   • Orthostatic hypotension 07/17/2017   • Osteoarthritis 03/07/2017   • Osteopenia 01/14/2019   • Osteoporosis 12/20/2016   • Other instability, right ankle 04/13/2017   • 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 Mixxed connective Tissue D/o   • Pain, joint, ankle, left 09/21/2017   • Palpitations 10/04/2017   • Paresthesia 06/12/2017    facial   • Peripheral neuropathy 12/20/2016    bilateral lower extremities   • Poor vision    • Prediabetes    • Presence of pessary    • Raynaud's syndrome 12/20/2016   • Retinopathy    • Right knee pain 11/08/2016   • Scleroderma ([facility]) 12/20/2016   • Seasonal allergic rhinitis 12/20/2016   • SLE (systemic lupus erythematosus) ([facility]) 03/16/2017   • Sleep apnea 11/02/2017    BIPAP   • Sleep disorder 01/14/2019   • Sprain of unspecified site of right knee, subsequent encounter 12/01/2016   • Status post placement of implantable loop recorder 05/09/2018    presence   • Syncope and collapse 2020    still has occassionally   • Ulcerative colitis ([facility]) 02/2020   • Urine incontinence    • Vasovagal syncope    • Ventricular arrhythmia 03/16/2016   • Visit for screening mammogram 12/20/2016   • Vitamin D deficiency 01/14/2019   • Wheezing      Past Surgical History:   Procedure Laterality Date   • ANKLE SURGERY Right 02/2017   • BREAST EXCISIONAL BIOPSY Right     years ago   • BRONCHOSCOPY     • CARDIAC ABLATION  01/2000   • CARDIAC ELECTROPHYSIOLOGY PROCEDURE     • CHOLECYSTECTOMY     • COLON SURGERY     • COLONOSCOPY N/A 2/26/2020    Procedure: COLONOSCOPY WITH BIOPSY X1 AREA;  Surgeon: Michael Cheng MD;  Location: [facility] ENDOSCOPY;  Service: Gastroenterology;  Laterality: N/A;  diarrhea   • ECHO  - CONVERTED  2020   • ENDOSCOPY     • ENDOSCOPY N/A 5/21/2020    Procedure: ESOPHAGOGASTRODUODENOSCOPY WITH DILATATION (#54, 60 bougie);  Surgeon: Michael Cheng MD;  Location: [facility] ENDOSCOPY;  Service: Gastroenterology;  Laterality: N/A;  Post: candidiasis, upper esophagus sphincter stricture   • KNEE SURGERY Right 09/2017   • OTHER SURGICAL HISTORY      Urerine hysterectomy   • OTHER SURGICAL HISTORY      t and a   • OTHER SURGICAL HISTORY      d&c   • OTHER SURGICAL HISTORY      bladder stim   • OTHER SURGICAL HISTORY      Loop recorder placement   • WRIST SURGERY Right      Family History   Problem Relation Age of Onset   • Cancer Mother    • Heart disease Father    • Lung disease Father    • Diabetes Sister    • Heart disease Sister    • Hypertension Sister    • Other Sister         weight disorder     Social History     Socioeconomic History   • Marital status: Single   Tobacco Use   • Smoking status: Current Some Day Smoker     Packs/day: 1.50     Years: 35.00     Pack years: 52.50     Types: Cigarettes   • Smokeless tobacco: Former User     Quit date: 5/11/2020   • Tobacco comment: Patient is still smoking patient is advised to quit smoking   Vaping Use   • Vaping Use: Never used   Substance and Sexual Activity   • Alcohol use: No   • Drug use: Not Currently     Frequency: 1.0 times per week     Types: Marijuana     Comment: stopped 2 months ago   • Sexual activity: Defer     Partners: Male         Current Outpatient Medications:   •  albuterol sulfate  (90 Base) MCG/ACT inhaler, Inhale 2 puffs Every 4 (Four) Hours As Needed for Wheezing., Disp: , Rfl:   •  ALPRAZolam (XANAX) 0.5 MG tablet, TAKE 1 TABLET BY MOUTH 2 TIMES A DAY AS NEEDED FOR ANXIETY, Disp: 60 tablet, Rfl: 0  •  apixaban (ELIQUIS) 5 MG tablet tablet, Take 1 tablet by mouth Every 12 (Twelve) Hours., Disp: 60 tablet, Rfl: 5  •  Budesonide (ENTOCORT EC) 3 MG 24 hr capsule, Take 3 capsules by mouth Daily., Disp: 90 capsule, Rfl: 5  •   colestipol (COLESTID) 1 g tablet, Take 1 tablet by mouth 2 (Two) Times a Day., Disp: 60 tablet, Rfl: 5  •  dicyclomine (BENTYL) 20 MG tablet, Take 1 tablet by mouth Every 6 (Six) Hours., Disp: 120 tablet, Rfl: 5  •  estradiol (ESTRACE) 0.1 MG/GM vaginal cream, Do not use Applicator. Apply pea-size amount with fingertip nightly x2 weeks, then apply 2-3 times per week thereafter, Disp: , Rfl:   •  furosemide (LASIX) 20 MG tablet, TAKE 2 TABS EVERY MORNING, 2 TABS EVERY EVENING AND MAY TAKE AN ADDITIONAL 1 TO 2 TABS MIDDAY AS NEEDED, Disp: 540 tablet, Rfl: 1  •  gabapentin (NEURONTIN) 600 MG tablet, Take 1 tablet by mouth 3 (Three) Times a Day., Disp: 90 tablet, Rfl: 5  •  HYDROcodone-acetaminophen (NORCO)  MG per tablet, Take 1 tablet by mouth Every 8 (Eight) Hours As Needed for Moderate Pain ., Disp: , Rfl:   •  Ipratropium-Albuterol (COMBIVENT IN), Inhale 2 puffs., Disp: , Rfl:   •  ipratropium-albuterol (DUO-NEB) 0.5-2.5 mg/3 ml nebulizer, Take 3 mL by nebulization 4 (Four) Times a Day., Disp: 360 mL, Rfl: 1  •  lidocaine (LIDODERM) 5 %, Place 1 patch on the skin as directed by provider Daily. Remove & Discard patch within 12 hours or as directed by MD, Disp: , Rfl:   •  Magnesium 250 MG tablet, Take 250 mg by mouth Daily., Disp: , Rfl:   •  metoclopramide (REGLAN) 10 MG tablet, Take 1 tablet by mouth 4 (Four) Times a Day., Disp: 120 tablet, Rfl: 5  •  metoprolol tartrate (LOPRESSOR) 50 MG tablet, Take 1 tablet by mouth 2 (Two) Times a Day., Disp: 60 tablet, Rfl: 5  •  omeprazole (priLOSEC) 40 MG capsule, Take 1 capsule by mouth 2 (Two) Times a Day., Disp: 60 capsule, Rfl: 5  •  ondansetron ODT (ZOFRAN-ODT) 4 MG disintegrating tablet, Place 1 tablet on the tongue Every 8 (Eight) Hours As Needed for Nausea or Vomiting (can take up to 8 mg as needed)., Disp: 30 tablet, Rfl: 0  •  orphenadrine (NORFLEX) 100 MG 12 hr tablet, Take 100 mg by mouth 3 (Three) Times a Day. As needed, Disp: , Rfl:   •  potassium  chloride 10 MEQ CR tablet, TAKE 1 TABLET BY MOUTH 2 TIMES A DAY., Disp: 60 tablet, Rfl: 1  •  predniSONE (DELTASONE) 10 MG tablet, Take 1 tablet by mouth Daily., Disp: 30 tablet, Rfl: 5  •  sulfaSALAzine (AZULFIDINE) 500 MG tablet, Take 1 tablet by mouth 2 (Two) Times a Day., Disp: 120 tablet, Rfl: 5  •  theophylline (THEODUR) 300 MG 12 hr tablet, Take 300 mg by mouth Every Morning., Disp: , Rfl:   •  topiramate (TOPAMAX) 50 MG tablet, TAKE 1 TABLET BY MOUTH 2 (TWO) TIMES A DAY., Disp: 180 tablet, Rfl: 3  •  Viibryd 20 MG tablet tablet, TAKE 1 TABLET BY MOUTH EVERY NIGHT AT BEDTIME., Disp: 30 tablet, Rfl: 0  •  vitamin D (ERGOCALCIFEROL) 1.25 MG (87401 UT) capsule capsule, TAKE 1 CAPSULE BY MOUTH EVERY 7 (SEVEN) DAYS. SUNDAY, Disp: 12 capsule, Rfl: 1  •  amLODIPine (NORVASC) 10 MG tablet, TAKE 1 TABLET BY MOUTH DAILY., Disp: 90 tablet, Rfl: 1  •  Blood Glucose Monitoring Suppl (Accu-Chek Guide) w/Device kit, 1 Device 2 (two) times a day., Disp: 1 kit, Rfl: 0  •  cloNIDine (CATAPRES) 0.1 MG tablet, TAKE 1 TABLET BY MOUTH 3 (THREE) TIMES A DAY. NO FURTHER REFILL UNTIL SEEN IN OFFICE., Disp: 45 tablet, Rfl: 0  •  Cyanocobalamin 1000 MCG/ML kit, Inject  as directed. Inject twice monthly, Disp: , Rfl:   •  folic acid (FOLVITE) 800 MCG tablet, Take 1 tablet by mouth Daily., Disp: , Rfl:   •  glucose blood test strip, Use as instructed to check BS BID, Disp: 180 each, Rfl: 5  •  Lancets Misc. (Accu-Chek FastClix Lancet) kit, 1 Device 2 (two) times a day., Disp: 1 kit, Rfl: 5  •  levocetirizine (Xyzal) 5 MG tablet, Take 1 tablet by mouth Every Evening., Disp: 30 tablet, Rfl: 5  •  lisinopril (PRINIVIL,ZESTRIL) 20 MG tablet, TAKE 1 TABLET BY MOUTH 2 (TWO) TIMES A DAY., Disp: 60 tablet, Rfl: 3  •  Melatonin 10 MG tablet, Take 1 tablet by mouth every night at bedtime., Disp: 30 tablet, Rfl: 2  •  metOLazone (ZAROXOLYN) 2.5 MG tablet, TAKE ONE TABLET BY MOUTH EACH MORNING, Disp: 30 tablet, Rfl: 0  •  nystatin (MYCOSTATIN)  "[id number] UNIT/ML suspension, Swish and swallow 5 mL 4 (Four) Times a Day., Disp: 473 mL, Rfl: 5  •  promethazine (PHENERGAN) 25 MG tablet, Take 25 mg by mouth Daily., Disp: , Rfl:   •  SUMAtriptan (IMITREX) 100 MG tablet, Take 100 mg by mouth. One tab at onset of HA, after one hour use sumatriptan injection, Disp: , Rfl:   •  SUMAtriptan (IMITREX) 6 MG/0.5ML injection, Inject prescribed dose at onset of headache. May repeat dose one time in 1 hour(s) if headache not relieved., Disp: , Rfl:   •  Umeclidinium Bromide (INCRUSE ELLIPTA) 62.5 MCG/INH aerosol powder , Inhale 1 puff 2 (Two) Times a Day., Disp: , Rfl:     Review of Systems   Constitutional: Positive for fatigue. Negative for activity change, appetite change, chills, diaphoresis, fever, unexpected weight gain and unexpected weight loss.   HENT: Negative for trouble swallowing.    Respiratory: Negative for chest tightness and shortness of breath.    Cardiovascular: Negative for chest pain, palpitations and leg swelling.   Gastrointestinal: Positive for abdominal pain, diarrhea, nausea and vomiting. Negative for constipation, GERD and indigestion.   Musculoskeletal: Negative for gait problem.   Neurological: Negative for dizziness, weakness, light-headedness, headache and confusion.     /82 (BP Location: Left arm, Patient Position: Sitting, Cuff Size: Large Adult)   Pulse 92   Temp 97.1 °F (36.2 °C) (Temporal)   Resp 16   Ht 165.1 cm (65\")   Wt 114 kg (251 lb)   SpO2 97%   BMI 41.77 kg/m²       Objective   Physical Exam  Vitals and nursing note reviewed.   Constitutional:       Appearance: Normal appearance. She is obese.   Neck:      Thyroid: No thyroid mass, thyromegaly or thyroid tenderness.      Vascular: No carotid bruit.   Cardiovascular:      Rate and Rhythm: Normal rate and regular rhythm.      Heart sounds: Normal heart sounds.   Pulmonary:      Effort: Pulmonary effort is normal.      Breath sounds: Normal breath sounds.   Abdominal:      " General: Abdomen is flat. Bowel sounds are normal.      Palpations: Abdomen is soft.      Tenderness: There is generalized abdominal tenderness.      Comments: Mild tenderness with palpation   Musculoskeletal:      Cervical back: Normal range of motion and neck supple.   Skin:     General: Skin is warm and dry.   Neurological:      General: No focal deficit present.      Mental Status: She is alert and oriented to person, place, and time.   Psychiatric:         Mood and Affect: Mood normal.         Behavior: Behavior normal.         Thought Content: Thought content normal.         Procedures     Assessment/Plan   Diagnoses and all orders for this visit:    1. Ulcerative colitis without complications, unspecified location (HCC) (Primary)  Comments:  Pt to restart medications and follow up with GI  Orders:  -     sulfaSALAzine (AZULFIDINE) 500 MG tablet; Take 1 tablet by mouth 2 (Two) Times a Day.  Dispense: 120 tablet; Refill: 5  -     Budesonide (ENTOCORT EC) 3 MG 24 hr capsule; Take 3 capsules by mouth Daily.  Dispense: 90 capsule; Refill: 5  -     colestipol (COLESTID) 1 g tablet; Take 1 tablet by mouth 2 (Two) Times a Day.  Dispense: 60 tablet; Refill: 5  -     dicyclomine (BENTYL) 20 MG tablet; Take 1 tablet by mouth Every 6 (Six) Hours.  Dispense: 120 tablet; Refill: 5  -     predniSONE (DELTASONE) 10 MG tablet; Take 1 tablet by mouth Daily.  Dispense: 30 tablet; Refill: 5  -     Ambulatory Referral to Gastroenterology    2. Neuropathy  Comments:  Continue gabapentin.  Orders:  -     gabapentin (NEURONTIN) 600 MG tablet; Take 1 tablet by mouth 3 (Three) Times a Day.  Dispense: 90 tablet; Refill: 5    3. Essential hypertension  Comments:  Stable.Pt to continue current regimen.  Orders:  -     metoprolol tartrate (LOPRESSOR) 50 MG tablet; Take 1 tablet by mouth 2 (Two) Times a Day.  Dispense: 60 tablet; Refill: 5    4. PVC's (premature ventricular contractions)  Comments:  Continue metoprolol.    5.  Gastroesophageal reflux disease without esophagitis  Comments:  Pt to continue omeprazole.  Orders:  -     omeprazole (priLOSEC) 40 MG capsule; Take 1 capsule by mouth 2 (Two) Times a Day.  Dispense: 60 capsule; Refill: 5  -     Ambulatory Referral to Gastroenterology    6. Seasonal allergic rhinitis, unspecified trigger  Comments:  Pt to try switching from claritin to xyzol.  Orders:  -     levocetirizine (Xyzal) 5 MG tablet; Take 1 tablet by mouth Every Evening.  Dispense: 30 tablet; Refill: 5    7. Abnormal glucose  Comments:  Pt to get labs done at her convenience.  Orders:  -     Comprehensive metabolic panel; Future  -     Hemoglobin A1c; Future    8. Hyperlipidemia, unspecified hyperlipidemia type  Comments:  Pt to get labs done at her convenience.  Orders:  -     Lipid panel; Future    9. Thyroid disorder screen  Comments:  Previous hx of abnormal labs.  Will recheck.  Orders:  -     T3, Free; Future  -     TSH; Future  -     T4, Free; Future    Other orders  -     metoclopramide (REGLAN) 10 MG tablet; Take 1 tablet by mouth 4 (Four) Times a Day.  Dispense: 120 tablet; Refill: 5      I spent 30 minutes of patient care including reviewing pt's previous hx, reviewing current symptoms, performing exam, ordering tests, discussing treatment plan and completing my note.

## 2022-04-29 DIAGNOSIS — E55.9 VITAMIN D DEFICIENCY: Primary | ICD-10-CM

## 2022-04-29 RX ORDER — ERGOCALCIFEROL 1.25 MG/1
50000 CAPSULE ORAL
Qty: 4 CAPSULE | Refills: 1 | OUTPATIENT
Start: 2022-04-29

## 2022-05-03 RX ORDER — FUROSEMIDE 20 MG/1
TABLET ORAL
Qty: 540 TABLET | Refills: 1 | OUTPATIENT
Start: 2022-05-03

## 2022-05-10 RX ORDER — CLONIDINE HYDROCHLORIDE 0.1 MG/1
0.1 TABLET ORAL 3 TIMES DAILY
Qty: 45 TABLET | Refills: 0 | Status: SHIPPED | OUTPATIENT
Start: 2022-05-10 | End: 2022-05-27

## 2022-05-10 RX ORDER — ERGOCALCIFEROL 1.25 MG/1
50000 CAPSULE ORAL
Qty: 4 CAPSULE | Refills: 1 | OUTPATIENT
Start: 2022-05-10

## 2022-05-19 RX ORDER — CLONIDINE HYDROCHLORIDE 0.1 MG/1
0.1 TABLET ORAL 3 TIMES DAILY
Qty: 45 TABLET | Refills: 0 | OUTPATIENT
Start: 2022-05-19

## 2022-05-19 RX ORDER — LISINOPRIL 20 MG/1
20 TABLET ORAL 2 TIMES DAILY
Qty: 60 TABLET | Refills: 3 | Status: SHIPPED | OUTPATIENT
Start: 2022-05-19 | End: 2022-07-29

## 2022-05-25 DIAGNOSIS — F33.1 MAJOR DEPRESSIVE DISORDER, RECURRENT EPISODE, MODERATE: ICD-10-CM

## 2022-05-25 RX ORDER — VILAZODONE HYDROCHLORIDE 20 MG/1
TABLET ORAL
Qty: 30 TABLET | Refills: 0 | OUTPATIENT
Start: 2022-05-25

## 2022-05-25 RX ORDER — CLONIDINE HYDROCHLORIDE 0.1 MG/1
0.1 TABLET ORAL 3 TIMES DAILY
Qty: 45 TABLET | Refills: 0 | OUTPATIENT
Start: 2022-05-25

## 2022-05-25 RX ORDER — ERGOCALCIFEROL 1.25 MG/1
50000 CAPSULE ORAL
Qty: 4 CAPSULE | Refills: 1 | OUTPATIENT
Start: 2022-05-25

## 2022-05-26 ENCOUNTER — LAB (OUTPATIENT)
Dept: LAB | Facility: HOSPITAL | Age: 52
End: 2022-05-26

## 2022-05-26 DIAGNOSIS — E55.9 VITAMIN D DEFICIENCY: ICD-10-CM

## 2022-05-26 DIAGNOSIS — Z13.29 THYROID DISORDER SCREEN: ICD-10-CM

## 2022-05-26 DIAGNOSIS — R73.09 ABNORMAL GLUCOSE: ICD-10-CM

## 2022-05-26 DIAGNOSIS — E78.5 HYPERLIPIDEMIA, UNSPECIFIED HYPERLIPIDEMIA TYPE: ICD-10-CM

## 2022-05-26 LAB — HBA1C MFR BLD: 5.9 % (ref 3.5–5.6)

## 2022-05-26 PROCEDURE — 80061 LIPID PANEL: CPT

## 2022-05-26 PROCEDURE — 84481 FREE ASSAY (FT-3): CPT

## 2022-05-26 PROCEDURE — 36415 COLL VENOUS BLD VENIPUNCTURE: CPT

## 2022-05-26 PROCEDURE — 84439 ASSAY OF FREE THYROXINE: CPT

## 2022-05-26 PROCEDURE — 80053 COMPREHEN METABOLIC PANEL: CPT

## 2022-05-26 PROCEDURE — 84443 ASSAY THYROID STIM HORMONE: CPT

## 2022-05-26 PROCEDURE — 83036 HEMOGLOBIN GLYCOSYLATED A1C: CPT

## 2022-05-26 PROCEDURE — 82306 VITAMIN D 25 HYDROXY: CPT

## 2022-05-27 DIAGNOSIS — E55.9 VITAMIN D DEFICIENCY: Primary | ICD-10-CM

## 2022-05-27 LAB
25(OH)D3 SERPL-MCNC: 26.1 NG/ML (ref 30–100)
ALBUMIN SERPL-MCNC: 4.1 G/DL (ref 3.5–5.2)
ALBUMIN/GLOB SERPL: 1.7 G/DL
ALP SERPL-CCNC: 79 U/L (ref 39–117)
ALT SERPL W P-5'-P-CCNC: 17 U/L (ref 1–33)
ANION GAP SERPL CALCULATED.3IONS-SCNC: 9.5 MMOL/L (ref 5–15)
AST SERPL-CCNC: 16 U/L (ref 1–32)
BILIRUB SERPL-MCNC: 0.2 MG/DL (ref 0–1.2)
BUN SERPL-MCNC: 7 MG/DL (ref 6–20)
BUN/CREAT SERPL: 10.1 (ref 7–25)
CALCIUM SPEC-SCNC: 8.9 MG/DL (ref 8.6–10.5)
CHLORIDE SERPL-SCNC: 100 MMOL/L (ref 98–107)
CHOLEST SERPL-MCNC: 146 MG/DL (ref 0–200)
CO2 SERPL-SCNC: 26.5 MMOL/L (ref 22–29)
CREAT SERPL-MCNC: 0.69 MG/DL (ref 0.57–1)
EGFRCR SERPLBLD CKD-EPI 2021: 104.6 ML/MIN/1.73
GLOBULIN UR ELPH-MCNC: 2.4 GM/DL
GLUCOSE SERPL-MCNC: 107 MG/DL (ref 65–99)
HDLC SERPL-MCNC: 44 MG/DL (ref 40–60)
LDLC SERPL CALC-MCNC: 70 MG/DL (ref 0–100)
LDLC/HDLC SERPL: 1.45 {RATIO}
POTASSIUM SERPL-SCNC: 4 MMOL/L (ref 3.5–5.2)
PROT SERPL-MCNC: 6.5 G/DL (ref 6–8.5)
SODIUM SERPL-SCNC: 136 MMOL/L (ref 136–145)
T3FREE SERPL-MCNC: 2.61 PG/ML (ref 2–4.4)
T4 FREE SERPL-MCNC: 1.08 NG/DL (ref 0.93–1.7)
TRIGL SERPL-MCNC: 190 MG/DL (ref 0–150)
TSH SERPL DL<=0.05 MIU/L-ACNC: 0.91 UIU/ML (ref 0.27–4.2)
VLDLC SERPL-MCNC: 32 MG/DL (ref 5–40)

## 2022-05-27 RX ORDER — CLONIDINE HYDROCHLORIDE 0.1 MG/1
0.1 TABLET ORAL 3 TIMES DAILY
Qty: 45 TABLET | Refills: 0 | Status: SHIPPED | OUTPATIENT
Start: 2022-05-27 | End: 2022-06-14

## 2022-05-31 DIAGNOSIS — F33.1 MAJOR DEPRESSIVE DISORDER, RECURRENT EPISODE, MODERATE: ICD-10-CM

## 2022-05-31 RX ORDER — VILAZODONE HYDROCHLORIDE 20 MG/1
TABLET ORAL
Qty: 30 TABLET | Refills: 0 | OUTPATIENT
Start: 2022-05-31

## 2022-06-07 DIAGNOSIS — F33.1 MAJOR DEPRESSIVE DISORDER, RECURRENT EPISODE, MODERATE: ICD-10-CM

## 2022-06-07 RX ORDER — POTASSIUM CHLORIDE 750 MG/1
TABLET, FILM COATED, EXTENDED RELEASE ORAL
Qty: 60 TABLET | Refills: 1 | Status: SHIPPED | OUTPATIENT
Start: 2022-06-07 | End: 2022-07-22

## 2022-06-07 RX ORDER — VILAZODONE HYDROCHLORIDE 20 MG/1
TABLET ORAL
Qty: 30 TABLET | Refills: 0 | OUTPATIENT
Start: 2022-06-07

## 2022-06-14 DIAGNOSIS — F33.1 MAJOR DEPRESSIVE DISORDER, RECURRENT EPISODE, MODERATE: ICD-10-CM

## 2022-06-14 RX ORDER — CLONIDINE HYDROCHLORIDE 0.1 MG/1
0.1 TABLET ORAL 3 TIMES DAILY
Qty: 45 TABLET | Refills: 0 | Status: SHIPPED | OUTPATIENT
Start: 2022-06-14 | End: 2022-06-22

## 2022-06-14 RX ORDER — VILAZODONE HYDROCHLORIDE 20 MG/1
TABLET ORAL
Qty: 30 TABLET | Refills: 0 | OUTPATIENT
Start: 2022-06-14

## 2022-06-14 NOTE — TELEPHONE ENCOUNTER
The pt must be seen, last appt 11/11/2020, she had 2 NSs and 2 cancellations as soon as she got refills

## 2022-06-22 RX ORDER — CLONIDINE HYDROCHLORIDE 0.1 MG/1
0.1 TABLET ORAL 3 TIMES DAILY
Qty: 45 TABLET | Refills: 0 | Status: SHIPPED | OUTPATIENT
Start: 2022-06-22 | End: 2022-07-07

## 2022-06-22 RX ORDER — AMLODIPINE BESYLATE 10 MG/1
TABLET ORAL
Qty: 90 TABLET | Refills: 1 | Status: SHIPPED | OUTPATIENT
Start: 2022-06-22 | End: 2022-09-16

## 2022-06-25 DIAGNOSIS — F33.1 MAJOR DEPRESSIVE DISORDER, RECURRENT EPISODE, MODERATE: ICD-10-CM

## 2022-06-25 RX ORDER — VILAZODONE HYDROCHLORIDE 20 MG/1
TABLET ORAL
Qty: 30 TABLET | Refills: 0 | OUTPATIENT
Start: 2022-06-25

## 2022-07-07 DIAGNOSIS — F33.1 MAJOR DEPRESSIVE DISORDER, RECURRENT EPISODE, MODERATE: ICD-10-CM

## 2022-07-07 RX ORDER — CLONIDINE HYDROCHLORIDE 0.1 MG/1
0.1 TABLET ORAL 3 TIMES DAILY
Qty: 90 TABLET | Refills: 3 | Status: SHIPPED | OUTPATIENT
Start: 2022-07-07 | End: 2022-09-08

## 2022-07-08 RX ORDER — VILAZODONE HYDROCHLORIDE 20 MG/1
TABLET ORAL
Qty: 30 TABLET | Refills: 0 | OUTPATIENT
Start: 2022-07-08

## 2022-07-11 DIAGNOSIS — F33.1 MAJOR DEPRESSIVE DISORDER, RECURRENT EPISODE, MODERATE: ICD-10-CM

## 2022-07-12 RX ORDER — VILAZODONE HYDROCHLORIDE 20 MG/1
TABLET ORAL
Qty: 30 TABLET | Refills: 0 | OUTPATIENT
Start: 2022-07-12

## 2022-07-13 DIAGNOSIS — F33.1 MAJOR DEPRESSIVE DISORDER, RECURRENT EPISODE, MODERATE: ICD-10-CM

## 2022-07-14 RX ORDER — VILAZODONE HYDROCHLORIDE 20 MG/1
TABLET ORAL
Qty: 30 TABLET | Refills: 0 | OUTPATIENT
Start: 2022-07-14

## 2022-07-19 DIAGNOSIS — F33.1 MAJOR DEPRESSIVE DISORDER, RECURRENT EPISODE, MODERATE: ICD-10-CM

## 2022-07-20 RX ORDER — VILAZODONE HYDROCHLORIDE 20 MG/1
TABLET ORAL
Qty: 30 TABLET | Refills: 0 | OUTPATIENT
Start: 2022-07-20

## 2022-07-22 DIAGNOSIS — F33.1 MAJOR DEPRESSIVE DISORDER, RECURRENT EPISODE, MODERATE: ICD-10-CM

## 2022-07-22 RX ORDER — FUROSEMIDE 20 MG/1
TABLET ORAL
Qty: 540 TABLET | Refills: 1 | Status: SHIPPED | OUTPATIENT
Start: 2022-07-22 | End: 2023-01-20

## 2022-07-22 RX ORDER — VILAZODONE HYDROCHLORIDE 20 MG/1
TABLET ORAL
Qty: 30 TABLET | Refills: 0 | OUTPATIENT
Start: 2022-07-22

## 2022-07-22 RX ORDER — POTASSIUM CHLORIDE 750 MG/1
TABLET, FILM COATED, EXTENDED RELEASE ORAL
Qty: 60 TABLET | Refills: 0 | Status: SHIPPED | OUTPATIENT
Start: 2022-07-22

## 2022-07-25 DIAGNOSIS — F33.1 MAJOR DEPRESSIVE DISORDER, RECURRENT EPISODE, MODERATE: ICD-10-CM

## 2022-07-25 RX ORDER — TOPIRAMATE 50 MG/1
TABLET, FILM COATED ORAL
Qty: 180 TABLET | Refills: 3 | Status: SHIPPED | OUTPATIENT
Start: 2022-07-25 | End: 2022-09-08

## 2022-07-26 RX ORDER — VILAZODONE HYDROCHLORIDE 20 MG/1
TABLET ORAL
Qty: 30 TABLET | Refills: 0 | OUTPATIENT
Start: 2022-07-26

## 2022-07-26 RX ORDER — VILAZODONE HYDROCHLORIDE 20 MG/1
20 TABLET ORAL
Qty: 30 TABLET | Refills: 1 | Status: SHIPPED | OUTPATIENT
Start: 2022-07-26 | End: 2022-08-17

## 2022-07-26 NOTE — TELEPHONE ENCOUNTER
Pt says another provider has been filling this and she told the pharmacy to not send this request to us.

## 2022-07-26 NOTE — TELEPHONE ENCOUNTER
Pt called back to says that the pharmacy is unsure why Viibryd has been in her pre-packed tray each week, since she should have ran out in May, and pt says she is just running out of these for this week and would like you to refill them.  Also, pt made an appt for October.

## 2022-07-26 NOTE — TELEPHONE ENCOUNTER
Pt transferred to scheduling to make f/u appt and says she still has Viibryd and thinks another provider has been filling this for her and she didn't want the pharmacy to send the request to us.

## 2022-07-29 RX ORDER — LISINOPRIL 20 MG/1
20 TABLET ORAL 2 TIMES DAILY
Qty: 60 TABLET | Refills: 3 | Status: SHIPPED | OUTPATIENT
Start: 2022-07-29 | End: 2023-01-10

## 2022-08-16 DIAGNOSIS — F33.1 MAJOR DEPRESSIVE DISORDER, RECURRENT EPISODE, MODERATE: ICD-10-CM

## 2022-08-17 RX ORDER — VILAZODONE HYDROCHLORIDE 20 MG/1
20 TABLET ORAL
Qty: 30 TABLET | Refills: 1 | Status: SHIPPED | OUTPATIENT
Start: 2022-08-17 | End: 2022-09-08

## 2022-09-08 ENCOUNTER — OFFICE VISIT (OUTPATIENT)
Dept: NEUROLOGY | Facility: CLINIC | Age: 52
End: 2022-09-08

## 2022-09-08 VITALS
HEART RATE: 90 BPM | WEIGHT: 255 LBS | DIASTOLIC BLOOD PRESSURE: 94 MMHG | SYSTOLIC BLOOD PRESSURE: 142 MMHG | BODY MASS INDEX: 42.49 KG/M2 | TEMPERATURE: 98.7 F | HEIGHT: 65 IN

## 2022-09-08 DIAGNOSIS — G43.919 INTRACTABLE MIGRAINE WITHOUT STATUS MIGRAINOSUS, UNSPECIFIED MIGRAINE TYPE: ICD-10-CM

## 2022-09-08 DIAGNOSIS — R55 SYNCOPE AND COLLAPSE: Primary | ICD-10-CM

## 2022-09-08 PROBLEM — M33.90 DERMATOMYOSITIS: Status: ACTIVE | Noted: 2022-05-24

## 2022-09-08 PROBLEM — I82.403 RECURRENT ACUTE DEEP VEIN THROMBOSIS (DVT) OF BOTH LOWER EXTREMITIES (HCC): Status: ACTIVE | Noted: 2022-05-24

## 2022-09-08 PROBLEM — D68.59 PROTEIN C DEFICIENCY (HCC): Status: ACTIVE | Noted: 2022-05-24

## 2022-09-08 PROBLEM — K51.90: Status: ACTIVE | Noted: 2022-05-24

## 2022-09-08 PROBLEM — D68.9 COAGULOPATHY (HCC): Status: ACTIVE | Noted: 2022-05-24

## 2022-09-08 PROBLEM — M33.13 DERMATOMYOSITIS: Status: ACTIVE | Noted: 2022-05-24

## 2022-09-08 PROCEDURE — 99214 OFFICE O/P EST MOD 30 MIN: CPT | Performed by: PSYCHIATRY & NEUROLOGY

## 2022-09-08 RX ORDER — TOPIRAMATE 50 MG/1
TABLET, FILM COATED ORAL
Qty: 60 TABLET | Refills: 2 | Status: SHIPPED | OUTPATIENT
Start: 2022-09-08 | End: 2023-01-12 | Stop reason: SDUPTHER

## 2022-09-08 RX ORDER — NICOTINE 21 MG/24HR
1 PATCH, TRANSDERMAL 24 HOURS TRANSDERMAL DAILY
COMMUNITY

## 2022-09-08 RX ORDER — OMEPRAZOLE 40 MG/1
40 CAPSULE, DELAYED RELEASE ORAL DAILY
COMMUNITY
End: 2022-09-08 | Stop reason: SDUPTHER

## 2022-09-08 RX ORDER — LEVOCETIRIZINE DIHYDROCHLORIDE 5 MG/1
TABLET, FILM COATED ORAL
COMMUNITY
Start: 2022-04-28 | End: 2022-09-08 | Stop reason: SDUPTHER

## 2022-09-08 RX ORDER — HYDROXYCHLOROQUINE SULFATE 200 MG/1
TABLET, FILM COATED ORAL
COMMUNITY
Start: 2022-07-07

## 2022-09-08 RX ORDER — METOPROLOL SUCCINATE 50 MG/1
50 TABLET, EXTENDED RELEASE ORAL DAILY
COMMUNITY
End: 2022-09-08 | Stop reason: SDUPTHER

## 2022-09-08 RX ORDER — ERGOCALCIFEROL 1.25 MG/1
CAPSULE ORAL
COMMUNITY
Start: 2022-08-16 | End: 2023-03-13

## 2022-09-08 NOTE — PROGRESS NOTES
Chief Complaint  Migraine    Subjective          Merry Thornton presents to [facility] for Migraines  History of Present Illness  Patient is here to f/u on migrianes,    she states her last migraine has been worse since last visit     she states her head throbs really bad,    patient states her last migraine was last week and lasted 3 days w/ nausea    she currently takes imitrex 100 mg prn,imitrex injection prn .....     topamax 50 mg 1 bid she has been out for about 2 months she states medication did help when she was taking it but it made her sleepy    Pt has had spells of passing out, associated with no twitching, confused and tired after the spell, associated with urinary incontinence.   Spells occurred while sitting , second while up working. In kitchen.                 Current Outpatient Medications:   •  albuterol sulfate  (90 Base) MCG/ACT inhaler, Inhale 2 puffs Every 4 (Four) Hours As Needed for Wheezing., Disp: , Rfl:   •  ALPRAZolam (XANAX) 0.5 MG tablet, TAKE 1 TABLET BY MOUTH 2 TIMES A DAY AS NEEDED FOR ANXIETY, Disp: 60 tablet, Rfl: 0  •  amLODIPine (NORVASC) 10 MG tablet, TAKE 1 TABLET BY MOUTH DAILY., Disp: 90 tablet, Rfl: 1  •  apixaban (ELIQUIS) 5 MG tablet tablet, Take 1 tablet by mouth Every 12 (Twelve) Hours., Disp: 60 tablet, Rfl: 5  •  Budesonide (ENTOCORT EC) 3 MG 24 hr capsule, Take 3 capsules by mouth Daily., Disp: 90 capsule, Rfl: 5  •  colestipol (COLESTID) 1 g tablet, Take 1 tablet by mouth 2 (Two) Times a Day., Disp: 60 tablet, Rfl: 5  •  dicyclomine (BENTYL) 20 MG tablet, Take 1 tablet by mouth Every 6 (Six) Hours., Disp: 120 tablet, Rfl: 5  •  folic acid (FOLVITE) 800 MCG tablet, Take 1 tablet by mouth Daily., Disp: , Rfl:   •  furosemide (LASIX) 20 MG tablet, TAKE 2 TABS EVERY MORNING, 2 TABS EVERY EVENING AND MAY TAKE AN ADDITIONAL 1 TO 2 TABS MIDDAY AS NEEDED, Disp: 540 tablet, Rfl: 1  •  gabapentin (NEURONTIN) 600 MG tablet, Take 1 tablet by  mouth 3 (Three) Times a Day., Disp: 90 tablet, Rfl: 5  •  HYDROcodone-acetaminophen (NORCO)  MG per tablet, Take 1 tablet by mouth Every 8 (Eight) Hours As Needed for Moderate Pain ., Disp: , Rfl:   •  hydroxychloroquine (PLAQUENIL) 200 MG tablet, , Disp: , Rfl:   •  ipratropium-albuterol (DUO-NEB) 0.5-2.5 mg/3 ml nebulizer, Take 3 mL by nebulization 4 (Four) Times a Day., Disp: 360 mL, Rfl: 1  •  levocetirizine (Xyzal) 5 MG tablet, Take 1 tablet by mouth Every Evening., Disp: 30 tablet, Rfl: 5  •  lidocaine (LIDODERM) 5 %, Place 1 patch on the skin as directed by provider Daily. Remove & Discard patch within 12 hours or as directed by MD, Disp: , Rfl:   •  lisinopril (PRINIVIL,ZESTRIL) 20 MG tablet, TAKE 1 TABLET BY MOUTH 2 (TWO) TIMES A DAY., Disp: 60 tablet, Rfl: 3  •  Magnesium 250 MG tablet, Take 250 mg by mouth Daily., Disp: , Rfl:   •  metoclopramide (REGLAN) 10 MG tablet, Take 1 tablet by mouth 4 (Four) Times a Day., Disp: 120 tablet, Rfl: 5  •  metoprolol tartrate (LOPRESSOR) 50 MG tablet, Take 1 tablet by mouth 2 (Two) Times a Day., Disp: 60 tablet, Rfl: 5  •  nicotine (NICODERM CQ) 21 MG/24HR patch, Place 1 patch on the skin as directed by provider Daily., Disp: , Rfl:   •  omeprazole (priLOSEC) 40 MG capsule, Take 1 capsule by mouth 2 (Two) Times a Day., Disp: 60 capsule, Rfl: 5  •  ondansetron ODT (ZOFRAN-ODT) 4 MG disintegrating tablet, Place 1 tablet on the tongue Every 8 (Eight) Hours As Needed for Nausea or Vomiting (can take up to 8 mg as needed)., Disp: 30 tablet, Rfl: 0  •  orphenadrine (NORFLEX) 100 MG 12 hr tablet, Take 100 mg by mouth 3 (Three) Times a Day. As needed, Disp: , Rfl:   •  potassium chloride 10 MEQ CR tablet, TAKE 1 TABLET BY MOUTH 2 TIMES A DAY., Disp: 60 tablet, Rfl: 0  •  predniSONE (DELTASONE) 10 MG tablet, Take 1 tablet by mouth Daily., Disp: 30 tablet, Rfl: 5  •  sulfaSALAzine (AZULFIDINE) 500 MG tablet, Take 1 tablet by mouth 2 (Two) Times a Day., Disp: 120 tablet,  "Rfl: 5  •  SUMAtriptan (IMITREX) 100 MG tablet, Take 100 mg by mouth. One tab at onset of HA, after one hour use sumatriptan injection, Disp: , Rfl:   •  SUMAtriptan (IMITREX) 6 MG/0.5ML injection, Inject prescribed dose at onset of headache. May repeat dose one time in 1 hour(s) if headache not relieved., Disp: , Rfl:   •  Umeclidinium Bromide (INCRUSE ELLIPTA) 62.5 MCG/INH aerosol powder , Inhale 1 puff 2 (Two) Times a Day., Disp: , Rfl:   •  vitamin D (ERGOCALCIFEROL) 1.25 MG (47340 UT) capsule capsule, , Disp: , Rfl:   •  topiramate (Topamax) 50 MG tablet, One at hs after one month may increase to two at hs., Disp: 60 tablet, Rfl: 2    Review of Systems   Constitutional: Positive for fatigue.   Musculoskeletal: Positive for neck pain and neck stiffness.   Neurological: Positive for dizziness and headaches.   Psychiatric/Behavioral: Positive for decreased concentration and sleep disturbance. The patient is nervous/anxious.    All other systems reviewed and are negative.         Objective:    Vital Signs:   /94   Pulse 90   Temp 98.7 °F (37.1 °C) (Infrared)   Ht 165.1 cm (65\")   Wt 116 kg (255 lb)   BMI 42.43 kg/m²     Physical Exam  Vitals reviewed.   HENT:      Head: Normocephalic.   Eyes:      Conjunctiva/sclera: Conjunctivae normal.   Pulmonary:      Effort: Pulmonary effort is normal. No respiratory distress.   Neurological:      General: No focal deficit present.      Mental Status: She is alert and oriented to person, place, and time.   Psychiatric:         Mood and Affect: Mood normal.        Result Review :                Neurologic Exam     Mental Status   Oriented to person, place, and time.         Assessment and Plan    Diagnoses and all orders for this visit:    1. Syncope and collapse (Primary)  -     EEG (Hospital Performed); Future    2. Intractable migraine without status migrainosus, unspecified migraine type  -     topiramate (Topamax) 50 MG tablet; One at hs after one month may " increase to two at hs.  Dispense: 60 tablet; Refill: 2     continue Imitrex prn ha    Restart Topamax, take at hs for migraine and or seizure  Seizure vrs syncope will obtain eeg    Follow Up   Return in about 4 months (around 1/8/2023).  Patient was given instructions and counseling regarding her condition or for health maintenance advice. Please see specific information pulled into the AVS if appropriate.     This document has been electronically signed by Joseph Seipel, MD on September 8, 2022 16:50 EDT

## 2022-09-16 DIAGNOSIS — I10 ESSENTIAL HYPERTENSION: ICD-10-CM

## 2022-09-16 DIAGNOSIS — K51.90 ULCERATIVE COLITIS WITHOUT COMPLICATIONS, UNSPECIFIED LOCATION: ICD-10-CM

## 2022-09-16 DIAGNOSIS — J30.2 SEASONAL ALLERGIC RHINITIS, UNSPECIFIED TRIGGER: ICD-10-CM

## 2022-09-16 DIAGNOSIS — G62.9 NEUROPATHY: ICD-10-CM

## 2022-09-16 RX ORDER — METOPROLOL TARTRATE 50 MG/1
50 TABLET, FILM COATED ORAL 2 TIMES DAILY
Qty: 60 TABLET | Refills: 5 | Status: SHIPPED | OUTPATIENT
Start: 2022-09-16 | End: 2023-03-12

## 2022-09-16 RX ORDER — METOCLOPRAMIDE 10 MG/1
10 TABLET ORAL 4 TIMES DAILY
Qty: 120 TABLET | Refills: 5 | Status: SHIPPED | OUTPATIENT
Start: 2022-09-16

## 2022-09-16 RX ORDER — GABAPENTIN 600 MG/1
600 TABLET ORAL 3 TIMES DAILY
Qty: 90 TABLET | Refills: 5 | Status: SHIPPED | OUTPATIENT
Start: 2022-09-16 | End: 2023-03-12

## 2022-09-16 RX ORDER — AMLODIPINE BESYLATE 10 MG/1
TABLET ORAL
Qty: 90 TABLET | Refills: 1 | Status: SHIPPED | OUTPATIENT
Start: 2022-09-16 | End: 2023-03-30

## 2022-09-16 RX ORDER — LEVOCETIRIZINE DIHYDROCHLORIDE 5 MG/1
5 TABLET, FILM COATED ORAL EVERY EVENING
Qty: 30 TABLET | Refills: 5 | Status: SHIPPED | OUTPATIENT
Start: 2022-09-16 | End: 2023-03-02

## 2022-09-16 RX ORDER — PREDNISONE 10 MG/1
10 TABLET ORAL DAILY
Qty: 30 TABLET | Refills: 5 | Status: SHIPPED | OUTPATIENT
Start: 2022-09-16

## 2022-09-19 ENCOUNTER — HOSPITAL ENCOUNTER (OUTPATIENT)
Dept: NEUROLOGY | Facility: HOSPITAL | Age: 52
Discharge: HOME | End: 2022-09-19
Admitting: PSYCHIATRY & NEUROLOGY

## 2022-09-19 DIAGNOSIS — R55 SYNCOPE AND COLLAPSE: ICD-10-CM

## 2022-09-19 PROCEDURE — 95816 EEG AWAKE AND DROWSY: CPT

## 2022-09-19 PROCEDURE — 95816 EEG AWAKE AND DROWSY: CPT | Performed by: PSYCHIATRY & NEUROLOGY

## 2022-09-28 RX ORDER — POTASSIUM CHLORIDE 750 MG/1
TABLET, FILM COATED, EXTENDED RELEASE ORAL
Qty: 60 TABLET | Refills: 0 | OUTPATIENT
Start: 2022-09-28

## 2022-10-04 RX ORDER — POTASSIUM CHLORIDE 750 MG/1
TABLET, FILM COATED, EXTENDED RELEASE ORAL
Qty: 60 TABLET | Refills: 0 | OUTPATIENT
Start: 2022-10-04

## 2022-10-06 ENCOUNTER — LAB (OUTPATIENT)
Dept: LAB | Facility: HOSPITAL | Age: 52
End: 2022-10-06

## 2022-10-06 ENCOUNTER — TRANSCRIBE ORDERS (OUTPATIENT)
Dept: ADMINISTRATIVE | Facility: HOSPITAL | Age: 52
End: 2022-10-06

## 2022-10-06 DIAGNOSIS — R23.3 BRUISING TENDENCY: Primary | ICD-10-CM

## 2022-10-06 DIAGNOSIS — R23.3 BRUISING TENDENCY: ICD-10-CM

## 2022-10-06 LAB
CLOSURE TME COLL+ADP BLD: NORMAL S
CLOSURE TME COLL+EPINEP BLD: 116 SECONDS (ref 64–160)
WHOLE BLOOD HOLD COAG: NORMAL

## 2022-10-06 PROCEDURE — 36415 COLL VENOUS BLD VENIPUNCTURE: CPT

## 2022-10-06 PROCEDURE — 85576 BLOOD PLATELET AGGREGATION: CPT

## 2022-10-10 RX ORDER — POTASSIUM CHLORIDE 750 MG/1
TABLET, FILM COATED, EXTENDED RELEASE ORAL
Qty: 60 TABLET | Refills: 0 | OUTPATIENT
Start: 2022-10-10

## 2022-10-12 DIAGNOSIS — F33.1 MAJOR DEPRESSIVE DISORDER, RECURRENT EPISODE, MODERATE: ICD-10-CM

## 2022-10-12 DIAGNOSIS — K21.9 GASTROESOPHAGEAL REFLUX DISEASE WITHOUT ESOPHAGITIS: ICD-10-CM

## 2022-10-12 RX ORDER — VILAZODONE HYDROCHLORIDE 20 MG/1
20 TABLET ORAL
Qty: 30 TABLET | Refills: 0 | Status: SHIPPED | OUTPATIENT
Start: 2022-10-12 | End: 2022-10-27 | Stop reason: SDUPTHER

## 2022-10-12 RX ORDER — OMEPRAZOLE 40 MG/1
40 CAPSULE, DELAYED RELEASE ORAL 2 TIMES DAILY
Qty: 60 CAPSULE | Refills: 5 | Status: SHIPPED | OUTPATIENT
Start: 2022-10-12 | End: 2023-03-30

## 2022-10-27 ENCOUNTER — OFFICE VISIT (OUTPATIENT)
Dept: PSYCHIATRY | Facility: CLINIC | Age: 52
End: 2022-10-27

## 2022-10-27 DIAGNOSIS — F41.1 GENERALIZED ANXIETY DISORDER: Primary | Chronic | ICD-10-CM

## 2022-10-27 DIAGNOSIS — F33.1 MAJOR DEPRESSIVE DISORDER, RECURRENT EPISODE, MODERATE: Chronic | ICD-10-CM

## 2022-10-27 PROCEDURE — 99214 OFFICE O/P EST MOD 30 MIN: CPT | Performed by: PSYCHIATRY & NEUROLOGY

## 2022-10-27 RX ORDER — HYDROXYZINE HYDROCHLORIDE 10 MG/1
10 TABLET, FILM COATED ORAL 2 TIMES DAILY PRN
Qty: 60 TABLET | Refills: 1 | Status: SHIPPED | OUTPATIENT
Start: 2022-10-27 | End: 2022-11-29

## 2022-10-27 RX ORDER — VILAZODONE HYDROCHLORIDE 40 MG/1
40 TABLET ORAL
Qty: 30 TABLET | Refills: 5 | Status: SHIPPED | OUTPATIENT
Start: 2022-10-27 | End: 2023-03-14

## 2022-10-27 NOTE — PROGRESS NOTES
Subjective   Merry Thornton is a 52 y.o. female who presents today for follow up       Chief Complaint:  Depression anxiety     History of Present Illness:   The pt suffered from depression anxiety for many years, was doing well on Viibryd, she had to change jobs, Last appt 3/20/18   The pt was admitted to [facility] in march 2020 for abd pain and recently due to DVT and PE  .     Today the pt reported multiple medical issues,  Family losses, now still trying to recover , reported feeling depressed, she is not working now, stays at home, decreased motivations, does not feel comfortable to be around people, but still enjoys time with grand children      anxiety affects her BP and pain, anxiety increased recently due to covid situation , health issues and family situation, she has foster kids     Anxiety is persistent and intense , she is medically fragile, does not go out, feels fidgety , decreased sleep - she had sleep study done - chronic PIPPA ,   BPAP ordered , did not get yet   Depression is rated as  7/10, every day , all day long   Denied avh/si/hi        The following portions of the patient's history were reviewed and updated as appropriate: allergies, current medications, past family history, past medical history, past social history, past surgical history and problem list.    PAST PSYCHIATRIC HISTORY  Axis I  Affective/Bipoloar Disorder, Anxiety/Panic Disorder  Axis II  None    PAST OUTPATIENT TREATMENT  Diagnosis treated:  Affective Disorder, Anxiety/Panic Disorder  Treatment Type:  Medication Management  Prior Psychiatric Medications:  xanax PRN effective   buspar - allergy   zoloft weight gain  lexapro - weight gain   wellbutrin - anxiety   Trazodone -groggy in the morning     Support Groups:  None   Sequelae Of Mental Disorder:  medical illness, emotional distress          Interval History  No changes, still depressed      Side Effects  Denied       Past Medical History:  Past Medical History:    Diagnosis Date   • CAD (coronary artery disease) 12/20/2016    dr. singh   • Carpal tunnel syndrome on right 03/08/2017   • Common migraine 12/20/2016   • COPD (chronic obstructive pulmonary disease) ([facility]) 12/20/2016   • Cyst, ovarian 12/20/2016    mass   • DDD (degenerative disc disease), cervical 03/11/2016   • Dental caries 01/14/2019   • Depression 11/02/2017   • Depression, major, recurrent, moderate ([facility]) 08/25/2016   • Dietary counseling 09/21/2017   • Dysphagia 05/2020   • Elevated random blood glucose level 05/14/2020   • Encounter for smoking cessation counseling 09/21/2017   • Exacerbation of systemic lupus ([facility]) 04/30/2019   • Fibromyalgia 12/20/2016   • Generalized anxiety disorder 03/11/2016   • GERD (gastroesophageal reflux disease) 12/20/2016   • Heartburn 11/02/2017   • Hypertension 03/16/2016   • Hyperthyroidism 03/11/2016   • Hypocalcemia 09/21/2017   • IBS (irritable colon syndrome) 09/13/2016   • Immobility syndrome 09/14/2017   • Intracranial pressure increased 12/20/2016   • Left knee pain 09/21/2017   • Lower back pain 02/01/2018   • Lung nodules 09/07/2016   • Morbid obesity ([facility]) 11/02/2017   • Muscle cramp 09/21/2017   • Neck pain 01/14/2019   • Need for prophylactic vaccination and inoculation against influenza 09/21/2017   • Neurogenic bladder 12/20/2016   • Obesity 03/07/2017   • Obesity (BMI 30-39.9) 05/14/2020   • Orthostatic hypotension 07/17/2017   • Osteoarthritis 03/07/2017   • Osteopenia 01/14/2019   • Osteoporosis 12/20/2016   • Other instability, right ankle 04/13/2017   • Overlap syndrome (HCC)     scleroderma, lupus, Raynaud's. Mixxed connective Tissue D/o   • Pain, joint, ankle, left 09/21/2017   • Palpitations 10/04/2017   • Paresthesia 06/12/2017    facial   • Peripheral neuropathy 12/20/2016    bilateral lower extremities   • Poor vision    • Prediabetes    • Presence of pessary    • Raynaud's syndrome 12/20/2016   • Retinopathy    • Right knee pain 11/08/2016   •  Scleroderma ([facility]) 12/20/2016   • Seasonal allergic rhinitis 12/20/2016   • SLE (systemic lupus erythematosus) ([facility]) 03/16/2017   • Sleep apnea 11/02/2017    BIPAP   • Sleep disorder 01/14/2019   • Sprain of unspecified site of right knee, subsequent encounter 12/01/2016   • Status post placement of implantable loop recorder 05/09/2018    presence   • Syncope and collapse 2020    still has occassionally   • Ulcerative colitis ([facility]) 02/2020   • Urine incontinence    • Vasovagal syncope    • Ventricular arrhythmia 03/16/2016   • Visit for screening mammogram 12/20/2016   • Vitamin D deficiency 01/14/2019   • Wheezing        Social History:  Social History     Socioeconomic History   • Marital status: Single   Tobacco Use   • Smoking status: Some Days     Packs/day: 1.50     Years: 35.00     Pack years: 52.50     Types: Cigarettes   • Smokeless tobacco: Former     Quit date: 5/11/2020   • Tobacco comments:     Patient is still smoking patient is advised to quit smoking   Vaping Use   • Vaping Use: Never used   Substance and Sexual Activity   • Alcohol use: No   • Drug use: Not Currently     Frequency: 1.0 times per week     Types: Marijuana     Comment: stopped 2 months ago   • Sexual activity: Defer     Partners: Male       Family History:  Family History   Problem Relation Age of Onset   • Cancer Mother    • Heart disease Father    • Lung disease Father    • Diabetes Sister    • Heart disease Sister    • Hypertension Sister    • Other Sister         weight disorder       Past Surgical History:  Past Surgical History:   Procedure Laterality Date   • ANKLE SURGERY Right 02/2017   • BREAST EXCISIONAL BIOPSY Right     years ago   • BRONCHOSCOPY     • CARDIAC ABLATION  01/2000   • CARDIAC ELECTROPHYSIOLOGY PROCEDURE     • CHOLECYSTECTOMY     • COLON SURGERY     • COLONOSCOPY N/A 2/26/2020    Procedure: COLONOSCOPY WITH BIOPSY X1 AREA;  Surgeon: Michael Cheng MD;  Location: [facility] ENDOSCOPY;  Service: Gastroenterology;   Laterality: N/A;  diarrhea   • ECHO - CONVERTED  2020   • ENDOSCOPY     • ENDOSCOPY N/A 5/21/2020    Procedure: ESOPHAGOGASTRODUODENOSCOPY WITH DILATATION (#54, 60 bougie);  Surgeon: Michael Cheng MD;  Location: [facility] ENDOSCOPY;  Service: Gastroenterology;  Laterality: N/A;  Post: candidiasis, upper esophagus sphincter stricture   • KNEE SURGERY Right 09/2017   • OTHER SURGICAL HISTORY      Urerine hysterectomy   • OTHER SURGICAL HISTORY      t and a   • OTHER SURGICAL HISTORY      d&c   • OTHER SURGICAL HISTORY      bladder stim   • OTHER SURGICAL HISTORY      Loop recorder placement   • WRIST SURGERY Right        Problem List:  Patient Active Problem List   Diagnosis   • PVC's (premature ventricular contractions)   • Essential hypertension   • Sleep apnea   • Orthostatic hypotension   • Presence of other cardiac implants and grafts   • Abnormal echocardiogram   • Left ankle pain   • Arthralgia of left knee   • Knee pain, right   • Low back pain   • Lower back pain   • Neurogenic claudication (HCC)   • Carpal tunnel syndrome of right wrist   • Allergic rhinitis, seasonal   • Chronic obstructive pulmonary disease (HCC)   • Dental caries   • Fibromyalgia   • Neck pain   • Gastroesophageal reflux disease   • Generalized anxiety disorder   • H. pylori infection   • Degeneration of intervertebral disc   • Herniation of intervertebral disc   • Hyperthyroidism   • Hypocalcemia   • Impaired mobility   • Irritable bowel syndrome with diarrhea   • Current chronic use of systemic steroids   • Long term current use of therapeutic drug   • Loss of peripheral visual field   • Lung nodule   • Major depressive disorder, recurrent episode, moderate (HCC)   • Migraine without aura, intractable   • Muscle cramps   • Neurogenic bladder   • Neuropathy involving both lower extremities   • Osteoarthritis   • Osteoporosis   • Other instability, right ankle   • Other localized visual field defect   • Other screening mammogram   •  Ovarian cystic mass   • Raynaud's phenomenon   • Scleroderma ([facility])   • Vasodepressor syncope   • Systemic lupus erythematosus ([facility])   • Tobacco dependency   • Undifferentiated connective tissue disease ([facility])   • Vitamin D deficiency   • White matter changes   • Asthma   • Pseudotumor cerebri   • Immunosuppression ([facility])   • Keratoconjunctivitis   • Presbyopia   • SPK (superficial punctate keratitis), bilateral   • Prediabetes   • Nocturnal hypoxia   • Moderate nonproliferative diabetic retinopathy ([facility])   • Ulcerative colitis ([facility])   • Pelvic mass   • Obesity (BMI 30-39.9)   • Increased cerebrospinal fluid pressure   • Coronary artery disease involving native coronary artery of native heart without angina pectoris   • Stress incontinence   • Swelling of lower extremity   • Urinary incontinence   • Hoarseness of voice   • Dysphagia   • Cigarette smoker   • Polyp of vocal cord or larynx   • Acute deep vein thrombosis (DVT) of popliteal vein of right lower extremity ([facility])   • Chronic obstructive pulmonary disease ([facility])   • Migraine headache   • Degeneration of intervertebral disc of cervical region   • Degeneration of intervertebral disc of lumbar region   • Osteoporosis   • Lupus erythematosus   • Autoimmune disease ([facility])   • Irregular heart rhythm   • Neuropathy   • Chest pain, atypical   • Osteoarthritis of both knees   • Ulcerative colitis, chronic, without complications ([facility])   • Recurrent acute deep vein thrombosis (DVT) of both lower extremities ([facility])   • Protein C deficiency ([facility])   • Dermatomyositis ([facility])   • Coagulopathy ([facility])       Allergy:   Allergies   Allergen Reactions   • Adhesive Tape Other (See Comments)     Pt ok with paper tape    Abstracted from [employer]   • Bupropion Shortness Of Breath   • Morphine Other (See Comments), Rash and Swelling     Abstracted from [employer]   • Amoxicillin-Pot Clavulanate Diarrhea   • Buspar [Buspirone] Other (See Comments)     Abstracted from [employer]         Discontinued Medications:  Medications Discontinued During This Encounter   Medication Reason   • ALPRAZolam (XANAX) 0.5 MG tablet Non-compliance   • vilazodone (VIIBRYD) 20 MG tablet tablet Reorder       Current Medications:   Current Outpatient Medications   Medication Sig Dispense Refill   • gabapentin (NEURONTIN) 600 MG tablet TAKE 1 TABLET BY MOUTH 3 (THREE) TIMES A DAY. 90 tablet 5   • levocetirizine (XYZAL) 5 MG tablet TAKE 1 TABLET BY MOUTH EVERY EVENING. 30 tablet 5   • metoclopramide (REGLAN) 10 MG tablet TAKE 1 TABLET BY MOUTH 4 (FOUR) TIMES A DAY. 120 tablet 5   • metoprolol tartrate (LOPRESSOR) 50 MG tablet TAKE 1 TABLET BY MOUTH 2 (TWO) TIMES A DAY. 60 tablet 5   • predniSONE (DELTASONE) 10 MG tablet TAKE 1 TABLET BY MOUTH DAILY. 30 tablet 5   • vilazodone (VIIBRYD) 40 MG tablet tablet Take 1 tablet by mouth every night at bedtime. 30 tablet 5   • albuterol sulfate  (90 Base) MCG/ACT inhaler Inhale 2 puffs Every 4 (Four) Hours As Needed for Wheezing.     • amLODIPine (NORVASC) 10 MG tablet TAKE 1 TABLET BY MOUTH DAILY. 90 tablet 1   • apixaban (ELIQUIS) 5 MG tablet tablet Take 1 tablet by mouth Every 12 (Twelve) Hours. 60 tablet 5   • Budesonide (ENTOCORT EC) 3 MG 24 hr capsule Take 3 capsules by mouth Daily. 90 capsule 5   • colestipol (COLESTID) 1 g tablet Take 1 tablet by mouth 2 (Two) Times a Day. 60 tablet 5   • dicyclomine (BENTYL) 20 MG tablet Take 1 tablet by mouth Every 6 (Six) Hours. 120 tablet 5   • folic acid (FOLVITE) 800 MCG tablet Take 1 tablet by mouth Daily.     • furosemide (LASIX) 20 MG tablet TAKE 2 TABS EVERY MORNING, 2 TABS EVERY EVENING AND MAY TAKE AN ADDITIONAL 1 TO 2 TABS MIDDAY AS NEEDED 540 tablet 1   • HYDROcodone-acetaminophen (NORCO)  MG per tablet Take 1 tablet by mouth Every 8 (Eight) Hours As Needed for Moderate Pain .     • hydroxychloroquine (PLAQUENIL) 200 MG tablet      • hydrOXYzine (ATARAX) 10 MG tablet Take 1 tablet by mouth 2 (Two) Times a Day  As Needed for Anxiety. 60 tablet 1   • ipratropium-albuterol (DUO-NEB) 0.5-2.5 mg/3 ml nebulizer Take 3 mL by nebulization 4 (Four) Times a Day. 360 mL 1   • lidocaine (LIDODERM) 5 % Place 1 patch on the skin as directed by provider Daily. Remove & Discard patch within 12 hours or as directed by MD     • lisinopril (PRINIVIL,ZESTRIL) 20 MG tablet TAKE 1 TABLET BY MOUTH 2 (TWO) TIMES A DAY. 60 tablet 3   • Magnesium 250 MG tablet Take 250 mg by mouth Daily.     • nicotine (NICODERM CQ) 21 MG/24HR patch Place 1 patch on the skin as directed by provider Daily.     • omeprazole (priLOSEC) 40 MG capsule TAKE 1 CAPSULE BY MOUTH 2 (TWO) TIMES A DAY. 60 capsule 5   • ondansetron ODT (ZOFRAN-ODT) 4 MG disintegrating tablet Place 1 tablet on the tongue Every 8 (Eight) Hours As Needed for Nausea or Vomiting (can take up to 8 mg as needed). 30 tablet 0   • orphenadrine (NORFLEX) 100 MG 12 hr tablet Take 100 mg by mouth 3 (Three) Times a Day. As needed     • potassium chloride 10 MEQ CR tablet TAKE 1 TABLET BY MOUTH 2 TIMES A DAY. 60 tablet 0   • sulfaSALAzine (AZULFIDINE) 500 MG tablet Take 1 tablet by mouth 2 (Two) Times a Day. 120 tablet 5   • SUMAtriptan (IMITREX) 100 MG tablet Take 100 mg by mouth. One tab at onset of HA, after one hour use sumatriptan injection     • SUMAtriptan (IMITREX) 6 MG/0.5ML injection Inject prescribed dose at onset of headache. May repeat dose one time in 1 hour(s) if headache not relieved.     • topiramate (Topamax) 50 MG tablet One at hs after one month may increase to two at hs. 60 tablet 2   • Umeclidinium Bromide (INCRUSE ELLIPTA) 62.5 MCG/INH aerosol powder  Inhale 1 puff 2 (Two) Times a Day.     • vitamin D (ERGOCALCIFEROL) 1.25 MG (72638 UT) capsule capsule        No current facility-administered medications for this visit.         Review of Symptoms:    Psychiatric/Behavioral: Negative for agitation, behavioral problems, confusion, decreased concentration, dysphoric mood, hallucinations,  self-injury, sleep disturbance and suicidal ideas. The patient is  Very  nervous/anxious and is not hyperactive.        Physical Exam:   not currently breastfeeding.    Mental Status Exam:   Hygiene:   good  Cooperation:  Cooperative  Eye Contact:  Good  Psychomotor Behavior:  Appropriate  Affect:  Appropriate,    Mood: depressed, anxious and fluctates  Hopelessness: Denies  Speech:  Normal  Thought Process:  Goal directed and Linear  Thought Content:  Normal  Suicidal:  None  Homicidal:  None  Hallucinations:  None  Delusion:  None  Memory:  fair   Orientation:  Person, Place, Time and Situation  Reliability:  fair  Insight:  Fair  Judgement:  Fair  Impulse Control:  Fair  Physical/Medical Issues:  Yes       MSE from 11/11/2020 reviewed and accepted with changes   PHQ-9 Depression Screening  Little interest or pleasure in doing things? 2-->more than half the days   Feeling down, depressed, or hopeless? 2-->more than half the days   Trouble falling or staying asleep, or sleeping too much? 2-->more than half the days   Feeling tired or having little energy? 2-->more than half the days   Poor appetite or overeating? 0-->not at all   Feeling bad about yourself - or that you are a failure or have let yourself or your family down? 3-->nearly every day   Trouble concentrating on things, such as reading the newspaper or watching television? 0-->not at all   Moving or speaking so slowly that other people could have noticed? Or the opposite - being so fidgety or restless that you have been moving around a lot more than usual? 1-->several days   Thoughts that you would be better off dead, or of hurting yourself in some way? 0-->not at all   PHQ-9 Total Score 12   If you checked off any problems, how difficult have these problems made it for you to do your work, take care of things at home, or get along with other people? extremely difficult         Former smoker    I advised Merry of the risks of tobacco use.     Lab  Results:   Lab on 10/06/2022   Component Date Value Ref Range Status   • Collagen/Epinephrine 10/06/2022 116  64 - 160 Seconds Final   • Collagen/ADP 10/06/2022    Final    ADP not indicated   • Extra Tube 10/06/2022 Hold for add-ons.   Final    Auto resulted       Assessment & Plan   Problems Addressed this Visit        Mental Health    Generalized anxiety disorder - Primary (Chronic)    Relevant Medications    vilazodone (VIIBRYD) 40 MG tablet tablet    hydrOXYzine (ATARAX) 10 MG tablet    Major depressive disorder, recurrent episode, moderate (HCC)    Relevant Medications    vilazodone (VIIBRYD) 40 MG tablet tablet    hydrOXYzine (ATARAX) 10 MG tablet   Diagnoses       Codes Comments    Generalized anxiety disorder    -  Primary ICD-10-CM: F41.1  ICD-9-CM: 300.02     Major depressive disorder, recurrent episode, moderate (HCC)     ICD-10-CM: F33.1  ICD-9-CM: 296.32           Visit Diagnoses:    ICD-10-CM ICD-9-CM   1. Generalized anxiety disorder  F41.1 300.02   2. Major depressive disorder, recurrent episode, moderate (HCC)  F33.1 296.32       TREATMENT PLAN/GOALS: Continue supportive psychotherapy efforts and medications as indicated. Treatment and medication options discussed during today's visit. Patient ackowledged and verbally consented to continue with current treatment plan and was educated on the importance of compliance with treatment and follow-up appointments.    MEDICATION ISSUES:  1. MDD - Cont viibryd, increase to 40 mg  To achieve better sxs control   2. MIGUEL ANGEL - start hydroxyzine 10 mg BID PRN  The pt was on xanax but experienced difficulties keeping her appts      depression and anxiety now are more related to her situation   Stress management discussed         PHQ scored 12 - moderate   MIGUEL ANGEL 7 scored 9     INSPECT reviewed as expected - xanax 9/1/2020  And hydrocodone from pain  Management now       Discussed medication options and treatment plan of prescribed medication as well as the risks,  benefits, and side effects including potential falls, possible impaired driving and metabolic adversities among others. Patient is agreeable to call the office with any worsening of symptoms or onset of side effects. Patient is agreeable to call 911 or go to the nearest ER should he/she begin having SI/HI. No medication side effects or related complaints today.     MEDS ORDERED DURING VISIT:  New Medications Ordered This Visit   Medications   • vilazodone (VIIBRYD) 40 MG tablet tablet     Sig: Take 1 tablet by mouth every night at bedtime.     Dispense:  30 tablet     Refill:  5   • hydrOXYzine (ATARAX) 10 MG tablet     Sig: Take 1 tablet by mouth 2 (Two) Times a Day As Needed for Anxiety.     Dispense:  60 tablet     Refill:  1       Return in about 6 months (around 4/27/2023).           This document has been electronically signed by Africa Conway MD  October 27, 2022 11:33 EDT

## 2022-11-02 RX ORDER — CLONIDINE HYDROCHLORIDE 0.1 MG/1
0.1 TABLET ORAL 3 TIMES DAILY
Qty: 90 TABLET | Refills: 3 | OUTPATIENT
Start: 2022-11-02

## 2022-11-09 DIAGNOSIS — K51.90 ULCERATIVE COLITIS WITHOUT COMPLICATIONS, UNSPECIFIED LOCATION: ICD-10-CM

## 2022-11-09 RX ORDER — CLONIDINE HYDROCHLORIDE 0.1 MG/1
0.1 TABLET ORAL 3 TIMES DAILY
Qty: 90 TABLET | Refills: 3 | OUTPATIENT
Start: 2022-11-09

## 2022-11-10 RX ORDER — DICYCLOMINE HCL 20 MG
20 TABLET ORAL EVERY 6 HOURS
Qty: 120 TABLET | Refills: 5 | Status: SHIPPED | OUTPATIENT
Start: 2022-11-10 | End: 2023-03-30

## 2022-11-17 RX ORDER — CLONIDINE HYDROCHLORIDE 0.1 MG/1
0.1 TABLET ORAL 3 TIMES DAILY
Qty: 90 TABLET | Refills: 3 | OUTPATIENT
Start: 2022-11-17

## 2022-11-29 RX ORDER — HYDROXYZINE HYDROCHLORIDE 10 MG/1
10 TABLET, FILM COATED ORAL 2 TIMES DAILY PRN
Qty: 60 TABLET | Refills: 1 | Status: SHIPPED | OUTPATIENT
Start: 2022-11-29 | End: 2023-01-11

## 2023-01-10 RX ORDER — LISINOPRIL 20 MG/1
20 TABLET ORAL 2 TIMES DAILY
Qty: 60 TABLET | Refills: 3 | Status: SHIPPED | OUTPATIENT
Start: 2023-01-10 | End: 2023-03-30

## 2023-01-11 RX ORDER — HYDROXYZINE HYDROCHLORIDE 10 MG/1
10 TABLET, FILM COATED ORAL 2 TIMES DAILY PRN
Qty: 60 TABLET | Refills: 1 | Status: SHIPPED | OUTPATIENT
Start: 2023-01-11

## 2023-01-11 NOTE — PROGRESS NOTES
Chief Complaint  Follow-up (migraines)    Subjective          Merry Thornton presents to [facility] for migrianes  History of Present Illness  Patient is here to f/u on migrianes,    Patient states her last migraine was last week it lasted a couple days  She is having 3-4 migraines a month  she currently takes imitrex 100 mg prn,imitrex injection prn .      topamax 50 mg 1 bid     Neuropathy  Start 10 years ago  Location: BLE  Medication: gabapentin    Pt had elevated spinal fluid pressure in past, about 8 years ago. See eye dr, some retinopathy seen on recent exam     Pt had diabetes with steroid use recent a1c 5.9    Pt has mixed connective tissue disease.     Legs go numb and tingle, hurt, Gabapentin  Has had emg on UE 5 years ago. Had cts surgery.     Pt has sleep apnea, getting a new pap machine soon--dr draw.     ========================================  9-8-2022  History of Present Illness  Patient is here to f/u on migrianes,     she states her last migraine has been worse since last visit      she states her head throbs really bad,     patient states her last migraine was last week and lasted 3 days w/ nausea     she currently takes imitrex 100 mg prn,imitrex injection prn .....      topamax 50 mg 1 bid she has been out for about 2 months she states medication did help when she was taking it but it made her sleepy     Pt has had spells of passing out, associated with no twitching, confused and tired after the spell, associated with urinary incontinence.   Spells occurred while sitting , second while up working. In kitchen.       Current Outpatient Medications:   •  albuterol sulfate  (90 Base) MCG/ACT inhaler, Inhale 2 puffs Every 4 (Four) Hours As Needed for Wheezing., Disp: , Rfl:   •  amLODIPine (NORVASC) 10 MG tablet, TAKE 1 TABLET BY MOUTH DAILY., Disp: 90 tablet, Rfl: 1  •  apixaban (ELIQUIS) 5 MG tablet tablet, Take 1 tablet by mouth Every 12 (Twelve) Hours., Disp:  60 tablet, Rfl: 5  •  Budeson-Glycopyrrol-Formoterol (Breztri Aerosphere) 160-9-4.8 MCG/ACT aerosol inhaler, Inhale., Disp: , Rfl:   •  Budesonide (ENTOCORT EC) 3 MG 24 hr capsule, Take 3 capsules by mouth Daily., Disp: 90 capsule, Rfl: 5  •  budesonide-formoterol (SYMBICORT) 80-4.5 MCG/ACT inhaler, , Disp: , Rfl:   •  cloNIDine (CATAPRES) 0.1 MG tablet, , Disp: , Rfl:   •  colestipol (COLESTID) 1 g tablet, Take 1 tablet by mouth 2 (Two) Times a Day., Disp: 60 tablet, Rfl: 5  •  dicyclomine (BENTYL) 20 MG tablet, TAKE 1 TABLET BY MOUTH EVERY 6 (SIX) HOURS., Disp: 120 tablet, Rfl: 5  •  folic acid (FOLVITE) 800 MCG tablet, Take 1 tablet by mouth Daily., Disp: , Rfl:   •  furosemide (LASIX) 20 MG tablet, TAKE 2 TABS EVERY MORNING, 2 TABS EVERY EVENING AND MAY TAKE AN ADDITIONAL 1 TO 2 TABS MIDDAY AS NEEDED, Disp: 540 tablet, Rfl: 1  •  gabapentin (NEURONTIN) 600 MG tablet, TAKE 1 TABLET BY MOUTH 3 (THREE) TIMES A DAY., Disp: 90 tablet, Rfl: 5  •  HYDROcodone-acetaminophen (NORCO)  MG per tablet, Take 1 tablet by mouth Every 8 (Eight) Hours As Needed for Moderate Pain ., Disp: , Rfl:   •  hydroxychloroquine (PLAQUENIL) 200 MG tablet, , Disp: , Rfl:   •  hydrOXYzine (ATARAX) 10 MG tablet, TAKE 1 TABLET BY MOUTH 2 (TWO) TIMES A DAY AS NEEDED FOR ANXIETY., Disp: 60 tablet, Rfl: 1  •  hydrOXYzine (ATARAX) 10 MG tablet, As Needed., Disp: , Rfl:   •  ipratropium-albuterol (DUO-NEB) 0.5-2.5 mg/3 ml nebulizer, Take 3 mL by nebulization 4 (Four) Times a Day., Disp: 360 mL, Rfl: 1  •  levocetirizine (XYZAL) 5 MG tablet, TAKE 1 TABLET BY MOUTH EVERY EVENING., Disp: 30 tablet, Rfl: 5  •  lidocaine (LIDODERM) 5 %, Place 1 patch on the skin as directed by provider Daily. Remove & Discard patch within 12 hours or as directed by MD, Disp: , Rfl:   •  lisinopril (PRINIVIL,ZESTRIL) 20 MG tablet, TAKE 1 TABLET BY MOUTH 2 (TWO) TIMES A DAY., Disp: 60 tablet, Rfl: 3  •  Magnesium 250 MG tablet, Take 250 mg by mouth Daily., Disp: ,  Rfl:   •  metoclopramide (REGLAN) 10 MG tablet, TAKE 1 TABLET BY MOUTH 4 (FOUR) TIMES A DAY., Disp: 120 tablet, Rfl: 5  •  metoprolol tartrate (LOPRESSOR) 50 MG tablet, TAKE 1 TABLET BY MOUTH 2 (TWO) TIMES A DAY., Disp: 60 tablet, Rfl: 5  •  nicotine (NICODERM CQ) 21 MG/24HR patch, Place 1 patch on the skin as directed by provider Daily., Disp: , Rfl:   •  omeprazole (priLOSEC) 40 MG capsule, TAKE 1 CAPSULE BY MOUTH 2 (TWO) TIMES A DAY., Disp: 60 capsule, Rfl: 5  •  ondansetron ODT (ZOFRAN-ODT) 4 MG disintegrating tablet, Place 1 tablet on the tongue Every 8 (Eight) Hours As Needed for Nausea or Vomiting (can take up to 8 mg as needed)., Disp: 30 tablet, Rfl: 0  •  orphenadrine (NORFLEX) 100 MG 12 hr tablet, Take 100 mg by mouth 3 (Three) Times a Day. As needed, Disp: , Rfl:   •  phentermine (ADIPEX-P) 37.5 MG tablet, phentermine 37.5 mg tablet, Disp: , Rfl:   •  potassium chloride (K-DUR,KLOR-CON) 10 MEQ CR tablet, , Disp: , Rfl:   •  potassium chloride 10 MEQ CR tablet, TAKE 1 TABLET BY MOUTH 2 TIMES A DAY., Disp: 60 tablet, Rfl: 0  •  predniSONE (DELTASONE) 10 MG tablet, TAKE 1 TABLET BY MOUTH DAILY., Disp: 30 tablet, Rfl: 5  •  sulfaSALAzine (AZULFIDINE) 500 MG tablet, Take 1 tablet by mouth 2 (Two) Times a Day., Disp: 120 tablet, Rfl: 5  •  SUMAtriptan (IMITREX) 100 MG tablet, Take 100 mg by mouth. One tab at onset of HA, after one hour use sumatriptan injection, Disp: , Rfl:   •  SUMAtriptan (IMITREX) 6 MG/0.5ML injection, Inject prescribed dose at onset of headache. May repeat dose one time in 1 hour(s) if headache not relieved., Disp: , Rfl:   •  topiramate (Topamax) 50 MG tablet, One at hs after one month may increase to two at hs., Disp: 60 tablet, Rfl: 2  •  Umeclidinium Bromide (INCRUSE ELLIPTA) 62.5 MCG/INH aerosol powder , Inhale 1 puff 2 (Two) Times a Day., Disp: , Rfl:   •  vilazodone (VIIBRYD) 40 MG tablet tablet, Take 1 tablet by mouth every night at bedtime., Disp: 30 tablet, Rfl: 5  •  vitamin D  "(ERGOCALCIFEROL) 1.25 MG (69077 UT) capsule capsule, , Disp: , Rfl:     Review of Systems   Constitutional: Positive for fatigue.   HENT: Positive for tinnitus, trouble swallowing and voice change.    Eyes: Negative.    Respiratory: Positive for apnea and shortness of breath.    Gastrointestinal: Positive for abdominal distention, blood in stool, diarrhea, nausea and vomiting.   Endocrine: Negative.    Genitourinary: Negative.    Musculoskeletal: Positive for back pain, joint swelling, myalgias, neck pain and neck stiffness.   Allergic/Immunologic: Positive for environmental allergies.   Neurological: Positive for dizziness, light-headedness, numbness and headaches.   Hematological: Bruises/bleeds easily.   Psychiatric/Behavioral: Negative.           Objective:    Vital Signs:   /83 (BP Location: Left arm, Patient Position: Sitting, Cuff Size: Adult)   Pulse 81   Temp 98.4 °F (36.9 °C)   Ht 165.1 cm (65\")   Wt 114 kg (251 lb)   BMI 41.77 kg/m²     Physical Exam   Result Review :                Neurologic Exam      Assessment and Plan    Diagnoses and all orders for this visit:    1. Pseudotumor cerebri (Primary)    2. Migraine without aura and without status migrainosus, not intractable    3. Obstructive sleep apnea syndrome    4. Vasodepressor syncope    5. Protein C deficiency (HCC)    6. Prediabetes         Follow Up   No follow-ups on file.  Patient was given instructions and counseling regarding her condition or for health maintenance advice. Please see specific information pulled into the AVS if appropriate.     This document has been electronically signed by Joseph Seipel, MD on January 12, 2023 15:16 EST      "

## 2023-01-11 NOTE — TELEPHONE ENCOUNTER
Rx Refill Note  Requested Prescriptions     Pending Prescriptions Disp Refills   • hydrOXYzine (ATARAX) 10 MG tablet [Pharmacy Med Name: HYDROXYZINE HCL 10 MG TABS 10 Tablet] 60 tablet 1     Sig: TAKE 1 TABLET BY MOUTH 2 (TWO) TIMES A DAY AS NEEDED FOR ANXIETY.      Last office visit with prescribing clinician: 10/27/2022   Last telemedicine visit with prescribing clinician: 4/27/2023   Next office visit with prescribing clinician: 4/27/2023   Office Visit with Africa Conway MD (10/27/2022)       Urine Drug Screen - Urine, Clean Catch (11/11/2020)                   Would you like a call back once the refill request has been completed: [] Yes [] No    If the office needs to give you a call back, can they leave a voicemail: [] Yes [] No    Mirlande Anaya MA  01/11/23, 09:01 EST     Duplicate request

## 2023-01-12 ENCOUNTER — OFFICE VISIT (OUTPATIENT)
Dept: NEUROLOGY | Facility: CLINIC | Age: 53
End: 2023-01-12
Payer: MEDICAID

## 2023-01-12 VITALS
BODY MASS INDEX: 41.82 KG/M2 | DIASTOLIC BLOOD PRESSURE: 83 MMHG | WEIGHT: 251 LBS | HEART RATE: 81 BPM | HEIGHT: 65 IN | SYSTOLIC BLOOD PRESSURE: 134 MMHG | TEMPERATURE: 98.4 F

## 2023-01-12 DIAGNOSIS — G43.919 INTRACTABLE MIGRAINE WITHOUT STATUS MIGRAINOSUS, UNSPECIFIED MIGRAINE TYPE: ICD-10-CM

## 2023-01-12 DIAGNOSIS — G47.33 OBSTRUCTIVE SLEEP APNEA SYNDROME: Chronic | ICD-10-CM

## 2023-01-12 DIAGNOSIS — G43.009 MIGRAINE WITHOUT AURA AND WITHOUT STATUS MIGRAINOSUS, NOT INTRACTABLE: ICD-10-CM

## 2023-01-12 DIAGNOSIS — D68.59 PROTEIN C DEFICIENCY: ICD-10-CM

## 2023-01-12 DIAGNOSIS — R55 VASODEPRESSOR SYNCOPE: ICD-10-CM

## 2023-01-12 DIAGNOSIS — R73.03 PREDIABETES: Chronic | ICD-10-CM

## 2023-01-12 DIAGNOSIS — G93.2 PSEUDOTUMOR CEREBRI: Primary | ICD-10-CM

## 2023-01-12 PROCEDURE — 99214 OFFICE O/P EST MOD 30 MIN: CPT | Performed by: PSYCHIATRY & NEUROLOGY

## 2023-01-12 RX ORDER — PHENTERMINE HYDROCHLORIDE 37.5 MG/1
TABLET ORAL
COMMUNITY
Start: 2022-12-20

## 2023-01-12 RX ORDER — SUMATRIPTAN 100 MG/1
100 TABLET, FILM COATED ORAL AS NEEDED
Qty: 9 TABLET | Refills: 11 | Status: SHIPPED | OUTPATIENT
Start: 2023-01-12

## 2023-01-12 RX ORDER — TOPIRAMATE 50 MG/1
TABLET, FILM COATED ORAL
Qty: 60 TABLET | Refills: 11 | Status: SHIPPED | OUTPATIENT
Start: 2023-01-12

## 2023-01-12 RX ORDER — HYDROXYZINE HYDROCHLORIDE 10 MG/1
TABLET, FILM COATED ORAL AS NEEDED
COMMUNITY
Start: 2022-11-29

## 2023-01-12 RX ORDER — CEFUROXIME AXETIL 250 MG/1
6 TABLET ORAL AS NEEDED
Qty: 6 EACH | Refills: 3 | Status: SHIPPED | OUTPATIENT
Start: 2023-01-12

## 2023-01-12 RX ORDER — BUDESONIDE AND FORMOTEROL FUMARATE DIHYDRATE 80; 4.5 UG/1; UG/1
AEROSOL RESPIRATORY (INHALATION)
COMMUNITY
Start: 2022-10-20

## 2023-01-12 RX ORDER — CLONIDINE HYDROCHLORIDE 0.1 MG/1
TABLET ORAL
COMMUNITY
Start: 2023-01-10

## 2023-01-12 RX ORDER — BUDESONIDE, GLYCOPYRROLATE, AND FORMOTEROL FUMARATE 160; 9; 4.8 UG/1; UG/1; UG/1
AEROSOL, METERED RESPIRATORY (INHALATION)
COMMUNITY

## 2023-01-12 RX ORDER — POTASSIUM CHLORIDE 750 MG/1
TABLET, EXTENDED RELEASE ORAL
COMMUNITY
Start: 2023-01-10

## 2023-01-12 NOTE — LETTER
January 12, 2023     ARIANNA Gu  [address]  [address]  [place] [address]    Patient: Merry Thornton   YOB: 1970   Date of Visit: 1/12/2023       Dear ARIANNA Eric:    Thank you for referring Merry Thornton to me for evaluation. Below are the relevant portions of my assessment and plan of care.    If you have questions, please do not hesitate to call me. I look forward to following Merry along with you.         Sincerely,        Joseph F Seipel, MD        CC: No Recipients  Seipel, Joseph F, MD  01/12/23 1533  Signed  Chief Complaint  Follow-up (migraines)    Subjective           Merry Thornton presents to [facility] for migrianes  History of Present Illness  Patient is here to f/u on migrianes,    Patient states her last migraine was last week it lasted a couple days  She is having 3-4 migraines a month  she currently takes imitrex 100 mg prn,imitrex injection prn .      topamax 50 mg 1 bid     Neuropathy  Start 10 years ago  Location: BLE  Medication: gabapentin    Pt had elevated spinal fluid pressure in past, about 8 years ago. See eye dr, some retinopathy seen on recent exam     Pt had diabetes with steroid use recent a1c 5.9    Pt has mixed connective tissue disease.     Legs go numb and tingle, hurt, Gabapentin  Has had emg on UE 5 years ago. Had cts surgery.     Pt has sleep apnea, getting a new pap machine soon--dr draw.     ========================================  9-8-2022  History of Present Illness  Patient is here to f/u on migrianes,     she states her last migraine has been worse since last visit      she states her head throbs really bad,     patient states her last migraine was last week and lasted 3 days w/ nausea     she currently takes imitrex 100 mg prn,imitrex injection prn .....      topamax 50 mg 1 bid she has been out for about 2 months she states medication did help when she was taking it but it made her sleepy     Pt has had  spells of passing out, associated with no twitching, confused and tired after the spell, associated with urinary incontinence.   Spells occurred while sitting , second while up working. In kitchen.       Current Outpatient Medications:   •  albuterol sulfate  (90 Base) MCG/ACT inhaler, Inhale 2 puffs Every 4 (Four) Hours As Needed for Wheezing., Disp: , Rfl:   •  amLODIPine (NORVASC) 10 MG tablet, TAKE 1 TABLET BY MOUTH DAILY., Disp: 90 tablet, Rfl: 1  •  apixaban (ELIQUIS) 5 MG tablet tablet, Take 1 tablet by mouth Every 12 (Twelve) Hours., Disp: 60 tablet, Rfl: 5  •  Budeson-Glycopyrrol-Formoterol (Breztri Aerosphere) 160-9-4.8 MCG/ACT aerosol inhaler, Inhale., Disp: , Rfl:   •  Budesonide (ENTOCORT EC) 3 MG 24 hr capsule, Take 3 capsules by mouth Daily., Disp: 90 capsule, Rfl: 5  •  budesonide-formoterol (SYMBICORT) 80-4.5 MCG/ACT inhaler, , Disp: , Rfl:   •  cloNIDine (CATAPRES) 0.1 MG tablet, , Disp: , Rfl:   •  colestipol (COLESTID) 1 g tablet, Take 1 tablet by mouth 2 (Two) Times a Day., Disp: 60 tablet, Rfl: 5  •  dicyclomine (BENTYL) 20 MG tablet, TAKE 1 TABLET BY MOUTH EVERY 6 (SIX) HOURS., Disp: 120 tablet, Rfl: 5  •  folic acid (FOLVITE) 800 MCG tablet, Take 1 tablet by mouth Daily., Disp: , Rfl:   •  furosemide (LASIX) 20 MG tablet, TAKE 2 TABS EVERY MORNING, 2 TABS EVERY EVENING AND MAY TAKE AN ADDITIONAL 1 TO 2 TABS MIDDAY AS NEEDED, Disp: 540 tablet, Rfl: 1  •  gabapentin (NEURONTIN) 600 MG tablet, TAKE 1 TABLET BY MOUTH 3 (THREE) TIMES A DAY., Disp: 90 tablet, Rfl: 5  •  HYDROcodone-acetaminophen (NORCO)  MG per tablet, Take 1 tablet by mouth Every 8 (Eight) Hours As Needed for Moderate Pain ., Disp: , Rfl:   •  hydroxychloroquine (PLAQUENIL) 200 MG tablet, , Disp: , Rfl:   •  hydrOXYzine (ATARAX) 10 MG tablet, TAKE 1 TABLET BY MOUTH 2 (TWO) TIMES A DAY AS NEEDED FOR ANXIETY., Disp: 60 tablet, Rfl: 1  •  hydrOXYzine (ATARAX) 10 MG tablet, As Needed., Disp: , Rfl:   •   ipratropium-albuterol (DUO-NEB) 0.5-2.5 mg/3 ml nebulizer, Take 3 mL by nebulization 4 (Four) Times a Day., Disp: 360 mL, Rfl: 1  •  levocetirizine (XYZAL) 5 MG tablet, TAKE 1 TABLET BY MOUTH EVERY EVENING., Disp: 30 tablet, Rfl: 5  •  lidocaine (LIDODERM) 5 %, Place 1 patch on the skin as directed by provider Daily. Remove & Discard patch within 12 hours or as directed by MD, Disp: , Rfl:   •  lisinopril (PRINIVIL,ZESTRIL) 20 MG tablet, TAKE 1 TABLET BY MOUTH 2 (TWO) TIMES A DAY., Disp: 60 tablet, Rfl: 3  •  Magnesium 250 MG tablet, Take 250 mg by mouth Daily., Disp: , Rfl:   •  metoclopramide (REGLAN) 10 MG tablet, TAKE 1 TABLET BY MOUTH 4 (FOUR) TIMES A DAY., Disp: 120 tablet, Rfl: 5  •  metoprolol tartrate (LOPRESSOR) 50 MG tablet, TAKE 1 TABLET BY MOUTH 2 (TWO) TIMES A DAY., Disp: 60 tablet, Rfl: 5  •  nicotine (NICODERM CQ) 21 MG/24HR patch, Place 1 patch on the skin as directed by provider Daily., Disp: , Rfl:   •  omeprazole (priLOSEC) 40 MG capsule, TAKE 1 CAPSULE BY MOUTH 2 (TWO) TIMES A DAY., Disp: 60 capsule, Rfl: 5  •  ondansetron ODT (ZOFRAN-ODT) 4 MG disintegrating tablet, Place 1 tablet on the tongue Every 8 (Eight) Hours As Needed for Nausea or Vomiting (can take up to 8 mg as needed)., Disp: 30 tablet, Rfl: 0  •  orphenadrine (NORFLEX) 100 MG 12 hr tablet, Take 100 mg by mouth 3 (Three) Times a Day. As needed, Disp: , Rfl:   •  phentermine (ADIPEX-P) 37.5 MG tablet, phentermine 37.5 mg tablet, Disp: , Rfl:   •  potassium chloride (K-DUR,KLOR-CON) 10 MEQ CR tablet, , Disp: , Rfl:   •  potassium chloride 10 MEQ CR tablet, TAKE 1 TABLET BY MOUTH 2 TIMES A DAY., Disp: 60 tablet, Rfl: 0  •  predniSONE (DELTASONE) 10 MG tablet, TAKE 1 TABLET BY MOUTH DAILY., Disp: 30 tablet, Rfl: 5  •  sulfaSALAzine (AZULFIDINE) 500 MG tablet, Take 1 tablet by mouth 2 (Two) Times a Day., Disp: 120 tablet, Rfl: 5  •  SUMAtriptan (IMITREX) 100 MG tablet, Take 100 mg by mouth. One tab at onset of HA, after one hour use  "sumatriptan injection, Disp: , Rfl:   •  SUMAtriptan (IMITREX) 6 MG/0.5ML injection, Inject prescribed dose at onset of headache. May repeat dose one time in 1 hour(s) if headache not relieved., Disp: , Rfl:   •  topiramate (Topamax) 50 MG tablet, One at hs after one month may increase to two at hs., Disp: 60 tablet, Rfl: 2  •  Umeclidinium Bromide (INCRUSE ELLIPTA) 62.5 MCG/INH aerosol powder , Inhale 1 puff 2 (Two) Times a Day., Disp: , Rfl:   •  vilazodone (VIIBRYD) 40 MG tablet tablet, Take 1 tablet by mouth every night at bedtime., Disp: 30 tablet, Rfl: 5  •  vitamin D (ERGOCALCIFEROL) 1.25 MG (96836 UT) capsule capsule, , Disp: , Rfl:     Review of Systems   Constitutional: Positive for fatigue.   HENT: Positive for tinnitus, trouble swallowing and voice change.    Eyes: Negative.    Respiratory: Positive for apnea and shortness of breath.    Gastrointestinal: Positive for abdominal distention, blood in stool, diarrhea, nausea and vomiting.   Endocrine: Negative.    Genitourinary: Negative.    Musculoskeletal: Positive for back pain, joint swelling, myalgias, neck pain and neck stiffness.   Allergic/Immunologic: Positive for environmental allergies.   Neurological: Positive for dizziness, light-headedness, numbness and headaches.   Hematological: Bruises/bleeds easily.   Psychiatric/Behavioral: Negative.           Objective:    Vital Signs:   /83 (BP Location: Left arm, Patient Position: Sitting, Cuff Size: Adult)   Pulse 81   Temp 98.4 °F (36.9 °C)   Ht 165.1 cm (65\")   Wt 114 kg (251 lb)   BMI 41.77 kg/m²     Physical Exam   Result Review :                Neurologic Exam      Assessment and Plan    Diagnoses and all orders for this visit:    1. Pseudotumor cerebri (Primary)    2. Migraine without aura and without status migrainosus, not intractable    3. Obstructive sleep apnea syndrome    4. Vasodepressor syncope    5. Protein C deficiency (HCC)    6. Prediabetes         Follow Up   No follow-ups " on file.  Patient was given instructions and counseling regarding her condition or for health maintenance advice. Please see specific information pulled into the AVS if appropriate.     This document has been electronically signed by Joseph Seipel, MD on January 12, 2023 15:16 EST

## 2023-01-18 ENCOUNTER — TELEPHONE (OUTPATIENT)
Dept: FAMILY MEDICINE CLINIC | Facility: CLINIC | Age: 53
End: 2023-01-18
Payer: MEDICAID

## 2023-01-18 DIAGNOSIS — R49.0 HOARSENESS OF VOICE: Primary | ICD-10-CM

## 2023-01-18 NOTE — TELEPHONE ENCOUNTER
Patient called requesting a new referral for her ENT. Last referral was entered in 2020 for hoarseness. She goes to [facility] in [place].

## 2023-01-20 ENCOUNTER — TELEPHONE (OUTPATIENT)
Dept: FAMILY MEDICINE CLINIC | Facility: CLINIC | Age: 53
End: 2023-01-20

## 2023-01-20 RX ORDER — FUROSEMIDE 20 MG/1
TABLET ORAL
Qty: 540 TABLET | Refills: 1 | Status: SHIPPED | OUTPATIENT
Start: 2023-01-20

## 2023-01-31 ENCOUNTER — TELEPHONE (OUTPATIENT)
Dept: FAMILY MEDICINE CLINIC | Facility: CLINIC | Age: 53
End: 2023-01-31

## 2023-01-31 RX ORDER — ONDANSETRON 4 MG/1
4 TABLET, ORALLY DISINTEGRATING ORAL EVERY 8 HOURS PRN
Qty: 30 TABLET | Refills: 0 | Status: CANCELLED | OUTPATIENT
Start: 2023-01-31

## 2023-01-31 NOTE — TELEPHONE ENCOUNTER
Incoming Refill Request      Medication requested (name and dose):   ondansetron ODT (ZOFRAN-ODT) 4 MG disintegrating tablet  4 mg, Every 8 Hours PRN         Pharmacy where request should be sent: [facility] [place] [id number] - [phone]  - [phone] FX  [phone]    Additional details provided by patient: PATIENT IS WANTING TO SEE IF THIS CAN BE REFILLED UNTIL HER NEXT APPOINTMENT     SHE SAID THE GROWTH CAME BACK ON HER VOCAL CORD AND SHE IS GOING TO BE HAVING THAT REMOVED SOON    Best call back number: [phone]    Does the patient have less than a 3 day supply:  [x] Yes  [] No    Andi Schulz Rep  01/31/23, 15:57 EST

## 2023-02-01 RX ORDER — ONDANSETRON 4 MG/1
4 TABLET, ORALLY DISINTEGRATING ORAL EVERY 8 HOURS PRN
Qty: 30 TABLET | Refills: 0 | Status: SHIPPED | OUTPATIENT
Start: 2023-02-01 | End: 2023-04-05

## 2023-02-14 ENCOUNTER — OFFICE VISIT (OUTPATIENT)
Dept: FAMILY MEDICINE CLINIC | Facility: CLINIC | Age: 53
End: 2023-02-14
Payer: MEDICAID

## 2023-02-14 VITALS
HEIGHT: 65 IN | OXYGEN SATURATION: 97 % | SYSTOLIC BLOOD PRESSURE: 124 MMHG | HEART RATE: 81 BPM | BODY MASS INDEX: 41.15 KG/M2 | RESPIRATION RATE: 16 BRPM | DIASTOLIC BLOOD PRESSURE: 84 MMHG | WEIGHT: 247 LBS | TEMPERATURE: 97.5 F

## 2023-02-14 DIAGNOSIS — Z00.00 ROUTINE PHYSICAL EXAMINATION: Primary | ICD-10-CM

## 2023-02-14 DIAGNOSIS — R73.09 ABNORMAL GLUCOSE: ICD-10-CM

## 2023-02-14 DIAGNOSIS — E78.5 HYPERLIPIDEMIA, UNSPECIFIED HYPERLIPIDEMIA TYPE: ICD-10-CM

## 2023-02-14 DIAGNOSIS — E55.9 VITAMIN D DEFICIENCY: Chronic | ICD-10-CM

## 2023-02-14 DIAGNOSIS — E53.8 B12 DEFICIENCY: ICD-10-CM

## 2023-02-14 DIAGNOSIS — I10 ESSENTIAL HYPERTENSION: ICD-10-CM

## 2023-02-14 DIAGNOSIS — R25.2 MUSCLE CRAMPS: ICD-10-CM

## 2023-02-14 DIAGNOSIS — J01.10 SUBACUTE FRONTAL SINUSITIS: ICD-10-CM

## 2023-02-14 PROCEDURE — 99396 PREV VISIT EST AGE 40-64: CPT | Performed by: PHYSICIAN ASSISTANT

## 2023-02-14 RX ORDER — CEFDINIR 300 MG/1
300 CAPSULE ORAL 2 TIMES DAILY
Qty: 20 CAPSULE | Refills: 0 | Status: SHIPPED | OUTPATIENT
Start: 2023-02-14 | End: 2023-02-24

## 2023-02-14 NOTE — PROGRESS NOTES
Subjective   Merry Thornton is a 52 y.o. female.     History of Present Illness  Pt presents for routine physical.  She is seeing Dr. Jesus about 3 months for her breathing issues.  She is now taking the Breztri.  She is still on oxygen at night.  Also using CPAP machine.  She has been getting night sweats and thinks she is menopausal.  Also gets some muscle cramps in her legs at night.  She has also had sinus congestion for the past few weeks.       The following portions of the patient's history were reviewed and updated as appropriate: allergies, current medications, past family history, past medical history, past social history, past surgical history and problem list.  Past Medical History:   Diagnosis Date   • Allergic 01/01/2002   • Asthma 01/01/1980   • CAD (coronary artery disease) 12/20/2016    dr. singh   • Carpal tunnel syndrome on right 03/08/2017   • CHF (congestive heart failure) (Formerly [facility]) 01/01/2014   • Cholelithiasis 01/01/2015   • Cluster headache 2000   • Common migraine 12/20/2016   • COPD (chronic obstructive pulmonary disease) (Former[facility]) 12/20/2016   • Cyst, ovarian 12/20/2016    mass   • DDD (degenerative disc disease), cervical 03/11/2016   • Deep vein thrombosis ([facility]) 09/05/2020   • Dental caries 01/14/2019   • Depression 11/02/2017   • Depression, major, recurrent, moderate ([facility]) 08/25/2016   • Dietary counseling 09/21/2017   • Difficulty walking 1999   • Dysphagia 05/2020   • Elevated random blood glucose level 05/14/2020   • Encounter for smoking cessation counseling 09/21/2017   • Exacerbation of systemic lupus ([facility]) 04/30/2019   • Fibromyalgia 12/20/2016   • Fibromyalgia, primary 2001   • Generalized anxiety disorder 03/11/2016   • GERD (gastroesophageal reflux disease) 12/20/2016   • Headache, tension-type 2014   • Heartburn 11/02/2017   • History of medical problems 01/01/2000   • Hyperlipidemia 2017   • Hypertension 03/16/2016   • Hyperthyroidism 03/11/2016   • Hypocalcemia 09/21/2017   • IBS  (irritable colon syndrome) 09/13/2016   • Immobility syndrome 09/14/2017   • Intracranial pressure increased 12/20/2016   • Kidney stone 01/01/2014   • Left knee pain 09/21/2017   • Lower back pain 02/01/2018   • Lung nodules 09/07/2016   • Memory loss 2017   • Morbid obesity ([facility]) 11/02/2017   • Muscle cramp 09/21/2017   • Neck pain 01/14/2019   • Need for prophylactic vaccination and inoculation against influenza 09/21/2017   • Neurogenic bladder 12/20/2016   • Obesity 03/07/2017   • Obesity (BMI 30-39.9) 05/14/2020   • Orthostatic hypotension 07/17/2017   • Osteoarthritis 03/07/2017   • Osteopenia 01/14/2019   • Osteoporosis 12/20/2016   • Other instability, right ankle 04/13/2017   • Overlap syndrome (HCC)     scleroderma, lupus, Raynaud's. Mixxed connective Tissue D/o   • Pain, joint, ankle, left 09/21/2017   • Palpitations 10/04/2017   • Paresthesia 06/12/2017    facial   • Peripheral neuropathy 12/20/2016    bilateral lower extremities   • Poor vision    • Prediabetes    • Presence of pessary    • Pulmonary embolism ([facility]) 09/05/2020   • Raynaud's syndrome 12/20/2016   • Retinopathy    • Right knee pain 11/08/2016   • Scleroderma ([facility]) 12/20/2016   • Seasonal allergic rhinitis 12/20/2016   • Seizures ([facility]) 2018   • SLE (systemic lupus erythematosus) ([facility]) 03/16/2017   • Sleep apnea 11/02/2017    BIPAP   • Sleep disorder 01/14/2019   • Sprain of unspecified site of right knee, subsequent encounter 12/01/2016   • Status post placement of implantable loop recorder 05/09/2018    presence   • Syncope and collapse 2020    still has occassionally   • Ulcerative colitis ([facility]) 02/2020   • Urine incontinence    • Vasovagal syncope    • Ventricular arrhythmia 03/16/2016   • Visit for screening mammogram 12/20/2016   • Vitamin D deficiency 01/14/2019   • Wheezing      Past Surgical History:   Procedure Laterality Date   • ADENOIDECTOMY  01/01/2010   • ANKLE SURGERY Right 02/2017   • BREAST EXCISIONAL BIOPSY Right      years ago   • BRONCHOSCOPY     • CARDIAC ABLATION  01/2000   • CARDIAC CATHETERIZATION  01/01/2016   • CARDIAC ELECTROPHYSIOLOGY PROCEDURE     • CARPAL TUNNEL RELEASE  2017   • CHOLECYSTECTOMY     • COLON SURGERY     • COLONOSCOPY N/A 02/26/2020    Procedure: COLONOSCOPY WITH BIOPSY X1 AREA;  Surgeon: Michael Cheng MD;  Location: [facility] ENDOSCOPY;  Service: Gastroenterology;  Laterality: N/A;  diarrhea   • ECHO - CONVERTED  2020   • ENDOSCOPY     • ENDOSCOPY N/A 05/21/2020    Procedure: ESOPHAGOGASTRODUODENOSCOPY WITH DILATATION (#54, 60 bougie);  Surgeon: Michael Cheng MD;  Location: [facility] ENDOSCOPY;  Service: Gastroenterology;  Laterality: N/A;  Post: candidiasis, upper esophagus sphincter stricture   • HYSTERECTOMY  01/01/1997   • KNEE SURGERY Right 09/2017   • OTHER SURGICAL HISTORY      Urerine hysterectomy   • OTHER SURGICAL HISTORY      t and a   • OTHER SURGICAL HISTORY      d&c   • OTHER SURGICAL HISTORY      bladder stim   • OTHER SURGICAL HISTORY      Loop recorder placement   • SUBTOTAL HYSTERECTOMY  01/01/1997   • TONSILLECTOMY  01/01/2010   • TUBAL ABDOMINAL LIGATION  01/12/1995   • WRIST SURGERY Right      Family History   Problem Relation Age of Onset   • Cancer Mother    • Migraines Mother    • Anxiety disorder Mother    • Arthritis Mother    • Mental illness Mother    • Heart disease Father    • Lung disease Father    • Neuropathy Father    • Alcohol abuse Father    • Asthma Father    • COPD Father    • Diabetes Father    • Hyperlipidemia Father    • Vision loss Father    • Diabetes Sister    • Heart disease Sister    • Hypertension Sister    • Other Sister         weight disorder   • Migraines Sister    • Stroke Sister    • Anxiety disorder Sister    • Drug abuse Sister    • Mental illness Sister    • Migraines Sister    • Neuropathy Brother    • Neuropathy Brother    • Seizures Brother         Epilepsy   • Asthma Brother    • Diabetes Brother    • Thyroid disease Daughter      Social  History     Socioeconomic History   • Marital status: Single   Tobacco Use   • Smoking status: Every Day     Packs/day: 1.00     Years: 38.00     Pack years: 38.00     Types: Cigarettes     Start date: 5/1/1984   • Smokeless tobacco: Former     Quit date: 5/11/2020   • Tobacco comments:     In the process of trying to quit   Vaping Use   • Vaping Use: Never used   Substance and Sexual Activity   • Alcohol use: No   • Drug use: Not Currently     Frequency: 1.0 times per week     Types: Marijuana     Comment: stopped 2 months ago   • Sexual activity: Not Currently     Partners: Male     Birth control/protection: Post-menopausal, Tubal ligation, Hysterectomy     Comment: Partial         Current Outpatient Medications:   •  albuterol sulfate  (90 Base) MCG/ACT inhaler, Inhale 2 puffs Every 4 (Four) Hours As Needed for Wheezing., Disp: , Rfl:   •  amLODIPine (NORVASC) 10 MG tablet, TAKE 1 TABLET BY MOUTH DAILY., Disp: 90 tablet, Rfl: 1  •  apixaban (ELIQUIS) 5 MG tablet tablet, Take 1 tablet by mouth Every 12 (Twelve) Hours., Disp: 60 tablet, Rfl: 5  •  Budeson-Glycopyrrol-Formoterol (Breztri Aerosphere) 160-9-4.8 MCG/ACT aerosol inhaler, Inhale., Disp: , Rfl:   •  Budesonide (ENTOCORT EC) 3 MG 24 hr capsule, Take 3 capsules by mouth Daily., Disp: 90 capsule, Rfl: 5  •  budesonide-formoterol (SYMBICORT) 80-4.5 MCG/ACT inhaler, , Disp: , Rfl:   •  cloNIDine (CATAPRES) 0.1 MG tablet, , Disp: , Rfl:   •  colestipol (COLESTID) 1 g tablet, Take 1 tablet by mouth 2 (Two) Times a Day., Disp: 60 tablet, Rfl: 5  •  dicyclomine (BENTYL) 20 MG tablet, TAKE 1 TABLET BY MOUTH EVERY 6 (SIX) HOURS., Disp: 120 tablet, Rfl: 5  •  folic acid (FOLVITE) 800 MCG tablet, Take 1 tablet by mouth Daily., Disp: , Rfl:   •  furosemide (LASIX) 20 MG tablet, TAKE 2 TABS EVERY MORNING, 2 TABS EVERY EVENING AND MAY TAKE AN ADDITIONAL 1 TO 2 TABS MIDDAY AS NEEDED, Disp: 540 tablet, Rfl: 1  •  gabapentin (NEURONTIN) 600 MG tablet, TAKE 1 TABLET  BY MOUTH 3 (THREE) TIMES A DAY., Disp: 90 tablet, Rfl: 5  •  HYDROcodone-acetaminophen (NORCO)  MG per tablet, Take 1 tablet by mouth Every 8 (Eight) Hours As Needed for Moderate Pain ., Disp: , Rfl:   •  hydroxychloroquine (PLAQUENIL) 200 MG tablet, , Disp: , Rfl:   •  hydrOXYzine (ATARAX) 10 MG tablet, TAKE 1 TABLET BY MOUTH 2 (TWO) TIMES A DAY AS NEEDED FOR ANXIETY., Disp: 60 tablet, Rfl: 1  •  hydrOXYzine (ATARAX) 10 MG tablet, As Needed., Disp: , Rfl:   •  ipratropium-albuterol (DUO-NEB) 0.5-2.5 mg/3 ml nebulizer, Take 3 mL by nebulization 4 (Four) Times a Day., Disp: 360 mL, Rfl: 1  •  levocetirizine (XYZAL) 5 MG tablet, TAKE 1 TABLET BY MOUTH EVERY EVENING., Disp: 30 tablet, Rfl: 5  •  lidocaine (LIDODERM) 5 %, Place 1 patch on the skin as directed by provider Daily. Remove & Discard patch within 12 hours or as directed by MD, Disp: , Rfl:   •  lisinopril (PRINIVIL,ZESTRIL) 20 MG tablet, TAKE 1 TABLET BY MOUTH 2 (TWO) TIMES A DAY., Disp: 60 tablet, Rfl: 3  •  Magnesium 250 MG tablet, Take 250 mg by mouth Daily., Disp: , Rfl:   •  metoclopramide (REGLAN) 10 MG tablet, TAKE 1 TABLET BY MOUTH 4 (FOUR) TIMES A DAY., Disp: 120 tablet, Rfl: 5  •  metoprolol tartrate (LOPRESSOR) 50 MG tablet, TAKE 1 TABLET BY MOUTH 2 (TWO) TIMES A DAY., Disp: 60 tablet, Rfl: 5  •  nicotine (NICODERM CQ) 21 MG/24HR patch, Place 1 patch on the skin as directed by provider Daily., Disp: , Rfl:   •  omeprazole (priLOSEC) 40 MG capsule, TAKE 1 CAPSULE BY MOUTH 2 (TWO) TIMES A DAY., Disp: 60 capsule, Rfl: 5  •  ondansetron ODT (ZOFRAN-ODT) 4 MG disintegrating tablet, Place 1 tablet on the tongue Every 8 (Eight) Hours As Needed for Nausea or Vomiting (can take up to 8 mg as needed)., Disp: 30 tablet, Rfl: 0  •  orphenadrine (NORFLEX) 100 MG 12 hr tablet, Take 100 mg by mouth 3 (Three) Times a Day. As needed, Disp: , Rfl:   •  phentermine (ADIPEX-P) 37.5 MG tablet, phentermine 37.5 mg tablet, Disp: , Rfl:   •  potassium chloride  (K-DUR,KLOR-CON) 10 MEQ CR tablet, , Disp: , Rfl:   •  potassium chloride 10 MEQ CR tablet, TAKE 1 TABLET BY MOUTH 2 TIMES A DAY., Disp: 60 tablet, Rfl: 0  •  predniSONE (DELTASONE) 10 MG tablet, TAKE 1 TABLET BY MOUTH DAILY., Disp: 30 tablet, Rfl: 5  •  sulfaSALAzine (AZULFIDINE) 500 MG tablet, Take 1 tablet by mouth 2 (Two) Times a Day., Disp: 120 tablet, Rfl: 5  •  SUMAtriptan (IMITREX) 100 MG tablet, Take 1 tablet by mouth As Needed for Migraine. One tab at onset of HA, after one hour use sumatriptan injection, Disp: 9 tablet, Rfl: 11  •  SUMAtriptan Succinate (IMITREX) 6 MG/0.5ML injection, Inject prescribed dose at onset of headache. May repeat dose one time in 1 hour(s) if headache not relieved., Disp: 6 each, Rfl: 3  •  topiramate (Topamax) 50 MG tablet, One po bid, Disp: 60 tablet, Rfl: 11  •  Umeclidinium Bromide (INCRUSE ELLIPTA) 62.5 MCG/INH aerosol powder , Inhale 1 puff 2 (Two) Times a Day., Disp: , Rfl:   •  vitamin D (ERGOCALCIFEROL) 1.25 MG (51194 UT) capsule capsule, , Disp: , Rfl:   •  cefdinir (OMNICEF) 300 MG capsule, Take 1 capsule by mouth 2 (Two) Times a Day for 10 days., Disp: 20 capsule, Rfl: 0  •  vilazodone (VIIBRYD) 40 MG tablet tablet, Take 1 tablet by mouth every night at bedtime., Disp: 30 tablet, Rfl: 5    Review of Systems   Constitutional: Positive for diaphoresis and fatigue. Negative for activity change, appetite change, chills, fever, unexpected weight gain and unexpected weight loss.   HENT: Positive for congestion and sinus pressure. Negative for trouble swallowing.    Eyes: Negative for blurred vision and visual disturbance.   Respiratory: Positive for shortness of breath and wheezing. Negative for chest tightness.    Cardiovascular: Negative for chest pain, palpitations and leg swelling.   Gastrointestinal: Negative for abdominal pain.   Musculoskeletal: Negative for gait problem.   Psychiatric/Behavioral: Positive for sleep disturbance.     /84   Pulse 81   Temp  "97.5 °F (36.4 °C) (Temporal)   Resp 16   Ht 165.1 cm (65\")   Wt 112 kg (247 lb)   SpO2 97%   BMI 41.10 kg/m²       Objective   Physical Exam  Vitals and nursing note reviewed.   Constitutional:       General: She is not in acute distress.     Appearance: Normal appearance. She is obese.   HENT:      Head: Normocephalic and atraumatic.      Right Ear: Tympanic membrane, ear canal and external ear normal.      Left Ear: Tympanic membrane, ear canal and external ear normal.      Nose: Nose normal.      Mouth/Throat:      Mouth: Mucous membranes are moist.      Pharynx: Oropharynx is clear.   Eyes:      Pupils: Pupils are equal, round, and reactive to light.   Neck:      Thyroid: No thyroid mass, thyromegaly or thyroid tenderness.   Cardiovascular:      Rate and Rhythm: Normal rate and regular rhythm.      Heart sounds: Normal heart sounds.   Pulmonary:      Effort: Pulmonary effort is normal. No respiratory distress.      Breath sounds: Normal breath sounds. No wheezing, rhonchi or rales.   Abdominal:      General: Abdomen is flat. Bowel sounds are normal. There is no distension.      Palpations: Abdomen is soft. There is no mass.      Tenderness: There is no abdominal tenderness.   Musculoskeletal:         General: Normal range of motion.      Cervical back: Normal range of motion and neck supple.      Right lower leg: No edema.      Left lower leg: No edema.   Skin:     General: Skin is warm and dry.   Neurological:      General: No focal deficit present.      Mental Status: She is alert and oriented to person, place, and time.   Psychiatric:         Mood and Affect: Mood normal.         Behavior: Behavior normal.         Thought Content: Thought content normal.         Procedures       Diagnoses and all orders for this visit:    1. Routine physical examination (Primary)  Comments:  Encouraged healthy diet.  She is limited on exercise due to her health problems but encouraged to stay as active as possible.    2. " Abnormal glucose  Comments:  Will check labs.  Work on low carbohydrate diet and weight loss.  Orders:  -     Hemoglobin A1c    3. Vitamin D deficiency  Comments:  Will check labs.  Orders:  -     Vitamin D,25-Hydroxy    4. Essential hypertension  Comments:  Stable.  Continue current regimen.    5. Hyperlipidemia, unspecified hyperlipidemia type  Comments:  Will check labs. Work on low saturated fat diet.  Orders:  -     Lipid panel    6. Subacute frontal sinusitis  Comments:  Pt to take antibiotics since not resolving over the last few weeks.  Orders:  -     cefdinir (OMNICEF) 300 MG capsule; Take 1 capsule by mouth 2 (Two) Times a Day for 10 days.  Dispense: 20 capsule; Refill: 0    7. Muscle cramps  Comments:  Will check labs.  Orders:  -     Magnesium    8. B12 deficiency  Comments:  Will check labs.  Orders:  -     Vitamin B12  -     Folate

## 2023-03-02 DIAGNOSIS — J30.2 SEASONAL ALLERGIC RHINITIS, UNSPECIFIED TRIGGER: ICD-10-CM

## 2023-03-02 RX ORDER — LEVOCETIRIZINE DIHYDROCHLORIDE 5 MG/1
5 TABLET, FILM COATED ORAL EVERY EVENING
Qty: 30 TABLET | Refills: 5 | Status: SHIPPED | OUTPATIENT
Start: 2023-03-02 | End: 2023-03-12

## 2023-03-10 ENCOUNTER — LAB (OUTPATIENT)
Dept: FAMILY MEDICINE CLINIC | Facility: CLINIC | Age: 53
End: 2023-03-10
Payer: MEDICAID

## 2023-03-10 PROCEDURE — 83735 ASSAY OF MAGNESIUM: CPT | Performed by: PHYSICIAN ASSISTANT

## 2023-03-10 PROCEDURE — 82746 ASSAY OF FOLIC ACID SERUM: CPT | Performed by: PHYSICIAN ASSISTANT

## 2023-03-10 PROCEDURE — 83036 HEMOGLOBIN GLYCOSYLATED A1C: CPT | Performed by: PHYSICIAN ASSISTANT

## 2023-03-10 PROCEDURE — 36415 COLL VENOUS BLD VENIPUNCTURE: CPT | Performed by: PHYSICIAN ASSISTANT

## 2023-03-10 PROCEDURE — 82306 VITAMIN D 25 HYDROXY: CPT | Performed by: PHYSICIAN ASSISTANT

## 2023-03-10 PROCEDURE — 82607 VITAMIN B-12: CPT | Performed by: PHYSICIAN ASSISTANT

## 2023-03-10 PROCEDURE — 80061 LIPID PANEL: CPT | Performed by: PHYSICIAN ASSISTANT

## 2023-03-11 DIAGNOSIS — J30.2 SEASONAL ALLERGIC RHINITIS, UNSPECIFIED TRIGGER: ICD-10-CM

## 2023-03-11 DIAGNOSIS — I10 ESSENTIAL HYPERTENSION: ICD-10-CM

## 2023-03-11 DIAGNOSIS — F33.1 MAJOR DEPRESSIVE DISORDER, RECURRENT EPISODE, MODERATE: Chronic | ICD-10-CM

## 2023-03-11 DIAGNOSIS — G62.9 NEUROPATHY: ICD-10-CM

## 2023-03-11 LAB
25(OH)D3 SERPL-MCNC: 41.3 NG/ML (ref 30–100)
CHOLEST SERPL-MCNC: 154 MG/DL (ref 0–200)
FOLATE SERPL-MCNC: 4.93 NG/ML (ref 4.78–24.2)
HBA1C MFR BLD: 6.1 % (ref 4.8–5.6)
HDLC SERPL-MCNC: 38 MG/DL (ref 40–60)
LDLC SERPL CALC-MCNC: 81 MG/DL (ref 0–100)
LDLC/HDLC SERPL: 1.95 {RATIO}
MAGNESIUM SERPL-MCNC: 2.1 MG/DL (ref 1.6–2.6)
TRIGL SERPL-MCNC: 209 MG/DL (ref 0–150)
VIT B12 BLD-MCNC: 386 PG/ML (ref 211–946)
VLDLC SERPL-MCNC: 35 MG/DL (ref 5–40)

## 2023-03-12 RX ORDER — LEVOCETIRIZINE DIHYDROCHLORIDE 5 MG/1
5 TABLET, FILM COATED ORAL EVERY EVENING
Qty: 30 TABLET | Refills: 5 | Status: SHIPPED | OUTPATIENT
Start: 2023-03-12

## 2023-03-12 RX ORDER — METOPROLOL TARTRATE 50 MG/1
50 TABLET, FILM COATED ORAL 2 TIMES DAILY
Qty: 60 TABLET | Refills: 5 | Status: SHIPPED | OUTPATIENT
Start: 2023-03-12

## 2023-03-12 RX ORDER — GABAPENTIN 600 MG/1
600 TABLET ORAL 3 TIMES DAILY
Qty: 90 TABLET | Refills: 5 | Status: SHIPPED | OUTPATIENT
Start: 2023-03-12

## 2023-03-13 ENCOUNTER — TELEPHONE (OUTPATIENT)
Dept: FAMILY MEDICINE CLINIC | Facility: CLINIC | Age: 53
End: 2023-03-13
Payer: MEDICAID

## 2023-03-13 RX ORDER — ERGOCALCIFEROL 1.25 MG/1
CAPSULE ORAL
Qty: 4 CAPSULE | Refills: 3 | Status: SHIPPED | OUTPATIENT
Start: 2023-03-13

## 2023-03-13 NOTE — TELEPHONE ENCOUNTER
Patient says she viewed he lab results and see's that her A1C has gone back up. Asking what needs to be done to get it lower?

## 2023-03-13 NOTE — TELEPHONE ENCOUNTER
Rx Refill Note  Requested Prescriptions     Pending Prescriptions Disp Refills   • Viibryd 40 MG tablet tablet [Pharmacy Med Name: VIIBRYD 40 MG TABLET 40 Tablet] 30 tablet 5     Sig: TAKE 1 TABLET BY MOUTH EVERY NIGHT AT BEDTIME.      Last office visit with prescribing clinician: 10/27/2022   Last telemedicine visit with prescribing clinician: 4/27/2023   Next office visit with prescribing clinician: 4/27/2023   Office Visit with Africa Conway MD (10/27/2022)       Urine Drug Screen - Urine, Clean Catch (11/11/2020)                   Would you like a call back once the refill request has been completed: [] Yes [] No    If the office needs to give you a call back, can they leave a voicemail: [] Yes [] No    Mirlande Anaya MA  03/13/23, 10:57 EDT

## 2023-03-14 ENCOUNTER — TRANSCRIBE ORDERS (OUTPATIENT)
Dept: ADMINISTRATIVE | Facility: HOSPITAL | Age: 53
End: 2023-03-14
Payer: MEDICAID

## 2023-03-14 ENCOUNTER — TELEPHONE (OUTPATIENT)
Dept: FAMILY MEDICINE CLINIC | Facility: CLINIC | Age: 53
End: 2023-03-14

## 2023-03-14 DIAGNOSIS — Z12.31 VISIT FOR SCREENING MAMMOGRAM: Primary | ICD-10-CM

## 2023-03-14 DIAGNOSIS — R53.83 FATIGUE, UNSPECIFIED TYPE: Primary | ICD-10-CM

## 2023-03-14 DIAGNOSIS — R61 NIGHT SWEATS: ICD-10-CM

## 2023-03-14 RX ORDER — VILAZODONE HYDROCHLORIDE 40 MG/1
TABLET ORAL
Qty: 30 TABLET | Refills: 1 | Status: SHIPPED | OUTPATIENT
Start: 2023-03-14

## 2023-03-14 NOTE — TELEPHONE ENCOUNTER
Caller: Merry Thornton    Relationship: Self    Best call back number: [phone]    What is the medical concern/diagnosis: HORMONES, NIGHT SWEATS, MENOPAUSE, FATIGUE, HEADACHES    What specialty or service is being requested: ENDOCRINOLOGY    What is the provider, practice or medical service name: DR. FAGAN    What is the office location: [facility]    What is the office phone number: OFFICE  [phone] FAX  [phone]

## 2023-03-30 DIAGNOSIS — K51.90 ULCERATIVE COLITIS WITHOUT COMPLICATIONS, UNSPECIFIED LOCATION: ICD-10-CM

## 2023-03-30 DIAGNOSIS — K21.9 GASTROESOPHAGEAL REFLUX DISEASE WITHOUT ESOPHAGITIS: ICD-10-CM

## 2023-03-30 RX ORDER — LISINOPRIL 20 MG/1
20 TABLET ORAL 2 TIMES DAILY
Qty: 60 TABLET | Refills: 3 | Status: SHIPPED | OUTPATIENT
Start: 2023-03-30

## 2023-03-30 RX ORDER — AMLODIPINE BESYLATE 10 MG/1
TABLET ORAL
Qty: 90 TABLET | Refills: 1 | Status: SHIPPED | OUTPATIENT
Start: 2023-03-30

## 2023-03-30 RX ORDER — SULFASALAZINE 500 MG/1
500 TABLET ORAL 2 TIMES DAILY
Qty: 120 TABLET | Refills: 5 | Status: SHIPPED | OUTPATIENT
Start: 2023-03-30

## 2023-03-30 RX ORDER — DICYCLOMINE HCL 20 MG
20 TABLET ORAL EVERY 6 HOURS
Qty: 120 TABLET | Refills: 5 | Status: SHIPPED | OUTPATIENT
Start: 2023-03-30

## 2023-03-30 RX ORDER — OMEPRAZOLE 40 MG/1
40 CAPSULE, DELAYED RELEASE ORAL 2 TIMES DAILY
Qty: 60 CAPSULE | Refills: 5 | Status: SHIPPED | OUTPATIENT
Start: 2023-03-30

## 2023-04-05 RX ORDER — ONDANSETRON 4 MG/1
4 TABLET, ORALLY DISINTEGRATING ORAL EVERY 8 HOURS PRN
Qty: 30 TABLET | Refills: 0 | Status: SHIPPED | OUTPATIENT
Start: 2023-04-05

## 2023-05-05 DIAGNOSIS — F33.1 MAJOR DEPRESSIVE DISORDER, RECURRENT EPISODE, MODERATE: Chronic | ICD-10-CM

## 2023-05-09 RX ORDER — VILAZODONE HYDROCHLORIDE 40 MG/1
TABLET ORAL
Qty: 30 TABLET | Refills: 0 | Status: SHIPPED | OUTPATIENT
Start: 2023-05-09

## 2023-08-07 DIAGNOSIS — F33.1 MAJOR DEPRESSIVE DISORDER, RECURRENT EPISODE, MODERATE: Chronic | ICD-10-CM

## 2023-08-07 RX ORDER — LISINOPRIL 20 MG/1
20 TABLET ORAL 2 TIMES DAILY
Qty: 60 TABLET | Refills: 0 | Status: SHIPPED | OUTPATIENT
Start: 2023-08-07

## 2023-08-08 RX ORDER — VILAZODONE HYDROCHLORIDE 40 MG/1
TABLET ORAL
Qty: 30 TABLET | Refills: 0 | Status: SHIPPED | OUTPATIENT
Start: 2023-08-08

## 2023-08-16 ENCOUNTER — OFFICE VISIT (OUTPATIENT)
Dept: PSYCHIATRY | Facility: CLINIC | Age: 53
End: 2023-08-16
Payer: COMMERCIAL

## 2023-08-16 DIAGNOSIS — F33.1 MAJOR DEPRESSIVE DISORDER, RECURRENT EPISODE, MODERATE: Primary | Chronic | ICD-10-CM

## 2023-08-16 DIAGNOSIS — F41.1 GENERALIZED ANXIETY DISORDER: Chronic | ICD-10-CM

## 2023-08-16 RX ORDER — VILAZODONE HYDROCHLORIDE 40 MG/1
40 TABLET ORAL
Qty: 30 TABLET | Refills: 5 | Status: SHIPPED | OUTPATIENT
Start: 2023-08-16

## 2023-08-16 NOTE — PROGRESS NOTES
Subjective   Merry Thornton is a 53 y.o. female who presents today for follow up       Chief Complaint:  Depression anxiety , health issues     History of Present Illness:   The pt suffered from depression anxiety for many years, was doing well on Viibryd, she had to change jobs, Last appt 3/20/18   The pt was admitted to [facility] in march 2020 for abd pain and recently due to DVT and PE  .     Today the pt reported multiple medical issues,  Family losses, still feels depressed but she is more anxious now about her health , the pt had 2 surgeries on her throat    still trying to recover ,  she is   working part time now     anxiety affects her BP and pain, anxiety increased recently due to covid situation , health issues and family situation, she has foster kids     Anxiety is persistent and intense , she is medically fragile, does not go out, feels fidgety , decreased sleep - she had sleep study done - chronic PIPPA ,   BPAP ordered , did not get yet   Depression is rated as  6-7/10, every day , all day long   Denied avh/si/hi        The following portions of the patient's history were reviewed and updated as appropriate: allergies, current medications, past family history, past medical history, past social history, past surgical history and problem list.    PAST PSYCHIATRIC HISTORY  Axis I  Affective/Bipoloar Disorder, Anxiety/Panic Disorder  Axis II  None    PAST OUTPATIENT TREATMENT  Diagnosis treated:  Affective Disorder, Anxiety/Panic Disorder  Treatment Type:  Medication Management  Prior Psychiatric Medications:  xanax PRN effective   buspar - allergy   zoloft weight gain  lexapro - weight gain   wellbutrin - anxiety   Trazodone -groggy in the morning     Support Groups:  None   Sequelae Of Mental Disorder:  medical illness, emotional distress          Interval History  No changes, still depressed      Side Effects  Denied       Past Medical History:  Past Medical History:   Diagnosis Date    Allergic  01/01/2002    Asthma 01/01/1980    CAD (coronary artery disease) 12/20/2016    dr. singh    Carpal tunnel syndrome on right 03/08/2017    CHF (congestive heart failure) 01/01/2014    Cholelithiasis 01/01/2015    Cluster headache 2000    Common migraine 12/20/2016    COPD (chronic obstructive pulmonary disease) 12/20/2016    Cyst, ovarian 12/20/2016    mass    DDD (degenerative disc disease), cervical 03/11/2016    Deep vein thrombosis 09/05/2020    Dental caries 01/14/2019    Depression 11/02/2017    Depression, major, recurrent, moderate 08/25/2016    Dietary counseling 09/21/2017    Difficulty walking 1999    Dysphagia 05/2020    Elevated random blood glucose level 05/14/2020    Encounter for smoking cessation counseling 09/21/2017    Exacerbation of systemic lupus 04/30/2019    Fibromyalgia 12/20/2016    Fibromyalgia, primary 2001    Generalized anxiety disorder 03/11/2016    GERD (gastroesophageal reflux disease) 12/20/2016    Headache, tension-type 2014    Heartburn 11/02/2017    History of medical problems 01/01/2000    Hyperlipidemia 2017    Hypertension 03/16/2016    Hyperthyroidism 03/11/2016    Hypocalcemia 09/21/2017    IBS (irritable colon syndrome) 09/13/2016    Immobility syndrome 09/14/2017    Intracranial pressure increased 12/20/2016    Kidney stone 01/01/2014    Left knee pain 09/21/2017    Lower back pain 02/01/2018    Lung nodules 09/07/2016    Memory loss 2017    Morbid obesity 11/02/2017    Muscle cramp 09/21/2017    Neck pain 01/14/2019    Need for prophylactic vaccination and inoculation against influenza 09/21/2017    Neurogenic bladder 12/20/2016    Obesity 03/07/2017    Obesity (BMI 30-39.9) 05/14/2020    Orthostatic hypotension 07/17/2017    Osteoarthritis 03/07/2017    Osteopenia 01/14/2019    Osteoporosis 12/20/2016    Other instability, right ankle 04/13/2017    Overlap syndrome     scleroderma, lupus, Raynaud's. Mixxed connective Tissue D/o    Pain, joint, ankle, left 09/21/2017     Palpitations 10/04/2017    Paresthesia 06/12/2017    facial    Peripheral neuropathy 12/20/2016    bilateral lower extremities    Poor vision     Prediabetes     Presence of pessary     Pulmonary embolism 09/05/2020    Raynaud's syndrome 12/20/2016    Retinopathy     Right knee pain 11/08/2016    Scleroderma 12/20/2016    Seasonal allergic rhinitis 12/20/2016    Seizures 2018    SLE (systemic lupus erythematosus) 03/16/2017    Sleep apnea 11/02/2017    BIPAP    Sleep disorder 01/14/2019    Sprain of unspecified site of right knee, subsequent encounter 12/01/2016    Status post placement of implantable loop recorder 05/09/2018    presence    Syncope and collapse 2020    still has occassionally    Ulcerative colitis 02/2020    Urine incontinence     Vasovagal syncope     Ventricular arrhythmia 03/16/2016    Visit for screening mammogram 12/20/2016    Vitamin D deficiency 01/14/2019    Wheezing        Social History:  Social History     Socioeconomic History    Marital status: Single   Tobacco Use    Smoking status: Every Day     Packs/day: 0.75     Years: 38.00     Pack years: 28.50     Types: Cigarettes     Start date: 5/1/1984    Smokeless tobacco: Former     Quit date: 5/11/2020    Tobacco comments:     In the process of trying to quit   Vaping Use    Vaping Use: Never used   Substance and Sexual Activity    Alcohol use: No    Drug use: Not Currently     Frequency: 1.0 times per week     Types: Marijuana     Comment: stopped 2 months ago    Sexual activity: Not Currently     Partners: Male     Birth control/protection: Post-menopausal, Tubal ligation, Hysterectomy     Comment: Partial       Family History:  Family History   Problem Relation Age of Onset    Cancer Mother     Migraines Mother     Anxiety disorder Mother     Arthritis Mother     Mental illness Mother     Heart disease Father     Lung disease Father     Neuropathy Father     Alcohol abuse Father     Asthma Father     COPD Father     Diabetes Father      Hyperlipidemia Father     Vision loss Father     Diabetes Sister     Heart disease Sister     Hypertension Sister     Other Sister         weight disorder    Migraines Sister     Stroke Sister     Anxiety disorder Sister     Drug abuse Sister     Mental illness Sister     Migraines Sister     Neuropathy Brother     Neuropathy Brother     Seizures Brother         Epilepsy    Asthma Brother     Diabetes Brother     Thyroid disease Daughter        Past Surgical History:  Past Surgical History:   Procedure Laterality Date    ADENOIDECTOMY  01/01/2010    ANKLE SURGERY Right 02/2017    BREAST EXCISIONAL BIOPSY Right     years ago    BRONCHOSCOPY      CARDIAC ABLATION  01/2000    CARDIAC CATHETERIZATION  01/01/2016    CARDIAC ELECTROPHYSIOLOGY PROCEDURE      CARPAL TUNNEL RELEASE  2017    CHOLECYSTECTOMY      COLON SURGERY      COLONOSCOPY N/A 02/26/2020    Procedure: COLONOSCOPY WITH BIOPSY X1 AREA;  Surgeon: Michael Cheng MD;  Location: [facility] ENDOSCOPY;  Service: Gastroenterology;  Laterality: N/A;  diarrhea    ECHO - CONVERTED  2020    ENDOSCOPY      ENDOSCOPY N/A 05/21/2020    Procedure: ESOPHAGOGASTRODUODENOSCOPY WITH DILATATION (#54, 60 bougie);  Surgeon: Michael Cheng MD;  Location: [facility] ENDOSCOPY;  Service: Gastroenterology;  Laterality: N/A;  Post: candidiasis, upper esophagus sphincter stricture    HYSTERECTOMY  01/01/1997    KNEE SURGERY Right 09/2017    OTHER SURGICAL HISTORY      Urerine hysterectomy    OTHER SURGICAL HISTORY      t and a    OTHER SURGICAL HISTORY      d&c    OTHER SURGICAL HISTORY      bladder stim    OTHER SURGICAL HISTORY      Loop recorder placement    SUBTOTAL HYSTERECTOMY  01/01/1997    TONSILLECTOMY  01/01/2010    TUBAL ABDOMINAL LIGATION  01/12/1995    WRIST SURGERY Right        Problem List:  Patient Active Problem List   Diagnosis    PVC's (premature ventricular contractions)    Essential hypertension    Sleep apnea    Orthostatic hypotension    Presence of other cardiac  implants and grafts    Abnormal echocardiogram    Left ankle pain    Arthralgia of left knee    Knee pain, right    Low back pain    Lower back pain    Neurogenic claudication    Carpal tunnel syndrome of right wrist    Allergic rhinitis, seasonal    Chronic obstructive pulmonary disease    Dental caries    Fibromyalgia    Neck pain    Gastroesophageal reflux disease    Generalized anxiety disorder    H. pylori infection    Degeneration of intervertebral disc    Herniation of intervertebral disc    Hyperthyroidism    Hypocalcemia    Impaired mobility    Irritable bowel syndrome with diarrhea    Current chronic use of systemic steroids    Long term current use of therapeutic drug    Loss of peripheral visual field    Lung nodule    Major depressive disorder, recurrent episode, moderate    Migraine without aura, intractable    Muscle cramps    Neurogenic bladder    Neuropathy involving both lower extremities    Osteoarthritis    Osteoporosis    Other instability, right ankle    Other localized visual field defect    Other screening mammogram    Ovarian cystic mass    Raynaud's phenomenon    Scleroderma    Vasodepressor syncope    Systemic lupus erythematosus    Tobacco dependency    Undifferentiated connective tissue disease    Vitamin D deficiency    White matter changes    Asthma    Pseudotumor cerebri    Immunosuppression    Keratoconjunctivitis    Presbyopia    SPK (superficial punctate keratitis), bilateral    Prediabetes    Nocturnal hypoxia    Moderate nonproliferative diabetic retinopathy    Ulcerative colitis    Pelvic mass    Obesity (BMI 30-39.9)    Increased cerebrospinal fluid pressure    Coronary artery disease involving native coronary artery of native heart without angina pectoris    Stress incontinence    Swelling of lower extremity    Urinary incontinence    Hoarseness of voice    Dysphagia    Cigarette smoker    Polyp of vocal cord or larynx    Acute deep vein thrombosis (DVT) of popliteal vein of  right lower extremity    Chronic obstructive pulmonary disease    Migraine headache    Degeneration of intervertebral disc of cervical region    Degeneration of intervertebral disc of lumbar region    Osteoporosis    Lupus erythematosus    Autoimmune disease    Irregular heart rhythm    Neuropathy    Chest pain, atypical    Osteoarthritis of both knees    Ulcerative colitis, chronic, without complications    Recurrent acute deep vein thrombosis (DVT) of both lower extremities    Protein C deficiency    Dermatomyositis    Coagulopathy       Allergy:   Allergies   Allergen Reactions    Adhesive Tape Other (See Comments)     Pt ok with paper tape    Abstracted from [facility]    Bupropion Shortness Of Breath    Morphine Other (See Comments), Rash and Swelling     Abstracted from [facility]    Amoxicillin-Pot Clavulanate Diarrhea    Buspar [Buspirone] Other (See Comments)     Abstracted from [facility]ity    Hydroxyzine Rash        Discontinued Medications:  Medications Discontinued During This Encounter   Medication Reason    Budeson-Glycopyrrol-Formoterol (Breztri Aerosphere) 160-9-4.8 MCG/ACT aerosol inhaler *Therapy completed    Budesonide (ENTOCORT EC) 3 MG 24 hr capsule *Therapy completed    budesonide-formoterol (SYMBICORT) 80-4.5 MCG/ACT inhaler *Therapy completed    colestipol (COLESTID) 1 g tablet *Therapy completed    folic acid (FOLVITE) 800 MCG tablet *Therapy completed    hydrOXYzine (ATARAX) 10 MG tablet Allergic response    hydrOXYzine (ATARAX) 10 MG tablet Allergic response    nicotine (NICODERM CQ) 21 MG/24HR patch *Therapy completed    sulfaSALAzine (AZULFIDINE) 500 MG tablet *Therapy completed    SUMAtriptan (IMITREX) 100 MG tablet *Therapy completed    SUMAtriptan Succinate (IMITREX) 6 MG/0.5ML injection *Therapy completed    topiramate (Topamax) 50 MG tablet *Therapy completed    Viibryd 40 MG tablet tablet Reorder         Current Medications:   Current Outpatient Medications   Medication Sig Dispense  Refill    vilazodone (Viibryd) 40 MG tablet tablet Take 1 tablet by mouth every night at bedtime. 30 tablet 5    albuterol sulfate  (90 Base) MCG/ACT inhaler Inhale 2 puffs Every 4 (Four) Hours As Needed for Wheezing.      amLODIPine (NORVASC) 10 MG tablet TAKE 1 TABLET BY MOUTH DAILY. 90 tablet 1    apixaban (ELIQUIS) 5 MG tablet tablet Take 1 tablet by mouth Every 12 (Twelve) Hours. 60 tablet 5    cloNIDine (CATAPRES) 0.1 MG tablet       dicyclomine (BENTYL) 20 MG tablet TAKE 1 TABLET BY MOUTH EVERY 6 (SIX) HOURS. 120 tablet 5    furosemide (LASIX) 20 MG tablet TAKE 2 TABS EVERY MORNING, 2 TABS EVERY EVENING AND MAY TAKE AN ADDITIONAL 1 TO 2 TABS MIDDAY AS NEEDED 540 tablet 1    gabapentin (NEURONTIN) 600 MG tablet TAKE 1 TABLET BY MOUTH 3 (THREE) TIMES A DAY. 90 tablet 5    HYDROcodone-acetaminophen (NORCO)  MG per tablet Take 1 tablet by mouth Every 8 (Eight) Hours As Needed for Moderate Pain .      hydroxychloroquine (PLAQUENIL) 200 MG tablet       ipratropium-albuterol (DUO-NEB) 0.5-2.5 mg/3 ml nebulizer Take 3 mL by nebulization 4 (Four) Times a Day. 360 mL 1    levocetirizine (XYZAL) 5 MG tablet TAKE 1 TABLET BY MOUTH EVERY EVENING. 30 tablet 5    lidocaine (LIDODERM) 5 % Place 1 patch on the skin as directed by provider Daily. Remove & Discard patch within 12 hours or as directed by MD      lisinopril (PRINIVIL,ZESTRIL) 20 MG tablet TAKE 1 TABLET BY MOUTH 2 (TWO) TIMES A DAY. 60 tablet 0    Magnesium 250 MG tablet Take 250 mg by mouth Daily.      metoclopramide (REGLAN) 10 MG tablet TAKE 1 TABLET BY MOUTH 4 (FOUR) TIMES A DAY. 120 tablet 5    metoprolol tartrate (LOPRESSOR) 50 MG tablet TAKE 1 TABLET BY MOUTH 2 (TWO) TIMES A DAY. 60 tablet 5    omeprazole (priLOSEC) 40 MG capsule TAKE 1 CAPSULE BY MOUTH 2 (TWO) TIMES A DAY. 60 capsule 5    ondansetron ODT (ZOFRAN-ODT) 4 MG disintegrating tablet PLACE 1 TABLET ON THE TONGUE EVERY 8 (EIGHT) HOURS AS NEEDED FOR NAUSEA OR VOMITING (CAN TAKE UP TO 8  MG AS NEEDED). 30 tablet 0    orphenadrine (NORFLEX) 100 MG 12 hr tablet Take 100 mg by mouth 3 (Three) Times a Day. As needed      phentermine (ADIPEX-P) 37.5 MG tablet phentermine 37.5 mg tablet      potassium chloride (K-DUR,KLOR-CON) 10 MEQ CR tablet       potassium chloride 10 MEQ CR tablet TAKE 1 TABLET BY MOUTH 2 TIMES A DAY. 60 tablet 0    predniSONE (DELTASONE) 10 MG tablet TAKE 1 TABLET BY MOUTH DAILY. 30 tablet 5    Umeclidinium Bromide (INCRUSE ELLIPTA) 62.5 MCG/INH aerosol powder  Inhale 1 puff 2 (Two) Times a Day.      vitamin D (ERGOCALCIFEROL) 1.25 MG (88068 UT) capsule capsule TAKE ONE CAPSULE BY MOUTH EVERY WEEK 4 capsule 3     No current facility-administered medications for this visit.         Review of Symptoms:    Psychiatric/Behavioral: Negative for agitation, behavioral problems, confusion, decreased concentration, dysphoric mood, hallucinations, self-injury, sleep disturbance and suicidal ideas. The patient is  Very  nervous/anxious and is not hyperactive.        Physical Exam:   not currently breastfeeding.    Mental Status Exam:   Hygiene:   good  Cooperation:  Cooperative  Eye Contact:  Good  Psychomotor Behavior:  Appropriate  Affect:  Appropriate,    Mood: depressed, anxious and fluctates  Hopelessness: Denies  Speech:  Normal  Thought Process:  Goal directed and Linear  Thought Content:  Normal  Suicidal:  None  Homicidal:  None  Hallucinations:  None  Delusion:  None  Memory:   fair   Orientation:  Person, Place, Time and Situation  Reliability:  fair  Insight:  Fair  Judgement:  Fair  Impulse Control:  Fair  Physical/Medical Issues:  Yes        MSE from 10/27/2023  reviewed and accepted without changes       PHQ-9 Depression Screening  Little interest or pleasure in doing things? 2-->more than half the days   Feeling down, depressed, or hopeless? 1-->several days   Trouble falling or staying asleep, or sleeping too much? 2-->more than half the days   Feeling tired or having little  energy? 2-->more than half the days   Poor appetite or overeating? 1-->several days   Feeling bad about yourself - or that you are a failure or have let yourself or your family down? 2-->more than half the days   Trouble concentrating on things, such as reading the newspaper or watching television? 1-->several days   Moving or speaking so slowly that other people could have noticed? Or the opposite - being so fidgety or restless that you have been moving around a lot more than usual? 0-->not at all   Thoughts that you would be better off dead, or of hurting yourself in some way? 0-->not at all   PHQ-9 Total Score 11   If you checked off any problems, how difficult have these problems made it for you to do your work, take care of things at home, or get along with other people? very difficult         Former smoker    I advised Merry of the risks of tobacco use.     Lab Results:   No visits with results within 3 Month(s) from this visit.   Latest known visit with results is:   Office Visit on 02/14/2023   Component Date Value Ref Range Status    Hemoglobin A1C 03/10/2023 6.10 (H)  4.80 - 5.60 % Final    Total Cholesterol 03/10/2023 154  0 - 200 mg/dL Final    Triglycerides 03/10/2023 209 (H)  0 - 150 mg/dL Final    HDL Cholesterol 03/10/2023 38 (L)  40 - 60 mg/dL Final    LDL Cholesterol  03/10/2023 81  0 - 100 mg/dL Final    VLDL Cholesterol 03/10/2023 35  5 - 40 mg/dL Final    LDL/HDL Ratio 03/10/2023 1.95   Final    25 Hydroxy, Vitamin D 03/10/2023 41.3  30.0 - 100.0 ng/ml Final    Magnesium 03/10/2023 2.1  1.6 - 2.6 mg/dL Final    Vitamin B-12 03/10/2023 386  211 - 946 pg/mL Final    Folate 03/10/2023 4.93  4.78 - 24.20 ng/mL Final       Assessment & Plan   Problems Addressed this Visit          Mental Health    Generalized anxiety disorder (Chronic)    Relevant Medications    vilazodone (Viibryd) 40 MG tablet tablet    Major depressive disorder, recurrent episode, moderate - Primary    Relevant Medications     vilazodone (Viibryd) 40 MG tablet tablet     Diagnoses         Codes Comments    Major depressive disorder, recurrent episode, moderate    -  Primary ICD-10-CM: F33.1  ICD-9-CM: 296.32     Generalized anxiety disorder     ICD-10-CM: F41.1  ICD-9-CM: 300.02             Visit Diagnoses:    ICD-10-CM ICD-9-CM   1. Major depressive disorder, recurrent episode, moderate  F33.1 296.32   2. Generalized anxiety disorder  F41.1 300.02         TREATMENT PLAN/GOALS: Continue supportive psychotherapy efforts and medications as indicated. Treatment and medication options discussed during today's visit. Patient ackowledged and verbally consented to continue with current treatment plan and was educated on the importance of compliance with treatment and follow-up appointments.    MEDICATION ISSUES:  1. MDD - Cont viibryd  40 mg , no changes necessary, the pt will benefit from psychotherapy     2. MIGUEL ANGEL - d.c  hydroxyzine 10 mg BID PRN- the pt developed rash   The pt was on xanax but experienced difficulties keeping her appts      depression and anxiety now are more related to her situation   Stress management discussed, the pt stated she will try to find therapist close to the place where she lives          PHQ scored 11 - moderate   MIGUEL ANGEL 7 scored 12      INSPECT reviewed as expected - xanax 9/1/2020  And hydrocodone from pain  Management now       Discussed medication options and treatment plan of prescribed medication as well as the risks, benefits, and side effects including potential falls, possible impaired driving and metabolic adversities among others. Patient is agreeable to call the office with any worsening of symptoms or onset of side effects. Patient is agreeable to call 911 or go to the nearest ER should he/she begin having SI/HI. No medication side effects or related complaints today.     MEDS ORDERED DURING VISIT:  New Medications Ordered This Visit   Medications    vilazodone (Viibryd) 40 MG tablet tablet     Sig: Take  1 tablet by mouth every night at bedtime.     Dispense:  30 tablet     Refill:  5       Return in about 1 year (around 8/16/2024).           This document has been electronically signed by Africa Conway MD  August 16, 2023 11:47 EDT

## 2023-09-01 DIAGNOSIS — J30.2 SEASONAL ALLERGIC RHINITIS, UNSPECIFIED TRIGGER: ICD-10-CM

## 2023-09-01 DIAGNOSIS — G62.9 NEUROPATHY: ICD-10-CM

## 2023-09-01 DIAGNOSIS — I10 ESSENTIAL HYPERTENSION: ICD-10-CM

## 2023-09-01 RX ORDER — METOPROLOL TARTRATE 50 MG/1
TABLET, FILM COATED ORAL
Qty: 60 TABLET | Refills: 5 | Status: SHIPPED | OUTPATIENT
Start: 2023-09-01

## 2023-09-01 RX ORDER — AMLODIPINE BESYLATE 10 MG/1
TABLET ORAL
Qty: 90 TABLET | Refills: 1 | Status: SHIPPED | OUTPATIENT
Start: 2023-09-01

## 2023-09-01 RX ORDER — GABAPENTIN 600 MG/1
600 TABLET ORAL 3 TIMES DAILY
Qty: 90 TABLET | Refills: 5 | Status: SHIPPED | OUTPATIENT
Start: 2023-09-01

## 2023-09-01 RX ORDER — LEVOCETIRIZINE DIHYDROCHLORIDE 5 MG/1
5 TABLET, FILM COATED ORAL EVERY EVENING
Qty: 30 TABLET | Refills: 5 | Status: SHIPPED | OUTPATIENT
Start: 2023-09-01

## 2023-09-21 DIAGNOSIS — K21.9 GASTROESOPHAGEAL REFLUX DISEASE WITHOUT ESOPHAGITIS: ICD-10-CM

## 2023-09-21 RX ORDER — OMEPRAZOLE 40 MG/1
CAPSULE, DELAYED RELEASE ORAL
Qty: 60 CAPSULE | Refills: 3 | Status: SHIPPED | OUTPATIENT
Start: 2023-09-21

## 2023-09-21 RX ORDER — LISINOPRIL 20 MG/1
20 TABLET ORAL 2 TIMES DAILY
Qty: 60 TABLET | Refills: 0 | Status: SHIPPED | OUTPATIENT
Start: 2023-09-21

## 2023-10-26 DIAGNOSIS — K51.90 ULCERATIVE COLITIS WITHOUT COMPLICATIONS, UNSPECIFIED LOCATION: ICD-10-CM

## 2023-10-26 RX ORDER — LISINOPRIL 20 MG/1
20 TABLET ORAL 2 TIMES DAILY
Qty: 60 TABLET | Refills: 0 | Status: SHIPPED | OUTPATIENT
Start: 2023-10-26

## 2023-10-26 RX ORDER — DICYCLOMINE HCL 20 MG
20 TABLET ORAL EVERY 6 HOURS
Qty: 120 TABLET | Refills: 5 | Status: SHIPPED | OUTPATIENT
Start: 2023-10-26

## 2023-11-10 DIAGNOSIS — K21.9 GASTROESOPHAGEAL REFLUX DISEASE WITHOUT ESOPHAGITIS: ICD-10-CM

## 2023-11-10 RX ORDER — OMEPRAZOLE 40 MG/1
CAPSULE, DELAYED RELEASE ORAL
Qty: 60 CAPSULE | Refills: 3 | Status: SHIPPED | OUTPATIENT
Start: 2023-11-10

## 2023-11-15 ENCOUNTER — TELEPHONE (OUTPATIENT)
Dept: FAMILY MEDICINE CLINIC | Facility: CLINIC | Age: 53
End: 2023-11-15
Payer: MEDICAID

## 2023-11-15 ENCOUNTER — TELEPHONE (OUTPATIENT)
Dept: FAMILY MEDICINE CLINIC | Facility: CLINIC | Age: 53
End: 2023-11-15

## 2023-11-15 DIAGNOSIS — R49.0 HOARSENESS OF VOICE: Primary | ICD-10-CM

## 2023-11-15 NOTE — TELEPHONE ENCOUNTER
Caller: Merry Thornton    Relationship: Self    Best call back number: [phone]    What is the medical concern/diagnosis:   R49.0 (ICD-10-CM) - Hoarseness of voice     What specialty or service is being requested:   Otolaryngology     What is the provider, practice or medical service name:   Dr. Vogt, [facility] [place]     What is the office location:   [address] [place] [address], [facility] [address], [facility] [place] [address]    What is the office phone number:   [phone]    Any additional details:   NEEDING TO HAVE VOCAL CORD SURGERY BUT THEY ARE REQUIRING A NEW REFERRAL.

## 2023-11-15 NOTE — TELEPHONE ENCOUNTER
Prior Authorization for  Omeprazole 40MG dr capsules approved.     Approved today  Approved. This drug has been approved. Approved quantity: 60 units per 30 day(s). The drug has been approved from 11/01/2023 to 11/14/2024. Please call the pharmacy to process your prescription claim. Generic or biosimilar substitution may be required when available and preferred on the formulary.      Faxed approval to pharmacy.

## 2023-11-15 NOTE — TELEPHONE ENCOUNTER
Prior Auth completed for Omeprazole 40MG dr capsules via covermymeds. Pending determination.  Key: [id number] - PA Case ID: [id number] - Rx #: [id number]

## 2023-11-22 ENCOUNTER — HOSPITAL ENCOUNTER (OUTPATIENT)
Dept: MAMMOGRAPHY | Facility: HOSPITAL | Age: 53
Discharge: HOME | End: 2023-11-22
Admitting: NURSE PRACTITIONER
Payer: MEDICAID

## 2023-11-22 DIAGNOSIS — Z12.31 VISIT FOR SCREENING MAMMOGRAM: ICD-10-CM

## 2023-11-22 PROCEDURE — 77063 BREAST TOMOSYNTHESIS BI: CPT

## 2023-11-22 PROCEDURE — 77067 SCR MAMMO BI INCL CAD: CPT

## 2023-12-01 RX ORDER — ERGOCALCIFEROL 1.25 MG/1
CAPSULE ORAL
Qty: 4 CAPSULE | Refills: 3 | Status: SHIPPED | OUTPATIENT
Start: 2023-12-01

## 2023-12-11 DIAGNOSIS — G43.919 INTRACTABLE MIGRAINE WITHOUT STATUS MIGRAINOSUS, UNSPECIFIED MIGRAINE TYPE: ICD-10-CM

## 2023-12-11 RX ORDER — LISINOPRIL 20 MG/1
20 TABLET ORAL 2 TIMES DAILY
Qty: 60 TABLET | Refills: 0 | Status: SHIPPED | OUTPATIENT
Start: 2023-12-11

## 2023-12-11 RX ORDER — TOPIRAMATE 50 MG/1
TABLET, FILM COATED ORAL
Qty: 60 TABLET | Refills: 11 | OUTPATIENT
Start: 2023-12-11

## 2023-12-11 NOTE — TELEPHONE ENCOUNTER
MEDICATION WAS D/C BY ANOTHER PROVIDER AS THERAPY COMPLETED.. MEDICATION IS NO LONGER ON PATIENT MED LIST

## 2023-12-28 DIAGNOSIS — G43.919 INTRACTABLE MIGRAINE WITHOUT STATUS MIGRAINOSUS, UNSPECIFIED MIGRAINE TYPE: ICD-10-CM

## 2023-12-29 RX ORDER — LISINOPRIL 20 MG/1
20 TABLET ORAL 2 TIMES DAILY
Qty: 60 TABLET | Refills: 0 | Status: SHIPPED | OUTPATIENT
Start: 2023-12-29

## 2023-12-29 RX ORDER — TOPIRAMATE 50 MG/1
TABLET, FILM COATED ORAL
Qty: 60 TABLET | Refills: 11 | Status: SHIPPED | OUTPATIENT
Start: 2023-12-29

## 2024-01-11 NOTE — PROGRESS NOTES
Chief Complaint  Follow-up (MIGRAINES/)    Subjective          Merry Thornton presents to [facility] for MIGRAINES  History of Present Illness    Patient is here to f/u on migraines, she is not  taking  topamax 50 mg 1 bid she d/c 4-5 months.     She stated  it made her sick to her stomach only taking OTC medication.    She does also use pain medications for it.     She is having 5 headaches every week.    Location-back of her head, states her hair hurts.   With these headaches her back does hurt.   Treated headaches, as a young lady,   Bad migraine ever since second baby   Has taken imitrex and tab and inj,     Had LP  7-8 years ago, pressure was high,   Took diamox,   Saw eye doctor 6 mo ago,     Patient states her legs still go numb, and it is getting worse. She is taking gabapentin for this but it is not helping.             ====PREV. OV 1/12/23===  Patient is here to f/u on migrianes,     Patient states her last migraine was last week it lasted a couple days  She is having 3-4 migraines a month  she currently takes imitrex 100 mg prn,imitrex injection prn .      topamax 50 mg 1 bid      Neuropathy  Start 10 years ago  Location: BLE  Medication: gabapentin     Pt had elevated spinal fluid pressure in past, about 8 years ago. See eye dr, some retinopathy seen on recent exam      Pt had diabetes with steroid use recent a1c 5.9     Pt has mixed connective tissue disease.      Legs go numb and tingle, hurt, Gabapentin  Has had emg on UE 5 years ago. Had cts surgery.      Pt has sleep apnea, getting a new pap machine soon--dr draw.        Current Outpatient Medications:     albuterol sulfate  (90 Base) MCG/ACT inhaler, Inhale 2 puffs Every 4 (Four) Hours As Needed for Wheezing., Disp: , Rfl:     alendronate (FOSAMAX) 70 MG tablet, , Disp: , Rfl:     amLODIPine (NORVASC) 10 MG tablet, TAKE 1 TABLET BY MOUTH DAILY., Disp: 90 tablet, Rfl: 1    apixaban (ELIQUIS) 5 MG tablet tablet, Take  1 tablet by mouth Every 12 (Twelve) Hours., Disp: 60 tablet, Rfl: 5    cloNIDine (CATAPRES) 0.1 MG tablet, , Disp: , Rfl:     dicyclomine (BENTYL) 20 MG tablet, TAKE 1 TABLET BY MOUTH EVERY 6 (SIX) HOURS., Disp: 120 tablet, Rfl: 5    fluticasone (FLONASE) 50 MCG/ACT nasal spray, 2 sprays into the nostril(s) as directed by provider Daily., Disp: , Rfl:     furosemide (LASIX) 20 MG tablet, TAKE 2 TABS EVERY MORNING, 2 TABS EVERY EVENING AND MAY TAKE AN ADDITIONAL 1 TO 2 TABS MIDDAY AS NEEDED, Disp: 540 tablet, Rfl: 1    gabapentin (NEURONTIN) 600 MG tablet, TAKE 1 TABLET BY MOUTH 3 (THREE) TIMES A DAY., Disp: 90 tablet, Rfl: 5    HYDROcodone-acetaminophen (NORCO)  MG per tablet, Take 1 tablet by mouth Every 8 (Eight) Hours As Needed for Moderate Pain., Disp: , Rfl:     hydroxychloroquine (PLAQUENIL) 200 MG tablet, , Disp: , Rfl:     ipratropium (ATROVENT) 0.03 % nasal spray, , Disp: , Rfl:     ipratropium-albuterol (DUO-NEB) 0.5-2.5 mg/3 ml nebulizer, Take 3 mL by nebulization 4 (Four) Times a Day., Disp: 360 mL, Rfl: 1    levocetirizine (XYZAL) 5 MG tablet, TAKE 1 TABLET BY MOUTH EVERY EVENING., Disp: 30 tablet, Rfl: 5    lidocaine (LIDODERM) 5 %, Place 1 patch on the skin as directed by provider Daily. Remove & Discard patch within 12 hours or as directed by MD, Disp: , Rfl:     lisinopril (PRINIVIL,ZESTRIL) 20 MG tablet, TAKE 1 TABLET BY MOUTH 2 (TWO) TIMES A DAY. DUE FOR OFFICE FOLLOW UP, Disp: 60 tablet, Rfl: 0    metoclopramide (REGLAN) 10 MG tablet, TAKE 1 TABLET BY MOUTH 4 (FOUR) TIMES A DAY., Disp: 120 tablet, Rfl: 5    metOLazone (ZAROXOLYN) 2.5 MG tablet, Mon, Wed & Fri, Disp: , Rfl:     metoprolol tartrate (LOPRESSOR) 50 MG tablet, TAKE ONE TABLET BY MOUTH TWO TIMES A DAY, Disp: 60 tablet, Rfl: 5    montelukast (SINGULAIR) 10 MG tablet, Take 1 tablet by mouth Every Night., Disp: , Rfl:     naloxone (NARCAN) 4 MG/0.1ML nasal spray, 1 actuation in one nostril x1; may repeat dose every 2-3 min until pt  responsive or EMS arrives; in case of opiate emergency, Disp: , Rfl:     omeprazole (priLOSEC) 40 MG capsule, TAKE 1 CAPSULE BY MOUTH 2 TIMES A DAY., Disp: 60 capsule, Rfl: 3    ondansetron (ZOFRAN) 4 MG tablet, Take 1 tablet by mouth Every 6 (Six) Hours As Needed., Disp: , Rfl:     orphenadrine (NORFLEX) 100 MG 12 hr tablet, Take 1 tablet by mouth 3 (Three) Times a Day. As needed, Disp: , Rfl:     potassium chloride (K-DUR,KLOR-CON) 10 MEQ CR tablet, , Disp: , Rfl:     predniSONE (DELTASONE) 10 MG tablet, TAKE 1 TABLET BY MOUTH DAILY., Disp: 30 tablet, Rfl: 5    Trulicity 0.75 MG/0.5ML solution pen-injector, Inject 0.75 mg under the skin into the appropriate area as directed Every 7 (Seven) Days., Disp: , Rfl:     vilazodone (Viibryd) 40 MG tablet tablet, Take 1 tablet by mouth every night at bedtime., Disp: 30 tablet, Rfl: 5    vitamin D (ERGOCALCIFEROL) 1.25 MG (61572 UT) capsule capsule, TAKE ONE CAPSULE BY MOUTH EVERY WEEK, Disp: 4 capsule, Rfl: 3    Cholecalciferol 250 MCG (97604 UT) capsule, , Disp: , Rfl:     potassium chloride 10 MEQ CR tablet, TAKE 1 TABLET BY MOUTH 2 TIMES A DAY. (Patient not taking: Reported on 1/15/2024), Disp: 60 tablet, Rfl: 0    Rimegepant Sulfate (Nurtec) 75 MG tablet dispersible tablet, Take 1 tablet by mouth Every Other Day., Disp: 16 tablet, Rfl: 5    theophylline (IVELISSE-24) 200 MG 24 hr capsule, Take 1 capsule by mouth Daily., Disp: , Rfl:     topiramate (TOPAMAX) 50 MG tablet, TAKE ONE TABLET BY MOUTH TWO TIMES A DAY (Patient not taking: Reported on 1/15/2024), Disp: 60 tablet, Rfl: 11    Review of Systems   Constitutional:  Positive for chills and fatigue.   HENT:  Positive for trouble swallowing.    Respiratory:  Positive for shortness of breath and wheezing.    Gastrointestinal:  Positive for nausea and vomiting.   Musculoskeletal:  Positive for back pain, gait problem, myalgias, neck pain and neck stiffness.   Allergic/Immunologic: Positive for environmental allergies and  "immunocompromised state.   Neurological:  Positive for dizziness, tremors, syncope, weakness, light-headedness, numbness and headaches.   Psychiatric/Behavioral:  Positive for confusion, decreased concentration and sleep disturbance. The patient is nervous/anxious.           Objective:    Vital Signs:   /82   Pulse 99   Ht 165.1 cm (65\")   Wt 99.3 kg (219 lb)   BMI 36.44 kg/m²     Physical Exam  Vitals reviewed.   Constitutional:       Appearance: Normal appearance.   Cardiovascular:      Rate and Rhythm: Normal rate.   Pulmonary:      Effort: Pulmonary effort is normal.   Neurological:      General: No focal deficit present.      Mental Status: She is alert and oriented to person, place, and time.   Psychiatric:         Mood and Affect: Mood normal.        Result Review :                Neurologic Exam     Mental Status   Oriented to person, place, and time.         Assessment and Plan    Diagnoses and all orders for this visit:    1. Restless leg syndrome (Primary)    2. Migraine without aura, intractable  -     Rimegepant Sulfate (Nurtec) 75 MG tablet dispersible tablet; Take 1 tablet by mouth Every Other Day.  Dispense: 16 tablet; Refill: 5    3. Neuropathy involving both lower extremities       Pt not able to take Topamax or diamox due to kidney stones,  Will avoid Depakote due to problem with weight gain  Pt to follow up with ophthalmologist.    Will nurtec, qod         Follow Up   Return in about 6 months (around 7/15/2024).  Patient was given instructions and counseling regarding her condition or for health maintenance advice. Please see specific information pulled into the AVS if appropriate.     This document has been electronically signed by Joseph Seipel, MD on January 15, 2024 17:09 EST      "

## 2024-01-15 ENCOUNTER — OFFICE VISIT (OUTPATIENT)
Dept: NEUROLOGY | Facility: CLINIC | Age: 54
End: 2024-01-15
Payer: MEDICAID

## 2024-01-15 VITALS
SYSTOLIC BLOOD PRESSURE: 135 MMHG | BODY MASS INDEX: 36.49 KG/M2 | HEIGHT: 65 IN | HEART RATE: 99 BPM | WEIGHT: 219 LBS | DIASTOLIC BLOOD PRESSURE: 82 MMHG

## 2024-01-15 DIAGNOSIS — G43.019 MIGRAINE WITHOUT AURA, INTRACTABLE: Chronic | ICD-10-CM

## 2024-01-15 DIAGNOSIS — G25.81 RESTLESS LEG SYNDROME: Primary | ICD-10-CM

## 2024-01-15 DIAGNOSIS — G57.93 NEUROPATHY INVOLVING BOTH LOWER EXTREMITIES: Chronic | ICD-10-CM

## 2024-01-15 PROBLEM — M46.1 INFLAMMATION OF SACROILIAC JOINT: Status: ACTIVE | Noted: 2020-11-05

## 2024-01-15 PROBLEM — M54.16 LUMBAR RADICULOPATHY: Status: ACTIVE | Noted: 2023-09-27

## 2024-01-15 PROBLEM — Z87.442 HISTORY OF KIDNEY STONES: Status: ACTIVE | Noted: 2024-01-15

## 2024-01-15 PROCEDURE — 3075F SYST BP GE 130 - 139MM HG: CPT | Performed by: PSYCHIATRY & NEUROLOGY

## 2024-01-15 PROCEDURE — 1160F RVW MEDS BY RX/DR IN RCRD: CPT | Performed by: PSYCHIATRY & NEUROLOGY

## 2024-01-15 PROCEDURE — 99214 OFFICE O/P EST MOD 30 MIN: CPT | Performed by: PSYCHIATRY & NEUROLOGY

## 2024-01-15 PROCEDURE — 3079F DIAST BP 80-89 MM HG: CPT | Performed by: PSYCHIATRY & NEUROLOGY

## 2024-01-15 PROCEDURE — 1159F MED LIST DOCD IN RCRD: CPT | Performed by: PSYCHIATRY & NEUROLOGY

## 2024-01-15 RX ORDER — ALPRAZOLAM 0.5 MG/1
TABLET ORAL
COMMUNITY
End: 2024-01-15

## 2024-01-15 RX ORDER — RIMEGEPANT SULFATE 75 MG/75MG
75 TABLET, ORALLY DISINTEGRATING ORAL EVERY OTHER DAY
Qty: 16 TABLET | Refills: 5 | Status: SHIPPED | OUTPATIENT
Start: 2024-01-15

## 2024-01-15 RX ORDER — PSEUDOEPHEDRINE HCL 30 MG
TABLET ORAL
COMMUNITY
End: 2024-01-15

## 2024-01-15 RX ORDER — NALOXONE HYDROCHLORIDE 4 MG/.1ML
SPRAY NASAL
COMMUNITY

## 2024-01-15 RX ORDER — ROPINIROLE 0.25 MG/1
TABLET, FILM COATED ORAL
COMMUNITY
End: 2024-01-15

## 2024-01-15 RX ORDER — DULAGLUTIDE 0.75 MG/.5ML
0.75 INJECTION, SOLUTION SUBCUTANEOUS
COMMUNITY
Start: 2023-10-03

## 2024-01-15 RX ORDER — LORATADINE 10 MG/1
TABLET ORAL
COMMUNITY
End: 2024-01-15 | Stop reason: ALTCHOICE

## 2024-01-15 RX ORDER — BENZONATATE 100 MG/1
CAPSULE ORAL
COMMUNITY
End: 2024-01-15

## 2024-01-15 RX ORDER — MONTELUKAST SODIUM 10 MG/1
1 TABLET ORAL NIGHTLY
COMMUNITY
Start: 2023-10-26

## 2024-01-15 RX ORDER — ONDANSETRON 4 MG/1
4 TABLET, FILM COATED ORAL EVERY 6 HOURS PRN
COMMUNITY
Start: 2023-12-19

## 2024-01-15 RX ORDER — MULTIVIT,TX WITH IRON,MINERALS
250 TABLET, EXTENDED RELEASE ORAL
COMMUNITY
Start: 2023-03-02 | End: 2024-01-15

## 2024-01-15 RX ORDER — IPRATROPIUM BROMIDE 21 UG/1
SPRAY, METERED NASAL
COMMUNITY

## 2024-01-15 RX ORDER — CEFDINIR 300 MG/1
CAPSULE ORAL
COMMUNITY
End: 2024-01-15

## 2024-01-15 RX ORDER — FLUTICASONE PROPIONATE 50 MCG
2 SPRAY, SUSPENSION (ML) NASAL DAILY
COMMUNITY
Start: 2023-12-21

## 2024-01-15 RX ORDER — METOLAZONE 2.5 MG/1
TABLET ORAL
COMMUNITY

## 2024-01-15 RX ORDER — HYDROCHLOROTHIAZIDE 12.5 MG/1
TABLET ORAL
COMMUNITY
End: 2024-01-15

## 2024-01-15 RX ORDER — ALENDRONATE SODIUM 70 MG/1
TABLET ORAL
COMMUNITY

## 2024-01-16 ENCOUNTER — SPECIALTY PHARMACY (OUTPATIENT)
Dept: INFUSION THERAPY | Facility: HOSPITAL | Age: 54
End: 2024-01-16
Payer: MEDICAID

## 2024-01-16 NOTE — PROGRESS NOTES
Specialty Pharmacy Refill Coordination Note     Merry is a 53 y.o. female contacted today regarding refills of  1 specialty medication(s).    Reviewed and verified with patient:       Specialty medication(s) and dose(s) confirmed: yes        Delivery Questions      Flowsheet Row Most Recent Value   Delivery method [employer]   Delivery address verified with patient/caregiver? Yes   Delivery address Home   Number of medications in delivery 1   Medication(s) being filled and delivered Rimegepant Sulfate   Doses left of specialty medications New   Copay verified? Yes   Copay amount $0   Copay form of payment No copayment ($0)                   Follow-up: 25 day(s)     Padmini Hernández  Specialty Pharmacy Technician     [facility] Also approved

## 2024-01-16 NOTE — PROGRESS NOTES
Specialty Pharmacy Patient Management Program  Neurology Initial Assessment     Merry Thornton is a 53 y.o. female with migraines seen by a Neurology provider and enrolled in the Neurology Patient Management program offered by [facility].  An initial outreach was conducted, including assessment of therapy appropriateness and specialty medication education for Nurtec 75mg. The patient was introduced to services offered by [facility], including: regular assessments, refill coordination, curbside pick-up or mail order delivery options, prior authorization maintenance, and financial assistance programs as applicable. The patient was also provided with contact information for the pharmacy team.     Insurance Coverage & Financial Support  [facility]     Relevant Past Medical History and Comorbidities  Relevant medical history and concomitant health conditions were discussed with the patient. The patient's chart has been reviewed for relevant past medical history and comorbid health conditions and updated as necessary.   Past Medical History:   Diagnosis Date    Allergic 01/01/2002    Asthma 01/01/1980    CAD (coronary artery disease) 12/20/2016    dr. singh    Carpal tunnel syndrome on right 03/08/2017    CHF (congestive heart failure) 01/01/2014    Cholelithiasis 01/01/2015    Cluster headache 2000    Common migraine 12/20/2016    COPD (chronic obstructive pulmonary disease) 12/20/2016    Cyst, ovarian 12/20/2016    mass    DDD (degenerative disc disease), cervical 03/11/2016    Deep vein thrombosis 09/05/2020    Dental caries 01/14/2019    Depression 11/02/2017    Depression, major, recurrent, moderate 08/25/2016    Dietary counseling 09/21/2017    Difficulty walking 1999    Dysphagia 05/2020    Elevated random blood glucose level 05/14/2020    Encounter for smoking cessation counseling 09/21/2017    Exacerbation of systemic lupus 04/30/2019    Fibromyalgia 12/20/2016     Fibromyalgia, primary 2001    Generalized anxiety disorder 03/11/2016    GERD (gastroesophageal reflux disease) 12/20/2016    Headache, tension-type 2014    Heartburn 11/02/2017    History of medical problems 01/01/2000    Hyperlipidemia 2017    Hypertension 03/16/2016    Hyperthyroidism 03/11/2016    Hypocalcemia 09/21/2017    IBS (irritable colon syndrome) 09/13/2016    Immobility syndrome 09/14/2017    Intracranial pressure increased 12/20/2016    Kidney stone 01/01/2014    Left knee pain 09/21/2017    Lower back pain 02/01/2018    Lung nodules 09/07/2016    Memory loss 2017    Morbid obesity 11/02/2017    Muscle cramp 09/21/2017    Neck pain 01/14/2019    Need for prophylactic vaccination and inoculation against influenza 09/21/2017    Neurogenic bladder 12/20/2016    Obesity 03/07/2017    Obesity (BMI 30-39.9) 05/14/2020    Orthostatic hypotension 07/17/2017    Osteoarthritis 03/07/2017    Osteopenia 01/14/2019    Osteoporosis 12/20/2016    Other instability, right ankle 04/13/2017    Overlap syndrome     scleroderma, lupus, Raynaud's. Mixxed connective Tissue D/o    Pain, joint, ankle, left 09/21/2017    Palpitations 10/04/2017    Paresthesia 06/12/2017    facial    Peripheral neuropathy 12/20/2016    bilateral lower extremities    Poor vision     Prediabetes     Presence of pessary     Pulmonary embolism 09/05/2020    Raynaud's syndrome 12/20/2016    Retinopathy     Right knee pain 11/08/2016    Scleroderma 12/20/2016    Seasonal allergic rhinitis 12/20/2016    Seizures 2018    SLE (systemic lupus erythematosus) 03/16/2017    Sleep apnea 11/02/2017    BIPAP    Sleep disorder 01/14/2019    Sprain of unspecified site of right knee, subsequent encounter 12/01/2016    Status post placement of implantable loop recorder 05/09/2018    presence    Syncope and collapse 2020    still has occassionally    Ulcerative colitis 02/2020    Urine incontinence     Vasovagal syncope     Ventricular arrhythmia 03/16/2016     Visit for screening mammogram 12/20/2016    Vitamin D deficiency 01/14/2019    Wheezing      Social History     Socioeconomic History    Marital status: Single   Tobacco Use    Smoking status: Every Day     Packs/day: 0.75     Years: 38.00     Additional pack years: 0.00     Total pack years: 28.50     Types: Cigarettes     Start date: 5/1/1984    Smokeless tobacco: Former     Quit date: 5/11/2020    Tobacco comments:     In the process of trying to quit   Vaping Use    Vaping Use: Never used   Substance and Sexual Activity    Alcohol use: No    Drug use: Not Currently     Frequency: 1.0 times per week     Types: Marijuana     Comment: stopped 2 months ago    Sexual activity: Not Currently     Partners: Male     Birth control/protection: Post-menopausal, Tubal ligation, Hysterectomy     Comment: Partial     Problem list reviewed by Ted Santamaria RPH on 1/16/2024 at  1:17 PM    Allergies  Known allergies and reactions were discussed with the patient. The patient's chart has been reviewed for  allergy information and updated as necessary.   Allergies   Allergen Reactions    Adhesive Tape Other (See Comments)     Pt ok with paper tape    Abstracted from [facility]    Alendronate Rash and Hives    Bupropion Shortness Of Breath    Morphine Other (See Comments), Rash and Swelling     Abstracted from [facility]    Amoxicillin-Pot Clavulanate Diarrhea    Buspar [Buspirone] Other (See Comments)     Abstracted from [facility]    Alendronate Sodium Other (See Comments)    Hydroxyzine Rash     Allergies reviewed by Ted Santamaria RP on 1/16/2024 at  1:17 PM    Relevant Laboratory Values  Common labs          3/10/2023    15:18   Common Labs   Total Cholesterol 154    Triglycerides 209    HDL Cholesterol 38    LDL Cholesterol  81    Hemoglobin A1C 6.10        Lab Assessment  Patient's cholesterol number have been addressed. There are no recent laboratory values to assess. I encouraged the patient to  obtain yearly labs for her own health records.     Current Medication List  This medication list has been reviewed with the patient and evaluated for any interactions or necessary modifications/recommendations, and updated to include all prescription medications, OTC medications, and supplements the patient is currently taking.  This list reflects what is contained in the patient's profile, which has also been marked as reviewed to communicate to other providers it is the most up to date version of the patient's current medication therapy.     Current Outpatient Medications:     albuterol sulfate  (90 Base) MCG/ACT inhaler, Inhale 2 puffs Every 4 (Four) Hours As Needed for Wheezing., Disp: , Rfl:     amLODIPine (NORVASC) 10 MG tablet, TAKE 1 TABLET BY MOUTH DAILY., Disp: 90 tablet, Rfl: 1    apixaban (ELIQUIS) 5 MG tablet tablet, Take 1 tablet by mouth Every 12 (Twelve) Hours., Disp: 60 tablet, Rfl: 5    Cholecalciferol 250 MCG (82606 UT) capsule, , Disp: , Rfl:     cloNIDine (CATAPRES) 0.1 MG tablet, , Disp: , Rfl:     dicyclomine (BENTYL) 20 MG tablet, TAKE 1 TABLET BY MOUTH EVERY 6 (SIX) HOURS., Disp: 120 tablet, Rfl: 5    furosemide (LASIX) 20 MG tablet, TAKE 2 TABS EVERY MORNING, 2 TABS EVERY EVENING AND MAY TAKE AN ADDITIONAL 1 TO 2 TABS MIDDAY AS NEEDED, Disp: 540 tablet, Rfl: 1    gabapentin (NEURONTIN) 600 MG tablet, TAKE 1 TABLET BY MOUTH 3 (THREE) TIMES A DAY., Disp: 90 tablet, Rfl: 5    HYDROcodone-acetaminophen (NORCO)  MG per tablet, Take 1 tablet by mouth Every 8 (Eight) Hours As Needed for Moderate Pain., Disp: , Rfl:     hydroxychloroquine (PLAQUENIL) 200 MG tablet, , Disp: , Rfl:     ipratropium-albuterol (DUO-NEB) 0.5-2.5 mg/3 ml nebulizer, Take 3 mL by nebulization 4 (Four) Times a Day., Disp: 360 mL, Rfl: 1    levocetirizine (XYZAL) 5 MG tablet, TAKE 1 TABLET BY MOUTH EVERY EVENING., Disp: 30 tablet, Rfl: 5    lidocaine (LIDODERM) 5 %, Place 1 patch on the skin as directed by  provider Daily. Remove & Discard patch within 12 hours or as directed by MD, Disp: , Rfl:     lisinopril (PRINIVIL,ZESTRIL) 20 MG tablet, TAKE 1 TABLET BY MOUTH 2 (TWO) TIMES A DAY. DUE FOR OFFICE FOLLOW UP, Disp: 60 tablet, Rfl: 0    metoclopramide (REGLAN) 10 MG tablet, TAKE 1 TABLET BY MOUTH 4 (FOUR) TIMES A DAY., Disp: 120 tablet, Rfl: 5    metoprolol tartrate (LOPRESSOR) 50 MG tablet, TAKE ONE TABLET BY MOUTH TWO TIMES A DAY, Disp: 60 tablet, Rfl: 5    montelukast (SINGULAIR) 10 MG tablet, Take 1 tablet by mouth Every Night., Disp: , Rfl:     naloxone (NARCAN) 4 MG/0.1ML nasal spray, 1 actuation in one nostril x1; may repeat dose every 2-3 min until pt responsive or EMS arrives; in case of opiate emergency, Disp: , Rfl:     omeprazole (priLOSEC) 40 MG capsule, TAKE 1 CAPSULE BY MOUTH 2 TIMES A DAY., Disp: 60 capsule, Rfl: 3    ondansetron (ZOFRAN) 4 MG tablet, Take 1 tablet by mouth Every 6 (Six) Hours As Needed., Disp: , Rfl:     orphenadrine (NORFLEX) 100 MG 12 hr tablet, Take 1 tablet by mouth 3 (Three) Times a Day. As needed, Disp: , Rfl:     potassium chloride (K-DUR,KLOR-CON) 10 MEQ CR tablet, , Disp: , Rfl:     predniSONE (DELTASONE) 10 MG tablet, TAKE 1 TABLET BY MOUTH DAILY., Disp: 30 tablet, Rfl: 5    Rimegepant Sulfate (Nurtec) 75 MG tablet dispersible tablet, Take 1 tablet by mouth Every Other Day., Disp: 16 tablet, Rfl: 5    theophylline (IVELISSE-24) 200 MG 24 hr capsule, Take 1 capsule by mouth Daily., Disp: , Rfl:     Trulicity 0.75 MG/0.5ML solution pen-injector, Inject 0.75 mg under the skin into the appropriate area as directed Every 7 (Seven) Days., Disp: , Rfl:     vilazodone (Viibryd) 40 MG tablet tablet, Take 1 tablet by mouth every night at bedtime., Disp: 30 tablet, Rfl: 5    vitamin D (ERGOCALCIFEROL) 1.25 MG (79838 UT) capsule capsule, TAKE ONE CAPSULE BY MOUTH EVERY WEEK, Disp: 4 capsule, Rfl: 3    alendronate (FOSAMAX) 70 MG tablet, , Disp: , Rfl:     fluticasone (FLONASE) 50 MCG/ACT  nasal spray, 2 sprays into the nostril(s) as directed by provider Daily. (Patient not taking: Reported on 1/16/2024), Disp: , Rfl:     ipratropium (ATROVENT) 0.03 % nasal spray, , Disp: , Rfl:     metOLazone (ZAROXOLYN) 2.5 MG tablet, Mon, Wed & Fri (Patient not taking: Reported on 1/16/2024), Disp: , Rfl:     potassium chloride 10 MEQ CR tablet, TAKE 1 TABLET BY MOUTH 2 TIMES A DAY. (Patient not taking: Reported on 1/15/2024), Disp: 60 tablet, Rfl: 0    topiramate (TOPAMAX) 50 MG tablet, TAKE ONE TABLET BY MOUTH TWO TIMES A DAY (Patient not taking: Reported on 1/15/2024), Disp: 60 tablet, Rfl: 11    Medicines reviewed by Ted Santamaria [facility] on 1/16/2024 at  1:15 PM  Medicines reviewed by Ted Santamaria RP on 1/16/2024 at  1:15 PM    Drug Interactions  I have updated and reviewed the patient's medication profile. There are no significant interactions with Nurtec to address. I did advise the patient not to take Orphenadrine and Catapress at the same time.      Initial Education Provided for Specialty Medication  The patient has been provided with the following education and any applicable administration techniques (i.e. self-injection) have been demonstrated for the therapies indicated. All questions and concerns have been addressed prior to the patient receiving the medication, and the patient has verbalized understanding of the education and any materials provided.  Additional patient education shall be provided and documented upon request by the patient, provider or payer.      Nurtec (rimegepant) 75 mg ODT, 1 tablet by mouth every other day  Medication Expectations   Why am I taking this medication? You are taking this medication for migraine prophylaxis.   What should I expect while on this medication? You should expect to see a decrease in the frequency and severity of your migraines.   How does the medication work? Nurtec is a small molecule that binds to calcitonin gene-related  peptide (CGRP) and blocks its binding to the receptor decreasing the severity of migraines.   How long will I be on this medication for? The amount of time you will be on this medication will be determined by your doctor and your response to the medication.    How do I take this medication? Take as directed on your prescription label.   What are some possible side effects? Potential side effects including, but not limited to nausea. Patient verbalized understanding.   What happens if I miss a dose? Take the missed dose as soon as possible, and resume the every other day timed from the last dose.     Medication Safety   What are things I should warn my doctor immediately about? Hypersensitivity reactions - trouble breathing or swallowing.   What are things that I should be cautious of? Hypersensitivity reactions (eg, dyspnea, rash), including delayed serious reactions, have occurred; discontinue use if suspected    What are some medications that can interact with this one? Avoid concomitant administration of Nurtec ODT with strong inhibitors of CY, strong or moderate inducers of CYP3A or inhibitors of P-gp or BCRP. Avoid another dose of Nurtec ODT within 48 hours when it is administered with moderate inhibitors of CY. Ask your pharmacist or health care provider before starting new medications     Medication Storage/Handling   How should I handle this medication? Keep this medication out of reach of pets/children in original container. Ensure hands are dry before opening blister pack.   How does this medication need to be stored? Store at room temperature away from heat/cold, sunlight or moisture   How should I dispose of this medication? There should not be a need to dispose of this medication unless your provider decides to change the dose or therapy. If that is the case, take to your local police station for proper disposal. Some pharmacies also have take-back bins for medication drop-off.       Resources/Support   How can I remind myself to take this medication? You can download reminder apps to help you manage your refills. You may also set an alarm on your phone to remind you. The pharmacy carries pill boxes that you can place next to an area you pass everyday (such as where you place your car keys or where you charge your phone)   Is financial support available?  Yes, [employer] can provide co-pay cards if you have commercial insurance or patient assistance if you have Medicare or no insurance.    Which vaccines are recommended for me? Talk to your doctor about these vaccines: Flu, Coronavirus (COVID-19), Pneumococcal (pneumonia), Tdap, Hepatitis B, Zoster (shingles)      Adherence and Self-Administration  Adherence related to the patient's specialty therapy was discussed with the patient. The Adherence segment of this outreach has been reviewed and updated.   Is there a concern with patient's ability to self administer the medication correctly and without issue?: No  Were any potential barriers to adherence identified during the initial assessment or patient education?: No  Are there any concerns regarding the patient's understanding of the importance of medication adherence?: No  Methods for Supporting Patient Adherence and/or Self-Administration: Patient was counseled on QOD dosing schedule as well as proper administration of an ODT formulation.    Goals of Therapy  Goals related to the patient's specialty therapy were discussed with the patient. The Patient Goals segment of this outreach has been reviewed and updated.   Goals Addressed Today         Specialty Pharmacy General Goal (pt-stated)       Patient reports having 15 to 20 headache days per month. Per the patient, she would be happy with no more than 5 headache days per month.                Reassessment Plan & Follow-Up  Medication Therapy Changes: Per Dr. Seipel, patient is to start Nurtec 75mg every other day for migraine  prevention.  The patient reports headaches most days of the month and would like to reduce to 5 per month. The patient is aware that Nurtec may take anywhere from 2-4 weeks to see improvement in headache frequency.   Related Plans, Therapy Recommendations, or Therapy Problems to Be Addressed: Will continue to monitor that patient's response to Nurtec and her tolerability to CGRP therapy.    Pharmacist to perform regular reassessments no more than (6) months from the previous assessment.  Care Coordinator to set up future refill outreaches, coordinate prescription delivery, and escalate clinical questions to pharmacist.   Welcome information and patient satisfaction survey to be sent by specialty pharmacy team with patient's initial fill.    Attestation  Therapeutic appropriateness: Appropriate   I attest the patient was actively involved in and has agreed to the above plan of care. If the prescribed therapy is at any point deemed not appropriate based on the current or future assessments, a consultation will be initiated with the patient's specialty care provider to determine the best course of action. The revised plan of therapy will be documented along with any additional patient education provided. Discussed aforementioned material with patient by phone.    Topher Santamaria, Katt[facility]  Clinic Specialty Pharmacist, Neurology  1/16/2024  13:21 EST     independent

## 2024-01-25 DIAGNOSIS — F33.1 MAJOR DEPRESSIVE DISORDER, RECURRENT EPISODE, MODERATE: Chronic | ICD-10-CM

## 2024-01-25 RX ORDER — VILAZODONE HYDROCHLORIDE 40 MG/1
40 TABLET ORAL
Qty: 30 TABLET | Refills: 5 | Status: SHIPPED | OUTPATIENT
Start: 2024-01-25

## 2024-01-31 DIAGNOSIS — K51.90 ULCERATIVE COLITIS WITHOUT COMPLICATIONS, UNSPECIFIED LOCATION: ICD-10-CM

## 2024-01-31 RX ORDER — SULFASALAZINE 500 MG/1
500 TABLET ORAL 2 TIMES DAILY
Qty: 120 TABLET | Refills: 5 | OUTPATIENT
Start: 2024-01-31

## 2024-02-01 ENCOUNTER — TELEPHONE (OUTPATIENT)
Dept: NEUROLOGY | Facility: CLINIC | Age: 54
End: 2024-02-01
Payer: MEDICAID

## 2024-02-01 DIAGNOSIS — G93.2 PSEUDOTUMOR CEREBRI: Primary | ICD-10-CM

## 2024-02-01 NOTE — TELEPHONE ENCOUNTER
Caller: JW    Relationship to patient: DR LUBIN'S OFFICE    Best call back number: [phone]    Chief complaint: SWOLLEN OPTIC NERVE    Type of visit: FU    Requested date: ASAP    If rescheduling, when is the original appointment: 9-24-24     Additional notes: DR LUBIN'S OFFICE WILL FAX PT'S OV NOTES FROM TODAY'S VISIT WITH HIM.     PT HAS BEEN RESCHEDULED FOR 2-7-24 AT 1:45 PM.

## 2024-02-05 ENCOUNTER — PREP FOR SURGERY (OUTPATIENT)
Dept: OTHER | Facility: HOSPITAL | Age: 54
End: 2024-02-05
Payer: MEDICAID

## 2024-02-05 DIAGNOSIS — G93.2 PSEUDOTUMOR CEREBRI: Primary | ICD-10-CM

## 2024-02-05 NOTE — TELEPHONE ENCOUNTER
Called patient regarding upcoming appt. I have cancelled that appt and told her she would be getting a LP done instead. Also pt will need order for MRI Brain prior to LP. Please put in orders and route back to me so I can inform patient. Thanks!

## 2024-02-08 RX ORDER — LISINOPRIL 20 MG/1
20 TABLET ORAL 2 TIMES DAILY
Qty: 60 TABLET | Refills: 0 | Status: SHIPPED | OUTPATIENT
Start: 2024-02-08

## 2024-02-09 ENCOUNTER — TELEPHONE (OUTPATIENT)
Dept: NEUROLOGY | Facility: CLINIC | Age: 54
End: 2024-02-09

## 2024-02-09 NOTE — TELEPHONE ENCOUNTER
Caller: Merry Thornton    Relationship: Self    Best call back number: [phone]    What was the call regarding: PT STATES SHE SAW THE EYE DOCTOR ON 2/2/24 AND WAS ADVISED SHE HAS BLEEDING BEHIND BOTH EYES AND A SWOLLEN OPTICAL NERVE OF RIGHT EYE. PT STATES [facility] CANNOT GET PT IN FOR THE MRI BRAIN UNTIL MARCH 27, 2024.    PT FEELS DR. SEIPEL WOULD WANT HER TO HAVE THIS COMPLETED SOONER SO SHE IS CALLING TO ASK WHAT DR. SEIPEL WOULD RECOMMEND IN THESE REGARDS. PT HAS NOT BEEN SCHEDULED FOR THE LP.    Do you require a callback: YES, PLEASE.    PLEASE REVIEW AND ADVISE.

## 2024-02-09 NOTE — TELEPHONE ENCOUNTER
LEFT DETAIL MESSAGE OK FOR HUB TO TELL HER THAT I FAXED TO [facility] THEY WILL GET PRECERT I PUT IT IN AS STAT. ONCE THEY GET THE APPROVAL THEY WILL CALL HER.  HAVE TO GET IT APPROVED FIRST

## 2024-02-12 ENCOUNTER — SPECIALTY PHARMACY (OUTPATIENT)
Dept: INFUSION THERAPY | Facility: HOSPITAL | Age: 54
End: 2024-02-12
Payer: MEDICAID

## 2024-02-12 NOTE — TELEPHONE ENCOUNTER
Provider: SEIPEL    Caller: PATIENT    Relationship to Patient: SELF    Phone Number: [phone]    Reason for Call: PT WAS TOLD BY [facility] THEY COULD NOT DO HER MRI BECAUSE OF HER IMPLANTED LOOP MONITOR.  THEY WILL NOT DO MRIS FOR ANY KIND OF IMPLANT.  PT HAS HAD AN MRI AT [facility] BEFORE AND [facility].      PLEASE CALL PT TO ADVISE     THANK YOU

## 2024-02-16 ENCOUNTER — LAB (OUTPATIENT)
Dept: FAMILY MEDICINE CLINIC | Facility: CLINIC | Age: 54
End: 2024-02-16
Payer: MEDICAID

## 2024-02-16 ENCOUNTER — OFFICE VISIT (OUTPATIENT)
Dept: FAMILY MEDICINE CLINIC | Facility: CLINIC | Age: 54
End: 2024-02-16
Payer: MEDICAID

## 2024-02-16 VITALS
BODY MASS INDEX: 36.49 KG/M2 | HEIGHT: 65 IN | SYSTOLIC BLOOD PRESSURE: 128 MMHG | DIASTOLIC BLOOD PRESSURE: 98 MMHG | RESPIRATION RATE: 18 BRPM | HEART RATE: 91 BPM | WEIGHT: 219 LBS | TEMPERATURE: 97.7 F

## 2024-02-16 DIAGNOSIS — M81.0 OSTEOPOROSIS, UNSPECIFIED OSTEOPOROSIS TYPE, UNSPECIFIED PATHOLOGICAL FRACTURE PRESENCE: ICD-10-CM

## 2024-02-16 DIAGNOSIS — Z79.899 LONG TERM CURRENT USE OF THERAPEUTIC DRUG: ICD-10-CM

## 2024-02-16 DIAGNOSIS — Z11.59 NEED FOR HEPATITIS C SCREENING TEST: ICD-10-CM

## 2024-02-16 DIAGNOSIS — R49.0 HOARSENESS OF VOICE: ICD-10-CM

## 2024-02-16 DIAGNOSIS — K51.90 ULCERATIVE COLITIS WITHOUT COMPLICATIONS, UNSPECIFIED LOCATION: ICD-10-CM

## 2024-02-16 DIAGNOSIS — K21.9 GASTROESOPHAGEAL REFLUX DISEASE WITHOUT ESOPHAGITIS: ICD-10-CM

## 2024-02-16 DIAGNOSIS — D68.59 PROTEIN C DEFICIENCY: ICD-10-CM

## 2024-02-16 DIAGNOSIS — J01.00 ACUTE NON-RECURRENT MAXILLARY SINUSITIS: ICD-10-CM

## 2024-02-16 DIAGNOSIS — Z00.00 ROUTINE ADULT HEALTH MAINTENANCE: Primary | ICD-10-CM

## 2024-02-16 DIAGNOSIS — R11.0 NAUSEA: ICD-10-CM

## 2024-02-16 LAB
ALBUMIN SERPL-MCNC: 3.9 G/DL (ref 3.5–5.2)
ALBUMIN/GLOB SERPL: 1.3 G/DL
ALP SERPL-CCNC: 87 U/L (ref 39–117)
ALT SERPL W P-5'-P-CCNC: 8 U/L (ref 1–33)
ANION GAP SERPL CALCULATED.3IONS-SCNC: 12.4 MMOL/L (ref 5–15)
AST SERPL-CCNC: 13 U/L (ref 1–32)
BASOPHILS # BLD AUTO: 0.02 10*3/MM3 (ref 0–0.2)
BASOPHILS NFR BLD AUTO: 0.2 % (ref 0–1.5)
BILIRUB SERPL-MCNC: 0.2 MG/DL (ref 0–1.2)
BUN SERPL-MCNC: 9 MG/DL (ref 6–20)
BUN/CREAT SERPL: 13 (ref 7–25)
CALCIUM SPEC-SCNC: 9.2 MG/DL (ref 8.6–10.5)
CHLORIDE SERPL-SCNC: 104 MMOL/L (ref 98–107)
CHOLEST SERPL-MCNC: 148 MG/DL (ref 0–200)
CO2 SERPL-SCNC: 25.6 MMOL/L (ref 22–29)
CREAT SERPL-MCNC: 0.69 MG/DL (ref 0.57–1)
DEPRECATED RDW RBC AUTO: 42.7 FL (ref 37–54)
EGFRCR SERPLBLD CKD-EPI 2021: 103.9 ML/MIN/1.73
EOSINOPHIL # BLD AUTO: 0.21 10*3/MM3 (ref 0–0.4)
EOSINOPHIL NFR BLD AUTO: 2.1 % (ref 0.3–6.2)
ERYTHROCYTE [DISTWIDTH] IN BLOOD BY AUTOMATED COUNT: 13.9 % (ref 12.3–15.4)
GLOBULIN UR ELPH-MCNC: 2.9 GM/DL
GLUCOSE SERPL-MCNC: 102 MG/DL (ref 65–99)
HCT VFR BLD AUTO: 42.3 % (ref 34–46.6)
HCV AB SER DONR QL: NORMAL
HDLC SERPL-MCNC: 32 MG/DL (ref 40–60)
HGB BLD-MCNC: 14.1 G/DL (ref 12–15.9)
IMM GRANULOCYTES # BLD AUTO: 0.02 10*3/MM3 (ref 0–0.05)
IMM GRANULOCYTES NFR BLD AUTO: 0.2 % (ref 0–0.5)
LDLC SERPL CALC-MCNC: 80 MG/DL (ref 0–100)
LDLC/HDLC SERPL: 2.28 {RATIO}
LYMPHOCYTES # BLD AUTO: 2.46 10*3/MM3 (ref 0.7–3.1)
LYMPHOCYTES NFR BLD AUTO: 24.6 % (ref 19.6–45.3)
MCH RBC QN AUTO: 28.6 PG (ref 26.6–33)
MCHC RBC AUTO-ENTMCNC: 33.3 G/DL (ref 31.5–35.7)
MCV RBC AUTO: 85.8 FL (ref 79–97)
MONOCYTES # BLD AUTO: 0.74 10*3/MM3 (ref 0.1–0.9)
MONOCYTES NFR BLD AUTO: 7.4 % (ref 5–12)
NEUTROPHILS NFR BLD AUTO: 6.57 10*3/MM3 (ref 1.7–7)
NEUTROPHILS NFR BLD AUTO: 65.5 % (ref 42.7–76)
NRBC BLD AUTO-RTO: 0 /100 WBC (ref 0–0.2)
PLATELET # BLD AUTO: 207 10*3/MM3 (ref 140–450)
PMV BLD AUTO: 11.3 FL (ref 6–12)
POTASSIUM SERPL-SCNC: 3.7 MMOL/L (ref 3.5–5.2)
PROT SERPL-MCNC: 6.8 G/DL (ref 6–8.5)
RBC # BLD AUTO: 4.93 10*6/MM3 (ref 3.77–5.28)
SODIUM SERPL-SCNC: 142 MMOL/L (ref 136–145)
TRIGL SERPL-MCNC: 215 MG/DL (ref 0–150)
TSH SERPL DL<=0.05 MIU/L-ACNC: 1.17 UIU/ML (ref 0.27–4.2)
VLDLC SERPL-MCNC: 36 MG/DL (ref 5–40)
WBC NRBC COR # BLD AUTO: 10.02 10*3/MM3 (ref 3.4–10.8)

## 2024-02-16 PROCEDURE — 86803 HEPATITIS C AB TEST: CPT | Performed by: PHYSICIAN ASSISTANT

## 2024-02-16 PROCEDURE — 80050 GENERAL HEALTH PANEL: CPT | Performed by: PHYSICIAN ASSISTANT

## 2024-02-16 PROCEDURE — 80061 LIPID PANEL: CPT | Performed by: PHYSICIAN ASSISTANT

## 2024-02-16 PROCEDURE — 36415 COLL VENOUS BLD VENIPUNCTURE: CPT | Performed by: PHYSICIAN ASSISTANT

## 2024-02-16 RX ORDER — ONDANSETRON 4 MG/1
4 TABLET, FILM COATED ORAL EVERY 6 HOURS PRN
Qty: 40 TABLET | Refills: 0 | Status: SHIPPED | OUTPATIENT
Start: 2024-02-16

## 2024-02-16 RX ORDER — DOXYCYCLINE HYCLATE 100 MG/1
100 CAPSULE ORAL 2 TIMES DAILY
Qty: 20 CAPSULE | Refills: 0 | Status: SHIPPED | OUTPATIENT
Start: 2024-02-16 | End: 2024-02-26

## 2024-03-01 ENCOUNTER — HOSPITAL ENCOUNTER (OUTPATIENT)
Dept: BONE DENSITY | Facility: HOSPITAL | Age: 54
Discharge: HOME | End: 2024-03-01
Payer: MEDICAID

## 2024-03-01 DIAGNOSIS — M81.0 OSTEOPOROSIS, UNSPECIFIED OSTEOPOROSIS TYPE, UNSPECIFIED PATHOLOGICAL FRACTURE PRESENCE: ICD-10-CM

## 2024-03-01 PROCEDURE — 77080 DXA BONE DENSITY AXIAL: CPT

## 2024-03-04 RX ORDER — APIXABAN 2.5 MG/1
TABLET, FILM COATED ORAL
Qty: 60 TABLET | Refills: 3 | Status: SHIPPED | OUTPATIENT
Start: 2024-03-04

## 2024-03-06 DIAGNOSIS — J30.2 SEASONAL ALLERGIC RHINITIS, UNSPECIFIED TRIGGER: ICD-10-CM

## 2024-03-06 DIAGNOSIS — I10 ESSENTIAL HYPERTENSION: ICD-10-CM

## 2024-03-06 DIAGNOSIS — K21.9 GASTROESOPHAGEAL REFLUX DISEASE WITHOUT ESOPHAGITIS: ICD-10-CM

## 2024-03-06 DIAGNOSIS — G62.9 NEUROPATHY: ICD-10-CM

## 2024-03-06 RX ORDER — LEVOCETIRIZINE DIHYDROCHLORIDE 5 MG/1
5 TABLET, FILM COATED ORAL EVERY EVENING
Qty: 90 TABLET | Refills: 3 | Status: SHIPPED | OUTPATIENT
Start: 2024-03-06

## 2024-03-06 RX ORDER — OMEPRAZOLE 40 MG/1
CAPSULE, DELAYED RELEASE ORAL
Qty: 180 CAPSULE | Refills: 3 | Status: SHIPPED | OUTPATIENT
Start: 2024-03-06

## 2024-03-06 RX ORDER — GABAPENTIN 600 MG/1
600 TABLET ORAL 3 TIMES DAILY
Qty: 90 TABLET | Refills: 2 | Status: SHIPPED | OUTPATIENT
Start: 2024-03-06

## 2024-03-06 RX ORDER — METOPROLOL TARTRATE 50 MG/1
TABLET, FILM COATED ORAL
Qty: 180 TABLET | Refills: 3 | Status: SHIPPED | OUTPATIENT
Start: 2024-03-06

## 2024-03-08 NOTE — PROGRESS NOTES
HEMATOLOGY ONCOLOGY OUTPATIENT CONSULTATION       Patient name: Merry Thornton  : 1970  MRN: [id number]  Primary Care Physician: Alicja Jean APRN  Referring Physician: Lizzy Roth PA  Reason For Consult:       History of Present Illness:  Patient is a 53 y.o. female who has been referred to [facility] heme-onc clinic for further evaluation and management for prior history of DVT.  She was previously being seen at [facility] [place] by Dr. Tom.  Pertinent outside records are not available at this time.  History was obtained from the patient and summarized as follows:    Patient reported having a DVT in right leg and Right Sided Pulmonary embolism in 2020. Patient reported that she was started on Lovenox for therapeutic anticoagulation and was later transitioned to Eliquis 2.5mg BID for 6 months thereafter.  She reported that her Eliquis dose was increased to 5mg BID thereafter by her cardiologist and is continued on the same.     She has underlying history of scleroderma and SLE, She is currently on Plaquenil and Nurtec for the same.  Per Chart review, she also has suspected IBD, She was previously being followed by I, but does not have a GI provider at this time.  Most recent EGD/colonoscopy was in 2023 and showed Superficial gastric erosion, Esophageal mucosal inflammation, Colonic inflammation (non specific)      Subjective:  Patient presents for initial consultation today. She reported having chronic allergies causing Upper respiratory symptoms and Sinus congestion. Has chronic fatigue and arthralgias, however no other new symptoms reported. No GI symptoms presently.      Past Medical History:   Diagnosis Date    Allergic 2002    Asthma 1980    CAD (coronary artery disease) 2016    dr. singh    Carpal tunnel syndrome on right 2017    CHF (congestive heart failure) 2014    Cholelithiasis 2015    Cluster headache 2000     Common migraine 12/20/2016    COPD (chronic obstructive pulmonary disease) 12/20/2016    Cyst, ovarian 12/20/2016    mass    DDD (degenerative disc disease), cervical 03/11/2016    Deep vein thrombosis 09/05/2020    Dental caries 01/14/2019    Depression 11/02/2017    Depression, major, recurrent, moderate 08/25/2016    Dietary counseling 09/21/2017    Difficulty walking 1999    Dysphagia 05/2020    Elevated random blood glucose level 05/14/2020    Encounter for smoking cessation counseling 09/21/2017    Exacerbation of systemic lupus 04/30/2019    Fibromyalgia 12/20/2016    Fibromyalgia, primary 2001    Generalized anxiety disorder 03/11/2016    GERD (gastroesophageal reflux disease) 12/20/2016    Headache, tension-type 2014    Heartburn 11/02/2017    History of medical problems 01/01/2000    Hyperlipidemia 2017    Hypertension 03/16/2016    Hyperthyroidism 03/11/2016    Hypocalcemia 09/21/2017    IBS (irritable colon syndrome) 09/13/2016    Immobility syndrome 09/14/2017    Intracranial pressure increased 12/20/2016    Kidney stone 01/01/2014    Left knee pain 09/21/2017    Lower back pain 02/01/2018    Lung nodules 09/07/2016    Memory loss 2017    Morbid obesity 11/02/2017    Muscle cramp 09/21/2017    Neck pain 01/14/2019    Need for prophylactic vaccination and inoculation against influenza 09/21/2017    Neurogenic bladder 12/20/2016    Obesity 03/07/2017    Obesity (BMI 30-39.9) 05/14/2020    Orthostatic hypotension 07/17/2017    Osteoarthritis 03/07/2017    Osteopenia 01/14/2019    Osteoporosis 12/20/2016    Other instability, right ankle 04/13/2017    Overlap syndrome     scleroderma, lupus, Raynaud's. Mixxed connective Tissue D/o    Pain, joint, ankle, left 09/21/2017    Palpitations 10/04/2017    Paresthesia 06/12/2017    facial    Peripheral neuropathy 12/20/2016    bilateral lower extremities    Poor vision     Prediabetes     Presence of pessary     Pulmonary embolism 09/05/2020    Raynaud's  syndrome 12/20/2016    Retinopathy     Right knee pain 11/08/2016    Scleroderma 12/20/2016    Seasonal allergic rhinitis 12/20/2016    Seizures 2018    SLE (systemic lupus erythematosus) 03/16/2017    Sleep apnea 11/02/2017    BIPAP    Sleep disorder 01/14/2019    Sprain of unspecified site of right knee, subsequent encounter 12/01/2016    Status post placement of implantable loop recorder 05/09/2018    presence    Syncope and collapse 2020    still has occassionally    Ulcerative colitis 02/2020    Urine incontinence     Vasovagal syncope     Ventricular arrhythmia 03/16/2016    Visit for screening mammogram 12/20/2016    Vitamin D deficiency 01/14/2019    Wheezing        Past Surgical History:   Procedure Laterality Date    ADENOIDECTOMY  01/01/2010    ANKLE SURGERY Right 02/2017    BREAST EXCISIONAL BIOPSY Right     years ago    BRONCHOSCOPY      CARDIAC ABLATION  01/2000    CARDIAC CATHETERIZATION  01/01/2016    CARDIAC ELECTROPHYSIOLOGY PROCEDURE      CARPAL TUNNEL RELEASE  2017    CHOLECYSTECTOMY      COLON SURGERY      COLONOSCOPY N/A 02/26/2020    Procedure: COLONOSCOPY WITH BIOPSY X1 AREA;  Surgeon: Michael Cheng MD;  Location: [facility] ENDOSCOPY;  Service: Gastroenterology;  Laterality: N/A;  diarrhea    ECHO - CONVERTED  2020    ENDOSCOPY      ENDOSCOPY N/A 05/21/2020    Procedure: ESOPHAGOGASTRODUODENOSCOPY WITH DILATATION (#54, 60 bougie);  Surgeon: Michael Cheng MD;  Location: [facility] ENDOSCOPY;  Service: Gastroenterology;  Laterality: N/A;  Post: candidiasis, upper esophagus sphincter stricture    HYSTERECTOMY  01/01/1997    KNEE SURGERY Right 09/2017    OTHER SURGICAL HISTORY      Urerine hysterectomy    OTHER SURGICAL HISTORY      t and a    OTHER SURGICAL HISTORY      d&c    OTHER SURGICAL HISTORY      bladder stim    OTHER SURGICAL HISTORY      Loop recorder placement    SUBTOTAL HYSTERECTOMY  01/01/1997    TONSILLECTOMY  01/01/2010    TUBAL ABDOMINAL LIGATION  01/12/1995    WRIST SURGERY  Right          Current Outpatient Medications:     albuterol sulfate  (90 Base) MCG/ACT inhaler, Inhale 2 puffs Every 4 (Four) Hours As Needed for Wheezing., Disp: , Rfl:     alendronate (FOSAMAX) 70 MG tablet, , Disp: , Rfl:     amLODIPine (NORVASC) 10 MG tablet, TAKE 1 TABLET BY MOUTH DAILY., Disp: 90 tablet, Rfl: 1    apixaban (ELIQUIS) 5 MG tablet tablet, Take 1 tablet by mouth Every 12 (Twelve) Hours., Disp: 60 tablet, Rfl: 5    Cholecalciferol 250 MCG (02901 UT) capsule, , Disp: , Rfl:     cloNIDine (CATAPRES) 0.1 MG tablet, , Disp: , Rfl:     dicyclomine (BENTYL) 20 MG tablet, TAKE 1 TABLET BY MOUTH EVERY 6 (SIX) HOURS., Disp: 120 tablet, Rfl: 5    Eliquis 2.5 MG tablet tablet, TAKE 1 TABLET BY MOUTH 2 TIMES EVERY DAY, Disp: 60 tablet, Rfl: 3    fluticasone (FLONASE) 50 MCG/ACT nasal spray, 2 sprays into the nostril(s) as directed by provider Daily., Disp: , Rfl:     furosemide (LASIX) 20 MG tablet, TAKE 2 TABS EVERY MORNING, 2 TABS EVERY EVENING AND MAY TAKE AN ADDITIONAL 1 TO 2 TABS MIDDAY AS NEEDED, Disp: 540 tablet, Rfl: 1    gabapentin (NEURONTIN) 600 MG tablet, TAKE 1 TABLET BY MOUTH 3 (THREE) TIMES A DAY., Disp: 90 tablet, Rfl: 2    HYDROcodone-acetaminophen (NORCO)  MG per tablet, Take 1 tablet by mouth Every 8 (Eight) Hours As Needed for Moderate Pain., Disp: , Rfl:     hydroxychloroquine (PLAQUENIL) 200 MG tablet, , Disp: , Rfl:     ipratropium (ATROVENT) 0.03 % nasal spray, , Disp: , Rfl:     ipratropium-albuterol (DUO-NEB) 0.5-2.5 mg/3 ml nebulizer, Take 3 mL by nebulization 4 (Four) Times a Day., Disp: 360 mL, Rfl: 1    levocetirizine (XYZAL) 5 MG tablet, TAKE 1 TABLET BY MOUTH EVERY EVENING., Disp: 90 tablet, Rfl: 3    lidocaine (LIDODERM) 5 %, Place 1 patch on the skin as directed by provider Daily. Remove & Discard patch within 12 hours or as directed by MD, Disp: , Rfl:     lisinopril (PRINIVIL,ZESTRIL) 20 MG tablet, TAKE 1 TABLET BY MOUTH 2 (TWO) TIMES A DAY. DUE FOR OFFICE  FOLLOW UP, Disp: 60 tablet, Rfl: 0    metoclopramide (REGLAN) 10 MG tablet, TAKE 1 TABLET BY MOUTH 4 (FOUR) TIMES A DAY., Disp: 120 tablet, Rfl: 5    metoprolol tartrate (LOPRESSOR) 50 MG tablet, TAKE ONE TABLET BY MOUTH TWO TIMES A DAY, Disp: 180 tablet, Rfl: 3    montelukast (SINGULAIR) 10 MG tablet, Take 1 tablet by mouth Every Night., Disp: , Rfl:     naloxone (NARCAN) 4 MG/0.1ML nasal spray, 1 actuation in one nostril x1; may repeat dose every 2-3 min until pt responsive or EMS arrives; in case of opiate emergency, Disp: , Rfl:     omeprazole (priLOSEC) 40 MG capsule, TAKE 1 CAPSULE BY MOUTH 2 TIMES A DAY., Disp: 180 capsule, Rfl: 3    ondansetron (ZOFRAN) 4 MG tablet, Take 1 tablet by mouth Every 6 (Six) Hours As Needed for Nausea or Vomiting., Disp: 40 tablet, Rfl: 0    orphenadrine (NORFLEX) 100 MG 12 hr tablet, Take 1 tablet by mouth 3 (Three) Times a Day. As needed, Disp: , Rfl:     potassium chloride (K-DUR,KLOR-CON) 10 MEQ CR tablet, , Disp: , Rfl:     potassium chloride 10 MEQ CR tablet, TAKE 1 TABLET BY MOUTH 2 TIMES A DAY., Disp: 60 tablet, Rfl: 0    predniSONE (DELTASONE) 10 MG tablet, TAKE 1 TABLET BY MOUTH DAILY. (Patient not taking: Reported on 2/16/2024), Disp: 30 tablet, Rfl: 5    Rimegepant Sulfate (Nurtec) 75 MG tablet dispersible tablet, Take 1 tablet by mouth Every Other Day., Disp: 16 tablet, Rfl: 5    Semaglutide,0.25 or 0.5MG/DOS, (OZEMPIC) 2 MG/3ML solution pen-injector, Inject 0.5 mg under the skin into the appropriate area as directed Every 7 (Seven) Days., Disp: , Rfl:     theophylline (IVELISSE-24) 200 MG 24 hr capsule, Take 1 capsule by mouth Daily., Disp: , Rfl:     vilazodone (VIIBRYD) 40 MG tablet tablet, TAKE 1 TABLET BY MOUTH EVERY NIGHT AT BEDTIME., Disp: 30 tablet, Rfl: 5    vitamin D (ERGOCALCIFEROL) 1.25 MG (99294 UT) capsule capsule, TAKE ONE CAPSULE BY MOUTH EVERY WEEK, Disp: 4 capsule, Rfl: 3    Allergies   Allergen Reactions    Adhesive Tape Other (See Comments)     Pt ok  with paper tape    Abstracted from centricity    Alendronate Rash and Hives    Bupropion Shortness Of Breath    Morphine Other (See Comments), Rash and Swelling     Abstracted from centricity    Amoxicillin-Pot Clavulanate Diarrhea    Buspar [Buspirone] Other (See Comments)     Abstracted from centricity    Alendronate Sodium Other (See Comments)    Hydroxyzine Rash       Family History   Problem Relation Age of Onset    Cancer Mother     Migraines Mother     Anxiety disorder Mother     Arthritis Mother     Mental illness Mother     Heart disease Father     Lung disease Father     Neuropathy Father     Alcohol abuse Father     Asthma Father     COPD Father     Diabetes Father     Hyperlipidemia Father     Vision loss Father     Diabetes Sister     Heart disease Sister     Hypertension Sister     Other Sister         weight disorder    Migraines Sister     Stroke Sister     Anxiety disorder Sister     Drug abuse Sister     Mental illness Sister     Migraines Sister     Neuropathy Brother     Neuropathy Brother     Seizures Brother         Epilepsy    Asthma Brother     Diabetes Brother     Thyroid disease Daughter        Cancer-related family history includes Cancer in her mother.      Social History     Tobacco Use    Smoking status: Every Day     Current packs/day: 0.75     Average packs/day: 0.7 packs/day for 39.9 years (29.9 ttl pk-yrs)     Types: Cigarettes     Start date: 5/1/1984    Smokeless tobacco: Former     Quit date: 5/11/2020    Tobacco comments:     In the process of trying to quit   Vaping Use    Vaping status: Never Used   Substance Use Topics    Alcohol use: No    Drug use: Not Currently     Frequency: 1.0 times per week     Types: Marijuana     Comment: stopped 2 months ago     Social History     Social History Narrative    Not on file       ROS:   Review of Systems   Constitutional:  Positive for fatigue.   HENT: Negative.     Eyes: Negative.    Respiratory: Negative.     Cardiovascular: Negative.  "   Gastrointestinal: Negative.    Endocrine: Negative.    Genitourinary: Negative.    Musculoskeletal:  Positive for arthralgias.   Skin: Negative.    Allergic/Immunologic: Negative.    Neurological: Negative.    Hematological: Negative.    Psychiatric/Behavioral: Negative.           Objective:    Vital Signs:  Vitals:    03/11/24 1330   BP: 132/82   Pulse: 93   SpO2: 96%   Weight: 96.1 kg (211 lb 12.8 oz)   Height: 165.1 cm (65\")   PainSc: 0-No pain     Body mass index is 35.25 kg/m².    ECOG  (1) Restricted in physically strenuous activity, ambulatory and able to do work of light nature    Physical Exam:   Physical Exam  Constitutional:       Appearance: Normal appearance. She is normal weight.   HENT:      Head: Normocephalic and atraumatic.      Right Ear: External ear normal.      Left Ear: External ear normal.      Nose: Nose normal.      Mouth/Throat:      Mouth: Mucous membranes are moist.      Pharynx: Oropharynx is clear.   Eyes:      Extraocular Movements: Extraocular movements intact.      Conjunctiva/sclera: Conjunctivae normal.      Pupils: Pupils are equal, round, and reactive to light.   Cardiovascular:      Rate and Rhythm: Normal rate.      Pulses: Normal pulses.   Pulmonary:      Effort: Pulmonary effort is normal.   Abdominal:      General: Abdomen is flat.      Palpations: Abdomen is soft.   Musculoskeletal:         General: Normal range of motion.      Cervical back: Normal range of motion and neck supple.   Skin:     General: Skin is warm.   Neurological:      Mental Status: She is alert.   Psychiatric:         Mood and Affect: Mood normal.         Behavior: Behavior normal.         Thought Content: Thought content normal.         Judgment: Judgment normal.         Lab Results - Last 18 Months   Lab Units 02/16/24  1150 01/05/23  1658   WBC 10*3/mm3 10.02 9.8   HEMOGLOBIN g/dL 14.1 14.0   HEMATOCRIT % 42.3 42.6   PLATELETS 10*3/mm3 207 201   MCV fL 85.8 89.7     Lab Results - Last 18 Months " "  Lab Units 02/16/24  1150   SODIUM mmol/L 142   POTASSIUM mmol/L 3.7   CHLORIDE mmol/L 104   CO2 mmol/L 25.6   BUN mg/dL 9   CREATININE mg/dL 0.69   CALCIUM mg/dL 9.2   BILIRUBIN mg/dL 0.2   ALK PHOS U/L 87   ALT (SGPT) U/L 8   AST (SGOT) U/L 13   GLUCOSE mg/dL 102*       Lab Results   Component Value Date    GLUCOSE 102 (H) 02/16/2024    BUN 9 02/16/2024    CREATININE 0.69 02/16/2024    EGFRIFNONA 83 02/02/2021    EGFRIFAFRI >60 06/01/2022    BCR 13.0 02/16/2024    K 3.7 02/16/2024    CO2 25.6 02/16/2024    CALCIUM 9.2 02/16/2024    ALBUMIN 3.9 02/16/2024    LABIL2 1.4 05/16/2019    AST 13 02/16/2024    ALT 8 02/16/2024       No results for input(s): \"APTT\", \"INR\", \"PTT\" in the last 36816 hours.    Lab Results   Component Value Date    FERRITIN 200.50 (H) 05/17/2020       Lab Results   Component Value Date    FOLATE 4.93 03/10/2023       No results found for: \"OCCULTBLD\"    No results found for: \"RETICCTPCT\"  Lab Results   Component Value Date    NQCJSWQP91 386 03/10/2023     No results found for: \"SPEP\", \"UPEP\"  LDH   Date Value Ref Range Status   05/17/2020 313 (H) 135 - 214 U/L Final     Comment:     Specimen hemolyzed.  Results may be affected.     Lab Results   Component Value Date    ROBBY POSITIVE (A) 03/11/2017    SEDRATE 11 02/01/2018     Lab Results   Component Value Date    FIBRINOGEN 339 05/17/2020     No results found for: \"PTT\", \"INR\"  No results found for: \"\"  No results found for: \"CEA\"  No components found for: \"CA-19-9\"  No results found for: \"PSA\"  Lab Results   Component Value Date    SEDRATE 11 02/01/2018          Assessment & Plan     Right Lower Extremity DVT and Right sided PE:  -Outside hematology records not available at this time. History obtained from patient and review of other medical records.  -Patient appears to have had unprovoked DVT and PE.  Prior hypercoagulable workup not available, however other records suggestive of possible protein C deficiency.  -Discussed with the " patient that timing of hypercoagulable workup can be important following diagnosis of VTE and protein S levels can be falsely low if checked immediately after DVT diagnosis.  -Regardless, in view of patient's history of autoimmune disorders she is relatively high risk for recurrent DVT and PE and it is appropriate in my view to continue with indefinite anticoagulation.  No indication for repeat thrombophilia workup at this time.  -Patient is currently on Eliquis 5 Mg twice daily.  Will recommend continuation of the same.  -Will continue to monitor CBC and D-dimer moving forward.    Follow-up in 4 months with repeat labs, sooner as needed.      Thank you very much for providing the opportunity to participate in this patient’s care. Please do not hesitate to call if there are any other questions.

## 2024-03-11 ENCOUNTER — CONSULT (OUTPATIENT)
Dept: ONCOLOGY | Facility: CLINIC | Age: 54
End: 2024-03-11
Payer: MEDICAID

## 2024-03-11 ENCOUNTER — LAB (OUTPATIENT)
Dept: LAB | Facility: HOSPITAL | Age: 54
End: 2024-03-11
Payer: MEDICAID

## 2024-03-11 ENCOUNTER — TELEPHONE (OUTPATIENT)
Dept: ONCOLOGY | Facility: OTHER | Age: 54
End: 2024-03-11

## 2024-03-11 ENCOUNTER — SPECIALTY PHARMACY (OUTPATIENT)
Dept: INFUSION THERAPY | Facility: HOSPITAL | Age: 54
End: 2024-03-11
Payer: MEDICAID

## 2024-03-11 ENCOUNTER — PATIENT ROUNDING (BHMG ONLY) (OUTPATIENT)
Dept: ONCOLOGY | Facility: CLINIC | Age: 54
End: 2024-03-11
Payer: MEDICAID

## 2024-03-11 VITALS
SYSTOLIC BLOOD PRESSURE: 132 MMHG | OXYGEN SATURATION: 96 % | DIASTOLIC BLOOD PRESSURE: 82 MMHG | WEIGHT: 211.8 LBS | HEIGHT: 65 IN | BODY MASS INDEX: 35.29 KG/M2 | HEART RATE: 93 BPM

## 2024-03-11 DIAGNOSIS — I82.4Y1 ACUTE DEEP VEIN THROMBOSIS (DVT) OF PROXIMAL VEIN OF RIGHT LOWER EXTREMITY: ICD-10-CM

## 2024-03-11 DIAGNOSIS — I82.4Y1 ACUTE DEEP VEIN THROMBOSIS (DVT) OF PROXIMAL VEIN OF RIGHT LOWER EXTREMITY: Primary | ICD-10-CM

## 2024-03-11 LAB
FERRITIN SERPL-MCNC: 84.13 NG/ML (ref 13–150)
FOLATE SERPL-MCNC: 5.54 NG/ML (ref 4.78–24.2)
IRON 24H UR-MRATE: 33 MCG/DL (ref 37–145)
IRON SATN MFR SERPL: 9 % (ref 20–50)
TIBC SERPL-MCNC: 374 MCG/DL (ref 298–536)
TRANSFERRIN SERPL-MCNC: 251 MG/DL (ref 200–360)
VIT B12 BLD-MCNC: 318 PG/ML (ref 211–946)

## 2024-03-11 PROCEDURE — 83540 ASSAY OF IRON: CPT | Performed by: STUDENT IN AN ORGANIZED HEALTH CARE EDUCATION/TRAINING PROGRAM

## 2024-03-11 PROCEDURE — 82746 ASSAY OF FOLIC ACID SERUM: CPT | Performed by: STUDENT IN AN ORGANIZED HEALTH CARE EDUCATION/TRAINING PROGRAM

## 2024-03-11 PROCEDURE — 82607 VITAMIN B-12: CPT | Performed by: STUDENT IN AN ORGANIZED HEALTH CARE EDUCATION/TRAINING PROGRAM

## 2024-03-11 PROCEDURE — 84466 ASSAY OF TRANSFERRIN: CPT | Performed by: STUDENT IN AN ORGANIZED HEALTH CARE EDUCATION/TRAINING PROGRAM

## 2024-03-11 PROCEDURE — 82728 ASSAY OF FERRITIN: CPT | Performed by: STUDENT IN AN ORGANIZED HEALTH CARE EDUCATION/TRAINING PROGRAM

## 2024-03-11 PROCEDURE — 36415 COLL VENOUS BLD VENIPUNCTURE: CPT

## 2024-03-11 NOTE — PROGRESS NOTES
Specialty Pharmacy Refill Coordination Note     Merry is a 53 y.o. female contacted today regarding refills of  1 specialty medication(s).    Reviewed and verified with patient:       Specialty medication(s) and dose(s) confirmed: yes    Refill Questions      Flowsheet Row Most Recent Value   Changes to allergies? No   Changes to medications? No   New conditions or infections since last clinic visit No   Unplanned office visit, urgent care, ED, or hospital admission in the last 4 weeks  No   How does patient/caregiver feel medication is working? Very good   Financial problems or insurance changes  No   Since the previous refill, were any specialty medication doses or scheduled injections missed or delayed?  No   Does this patient require a clinical escalation to a pharmacist? No            Delivery Questions      Flowsheet Row Most Recent Value   Delivery method [employer]   Delivery address verified with patient/caregiver? Yes   Delivery address Home   Number of medications in delivery 1   Medication(s) being filled and delivered Rimegepant Sulfate   Doses left of specialty medications 1 week   Copay verified? Yes   Copay amount $0   Copay form of payment No copayment ($0)                   Follow-up: 25 day(s)     Padmini Hernández  Specialty Pharmacy Technician

## 2024-03-11 NOTE — PROGRESS NOTES
March 11, 2024    Hello, may I speak with Merry Thornton?    My name is Haylee Villanueva      I am  with [facility]  [address] [place] [address] [phone].    Before we get started may I verify your date of birth? 1970    I am calling to officially welcome you to our practice and ask about your recent visit. Is this a good time to talk? no    Tell me about your visit with us. What things went well?  A My Chart message was sent to the patient.         We're always looking for ways to make our patients' experiences even better. Do you have recommendations on ways we may improve?  no    Overall were you satisfied with your first visit to our practice? yes       I appreciate you taking the time to speak with me today. Is there anything else I can do for you? no      Thank you, and have a great day.

## 2024-03-11 NOTE — TELEPHONE ENCOUNTER
LADY CALLING PATIENT WILL BE LATE DUE TO TRAFFIC. MAYBE 1:19 BEFORE THEY GET THERE.     TRIED TO W/T NO ANSWER.    CALL BACK NUMBER : [phone]

## 2024-03-27 ENCOUNTER — HOSPITAL ENCOUNTER (OUTPATIENT)
Dept: MRI IMAGING | Facility: HOSPITAL | Age: 54
Discharge: HOME | End: 2024-03-27
Admitting: PSYCHIATRY & NEUROLOGY
Payer: MEDICAID

## 2024-03-27 DIAGNOSIS — G93.2 PSEUDOTUMOR CEREBRI: ICD-10-CM

## 2024-03-27 LAB
CREAT BLDA-MCNC: 0.6 MG/DL (ref 0.6–1.3)
EGFRCR SERPLBLD CKD-EPI 2021: 107.5 ML/MIN/1.73

## 2024-03-27 PROCEDURE — 82565 ASSAY OF CREATININE: CPT

## 2024-03-27 PROCEDURE — 70553 MRI BRAIN STEM W/O & W/DYE: CPT

## 2024-03-27 PROCEDURE — A9579 GAD-BASE MR CONTRAST NOS,1ML: HCPCS | Performed by: PSYCHIATRY & NEUROLOGY

## 2024-03-27 PROCEDURE — 25010000002 GADOTERIDOL PER 1 ML: Performed by: PSYCHIATRY & NEUROLOGY

## 2024-03-27 RX ADMIN — GADOTERIDOL 20 ML: 279.3 INJECTION, SOLUTION INTRAVENOUS at 17:45

## 2024-04-01 ENCOUNTER — HOSPITAL ENCOUNTER (OUTPATIENT)
Dept: INTERVENTIONAL RADIOLOGY/VASCULAR | Facility: HOSPITAL | Age: 54
Discharge: HOME | End: 2024-04-01
Admitting: PSYCHIATRY & NEUROLOGY
Payer: MEDICAID

## 2024-04-01 VITALS
SYSTOLIC BLOOD PRESSURE: 108 MMHG | HEIGHT: 65 IN | HEART RATE: 84 BPM | BODY MASS INDEX: 35.16 KG/M2 | WEIGHT: 211 LBS | TEMPERATURE: 98.1 F | DIASTOLIC BLOOD PRESSURE: 75 MMHG | RESPIRATION RATE: 18 BRPM | OXYGEN SATURATION: 96 %

## 2024-04-01 DIAGNOSIS — G93.2 PSEUDOTUMOR CEREBRI: ICD-10-CM

## 2024-04-01 LAB
APPEARANCE CSF: CLEAR
APPEARANCE CSF: CLEAR
APTT PPP: 26.6 SECONDS (ref 24–31)
BASOPHILS # BLD AUTO: 0.03 10*3/MM3 (ref 0–0.2)
BASOPHILS NFR BLD AUTO: 0.3 % (ref 0–1.5)
COLOR CSF: COLORLESS
COLOR CSF: COLORLESS
DEPRECATED RDW RBC AUTO: 45.4 FL (ref 37–54)
EOSINOPHIL # BLD AUTO: 0.17 10*3/MM3 (ref 0–0.4)
EOSINOPHIL NFR BLD AUTO: 1.5 % (ref 0.3–6.2)
ERYTHROCYTE [DISTWIDTH] IN BLOOD BY AUTOMATED COUNT: 14 % (ref 12.3–15.4)
GLUCOSE CSF-MCNC: 61 MG/DL (ref 40–70)
HCT VFR BLD AUTO: 40.3 % (ref 34–46.6)
HGB BLD-MCNC: 12.9 G/DL (ref 12–15.9)
HOLD SPECIMEN: NORMAL
IMM GRANULOCYTES # BLD AUTO: 0.04 10*3/MM3 (ref 0–0.05)
IMM GRANULOCYTES NFR BLD AUTO: 0.4 % (ref 0–0.5)
INR PPP: 0.99 (ref 0.93–1.1)
LYMPHOCYTES # BLD AUTO: 2.6 10*3/MM3 (ref 0.7–3.1)
LYMPHOCYTES NFR BLD AUTO: 23.3 % (ref 19.6–45.3)
MCH RBC QN AUTO: 28.5 PG (ref 26.6–33)
MCHC RBC AUTO-ENTMCNC: 32 G/DL (ref 31.5–35.7)
MCV RBC AUTO: 89 FL (ref 79–97)
MONOCYTES # BLD AUTO: 0.75 10*3/MM3 (ref 0.1–0.9)
MONOCYTES NFR BLD AUTO: 6.7 % (ref 5–12)
NEUTROPHILS NFR BLD AUTO: 67.8 % (ref 42.7–76)
NEUTROPHILS NFR BLD AUTO: 7.57 10*3/MM3 (ref 1.7–7)
NRBC BLD AUTO-RTO: 0 /100 WBC (ref 0–0.2)
NUC CELL # CSF MANUAL: 2 /MM3 (ref 0–5)
NUC CELL # CSF MANUAL: 4 /MM3 (ref 0–5)
PLATELET # BLD AUTO: 179 10*3/MM3 (ref 140–450)
PMV BLD AUTO: 10.7 FL (ref 6–12)
PROT CSF-MCNC: 30.3 MG/DL (ref 15–45)
PROTHROMBIN TIME: 10.8 SECONDS (ref 9.6–11.7)
RBC # BLD AUTO: 4.53 10*6/MM3 (ref 3.77–5.28)
RBC # CSF MANUAL: 1 /MM3 (ref 0–5)
RBC # CSF MANUAL: 12 /MM3 (ref 0–5)
SPECIMEN VOL CSF: 3 ML
SPECIMEN VOL CSF: 3 ML
TUBE # CSF: 1
TUBE # CSF: 4
WBC NRBC COR # BLD AUTO: 11.16 10*3/MM3 (ref 3.4–10.8)

## 2024-04-01 PROCEDURE — 85025 COMPLETE CBC W/AUTO DIFF WBC: CPT | Performed by: RADIOLOGY

## 2024-04-01 PROCEDURE — 89050 BODY FLUID CELL COUNT: CPT | Performed by: PSYCHIATRY & NEUROLOGY

## 2024-04-01 PROCEDURE — 82945 GLUCOSE OTHER FLUID: CPT | Performed by: PSYCHIATRY & NEUROLOGY

## 2024-04-01 PROCEDURE — 85610 PROTHROMBIN TIME: CPT | Performed by: RADIOLOGY

## 2024-04-01 PROCEDURE — 84157 ASSAY OF PROTEIN OTHER: CPT | Performed by: PSYCHIATRY & NEUROLOGY

## 2024-04-01 PROCEDURE — 85730 THROMBOPLASTIN TIME PARTIAL: CPT | Performed by: RADIOLOGY

## 2024-04-01 PROCEDURE — 25010000002 LIDOCAINE 1 % SOLUTION

## 2024-04-01 RX ORDER — SODIUM CHLORIDE 0.9 % (FLUSH) 0.9 %
10 SYRINGE (ML) INJECTION EVERY 12 HOURS SCHEDULED
Status: DISCONTINUED | OUTPATIENT
Start: 2024-04-01 | End: 2024-04-02 | Stop reason: HOSPADM

## 2024-04-01 RX ORDER — SODIUM CHLORIDE 0.9 % (FLUSH) 0.9 %
10 SYRINGE (ML) INJECTION AS NEEDED
Status: DISCONTINUED | OUTPATIENT
Start: 2024-04-01 | End: 2024-04-02 | Stop reason: HOSPADM

## 2024-04-01 RX ORDER — LIDOCAINE HYDROCHLORIDE 10 MG/ML
INJECTION, SOLUTION INFILTRATION; PERINEURAL AS NEEDED
Status: DISCONTINUED | OUTPATIENT
Start: 2024-04-01 | End: 2024-04-02 | Stop reason: HOSPADM

## 2024-04-01 RX ADMIN — LIDOCAINE HYDROCHLORIDE 5 ML: 10 INJECTION, SOLUTION INFILTRATION; PERINEURAL at 10:19

## 2024-04-01 RX ADMIN — Medication 10 ML: at 08:41

## 2024-04-01 NOTE — NURSING NOTE
Pt discharged home with friend after instructions given and questions answered.  Transported via wheelchair to car and assisted into car without incidence.

## 2024-04-02 ENCOUNTER — TELEPHONE (OUTPATIENT)
Dept: NEUROLOGY | Facility: CLINIC | Age: 54
End: 2024-04-02
Payer: MEDICAID

## 2024-04-02 NOTE — TELEPHONE ENCOUNTER
Caller: lisa toney    Relationship: Emergency Contact    Best call back number: [phone]    Caller requesting test results:   CARE GIVER    What test was performed:   LUMBAR PUNCTURE    When was the test performed:   4-1-24    Where was the test performed:   [facility]    Additional notes:   PLEASE CALL PT WITH TEST RESULTS AND TO SCHEDULE NEXT FU VISIT IF NEEDED AS THE EARLIEST APPT FOR DR SEIPEL WAS JAN OF 2025.

## 2024-04-08 ENCOUNTER — SPECIALTY PHARMACY (OUTPATIENT)
Dept: INFUSION THERAPY | Facility: HOSPITAL | Age: 54
End: 2024-04-08
Payer: MEDICAID

## 2024-04-08 RX ORDER — RIMEGEPANT SULFATE 75 MG/75MG
75 TABLET, ORALLY DISINTEGRATING ORAL EVERY OTHER DAY
Qty: 16 TABLET | Refills: 5 | Status: SHIPPED | OUTPATIENT
Start: 2024-04-08

## 2024-04-08 NOTE — PROGRESS NOTES
Specialty Pharmacy Refill Coordination Note     error     Padmini Hernández  Specialty Pharmacy Technician

## 2024-04-09 RX ORDER — AMOXICILLIN 250 MG
1 CAPSULE ORAL DAILY
Qty: 90 TABLET | Refills: 1 | Status: SHIPPED | OUTPATIENT
Start: 2024-04-09

## 2024-04-09 RX ORDER — FERROUS SULFATE 325(65) MG
325 TABLET ORAL
Qty: 90 TABLET | Refills: 1 | Status: SHIPPED | OUTPATIENT
Start: 2024-04-09

## 2024-04-10 ENCOUNTER — TELEPHONE (OUTPATIENT)
Dept: ONCOLOGY | Facility: CLINIC | Age: 54
End: 2024-04-10
Payer: MEDICAID

## 2024-04-10 ENCOUNTER — SPECIALTY PHARMACY (OUTPATIENT)
Dept: INFUSION THERAPY | Facility: HOSPITAL | Age: 54
End: 2024-04-10
Payer: MEDICAID

## 2024-04-10 NOTE — TELEPHONE ENCOUNTER
----- Message from Reji Jones MD sent at 4/9/2024  6:15 PM EDT -----  She appears to have Some Iron Deficiency. I have prescribed oral iron supplement and Laxative for her. Please let the patient know at your convenience.    Thanks.

## 2024-04-10 NOTE — TELEPHONE ENCOUNTER
Phoned pt and informed her of her iron deficiency and Dr. Jones has sent her oral iron and laxative to her pharmacy.

## 2024-04-10 NOTE — PROGRESS NOTES
Specialty Pharmacy Refill Coordination Note     Nurtec approved until 4/10/25     Padmini Hernández  Specialty Pharmacy Technician

## 2024-04-18 RX ORDER — ERGOCALCIFEROL 1.25 MG/1
CAPSULE ORAL
Qty: 4 CAPSULE | Refills: 3 | Status: SHIPPED | OUTPATIENT
Start: 2024-04-18

## 2024-04-18 RX ORDER — LISINOPRIL 20 MG/1
20 TABLET ORAL 2 TIMES DAILY
Qty: 60 TABLET | Refills: 0 | Status: SHIPPED | OUTPATIENT
Start: 2024-04-18

## 2024-04-24 RX ORDER — LISINOPRIL 20 MG/1
20 TABLET ORAL 2 TIMES DAILY
Qty: 60 TABLET | Refills: 2 | Status: SHIPPED | OUTPATIENT
Start: 2024-04-24

## 2024-04-24 NOTE — TELEPHONE ENCOUNTER
Caller: [place], [place] - [address] [phone] [facility] [phone] FX    Relationship: Pharmacy    Requested Prescriptions:   Requested Prescriptions     Pending Prescriptions Disp Refills    lisinopril (PRINIVIL,ZESTRIL) 20 MG tablet 60 tablet 0     Sig: Take 1 tablet by mouth 2 (Two) Times a Day. DUE FOR OFFICE FOLLOW UP        Pharmacy where request should be sent: [place], [place] - [facility] [facility] [phone] [facility] [phone] FX     Last office visit with prescribing clinician: Visit date not found   Last telemedicine visit with prescribing clinician: Visit date not found   Next office visit with prescribing clinician: 8/16/2024     Additional details provided by patient: STATES PATIENT ALSO REQUESTING REFILL ON sulfaSALAzine (AZULFIDINE) 500 MG tablet (LISTED AS NO LONGER TAKING)    Andi Gonzales Rep   04/24/24 14:19 EDT

## 2024-04-26 ENCOUNTER — TELEPHONE (OUTPATIENT)
Dept: FAMILY MEDICINE CLINIC | Facility: CLINIC | Age: 54
End: 2024-04-26

## 2024-04-26 RX ORDER — ALBUTEROL SULFATE 90 UG/1
2 AEROSOL, METERED RESPIRATORY (INHALATION) EVERY 4 HOURS PRN
Qty: 18 G | Refills: 0 | Status: SHIPPED | OUTPATIENT
Start: 2024-04-26

## 2024-04-26 NOTE — TELEPHONE ENCOUNTER
Caller: MAURI    Relationship: Other    Best call back number: [phone]     Requested Prescriptions:     apixaban (ELIQUIS) 5 MG tablet tablet   albuterol sulfate  (90 Base) MCG/ACT inhaler   Requested Prescriptions      No prescriptions requested or ordered in this encounter        [place]arm[place] - [address] [facility] [address] - [phone] [facility] [phone]  [phone]        Last office visit with prescribing clinician: Visit date not found   Last telemedicine visit with prescribing clinician: Visit date not found   Next office visit with prescribing clinician: 8/16/2024     Additional details provided by patient: MAURI WITH [facility] IS CALLING TO REQUEST NEW PRESCRIPTIONS FOR THE ABOVE MEDICATIONS.  SHE STATES SHE RECEIVED ALL OF THE PATIENT'S PRESCRIPTIONS FROM [facility] EXCEPT THESE 2.  [facility] IS PATIENT'S MAIL DELIVERY PHARMACY.      Does patient have less than a 3 day supply:  [x] Yes  [] No    Would you like a call back once the refill request has been completed: [] Yes [] No    If the office needs to give you a call back, can they leave a voicemail: [] Yes [] No    Beth Banuelos, Kaneed Rep   04/26/24 15:41 EDT     PLEASE ADVISE,

## 2024-04-30 ENCOUNTER — TELEPHONE (OUTPATIENT)
Dept: FAMILY MEDICINE CLINIC | Facility: CLINIC | Age: 54
End: 2024-04-30

## 2024-04-30 NOTE — TELEPHONE ENCOUNTER
[place] [address] - [phone] [facility] [phone] FX    Relationship: Pharmacy    Best call back number: [phone]    Which medication are you concerned about:     omeprazole (priLOSEC) 40 MG capsul     Who prescribed you this medication:   CASSI GONZALEZ    What are your concerns:   GI DOCTOR TESSA HAS SENT IN A SCRIPT FOR THE MEDICATION AT ONCE A DAY.  SCRIPT BY LISA STATES 2 A DAY.  PLEASE ADVISE ASAP.  PATIENT OUT OF MEDICATION.

## 2024-06-13 RX ORDER — ERGOCALCIFEROL 1.25 MG/1
50000 CAPSULE ORAL WEEKLY
Qty: 4 CAPSULE | Refills: 10 | Status: SHIPPED | OUTPATIENT
Start: 2024-06-13

## 2024-06-14 DIAGNOSIS — G62.9 NEUROPATHY: ICD-10-CM

## 2024-06-17 RX ORDER — GABAPENTIN 600 MG/1
600 TABLET ORAL 3 TIMES DAILY
Qty: 90 TABLET | Refills: 3 | Status: SHIPPED | OUTPATIENT
Start: 2024-06-17

## 2024-07-10 NOTE — PROGRESS NOTES
HEMATOLOGY ONCOLOGY OUTPATIENT FOLLOW U{       Patient name: Merry Thornton  : 1970  MRN: [id number]  Primary Care Physician: Alicja Jean APRN  Referring Physician: Alicja Jean APRN  Reason For Consult:       History of Present Illness:  Patient is a 54 y.o. female who has been referred to [facility] heme-onc clinic for further evaluation and management for prior history of DVT.  She was previously being seen at [facility] cancer center by Dr. Tom.  Pertinent outside records are not available at this time.  History was obtained from the patient and summarized as follows:    Patient reported having a DVT in right leg and Right Sided Pulmonary embolism in 2020. Patient reported that she was started on Lovenox for therapeutic anticoagulation and was later transitioned to Eliquis 2.5mg BID for 6 months thereafter.  She reported that her Eliquis dose was increased to 5mg BID thereafter by her cardiologist and is continued on the same.     She has underlying history of scleroderma and SLE, She is currently on Plaquenil and Nurtec for the same.  Per Chart review, she also has suspected IBD, She was previously being followed by I, but does not have a GI provider at this time.  Most recent EGD/colonoscopy was in 2023 and showed Superficial gastric erosion, Esophageal mucosal inflammation, Colonic inflammation (non specific)    3/11/24: Patient presents for initial consultation today. She reported having chronic allergies causing Upper respiratory symptoms and Sinus congestion. Has chronic fatigue and arthralgias, however no other new symptoms reported. No GI symptoms presently.    Subjective:  Pt seen todayfor follow up. Denied any new complaints. Chronic fatigue and arthralgias are stable    Past Medical History:   Diagnosis Date    Allergic 2002    Asthma 1980    CAD (coronary artery disease) 2016    dr. singh    Carpal tunnel syndrome on right  03/08/2017    CHF (congestive heart failure) 01/01/2014    Cholelithiasis 01/01/2015    Cluster headache 2000    Common migraine 12/20/2016    COPD (chronic obstructive pulmonary disease) 12/20/2016    Cyst, ovarian 12/20/2016    mass    DDD (degenerative disc disease), cervical 03/11/2016    Deep vein thrombosis 09/05/2020    Dental caries 01/14/2019    Depression 11/02/2017    Depression, major, recurrent, moderate 08/25/2016    Dietary counseling 09/21/2017    Difficulty walking 1999    Dysphagia 05/2020    Elevated random blood glucose level 05/14/2020    Encounter for smoking cessation counseling 09/21/2017    Exacerbation of systemic lupus 04/30/2019    Fibromyalgia 12/20/2016    Fibromyalgia, primary 2001    Generalized anxiety disorder 03/11/2016    GERD (gastroesophageal reflux disease) 12/20/2016    Headache, tension-type 2014    Heartburn 11/02/2017    History of medical problems 01/01/2000    Hyperlipidemia 2017    Hypertension 03/16/2016    Hyperthyroidism 03/11/2016    Hypocalcemia 09/21/2017    IBS (irritable colon syndrome) 09/13/2016    Immobility syndrome 09/14/2017    Intracranial pressure increased 12/20/2016    Kidney stone 01/01/2014    Left knee pain 09/21/2017    Lower back pain 02/01/2018    Lung nodules 09/07/2016    Memory loss 2017    Morbid obesity 11/02/2017    Muscle cramp 09/21/2017    Neck pain 01/14/2019    Need for prophylactic vaccination and inoculation against influenza 09/21/2017    Neurogenic bladder 12/20/2016    Obesity 03/07/2017    Obesity (BMI 30-39.9) 05/14/2020    Orthostatic hypotension 07/17/2017    Osteoarthritis 03/07/2017    Osteopenia 01/14/2019    Osteoporosis 12/20/2016    Other instability, right ankle 04/13/2017    Overlap syndrome     scleroderma, lupus, Raynaud's. Mixxed connective Tissue D/o    Pain, joint, ankle, left 09/21/2017    Palpitations 10/04/2017    Paresthesia 06/12/2017    facial    Peripheral neuropathy 12/20/2016    bilateral lower extremities     Poor vision     Prediabetes     Presence of pessary     Pulmonary embolism 09/05/2020    Raynaud's syndrome 12/20/2016    Retinopathy     Right knee pain 11/08/2016    Scleroderma 12/20/2016    Seasonal allergic rhinitis 12/20/2016    Seizures 2018    SLE (systemic lupus erythematosus) 03/16/2017    Sleep apnea 11/02/2017    BIPAP    Sleep disorder 01/14/2019    Sprain of unspecified site of right knee, subsequent encounter 12/01/2016    Status post placement of implantable loop recorder 05/09/2018    presence    Syncope and collapse 2020    still has occassionally    Ulcerative colitis 02/2020    Urine incontinence     Vasovagal syncope     Ventricular arrhythmia 03/16/2016    Visit for screening mammogram 12/20/2016    Vitamin D deficiency 01/14/2019    Wheezing        Past Surgical History:   Procedure Laterality Date    ADENOIDECTOMY  01/01/2010    ANKLE SURGERY Right 02/2017    BREAST EXCISIONAL BIOPSY Right     years ago    BRONCHOSCOPY      CARDIAC ABLATION  01/2000    CARDIAC CATHETERIZATION  01/01/2016    CARDIAC ELECTROPHYSIOLOGY PROCEDURE      CARPAL TUNNEL RELEASE  2017    CHOLECYSTECTOMY      COLON SURGERY      COLONOSCOPY N/A 02/26/2020    Procedure: COLONOSCOPY WITH BIOPSY X1 AREA;  Surgeon: Michael Cheng MD;  Location: [facility] ENDOSCOPY;  Service: Gastroenterology;  Laterality: N/A;  diarrhea    ECHO - CONVERTED  2020    ENDOSCOPY      ENDOSCOPY N/A 05/21/2020    Procedure: ESOPHAGOGASTRODUODENOSCOPY WITH DILATATION (#54, 60 bougie);  Surgeon: Michael Cheng MD;  Location: [facility] ENDOSCOPY;  Service: Gastroenterology;  Laterality: N/A;  Post: candidiasis, upper esophagus sphincter stricture    HYSTERECTOMY  01/01/1997    KNEE SURGERY Right 09/2017    OTHER SURGICAL HISTORY      Urerine hysterectomy    OTHER SURGICAL HISTORY      t and a    OTHER SURGICAL HISTORY      d&c    OTHER SURGICAL HISTORY      bladder stim    OTHER SURGICAL HISTORY      Loop recorder placement    SUBTOTAL  HYSTERECTOMY  01/01/1997    TONSILLECTOMY  01/01/2010    TUBAL ABDOMINAL LIGATION  01/12/1995    WRIST SURGERY Right          Current Outpatient Medications:     albuterol sulfate  (90 Base) MCG/ACT inhaler, Inhale 2 puffs Every 4 (Four) Hours As Needed for Wheezing., Disp: 18 g, Rfl: 0    amLODIPine (NORVASC) 10 MG tablet, TAKE 1 TABLET BY MOUTH DAILY., Disp: 90 tablet, Rfl: 1    apixaban (ELIQUIS) 5 MG tablet tablet, Take 1 tablet by mouth Every 12 (Twelve) Hours., Disp: 180 tablet, Rfl: 3    cloNIDine (CATAPRES) 0.1 MG tablet, , Disp: , Rfl:     dicyclomine (BENTYL) 20 MG tablet, TAKE 1 TABLET BY MOUTH EVERY 6 (SIX) HOURS., Disp: 120 tablet, Rfl: 5    ferrous sulfate 325 (65 FE) MG tablet, Take 1 tablet by mouth Daily With Breakfast., Disp: 90 tablet, Rfl: 1    fluticasone (FLONASE) 50 MCG/ACT nasal spray, 2 sprays into the nostril(s) as directed by provider Daily., Disp: , Rfl:     furosemide (LASIX) 20 MG tablet, TAKE 2 TABS EVERY MORNING, 2 TABS EVERY EVENING AND MAY TAKE AN ADDITIONAL 1 TO 2 TABS MIDDAY AS NEEDED, Disp: 540 tablet, Rfl: 1    gabapentin (NEURONTIN) 600 MG tablet, TAKE 1 TABLET BY MOUTH THREE TIMES DAILY, Disp: 90 tablet, Rfl: 3    HYDROcodone-acetaminophen (NORCO)  MG per tablet, Take 1 tablet by mouth Every 8 (Eight) Hours As Needed for Moderate Pain., Disp: , Rfl:     hydroxychloroquine (PLAQUENIL) 200 MG tablet, , Disp: , Rfl:     ipratropium-albuterol (DUO-NEB) 0.5-2.5 mg/3 ml nebulizer, Take 3 mL by nebulization 4 (Four) Times a Day., Disp: 360 mL, Rfl: 1    levocetirizine (XYZAL) 5 MG tablet, TAKE 1 TABLET BY MOUTH EVERY EVENING., Disp: 90 tablet, Rfl: 3    lidocaine (LIDODERM) 5 %, Place 1 patch on the skin as directed by provider Daily. Remove & Discard patch within 12 hours or as directed by MD, Disp: , Rfl:     lisinopril (PRINIVIL,ZESTRIL) 20 MG tablet, Take 1 tablet by mouth 2 (Two) Times a Day., Disp: 60 tablet, Rfl: 2    metoclopramide (REGLAN) 10 MG tablet, TAKE 1  TABLET BY MOUTH 4 (FOUR) TIMES A DAY., Disp: 120 tablet, Rfl: 5    metoprolol tartrate (LOPRESSOR) 50 MG tablet, TAKE ONE TABLET BY MOUTH TWO TIMES A DAY, Disp: 180 tablet, Rfl: 3    montelukast (SINGULAIR) 10 MG tablet, Take 1 tablet by mouth Every Night., Disp: , Rfl:     naloxone (NARCAN) 4 MG/0.1ML nasal spray, 1 actuation in one nostril x1; may repeat dose every 2-3 min until pt responsive or EMS arrives; in case of opiate emergency (Patient not taking: Reported on 3/11/2024), Disp: , Rfl:     omeprazole (priLOSEC) 40 MG capsule, TAKE 1 CAPSULE BY MOUTH 2 TIMES A DAY., Disp: 180 capsule, Rfl: 3    ondansetron (ZOFRAN) 4 MG tablet, Take 1 tablet by mouth Every 6 (Six) Hours As Needed for Nausea or Vomiting., Disp: 40 tablet, Rfl: 0    orphenadrine (NORFLEX) 100 MG 12 hr tablet, Take 1 tablet by mouth 3 (Three) Times a Day. As needed, Disp: , Rfl:     potassium chloride (K-DUR,KLOR-CON) 10 MEQ CR tablet, , Disp: , Rfl:     predniSONE (DELTASONE) 10 MG tablet, TAKE 1 TABLET BY MOUTH DAILY., Disp: 30 tablet, Rfl: 5    Rimegepant Sulfate (Nurtec) 75 MG tablet dispersible tablet, Take 1 tablet by mouth Every Other Day., Disp: 16 tablet, Rfl: 5    Semaglutide,0.25 or 0.5MG/DOS, (OZEMPIC) 2 MG/3ML solution pen-injector, Inject 0.5 mg under the skin into the appropriate area as directed Every 7 (Seven) Days., Disp: , Rfl:     sennosides-docusate (senna-docusate sodium) 8.6-50 MG per tablet, Take 1 tablet by mouth Daily., Disp: 90 tablet, Rfl: 1    theophylline (IVELISSE-24) 200 MG 24 hr capsule, Take 1 capsule by mouth Daily., Disp: , Rfl:     vilazodone (VIIBRYD) 40 MG tablet tablet, TAKE 1 TABLET BY MOUTH EVERY NIGHT AT BEDTIME., Disp: 30 tablet, Rfl: 5    vitamin D (ERGOCALCIFEROL) 1.25 MG (20296 UT) capsule capsule, TAKE 1 CAPSULE BY MOUTH EVERY WEEK, Disp: 4 capsule, Rfl: 10    Allergies   Allergen Reactions    Adhesive Tape Other (See Comments)     Pt ok with paper tape    [facility]    Alendronate  Rash and Hives    Bupropion Shortness Of Breath    Morphine Other (See Comments), Rash and Swelling     Abstracted from [facility]    Amoxicillin-Pot Clavulanate Diarrhea    Buspar [Buspirone] Other (See Comments)     Abstracted from [facility]    Alendronate Sodium Other (See Comments)    Hydroxyzine Rash       Family History   Problem Relation Age of Onset    Cancer Mother     Migraines Mother     Anxiety disorder Mother     Arthritis Mother     Mental illness Mother     Heart disease Father     Lung disease Father     Neuropathy Father     Alcohol abuse Father     Asthma Father     COPD Father     Diabetes Father     Hyperlipidemia Father     Vision loss Father     Diabetes Sister     Heart disease Sister     Hypertension Sister     Other Sister         weight disorder    Migraines Sister     Stroke Sister     Anxiety disorder Sister     Drug abuse Sister     Mental illness Sister     Migraines Sister     Neuropathy Brother     Neuropathy Brother     Seizures Brother         Epilepsy    Asthma Brother     Diabetes Brother     Thyroid disease Daughter        Cancer-related family history includes Cancer in her mother.      Social History     Tobacco Use    Smoking status: Every Day     Current packs/day: 0.75     Average packs/day: 0.8 packs/day for 40.2 years (30.1 ttl pk-yrs)     Types: Cigarettes     Start date: 5/1/1984    Smokeless tobacco: Former     Quit date: 5/11/2020    Tobacco comments:     In the process of trying to quit   Vaping Use    Vaping status: Never Used   Substance Use Topics    Alcohol use: No    Drug use: Not Currently     Frequency: 1.0 times per week     Types: Marijuana     Comment: stopped 2 months ago     Social History     Social History Narrative    Not on file       ROS:   Review of Systems   Constitutional:  Positive for fatigue.   HENT: Negative.     Eyes: Negative.    Respiratory: Negative.     Cardiovascular: Negative.    Gastrointestinal: Negative.    Endocrine: Negative.   "  Genitourinary: Negative.    Musculoskeletal:  Positive for arthralgias.   Skin: Negative.    Allergic/Immunologic: Negative.    Neurological: Negative.    Hematological: Negative.    Psychiatric/Behavioral: Negative.           Objective:    Vital Signs:  Vitals:    07/11/24 1522   BP: 139/85   Pulse: 91   SpO2: 96%   Weight: 89.4 kg (197 lb 3.2 oz)   Height: 165.1 cm (65\")   PainSc: 0-No pain       Body mass index is 32.82 kg/m².    ECOG  (1) Restricted in physically strenuous activity, ambulatory and able to do work of light nature    Physical Exam:   Physical Exam  Constitutional:       Appearance: Normal appearance. She is normal weight.   HENT:      Head: Normocephalic and atraumatic.      Right Ear: External ear normal.      Left Ear: External ear normal.      Nose: Nose normal.      Mouth/Throat:      Mouth: Mucous membranes are moist.      Pharynx: Oropharynx is clear.   Eyes:      Extraocular Movements: Extraocular movements intact.      Conjunctiva/sclera: Conjunctivae normal.      Pupils: Pupils are equal, round, and reactive to light.   Cardiovascular:      Rate and Rhythm: Normal rate.      Pulses: Normal pulses.   Pulmonary:      Effort: Pulmonary effort is normal.   Abdominal:      General: Abdomen is flat.      Palpations: Abdomen is soft.   Musculoskeletal:         General: Normal range of motion.      Cervical back: Normal range of motion and neck supple.   Skin:     General: Skin is warm.   Neurological:      Mental Status: She is alert.   Psychiatric:         Mood and Affect: Mood normal.         Behavior: Behavior normal.         Thought Content: Thought content normal.         Judgment: Judgment normal.         Lab Results - Last 18 Months   Lab Units 04/01/24  0832 02/16/24  1150   WBC 10*3/mm3 11.16* 10.02   HEMOGLOBIN g/dL 12.9 14.1   HEMATOCRIT % 40.3 42.3   PLATELETS 10*3/mm3 179 207   MCV fL 89.0 85.8     Lab Results - Last 18 Months   Lab Units 03/27/24  1710 02/16/24  1150   SODIUM " "mmol/L  --  142   POTASSIUM mmol/L  --  3.7   CHLORIDE mmol/L  --  104   CO2 mmol/L  --  25.6   BUN mg/dL  --  9   CREATININE mg/dL 0.60 0.69   CALCIUM mg/dL  --  9.2   BILIRUBIN mg/dL  --  0.2   ALK PHOS U/L  --  87   ALT (SGPT) U/L  --  8   AST (SGOT) U/L  --  13   GLUCOSE mg/dL  --  102*       Lab Results   Component Value Date    GLUCOSE 102 (H) 02/16/2024    BUN 9 02/16/2024    CREATININE 0.60 03/27/2024    EGFRIFNONA 83 02/02/2021    EGFRIFAFRI >60 06/01/2022    BCR 13.0 02/16/2024    K 3.7 02/16/2024    CO2 25.6 02/16/2024    CALCIUM 9.2 02/16/2024    ALBUMIN 3.9 02/16/2024    LABIL2 1.4 05/16/2019    AST 13 02/16/2024    ALT 8 02/16/2024       Lab Results - Last 18 Months   Lab Units 04/01/24  0832   INR  0.99   APTT seconds 26.6       Lab Results   Component Value Date    IRON 33 (L) 03/11/2024    TIBC 374 03/11/2024    FERRITIN 84.13 03/11/2024       Lab Results   Component Value Date    FOLATE 5.54 03/11/2024       No results found for: \"OCCULTBLD\"    No results found for: \"RETICCTPCT\"  Lab Results   Component Value Date    TECQOCLP44 318 03/11/2024     No results found for: \"SPEP\", \"UPEP\"  LDH   Date Value Ref Range Status   05/17/2020 313 (H) 135 - 214 U/L Final     Comment:     Specimen hemolyzed.  Results may be affected.     Lab Results   Component Value Date    ROBBY POSITIVE (A) 03/11/2017    SEDRATE 11 02/01/2018     Lab Results   Component Value Date    FIBRINOGEN 339 05/17/2020     Lab Results   Component Value Date    PTT 26.6 04/01/2024    INR 0.99 04/01/2024     No results found for: \"\"  No results found for: \"CEA\"  No components found for: \"CA-19-9\"  No results found for: \"PSA\"  Lab Results   Component Value Date    SEDRATE 11 02/01/2018          Assessment & Plan     Right Lower Extremity DVT and Right sided PE:  -Outside hematology records not available at this time. History obtained from patient and review of other medical records.  -Patient appears to have had unprovoked DVT and PE.  " Prior hypercoagulable workup not available, however other records suggestive of possible protein C deficiency.  -Discussed with the patient that timing of hypercoagulable workup can be important following diagnosis of VTE and protein S levels can be falsely low if checked immediately after DVT diagnosis.  -Regardless, in view of patient's history of autoimmune disorders she is relatively high risk for recurrent DVT and PE and it is appropriate in my view to continue with indefinite anticoagulation.  No indication for repeat thrombophilia workup at this time.  -Patient is currently on Eliquis 5 Mg twice daily.  Will recommend continuation of the same.    Iron Deficiency anemia:  Iron panel and ferritin consistent with DACIA, despite patient being on oral iron for 3 months.  She reported intermittent compliance with oral iron so far,   Counseled on daily compliance.   Will schedule her for IV iron with Ferumoxytol x 2 doses.  Will recheck levels again on follow up.     -Will continue to monitor CBC and D-dimer moving forward.    Follow-up with APRN/PA in 6 months with repeat labs, sooner as needed.      Thank you very much for providing the opportunity to participate in this patient’s care. Please do not hesitate to call if there are any other questions.

## 2024-07-11 ENCOUNTER — LAB (OUTPATIENT)
Dept: LAB | Facility: HOSPITAL | Age: 54
End: 2024-07-11
Payer: MEDICAID

## 2024-07-11 ENCOUNTER — OFFICE VISIT (OUTPATIENT)
Dept: ONCOLOGY | Facility: CLINIC | Age: 54
End: 2024-07-11
Payer: MEDICAID

## 2024-07-11 ENCOUNTER — TELEPHONE (OUTPATIENT)
Dept: ONCOLOGY | Facility: CLINIC | Age: 54
End: 2024-07-11
Payer: MEDICAID

## 2024-07-11 VITALS
HEART RATE: 91 BPM | WEIGHT: 197.2 LBS | SYSTOLIC BLOOD PRESSURE: 139 MMHG | DIASTOLIC BLOOD PRESSURE: 85 MMHG | HEIGHT: 65 IN | OXYGEN SATURATION: 96 % | BODY MASS INDEX: 32.86 KG/M2

## 2024-07-11 DIAGNOSIS — D50.0 IRON DEFICIENCY ANEMIA DUE TO CHRONIC BLOOD LOSS: ICD-10-CM

## 2024-07-11 DIAGNOSIS — D64.9 ANEMIA, UNSPECIFIED TYPE: ICD-10-CM

## 2024-07-11 DIAGNOSIS — I82.4Y1 ACUTE DEEP VEIN THROMBOSIS (DVT) OF PROXIMAL VEIN OF RIGHT LOWER EXTREMITY: Primary | ICD-10-CM

## 2024-07-11 LAB
BASOPHILS # BLD AUTO: 0.01 10*3/MM3 (ref 0–0.2)
BASOPHILS NFR BLD AUTO: 0.1 % (ref 0–1.5)
D DIMER PPP FEU-MCNC: 0.29 MG/L (FEU) (ref 0–0.54)
DEPRECATED RDW RBC AUTO: 49.9 FL (ref 37–54)
EOSINOPHIL # BLD AUTO: 0.16 10*3/MM3 (ref 0–0.4)
EOSINOPHIL NFR BLD AUTO: 1.5 % (ref 0.3–6.2)
ERYTHROCYTE [DISTWIDTH] IN BLOOD BY AUTOMATED COUNT: 15.4 % (ref 12.3–15.4)
FERRITIN SERPL-MCNC: 76.6 NG/ML (ref 13–150)
HCT VFR BLD AUTO: 42.2 % (ref 34–46.6)
HGB BLD-MCNC: 13.7 G/DL (ref 12–15.9)
HOLD SPECIMEN: NORMAL
HOLD SPECIMEN: NORMAL
IRON 24H UR-MRATE: 30 MCG/DL (ref 37–145)
IRON SATN MFR SERPL: 8 % (ref 20–50)
LYMPHOCYTES # BLD AUTO: 2.95 10*3/MM3 (ref 0.7–3.1)
LYMPHOCYTES NFR BLD AUTO: 28 % (ref 19.6–45.3)
MCH RBC QN AUTO: 29.1 PG (ref 26.6–33)
MCHC RBC AUTO-ENTMCNC: 32.5 G/DL (ref 31.5–35.7)
MCV RBC AUTO: 89.8 FL (ref 79–97)
MONOCYTES # BLD AUTO: 0.67 10*3/MM3 (ref 0.1–0.9)
MONOCYTES NFR BLD AUTO: 6.4 % (ref 5–12)
NEUTROPHILS NFR BLD AUTO: 6.76 10*3/MM3 (ref 1.7–7)
NEUTROPHILS NFR BLD AUTO: 64 % (ref 42.7–76)
PLATELET # BLD AUTO: 182 10*3/MM3 (ref 140–450)
PMV BLD AUTO: 10.7 FL (ref 6–12)
RBC # BLD AUTO: 4.7 10*6/MM3 (ref 3.77–5.28)
TIBC SERPL-MCNC: 365 MCG/DL (ref 298–536)
TRANSFERRIN SERPL-MCNC: 245 MG/DL (ref 200–360)
WBC NRBC COR # BLD AUTO: 10.55 10*3/MM3 (ref 3.4–10.8)

## 2024-07-11 PROCEDURE — 84466 ASSAY OF TRANSFERRIN: CPT | Performed by: STUDENT IN AN ORGANIZED HEALTH CARE EDUCATION/TRAINING PROGRAM

## 2024-07-11 PROCEDURE — 85025 COMPLETE CBC W/AUTO DIFF WBC: CPT

## 2024-07-11 PROCEDURE — 85379 FIBRIN DEGRADATION QUANT: CPT | Performed by: STUDENT IN AN ORGANIZED HEALTH CARE EDUCATION/TRAINING PROGRAM

## 2024-07-11 PROCEDURE — 82728 ASSAY OF FERRITIN: CPT | Performed by: STUDENT IN AN ORGANIZED HEALTH CARE EDUCATION/TRAINING PROGRAM

## 2024-07-11 PROCEDURE — 83540 ASSAY OF IRON: CPT | Performed by: STUDENT IN AN ORGANIZED HEALTH CARE EDUCATION/TRAINING PROGRAM

## 2024-07-11 PROCEDURE — 36415 COLL VENOUS BLD VENIPUNCTURE: CPT

## 2024-07-11 RX ORDER — LISINOPRIL 20 MG/1
20 TABLET ORAL 2 TIMES DAILY
Qty: 60 TABLET | Refills: 10 | Status: SHIPPED | OUTPATIENT
Start: 2024-07-11

## 2024-07-11 NOTE — TELEPHONE ENCOUNTER
Dr Jones would like pt to come in sooner today if possible. I LVM asking pt if they could arrive at 2:45 instead of 3:30.

## 2024-07-14 PROBLEM — D50.0 IRON DEFICIENCY ANEMIA DUE TO CHRONIC BLOOD LOSS: Status: ACTIVE | Noted: 2024-07-14

## 2024-07-14 RX ORDER — SODIUM CHLORIDE 9 MG/ML
20 INJECTION, SOLUTION INTRAVENOUS ONCE
OUTPATIENT
Start: 2024-07-29

## 2024-07-14 RX ORDER — SODIUM CHLORIDE 9 MG/ML
20 INJECTION, SOLUTION INTRAVENOUS ONCE
OUTPATIENT
Start: 2024-07-22

## 2024-07-15 ENCOUNTER — TELEPHONE (OUTPATIENT)
Dept: ONCOLOGY | Facility: CLINIC | Age: 54
End: 2024-07-15
Payer: MEDICAID

## 2024-07-15 NOTE — TELEPHONE ENCOUNTER
Caller: Merry Thornton    Relationship to patient: Self    Best call back number: [phone]     Chief complaint: REQUESTING TO RESCHEDULE 7/29/24 APPT    Type of visit:INFUSION    Requested date: CAN BE SAME REQUESTING EARLIER IN DAY

## 2024-07-16 ENCOUNTER — TELEPHONE (OUTPATIENT)
Dept: ONCOLOGY | Facility: CLINIC | Age: 54
End: 2024-07-16
Payer: MEDICAID

## 2024-07-16 ENCOUNTER — TELEPHONE (OUTPATIENT)
Dept: FAMILY MEDICINE CLINIC | Facility: CLINIC | Age: 54
End: 2024-07-16

## 2024-07-16 NOTE — TELEPHONE ENCOUNTER
Caller: Merry Thornton    Relationship: Self    Best call back number: [phone]     What was the call regarding: PATIENT STATES THAT SHE SAW DR FLOWERS, AND WAS ADVISED THAT SHE NEEDS INFUSIONS FOR IRON  DEFICIENCY. MEANWHILE, SHE DEVELOPED WHAT STARTED OUT SIMILAR TO A BLOOD BLISTER BELOW HER EYE, AND NOW LOOKS LIKE A BLACK EYE WITH NO REASON. SHE WAS ADVISED TO FOLLOW UP WITH PCP. PLEASE CALL TO FOLLOW UP

## 2024-07-16 NOTE — TELEPHONE ENCOUNTER
----- Message from Reji Jones sent at 7/15/2024  3:21 PM EDT -----  Regarding: RE: IV iron supplementation  She should reach out to her PCP or Ophthalmology for her eye symptoms. I am not sure how significant this is, but should not be ignored given that she is on Eliquis.    @April: Will appreciate it if you could touch base with her about this eye blister when you get a chance.       Thank you  ----- Message -----  From: Natasha Saxena RegSched Rep  Sent: 7/15/2024   2:20 PM EDT  To: Reji Jones MD  Subject: RE: IV iron supplementation                      Infusions scheduled with patient. She also wanted you to know she had a random black blister show up under her eye yesterday. She went to visit her sister and in the course of the visit, her eye turned black without any reason.  ----- Message -----  From: Reji Jones MD  Sent: 7/14/2024  10:57 PM EDT  To: Malia Portillo RN; #  Subject: IV iron supplementation                          Merry has Iron deficiency anemia and needs IV iron. I have entered a care plan for IV Ferumoxytol x 2 doses. Please scheduled her for it.    Thanks.

## 2024-07-16 NOTE — TELEPHONE ENCOUNTER
Phoned pt, she states she has a black spot under her eye that appeared 2 days ago. It is not painful and she did not injure her eye but is taking Eliquis. Dr. Jones notfied.

## 2024-07-16 NOTE — TELEPHONE ENCOUNTER
Spoke with patient.  She was advised to let PCP know what was going on, but Dr. Jones is taking care of her needs at the moment.  She will come in for her scheduled visit on 8/16.

## 2024-07-22 ENCOUNTER — HOSPITAL ENCOUNTER (OUTPATIENT)
Dept: ONCOLOGY | Facility: HOSPITAL | Age: 54
Discharge: HOME | End: 2024-07-22
Admitting: STUDENT IN AN ORGANIZED HEALTH CARE EDUCATION/TRAINING PROGRAM
Payer: MEDICAID

## 2024-07-22 VITALS
SYSTOLIC BLOOD PRESSURE: 122 MMHG | OXYGEN SATURATION: 97 % | DIASTOLIC BLOOD PRESSURE: 83 MMHG | TEMPERATURE: 97.9 F | HEIGHT: 65 IN | BODY MASS INDEX: 32.92 KG/M2 | HEART RATE: 81 BPM | RESPIRATION RATE: 14 BRPM | WEIGHT: 197.6 LBS

## 2024-07-22 DIAGNOSIS — D50.0 IRON DEFICIENCY ANEMIA DUE TO CHRONIC BLOOD LOSS: Primary | ICD-10-CM

## 2024-07-22 PROCEDURE — 96374 THER/PROPH/DIAG INJ IV PUSH: CPT

## 2024-07-22 PROCEDURE — 25810000003 SODIUM CHLORIDE 0.9 % SOLUTION: Performed by: STUDENT IN AN ORGANIZED HEALTH CARE EDUCATION/TRAINING PROGRAM

## 2024-07-22 PROCEDURE — 96365 THER/PROPH/DIAG IV INF INIT: CPT

## 2024-07-22 PROCEDURE — 25010000002 FERUMOXYTOL 510 MG/17ML SOLUTION 17 ML VIAL: Performed by: STUDENT IN AN ORGANIZED HEALTH CARE EDUCATION/TRAINING PROGRAM

## 2024-07-22 RX ORDER — SODIUM CHLORIDE 9 MG/ML
20 INJECTION, SOLUTION INTRAVENOUS ONCE
Status: COMPLETED | OUTPATIENT
Start: 2024-07-22 | End: 2024-07-22

## 2024-07-22 RX ADMIN — FERUMOXYTOL 510 MG: 510 INJECTION INTRAVENOUS at 14:47

## 2024-07-22 RX ADMIN — SODIUM CHLORIDE 20 ML/HR: 9 INJECTION, SOLUTION INTRAVENOUS at 14:46

## 2024-07-22 NOTE — PROGRESS NOTES
Pt here for Ferraheme infusion. Pt denies having iron infusion in the past. PIV started with blood return noted and flushed with NS. Pt informed that this infusion could effect the ruslts of an MRI for up to 3 months. Pt reports that she has a MRI on 07/25. Pt decided that she would recheduled her MRI and to proceed with infusion today. Pt tolerated infusion without any difficulties. Pt observed for 30 minutes without any reactions. Vitals WNL and IV removed. Pt given AVS and d/c by s/o.

## 2024-07-29 ENCOUNTER — HOSPITAL ENCOUNTER (OUTPATIENT)
Dept: ONCOLOGY | Facility: HOSPITAL | Age: 54
Discharge: HOME | End: 2024-07-29
Admitting: STUDENT IN AN ORGANIZED HEALTH CARE EDUCATION/TRAINING PROGRAM
Payer: MEDICAID

## 2024-07-29 VITALS
HEART RATE: 75 BPM | OXYGEN SATURATION: 97 % | SYSTOLIC BLOOD PRESSURE: 120 MMHG | HEIGHT: 65 IN | DIASTOLIC BLOOD PRESSURE: 77 MMHG | WEIGHT: 197 LBS | TEMPERATURE: 97 F | BODY MASS INDEX: 32.82 KG/M2 | RESPIRATION RATE: 16 BRPM

## 2024-07-29 DIAGNOSIS — D50.0 IRON DEFICIENCY ANEMIA DUE TO CHRONIC BLOOD LOSS: Primary | ICD-10-CM

## 2024-07-29 PROCEDURE — 96365 THER/PROPH/DIAG IV INF INIT: CPT

## 2024-07-29 PROCEDURE — 25810000003 SODIUM CHLORIDE 0.9 % SOLUTION: Performed by: STUDENT IN AN ORGANIZED HEALTH CARE EDUCATION/TRAINING PROGRAM

## 2024-07-29 PROCEDURE — 96374 THER/PROPH/DIAG INJ IV PUSH: CPT

## 2024-07-29 PROCEDURE — 25010000002 FERUMOXYTOL 510 MG/17ML SOLUTION 17 ML VIAL: Performed by: STUDENT IN AN ORGANIZED HEALTH CARE EDUCATION/TRAINING PROGRAM

## 2024-07-29 RX ORDER — SODIUM CHLORIDE 9 MG/ML
20 INJECTION, SOLUTION INTRAVENOUS ONCE
Status: COMPLETED | OUTPATIENT
Start: 2024-07-29 | End: 2024-07-29

## 2024-07-29 RX ADMIN — FERUMOXYTOL 510 MG: 510 INJECTION INTRAVENOUS at 14:48

## 2024-07-29 RX ADMIN — SODIUM CHLORIDE 20 ML/HR: 9 INJECTION, SOLUTION INTRAVENOUS at 14:43

## 2024-08-01 RX ORDER — AMOXICILLIN 250 MG
1 CAPSULE ORAL DAILY
Qty: 90 TABLET | Refills: 1 | Status: SHIPPED | OUTPATIENT
Start: 2024-08-01

## 2024-08-01 NOTE — TELEPHONE ENCOUNTER
Caller: ALYSSIA    Relationship: [facility]    Best call back number: [phone]     Requested Prescriptions:   Requested Prescriptions     Pending Prescriptions Disp Refills    sennosides-docusate (senna-docusate sodium) 8.6-50 MG per tablet 90 tablet 1     Sig: Take 1 tablet by mouth Daily.        Pharmacy where request should be sent: [address] [phone] [facility] [phone]      Last office visit with prescribing clinician: 7/11/2024   Last telemedicine visit with prescribing clinician: Visit date not found   Next office visit with prescribing clinician: Visit date not found     Additional details provided by patient:     Does the patient have less than a 3 day supply:  [] Yes  [x] No    Would you like a call back once the refill request has been completed: [] Yes [x] No    If the office needs to give you a call back, can they leave a voicemail: [] Yes [x] No

## 2024-08-16 ENCOUNTER — OFFICE VISIT (OUTPATIENT)
Dept: PSYCHIATRY | Facility: CLINIC | Age: 54
End: 2024-08-16
Payer: MEDICAID

## 2024-08-16 ENCOUNTER — OFFICE VISIT (OUTPATIENT)
Dept: FAMILY MEDICINE CLINIC | Facility: CLINIC | Age: 54
End: 2024-08-16
Payer: MEDICAID

## 2024-08-16 VITALS
WEIGHT: 196 LBS | SYSTOLIC BLOOD PRESSURE: 138 MMHG | DIASTOLIC BLOOD PRESSURE: 86 MMHG | HEART RATE: 86 BPM | HEIGHT: 65 IN | BODY MASS INDEX: 32.65 KG/M2 | RESPIRATION RATE: 18 BRPM | OXYGEN SATURATION: 97 % | TEMPERATURE: 97.8 F

## 2024-08-16 DIAGNOSIS — G43.019 MIGRAINE WITHOUT AURA, INTRACTABLE: Primary | Chronic | ICD-10-CM

## 2024-08-16 DIAGNOSIS — F33.1 MAJOR DEPRESSIVE DISORDER, RECURRENT EPISODE, MODERATE: Primary | Chronic | ICD-10-CM

## 2024-08-16 DIAGNOSIS — H53.9 VISUAL DISTURBANCE: ICD-10-CM

## 2024-08-16 DIAGNOSIS — G93.2 PSEUDOTUMOR CEREBRI: ICD-10-CM

## 2024-08-16 DIAGNOSIS — F41.1 GENERALIZED ANXIETY DISORDER: Chronic | ICD-10-CM

## 2024-08-16 DIAGNOSIS — G43.801 OCULAR MIGRAINE WITH STATUS MIGRAINOSUS: ICD-10-CM

## 2024-08-16 RX ORDER — BUDESONIDE, GLYCOPYRROLATE, AND FORMOTEROL FUMARATE 160; 9; 4.8 UG/1; UG/1; UG/1
2 AEROSOL, METERED RESPIRATORY (INHALATION) 2 TIMES DAILY
COMMUNITY

## 2024-08-16 RX ORDER — VILAZODONE HYDROCHLORIDE 40 MG/1
40 TABLET ORAL
Qty: 90 TABLET | Refills: 3 | Status: SHIPPED | OUTPATIENT
Start: 2024-08-16

## 2024-08-16 NOTE — PROGRESS NOTES
Subjective   Merry Thornton is a 54 y.o. female.     History of Present Illness  Pt presents to follow up on her chronic medical problems.  She has MRI lumbar spine on 26th at [facility].    She has been seeing hematology for iron deficiency and recently started iron infusions.  Has had 2 so far.  She is having colonoscopy and endoscopy  with [facility] soon.  Still getting headaches frequently and so bad she vomits. Sees Dr. Seipel for this and he has her on Nurtec but still getting headache.  Last hemoglobin A1C in March with Dr. Pineda.    Seeing Dr. Jesus-had lung cancer screening in January at [facility].    She is seeing Dr. Conway who prescribes Viibryd but not sure if helpful.         Past Medical History:   Diagnosis Date    Allergic 01/01/2002    Asthma 01/01/1980    CAD (coronary artery disease) 12/20/2016    dr. singh    Carpal tunnel syndrome on right 03/08/2017    CHF (congestive heart failure) 01/01/2014    Cholelithiasis 01/01/2015    Cluster headache 2000    Common migraine 12/20/2016    COPD (chronic obstructive pulmonary disease) 12/20/2016    Cyst, ovarian 12/20/2016    mass    DDD (degenerative disc disease), cervical 03/11/2016    Deep vein thrombosis 09/05/2020    Dental caries 01/14/2019    Depression 11/02/2017    Depression, major, recurrent, moderate 08/25/2016    Dietary counseling 09/21/2017    Difficulty walking 1999    Dysphagia 05/2020    Elevated random blood glucose level 05/14/2020    Encounter for smoking cessation counseling 09/21/2017    Exacerbation of systemic lupus 04/30/2019    Fibromyalgia 12/20/2016    Fibromyalgia, primary 2001    Generalized anxiety disorder 03/11/2016    GERD (gastroesophageal reflux disease) 12/20/2016    Headache, tension-type 2014    Heartburn 11/02/2017    History of medical problems 01/01/2000    Hyperlipidemia 2017    Hypertension 03/16/2016    Hyperthyroidism 03/11/2016    Hypocalcemia 09/21/2017    IBS (irritable colon syndrome) 09/13/2016     Immobility syndrome 09/14/2017    Intracranial pressure increased 12/20/2016    Kidney stone 01/01/2014    Left knee pain 09/21/2017    Lower back pain 02/01/2018    Lung nodules 09/07/2016    Memory loss 2017    Morbid obesity 11/02/2017    Muscle cramp 09/21/2017    Neck pain 01/14/2019    Need for prophylactic vaccination and inoculation against influenza 09/21/2017    Neurogenic bladder 12/20/2016    Obesity 03/07/2017    Obesity (BMI 30-39.9) 05/14/2020    Orthostatic hypotension 07/17/2017    Osteoarthritis 03/07/2017    Osteopenia 01/14/2019    Osteoporosis 12/20/2016    Other instability, right ankle 04/13/2017    Overlap syndrome     scleroderma, lupus, Raynaud's. Mixxed connective Tissue D/o    Pain, joint, ankle, left 09/21/2017    Palpitations 10/04/2017    Paresthesia 06/12/2017    facial    Peripheral neuropathy 12/20/2016    bilateral lower extremities    Poor vision     Prediabetes     Presence of pessary     Pulmonary embolism 09/05/2020    Raynaud's syndrome 12/20/2016    Retinopathy     Right knee pain 11/08/2016    Scleroderma 12/20/2016    Seasonal allergic rhinitis 12/20/2016    Seizures 2018    SLE (systemic lupus erythematosus) 03/16/2017    Sleep apnea 11/02/2017    BIPAP    Sleep disorder 01/14/2019    Sprain of unspecified site of right knee, subsequent encounter 12/01/2016    Status post placement of implantable loop recorder 05/09/2018    presence    Syncope and collapse 2020    still has occassionally    Ulcerative colitis 02/2020    Urine incontinence     Vasovagal syncope     Ventricular arrhythmia 03/16/2016    Visit for screening mammogram 12/20/2016    Vitamin D deficiency 01/14/2019    Wheezing      Past Surgical History:   Procedure Laterality Date    ADENOIDECTOMY  01/01/2010    ANKLE SURGERY Right 02/2017    BREAST EXCISIONAL BIOPSY Right     years ago    BRONCHOSCOPY      CARDIAC ABLATION  01/2000    CARDIAC CATHETERIZATION  01/01/2016    CARDIAC ELECTROPHYSIOLOGY PROCEDURE       CARPAL TUNNEL RELEASE  2017    CHOLECYSTECTOMY      COLON SURGERY      COLONOSCOPY N/A 02/26/2020    Procedure: COLONOSCOPY WITH BIOPSY X1 AREA;  Surgeon: Michael Cheng MD;  Location: [facility] ENDOSCOPY;  Service: Gastroenterology;  Laterality: N/A;  diarrhea    ECHO - CONVERTED  2020    ENDOSCOPY      ENDOSCOPY N/A 05/21/2020    Procedure: ESOPHAGOGASTRODUODENOSCOPY WITH DILATATION (#54, 60 bougie);  Surgeon: Michael Cheng MD;  Location: [facility] ENDOSCOPY;  Service: Gastroenterology;  Laterality: N/A;  Post: candidiasis, upper esophagus sphincter stricture    HYSTERECTOMY  01/01/1997    KNEE SURGERY Right 09/2017    OTHER SURGICAL HISTORY      Urerine hysterectomy    OTHER SURGICAL HISTORY      t and a    OTHER SURGICAL HISTORY      d&c    OTHER SURGICAL HISTORY      bladder stim    OTHER SURGICAL HISTORY      Loop recorder placement    SUBTOTAL HYSTERECTOMY  01/01/1997    TONSILLECTOMY  01/01/2010    TUBAL ABDOMINAL LIGATION  01/12/1995    WRIST SURGERY Right      Family History   Problem Relation Age of Onset    Cancer Mother     Migraines Mother     Anxiety disorder Mother     Arthritis Mother     Mental illness Mother     Heart disease Father     Lung disease Father     Neuropathy Father     Alcohol abuse Father     Asthma Father     COPD Father     Diabetes Father     Hyperlipidemia Father     Vision loss Father     Diabetes Sister     Heart disease Sister     Hypertension Sister     Other Sister         weight disorder    Migraines Sister     Stroke Sister     Anxiety disorder Sister     Drug abuse Sister     Mental illness Sister     Migraines Sister     Neuropathy Brother     Neuropathy Brother     Seizures Brother         Epilepsy    Asthma Brother     Diabetes Brother     Thyroid disease Daughter      Social History     Socioeconomic History    Marital status: Single   Tobacco Use    Smoking status: Every Day     Current packs/day: 0.75     Average packs/day: 0.7 packs/day for 40.3 years (30.2 ttl  pk-yrs)     Types: Cigarettes     Start date: 5/1/1984    Smokeless tobacco: Former     Quit date: 5/11/2020    Tobacco comments:     In the process of trying to quit   Vaping Use    Vaping status: Never Used   Substance and Sexual Activity    Alcohol use: No    Drug use: Not Currently     Frequency: 1.0 times per week     Types: Marijuana     Comment: stopped 2 months ago    Sexual activity: Not Currently     Partners: Male     Birth control/protection: Post-menopausal, Tubal ligation, Hysterectomy     Comment: Partial         Current Outpatient Medications:     albuterol sulfate  (90 Base) MCG/ACT inhaler, Inhale 2 puffs Every 4 (Four) Hours As Needed for Wheezing., Disp: 18 g, Rfl: 0    amLODIPine (NORVASC) 10 MG tablet, TAKE 1 TABLET BY MOUTH DAILY., Disp: 90 tablet, Rfl: 1    apixaban (ELIQUIS) 5 MG tablet tablet, Take 1 tablet by mouth Every 12 (Twelve) Hours., Disp: 180 tablet, Rfl: 3    cloNIDine (CATAPRES) 0.1 MG tablet, , Disp: , Rfl:     dicyclomine (BENTYL) 20 MG tablet, TAKE 1 TABLET BY MOUTH EVERY 6 (SIX) HOURS., Disp: 120 tablet, Rfl: 5    ferrous sulfate 325 (65 FE) MG tablet, Take 1 tablet by mouth Daily With Breakfast., Disp: 90 tablet, Rfl: 1    fluticasone (FLONASE) 50 MCG/ACT nasal spray, 2 sprays into the nostril(s) as directed by provider Daily., Disp: , Rfl:     furosemide (LASIX) 20 MG tablet, TAKE 2 TABS EVERY MORNING, 2 TABS EVERY EVENING AND MAY TAKE AN ADDITIONAL 1 TO 2 TABS MIDDAY AS NEEDED, Disp: 540 tablet, Rfl: 1    gabapentin (NEURONTIN) 600 MG tablet, TAKE 1 TABLET BY MOUTH THREE TIMES DAILY, Disp: 90 tablet, Rfl: 3    HYDROcodone-acetaminophen (NORCO)  MG per tablet, Take 1 tablet by mouth Every 8 (Eight) Hours As Needed for Moderate Pain., Disp: , Rfl:     hydroxychloroquine (PLAQUENIL) 200 MG tablet, , Disp: , Rfl:     ipratropium-albuterol (DUO-NEB) 0.5-2.5 mg/3 ml nebulizer, Take 3 mL by nebulization 4 (Four) Times a Day., Disp: 360 mL, Rfl: 1    levocetirizine  (XYZAL) 5 MG tablet, TAKE 1 TABLET BY MOUTH EVERY EVENING., Disp: 90 tablet, Rfl: 3    lidocaine (LIDODERM) 5 %, Place 1 patch on the skin as directed by provider Daily. Remove & Discard patch within 12 hours or as directed by MD, Disp: , Rfl:     lisinopril (PRINIVIL,ZESTRIL) 20 MG tablet, TAKE 1 TABLET BY MOUTH 2 (TWO) TIMES A DAY., Disp: 60 tablet, Rfl: 10    metoclopramide (REGLAN) 10 MG tablet, TAKE 1 TABLET BY MOUTH 4 (FOUR) TIMES A DAY., Disp: 120 tablet, Rfl: 5    metoprolol tartrate (LOPRESSOR) 50 MG tablet, TAKE ONE TABLET BY MOUTH TWO TIMES A DAY, Disp: 180 tablet, Rfl: 3    montelukast (SINGULAIR) 10 MG tablet, Take 1 tablet by mouth Every Night., Disp: , Rfl:     omeprazole (priLOSEC) 40 MG capsule, TAKE 1 CAPSULE BY MOUTH 2 TIMES A DAY., Disp: 180 capsule, Rfl: 3    ondansetron (ZOFRAN) 4 MG tablet, Take 1 tablet by mouth Every 6 (Six) Hours As Needed for Nausea or Vomiting., Disp: 40 tablet, Rfl: 0    orphenadrine (NORFLEX) 100 MG 12 hr tablet, Take 1 tablet by mouth 3 (Three) Times a Day. As needed, Disp: , Rfl:     potassium chloride (K-DUR,KLOR-CON) 10 MEQ CR tablet, , Disp: , Rfl:     predniSONE (DELTASONE) 10 MG tablet, TAKE 1 TABLET BY MOUTH DAILY., Disp: 30 tablet, Rfl: 5    Rimegepant Sulfate (Nurtec) 75 MG tablet dispersible tablet, Take 1 tablet by mouth Every Other Day., Disp: 16 tablet, Rfl: 5    Semaglutide,0.25 or 0.5MG/DOS, (OZEMPIC) 2 MG/3ML solution pen-injector, Inject 0.5 mg under the skin into the appropriate area as directed Every 7 (Seven) Days., Disp: , Rfl:     sennosides-docusate (senna-docusate sodium) 8.6-50 MG per tablet, Take 1 tablet by mouth Daily., Disp: 90 tablet, Rfl: 1    theophylline (IVELISSE-24) 200 MG 24 hr capsule, Take 1 capsule by mouth Daily., Disp: , Rfl:     vilazodone (VIIBRYD) 40 MG tablet tablet, Take 1 tablet by mouth every night at bedtime., Disp: 90 tablet, Rfl: 3    vitamin D (ERGOCALCIFEROL) 1.25 MG (21963 UT) capsule capsule, TAKE 1 CAPSULE BY MOUTH  "EVERY WEEK, Disp: 4 capsule, Rfl: 10    Budeson-Glycopyrrol-Formoterol (Break30ztri Aerosphere) 160-9-4.8 MCG/ACT aerosol inhaler, Inhale 2 puffs 2 (Two) Times a Day., Disp: , Rfl:     Review of Systems   Constitutional:  Positive for fatigue. Negative for activity change, appetite change, chills, diaphoresis, fever, unexpected weight gain and unexpected weight loss.   HENT:  Negative for trouble swallowing.    Eyes:  Positive for visual disturbance. Negative for blurred vision.   Respiratory:  Negative for cough, chest tightness, shortness of breath and wheezing.    Cardiovascular:  Negative for chest pain, palpitations and leg swelling.   Gastrointestinal:  Negative for abdominal pain, nausea and vomiting.   Musculoskeletal:  Positive for arthralgias and back pain. Negative for gait problem.   Neurological:  Positive for weakness and headache. Negative for dizziness, tremors, syncope, light-headedness and confusion.   Psychiatric/Behavioral:  Negative for self-injury, sleep disturbance, suicidal ideas, depressed mood and stress. The patient is nervous/anxious.      /86 (BP Location: Left arm, Patient Position: Sitting, Cuff Size: Adult)   Pulse 86   Temp 97.8 °F (36.6 °C) (Temporal)   Resp 18   Ht 165.1 cm (65\")   Wt 88.9 kg (196 lb)   SpO2 97%   BMI 32.62 kg/m²       Objective   Physical Exam  Vitals and nursing note reviewed.   Constitutional:       Appearance: Normal appearance. She is normal weight.   HENT:      Head: Normocephalic and atraumatic.   Eyes:      Extraocular Movements: Extraocular movements intact.      Pupils: Pupils are equal, round, and reactive to light.   Neck:      Thyroid: No thyroid mass, thyromegaly or thyroid tenderness.      Vascular: No carotid bruit.   Cardiovascular:      Rate and Rhythm: Normal rate and regular rhythm.      Heart sounds: Normal heart sounds.   Pulmonary:      Effort: Pulmonary effort is normal.      Breath sounds: Normal breath sounds.   Musculoskeletal:   "       General: Normal range of motion.      Cervical back: Normal range of motion and neck supple.      Right lower leg: No edema.      Left lower leg: No edema.   Skin:     General: Skin is warm and dry.   Neurological:      General: No focal deficit present.      Mental Status: She is alert and oriented to person, place, and time.   Psychiatric:         Mood and Affect: Mood normal.         Behavior: Behavior normal.         Thought Content: Thought content normal.         Procedures     Assessment    Diagnoses and all orders for this visit:    1. Migraine without aura, intractable (Primary)  Comments:  Continue follow up with neuro.  Still getting severe migraines at times.    2. Ocular migraine with status migrainosus  Comments:  Continue Nurtec.  Will also refer to neuro-ophthalmology to see if there is anything else contributing.  Orders:  -     Ambulatory Referral to Ophthalmology    3. Pseudotumor cerebri  Comments:  Continue follow up with neuro and oph.  Orders:  -     Ambulatory Referral to Ophthalmology    4. Visual disturbance  Comments:  Will refer to neuro-ophthalamology for evaluation.  Orders:  -     Ambulatory Referral to Ophthalmology

## 2024-08-16 NOTE — PROGRESS NOTES
Subjective   Merry Thornton is a 54 y.o. female who presents today for follow up       Chief Complaint:  Depression, increased  anxiety      History of Present Illness:   The pt suffered from depression anxiety for many years, was doing well on Viibryd, she had to change jobs, Last appt 3/20/18   The pt was admitted to [facility] in march 2020 for abd pain and recently due to DVT and PE  .     Today the pt reported feeling better, depression 3-4/10  Anxiety can be very severe in situations when she had no control, [place] not stay on the beach, [place] [address], getting fidgety    Weight loss intentional on ozempic   The pt feels more confident now   anxiety affects her BP and pain  Anxiety can be    intense , she is medically fragile, does not go out, feels fidgety , decreased sleep - she had sleep study done - chronic PIPPA ,   BPAP ordered , did not get yet   Denied avh/si/hi        The following portions of the patient's history were reviewed and updated as appropriate: allergies, current medications, past family history, past medical history, past social history, past surgical history and problem list.    PAST PSYCHIATRIC HISTORY  Axis I  Affective/Bipoloar Disorder, Anxiety/Panic Disorder  Axis II  None    PAST OUTPATIENT TREATMENT  Diagnosis treated:  Affective Disorder, Anxiety/Panic Disorder  Treatment Type:  Medication Management  Prior Psychiatric Medications:  xanax PRN effective   buspar - allergy   zoloft weight gain  lexapro - weight gain   wellbutrin - anxiety   Trazodone -groggy in the morning     Support Groups:  None   Sequelae Of Mental Disorder:  medical illness, emotional distress          Interval History  No changes      Side Effects  Denied       Past Medical History:  Past Medical History:   Diagnosis Date    Allergic 01/01/2002    Asthma 01/01/1980    CAD (coronary artery disease) 12/20/2016    dr. singh    Carpal tunnel syndrome on right 03/08/2017    CHF (congestive heart failure)  01/01/2014    Cholelithiasis 01/01/2015    Cluster headache 2000    Common migraine 12/20/2016    COPD (chronic obstructive pulmonary disease) 12/20/2016    Cyst, ovarian 12/20/2016    mass    DDD (degenerative disc disease), cervical 03/11/2016    Deep vein thrombosis 09/05/2020    Dental caries 01/14/2019    Depression 11/02/2017    Depression, major, recurrent, moderate 08/25/2016    Dietary counseling 09/21/2017    Difficulty walking 1999    Dysphagia 05/2020    Elevated random blood glucose level 05/14/2020    Encounter for smoking cessation counseling 09/21/2017    Exacerbation of systemic lupus 04/30/2019    Fibromyalgia 12/20/2016    Fibromyalgia, primary 2001    Generalized anxiety disorder 03/11/2016    GERD (gastroesophageal reflux disease) 12/20/2016    Headache, tension-type 2014    Heartburn 11/02/2017    History of medical problems 01/01/2000    Hyperlipidemia 2017    Hypertension 03/16/2016    Hyperthyroidism 03/11/2016    Hypocalcemia 09/21/2017    IBS (irritable colon syndrome) 09/13/2016    Immobility syndrome 09/14/2017    Intracranial pressure increased 12/20/2016    Kidney stone 01/01/2014    Left knee pain 09/21/2017    Lower back pain 02/01/2018    Lung nodules 09/07/2016    Memory loss 2017    Morbid obesity 11/02/2017    Muscle cramp 09/21/2017    Neck pain 01/14/2019    Need for prophylactic vaccination and inoculation against influenza 09/21/2017    Neurogenic bladder 12/20/2016    Obesity 03/07/2017    Obesity (BMI 30-39.9) 05/14/2020    Orthostatic hypotension 07/17/2017    Osteoarthritis 03/07/2017    Osteopenia 01/14/2019    Osteoporosis 12/20/2016    Other instability, right ankle 04/13/2017    Overlap syndrome     scleroderma, lupus, Raynaud's. Mixxed connective Tissue D/o    Pain, joint, ankle, left 09/21/2017    Palpitations 10/04/2017    Paresthesia 06/12/2017    facial    Peripheral neuropathy 12/20/2016    bilateral lower extremities    Poor vision     Prediabetes     Presence  david del castillo     Pulmonary embolism 09/05/2020    Raynaud's syndrome 12/20/2016    Retinopathy     Right knee pain 11/08/2016    Scleroderma 12/20/2016    Seasonal allergic rhinitis 12/20/2016    Seizures 2018    SLE (systemic lupus erythematosus) 03/16/2017    Sleep apnea 11/02/2017    BIPAP    Sleep disorder 01/14/2019    Sprain of unspecified site of right knee, subsequent encounter 12/01/2016    Status post placement of implantable loop recorder 05/09/2018    presence    Syncope and collapse 2020    still has occassionally    Ulcerative colitis 02/2020    Urine incontinence     Vasovagal syncope     Ventricular arrhythmia 03/16/2016    Visit for screening mammogram 12/20/2016    Vitamin D deficiency 01/14/2019    Wheezing        Social History:  Social History     Socioeconomic History    Marital status: Single   Tobacco Use    Smoking status: Every Day     Current packs/day: 0.75     Average packs/day: 0.8 packs/day for 40.3 years (30.2 ttl pk-yrs)     Types: Cigarettes     Start date: 5/1/1984    Smokeless tobacco: Former     Quit date: 5/11/2020    Tobacco comments:     In the process of trying to quit   Vaping Use    Vaping status: Never Used   Substance and Sexual Activity    Alcohol use: No    Drug use: Not Currently     Frequency: 1.0 times per week     Types: Marijuana     Comment: stopped 2 months ago    Sexual activity: Not Currently     Partners: Male     Birth control/protection: Post-menopausal, Tubal ligation, Hysterectomy     Comment: Partial       Family History:  Family History   Problem Relation Age of Onset    Cancer Mother     Migraines Mother     Anxiety disorder Mother     Arthritis Mother     Mental illness Mother     Heart disease Father     Lung disease Father     Neuropathy Father     Alcohol abuse Father     Asthma Father     COPD Father     Diabetes Father     Hyperlipidemia Father     Vision loss Father     Diabetes Sister     Heart disease Sister     Hypertension Sister     Other  Sister         weight disorder    Migraines Sister     Stroke Sister     Anxiety disorder Sister     Drug abuse Sister     Mental illness Sister     Migraines Sister     Neuropathy Brother     Neuropathy Brother     Seizures Brother         Epilepsy    Asthma Brother     Diabetes Brother     Thyroid disease Daughter        Past Surgical History:  Past Surgical History:   Procedure Laterality Date    ADENOIDECTOMY  01/01/2010    ANKLE SURGERY Right 02/2017    BREAST EXCISIONAL BIOPSY Right     years ago    BRONCHOSCOPY      CARDIAC ABLATION  01/2000    CARDIAC CATHETERIZATION  01/01/2016    CARDIAC ELECTROPHYSIOLOGY PROCEDURE      CARPAL TUNNEL RELEASE  2017    CHOLECYSTECTOMY      COLON SURGERY      COLONOSCOPY N/A 02/26/2020    Procedure: COLONOSCOPY WITH BIOPSY X1 AREA;  Surgeon: Michael Cheng MD;  Location: [facility] ENDOSCOPY;  Service: Gastroenterology;  Laterality: N/A;  diarrhea    ECHO - CONVERTED  2020    ENDOSCOPY      ENDOSCOPY N/A 05/21/2020    Procedure: ESOPHAGOGASTRODUODENOSCOPY WITH DILATATION (#54, 60 bougie);  Surgeon: Michael Cheng MD;  Location: [facility] ENDOSCOPY;  Service: Gastroenterology;  Laterality: N/A;  Post: candidiasis, upper esophagus sphincter stricture    HYSTERECTOMY  01/01/1997    KNEE SURGERY Right 09/2017    OTHER SURGICAL HISTORY      Urerine hysterectomy    OTHER SURGICAL HISTORY      t and a    OTHER SURGICAL HISTORY      d&c    OTHER SURGICAL HISTORY      bladder stim    OTHER SURGICAL HISTORY      Loop recorder placement    SUBTOTAL HYSTERECTOMY  01/01/1997    TONSILLECTOMY  01/01/2010    TUBAL ABDOMINAL LIGATION  01/12/1995    WRIST SURGERY Right        Problem List:  Patient Active Problem List   Diagnosis    PVC's (premature ventricular contractions)    Essential hypertension    Sleep apnea    Orthostatic hypotension    Presence of other cardiac implants and grafts    Abnormal echocardiogram    Left ankle pain    Arthralgia of left knee    Knee pain, right    Low back  pain    Lower back pain    Neurogenic claudication    Carpal tunnel syndrome of right wrist    Allergic rhinitis, seasonal    Chronic obstructive pulmonary disease    Dental caries    Fibromyalgia    Neck pain    Gastroesophageal reflux disease    Generalized anxiety disorder    H. pylori infection    Degeneration of intervertebral disc    Herniation of intervertebral disc    Hyperthyroidism    Hypocalcemia    Impaired mobility    Irritable bowel syndrome with diarrhea    Current chronic use of systemic steroids    Long term current use of therapeutic drug    Loss of peripheral visual field    Lung nodule    Major depressive disorder, recurrent episode, moderate    Migraine without aura, intractable    Muscle cramps    Neurogenic bladder    Neuropathy involving both lower extremities    Osteoarthritis    Osteoporosis    Other instability, right ankle    Other localized visual field defect    Other screening mammogram    Ovarian cystic mass    Raynaud's phenomenon    Scleroderma    Vasodepressor syncope    Systemic lupus erythematosus    Tobacco dependency    Undifferentiated connective tissue disease    Vitamin D deficiency    White matter changes    Asthma    Pseudotumor cerebri    Immunosuppression    Keratoconjunctivitis    Presbyopia    SPK (superficial punctate keratitis), bilateral    Prediabetes    Nocturnal hypoxia    Moderate nonproliferative diabetic retinopathy    Ulcerative colitis    Pelvic mass    Obesity (BMI 30-39.9)    Increased cerebrospinal fluid pressure    Coronary artery disease involving native coronary artery of native heart without angina pectoris    Stress incontinence    Swelling of lower extremity    Urinary incontinence    Hoarseness of voice    Dysphagia    Cigarette smoker    Polyp of vocal cord or larynx    Acute deep vein thrombosis (DVT) of popliteal vein of right lower extremity    Chronic obstructive pulmonary disease    Migraine headache    Degeneration of intervertebral disc of  cervical region    Degeneration of intervertebral disc of lumbar region    Osteoporosis    Lupus erythematosus    Autoimmune disease    Irregular heart rhythm    Neuropathy    Chest pain, atypical    Osteoarthritis of both knees    Ulcerative colitis, chronic, without complications    Recurrent acute deep vein thrombosis (DVT) of both lower extremities    Protein C deficiency    Dermatomyositis    Coagulopathy    Restless leg syndrome    Lumbar radiculopathy    Inflammation of sacroiliac joint    History of kidney stones    Iron deficiency anemia due to chronic blood loss       Allergy:   Allergies   Allergen Reactions    Adhesive Tape Other (See Comments)     Pt ok with paper tape    Abstracted from [facility]    Alendronate Rash and Hives    Bupropion Shortness Of Breath    Morphine Other (See Comments), Rash and Swelling     Abstracted from [facility]ity    Amoxicillin-Pot Clavulanate Diarrhea    Buspar [Buspirone] Other (See Comments)     Abstracted from [facility]    Alendronate Sodium Other (See Comments)    Hydroxyzine Rash        Discontinued Medications:  Medications Discontinued During This Encounter   Medication Reason    naloxone (NARCAN) 4 MG/0.1ML nasal spray *Therapy completed    vilazodone (VIIBRYD) 40 MG tablet tablet Reorder           Current Medications:   Current Outpatient Medications   Medication Sig Dispense Refill    vilazodone (VIIBRYD) 40 MG tablet tablet Take 1 tablet by mouth every night at bedtime. 90 tablet 3    albuterol sulfate  (90 Base) MCG/ACT inhaler Inhale 2 puffs Every 4 (Four) Hours As Needed for Wheezing. 18 g 0    amLODIPine (NORVASC) 10 MG tablet TAKE 1 TABLET BY MOUTH DAILY. 90 tablet 1    apixaban (ELIQUIS) 5 MG tablet tablet Take 1 tablet by mouth Every 12 (Twelve) Hours. 180 tablet 3    cloNIDine (CATAPRES) 0.1 MG tablet       dicyclomine (BENTYL) 20 MG tablet TAKE 1 TABLET BY MOUTH EVERY 6 (SIX) HOURS. 120 tablet 5    ferrous sulfate 325 (65 FE) MG tablet Take 1  tablet by mouth Daily With Breakfast. 90 tablet 1    fluticasone (FLONASE) 50 MCG/ACT nasal spray 2 sprays into the nostril(s) as directed by provider Daily.      furosemide (LASIX) 20 MG tablet TAKE 2 TABS EVERY MORNING, 2 TABS EVERY EVENING AND MAY TAKE AN ADDITIONAL 1 TO 2 TABS MIDDAY AS NEEDED 540 tablet 1    gabapentin (NEURONTIN) 600 MG tablet TAKE 1 TABLET BY MOUTH THREE TIMES DAILY 90 tablet 3    HYDROcodone-acetaminophen (NORCO)  MG per tablet Take 1 tablet by mouth Every 8 (Eight) Hours As Needed for Moderate Pain.      hydroxychloroquine (PLAQUENIL) 200 MG tablet       ipratropium-albuterol (DUO-NEB) 0.5-2.5 mg/3 ml nebulizer Take 3 mL by nebulization 4 (Four) Times a Day. 360 mL 1    levocetirizine (XYZAL) 5 MG tablet TAKE 1 TABLET BY MOUTH EVERY EVENING. 90 tablet 3    lidocaine (LIDODERM) 5 % Place 1 patch on the skin as directed by provider Daily. Remove & Discard patch within 12 hours or as directed by MD      lisinopril (PRINIVIL,ZESTRIL) 20 MG tablet TAKE 1 TABLET BY MOUTH 2 (TWO) TIMES A DAY. 60 tablet 10    metoclopramide (REGLAN) 10 MG tablet TAKE 1 TABLET BY MOUTH 4 (FOUR) TIMES A DAY. 120 tablet 5    metoprolol tartrate (LOPRESSOR) 50 MG tablet TAKE ONE TABLET BY MOUTH TWO TIMES A  tablet 3    montelukast (SINGULAIR) 10 MG tablet Take 1 tablet by mouth Every Night.      omeprazole (priLOSEC) 40 MG capsule TAKE 1 CAPSULE BY MOUTH 2 TIMES A DAY. 180 capsule 3    ondansetron (ZOFRAN) 4 MG tablet Take 1 tablet by mouth Every 6 (Six) Hours As Needed for Nausea or Vomiting. 40 tablet 0    orphenadrine (NORFLEX) 100 MG 12 hr tablet Take 1 tablet by mouth 3 (Three) Times a Day. As needed      potassium chloride (K-DUR,KLOR-CON) 10 MEQ CR tablet       predniSONE (DELTASONE) 10 MG tablet TAKE 1 TABLET BY MOUTH DAILY. 30 tablet 5    Rimegepant Sulfate (Nurtec) 75 MG tablet dispersible tablet Take 1 tablet by mouth Every Other Day. 16 tablet 5    Semaglutide,0.25 or 0.5MG/DOS, (OZEMPIC) 2  MG/3ML solution pen-injector Inject 0.5 mg under the skin into the appropriate area as directed Every 7 (Seven) Days.      sennosides-docusate (senna-docusate sodium) 8.6-50 MG per tablet Take 1 tablet by mouth Daily. 90 tablet 1    theophylline (IVELISSE-24) 200 MG 24 hr capsule Take 1 capsule by mouth Daily.      vitamin D (ERGOCALCIFEROL) 1.25 MG (39865 UT) capsule capsule TAKE 1 CAPSULE BY MOUTH EVERY WEEK 4 capsule 10     No current facility-administered medications for this visit.         Review of Symptoms:    Psychiatric/Behavioral: Negative for agitation, behavioral problems, confusion, decreased concentration, dysphoric mood, hallucinations, self-injury, sleep disturbance and suicidal ideas. The patient is  Very  nervous/anxious and is not hyperactive.        Physical Exam:   not currently breastfeeding.    Mental Status Exam:   Hygiene:   good  Cooperation:  Cooperative  Eye Contact:  Good  Psychomotor Behavior:  Appropriate  Affect:  Appropriate,    Mood: anxious and fluctates  Hopelessness: Denies  Speech:  Normal  Thought Process:  Goal directed and Linear  Thought Content:  Normal  Suicidal:  None  Homicidal:  None  Hallucinations:  None  Delusion:  None  Memory:   fair   Orientation:  Person, Place, Time and Situation  Reliability:  fair  Insight:  Fair  Judgement:  Fair  Impulse Control:  Fair  Physical/Medical Issues:  Yes        MSE from 8/26/23  reviewed and accepted with changes       PHQ-9 Depression Screening  Little interest or pleasure in doing things? 1-->several days   Feeling down, depressed, or hopeless? 1-->several days   Trouble falling or staying asleep, or sleeping too much? 0-->not at all   Feeling tired or having little energy? 0-->not at all   Poor appetite or overeating? 0-->not at all   Feeling bad about yourself - or that you are a failure or have let yourself or your family down? 0-->not at all   Trouble concentrating on things, such as reading the newspaper or watching  television? 0-->not at all   Moving or speaking so slowly that other people could have noticed? Or the opposite - being so fidgety or restless that you have been moving around a lot more than usual? 0-->not at all   Thoughts that you would be better off dead, or of hurting yourself in some way? 0-->not at all   PHQ-9 Total Score 2   If you checked off any problems, how difficult have these problems made it for you to do your work, take care of things at home, or get along with other people? somewhat difficult         Former smoker    I advised Merry of the risks of tobacco use.     Lab Results:   Lab on 07/11/2024   Component Date Value Ref Range Status    D-Dimer, Quantitative 07/11/2024 0.29  0.00 - 0.54 mg/L (FEU) Final    WBC 07/11/2024 10.55  3.40 - 10.80 10*3/mm3 Final    RBC 07/11/2024 4.70  3.77 - 5.28 10*6/mm3 Final    Hemoglobin 07/11/2024 13.7  12.0 - 15.9 g/dL Final    Hematocrit 07/11/2024 42.2  34.0 - 46.6 % Final    MCV 07/11/2024 89.8  79.0 - 97.0 fL Final    MCH 07/11/2024 29.1  26.6 - 33.0 pg Final    MCHC 07/11/2024 32.5  31.5 - 35.7 g/dL Final    RDW 07/11/2024 15.4  12.3 - 15.4 % Final    RDW-SD 07/11/2024 49.9  37.0 - 54.0 fl Final    MPV 07/11/2024 10.7  6.0 - 12.0 fL Final    Platelets 07/11/2024 182  140 - 450 10*3/mm3 Final    Neutrophil % 07/11/2024 64.0  42.7 - 76.0 % Final    Lymphocyte % 07/11/2024 28.0  19.6 - 45.3 % Final    Monocyte % 07/11/2024 6.4  5.0 - 12.0 % Final    Eosinophil % 07/11/2024 1.5  0.3 - 6.2 % Final    Basophil % 07/11/2024 0.1  0.0 - 1.5 % Final    Neutrophils, Absolute 07/11/2024 6.76  1.70 - 7.00 10*3/mm3 Final    Lymphocytes, Absolute 07/11/2024 2.95  0.70 - 3.10 10*3/mm3 Final    Monocytes, Absolute 07/11/2024 0.67  0.10 - 0.90 10*3/mm3 Final    Eosinophils, Absolute 07/11/2024 0.16  0.00 - 0.40 10*3/mm3 Final    Basophils, Absolute 07/11/2024 0.01  0.00 - 0.20 10*3/mm3 Final    Extra Tube 07/11/2024 Hold for add-ons.   Final    Auto resulted.    Extra Tube  07/11/2024 Hold for add-ons.   Final    Auto resulted.    Iron 07/11/2024 30 (L)  37 - 145 mcg/dL Final    Iron Saturation (TSAT) 07/11/2024 8 (L)  20 - 50 % Final    Transferrin 07/11/2024 245  200 - 360 mg/dL Final    TIBC 07/11/2024 365  298 - 536 mcg/dL Final    Ferritin 07/11/2024 76.60  13.00 - 150.00 ng/mL Final       Assessment & Plan   Problems Addressed this Visit       Generalized anxiety disorder (Chronic)    Relevant Medications    vilazodone (VIIBRYD) 40 MG tablet tablet    Major depressive disorder, recurrent episode, moderate - Primary    Relevant Medications    vilazodone (VIIBRYD) 40 MG tablet tablet     Diagnoses         Codes Comments    Major depressive disorder, recurrent episode, moderate    -  Primary ICD-10-CM: F33.1  ICD-9-CM: 296.32     Generalized anxiety disorder     ICD-10-CM: F41.1  ICD-9-CM: 300.02             Visit Diagnoses:    ICD-10-CM ICD-9-CM   1. Major depressive disorder, recurrent episode, moderate  F33.1 296.32   2. Generalized anxiety disorder  F41.1 300.02           TREATMENT PLAN/GOALS: Continue supportive psychotherapy efforts and medications as indicated. Treatment and medication options discussed during today's visit. Patient ackowledged and verbally consented to continue with current treatment plan and was educated on the importance of compliance with treatment and follow-up appointments.    MEDICATION ISSUES:  1. MDD - Cont viibryd  40 mg , no changes necessary, tolertes well, refill sent to the pharmacy , the pt will benefit from psychotherapy     2. MIGUEL ANGEL - just CBT   Stress management discussed, the pt stated she will try to find therapist close to the place where she lives          PHQ scored 2- minimal depression    MIGUEL ANGEL 7 scored  9      INSPECT reviewed as expected -   hydrocodone  and gabapentin from pain  Management now       Discussed medication options and treatment plan of prescribed medication as well as the risks, benefits, and side effects including  potential falls, possible impaired driving and metabolic adversities among others. Patient is agreeable to call the office with any worsening of symptoms or onset of side effects. Patient is agreeable to call 911 or go to the nearest ER should he/she begin having SI/HI. No medication side effects or related complaints today.     MEDS ORDERED DURING VISIT:  New Medications Ordered This Visit   Medications    vilazodone (VIIBRYD) 40 MG tablet tablet     Sig: Take 1 tablet by mouth every night at bedtime.     Dispense:  90 tablet     Refill:  3       Return in about 1 year (around 8/16/2025).           This document has been electronically signed by Africa Conway MD  August 16, 2024 11:24 EDT

## 2024-08-22 DIAGNOSIS — F33.1 MAJOR DEPRESSIVE DISORDER, RECURRENT EPISODE, MODERATE: Chronic | ICD-10-CM

## 2024-08-22 RX ORDER — VILAZODONE HYDROCHLORIDE 40 MG/1
40 TABLET ORAL
Qty: 30 TABLET | Refills: 10 | OUTPATIENT
Start: 2024-08-22

## 2024-10-08 DIAGNOSIS — G62.9 NEUROPATHY: ICD-10-CM

## 2024-10-09 RX ORDER — RIMEGEPANT SULFATE 75 MG/75MG
TABLET, ORALLY DISINTEGRATING ORAL
Qty: 16 TABLET | Refills: 10 | OUTPATIENT
Start: 2024-10-09

## 2024-10-09 RX ORDER — GABAPENTIN 600 MG/1
600 TABLET ORAL 3 TIMES DAILY
Qty: 90 TABLET | Refills: 10 | Status: SHIPPED | OUTPATIENT
Start: 2024-10-09

## 2024-12-04 RX ORDER — TOPIRAMATE 50 MG/1
50 TABLET, FILM COATED ORAL 2 TIMES DAILY
Qty: 60 TABLET | Refills: 10 | OUTPATIENT
Start: 2024-12-04

## 2024-12-17 ENCOUNTER — TELEPHONE (OUTPATIENT)
Dept: NEUROLOGY | Facility: CLINIC | Age: 54
End: 2024-12-17
Payer: MEDICAID

## 2024-12-17 DIAGNOSIS — G57.93 NEUROPATHY INVOLVING BOTH LOWER EXTREMITIES: Primary | ICD-10-CM

## 2024-12-17 NOTE — TELEPHONE ENCOUNTER
Provider: SEIPEL    Caller: Merry Thornton    Relationship to Patient: Self    Phone Number: [phone]    Reason for Call:  PT IS CURRENTLY SCHEDULED FOR 7/10/24.  SHE WOULD LIKE TO BE SEEN SOONER.  SHE IS HAVING A LOT OF PROBLEMS WITH HER NEUROPATHY.  SHE IS HAVING TROUBLE WALKING.  SOME DAYS SHE HAS TO USE A WALKER.  FROM HER KNEES DOWN IT FEELS LIKE HER LEGS ARE NUMB AND TINGLING, LIKE HER LEGS HAVE FALLEN ASLEEP.  SHE STATES HER FEET FEEL LIKE SHE IS WALKING ON HOT [place].  SHE FEELS LIKE HER FEET ARE ON FIRE. THIS HAS BEEN GOING ON FOR A COUPLE OF MONTHS NOW AND IT SEEMS LIKE EVERY COUPLE OF WEEKS IT IS GETTING WORSE THAN IT WAS BEFORE.  SHE HAS DISCUSSED THIS WITH HER RHEUMATOLOGIST AND PCP   AND THEY BOTH HAVE TOLD HER SHE NEEDS TO SEE HER NEUROLOGIST.      PLEASE REVIEW AND ADVISE     THANK YOU

## 2024-12-19 RX ORDER — RIMEGEPANT SULFATE 75 MG/75MG
TABLET, ORALLY DISINTEGRATING ORAL
Qty: 16 TABLET | Refills: 7 | Status: SHIPPED | OUTPATIENT
Start: 2024-12-19

## 2025-01-09 RX ORDER — DOCUSATE SODIUM 50MG AND SENNOSIDES 8.6MG 8.6; 5 MG/1; MG/1
1 TABLET, FILM COATED ORAL DAILY
Qty: 30 TABLET | Refills: 10 | OUTPATIENT
Start: 2025-01-09

## 2025-01-09 NOTE — TELEPHONE ENCOUNTER
She has mentioned neuropathy symnptoms but never a primar reason for visit  No emg noted    I will order bilateral lower ext EMG    Move up appt for the neuropathy symptoms to me or a NP

## 2025-01-10 NOTE — PROGRESS NOTES
HEMATOLOGY ONCOLOGY OUTPATIENT FOLLOW U{       Patient name: Merry Thornton  : 1970  MRN: [id number]  Primary Care Physician: Lizzy Roth PA  Referring Physician: Alicja Jean APRN  Reason For Consult:       History of Present Illness:  Patient is a 54 y.o. female who has been referred to [facility] heme-onc clinic for further evaluation and management for prior history of DVT.  She was previously being seen at [facility] [place] by Dr. Tom.  Pertinent outside records are not available at this time.  History was obtained from the patient and summarized as follows:    Patient reported having a DVT in right leg and Right Sided Pulmonary embolism in 2020. Patient reported that she was started on Lovenox for therapeutic anticoagulation and was later transitioned to Eliquis 2.5mg BID for 6 months thereafter.  She reported that her Eliquis dose was increased to 5mg BID thereafter by her cardiologist and is continued on the same.     She has underlying history of scleroderma and SLE, She is currently on Plaquenil and Nurtec for the same.  Per Chart review, she also has suspected IBD, She was previously being followed by [facility], but does not have a GI provider at this time.  Most recent EGD/colonoscopy was in 2023 and showed Superficial gastric erosion, Esophageal mucosal inflammation, Colonic inflammation (non specific)    3/11/24: Patient presents for initial consultation today. She reported having chronic allergies causing Upper respiratory symptoms and Sinus congestion. Has chronic fatigue and arthralgias, however no other new symptoms reported. No GI symptoms presently.    Subjective:  113/: Seen today for routine follow-up.  She continues to have chronic fatigue and arthralgias.  She completed IV Feraheme 510 mg x 2 weekly doses in 2024.  She reports mild improvement in fatigue symptoms although recently feels they are worsening again.  She continues  apixaban 5 mg twice daily with good compliance.  She denies any bleeding concerns.      Past Medical History:   Diagnosis Date   • Allergic 01/01/2002   • Asthma 01/01/1980   • CAD (coronary artery disease) 12/20/2016    dr. singh   • Carpal tunnel syndrome on right 03/08/2017   • CHF (congestive heart failure) 01/01/2014   • Cholelithiasis 01/01/2015   • Cluster headache 2000   • Common migraine 12/20/2016   • COPD (chronic obstructive pulmonary disease) 12/20/2016   • Cyst, ovarian 12/20/2016    mass   • DDD (degenerative disc disease), cervical 03/11/2016   • Deep vein thrombosis 09/05/2020   • Dental caries 01/14/2019   • Depression 11/02/2017   • Depression, major, recurrent, moderate 08/25/2016   • Dietary counseling 09/21/2017   • Difficulty walking 1999   • Dysphagia 05/2020   • Elevated random blood glucose level 05/14/2020   • Encounter for smoking cessation counseling 09/21/2017   • Exacerbation of systemic lupus 04/30/2019   • Fibromyalgia 12/20/2016   • Fibromyalgia, primary 2001   • Generalized anxiety disorder 03/11/2016   • GERD (gastroesophageal reflux disease) 12/20/2016   • Headache, tension-type 2014   • Heartburn 11/02/2017   • History of medical problems 01/01/2000   • Hyperlipidemia 2017   • Hypertension 03/16/2016   • Hyperthyroidism 03/11/2016   • Hypocalcemia 09/21/2017   • IBS (irritable colon syndrome) 09/13/2016   • Immobility syndrome 09/14/2017   • Intracranial pressure increased 12/20/2016   • Kidney stone 01/01/2014   • Left knee pain 09/21/2017   • Lower back pain 02/01/2018   • Lung nodules 09/07/2016   • Memory loss 2017   • Morbid obesity 11/02/2017   • Muscle cramp 09/21/2017   • Neck pain 01/14/2019   • Need for prophylactic vaccination and inoculation against influenza 09/21/2017   • Neurogenic bladder 12/20/2016   • Obesity 03/07/2017   • Obesity (BMI 30-39.9) 05/14/2020   • Orthostatic hypotension 07/17/2017   • Osteoarthritis 03/07/2017   • Osteopenia 01/14/2019   •  Osteoporosis 12/20/2016   • Other instability, right ankle 04/13/2017   • Overlap syndrome     scleroderma, lupus, Raynaud's. Mixxed connective Tissue D/o   • Pain, joint, ankle, left 09/21/2017   • Palpitations 10/04/2017   • Paresthesia 06/12/2017    facial   • Peripheral neuropathy 12/20/2016    bilateral lower extremities   • Poor vision    • Prediabetes    • Presence of pessary    • Pulmonary embolism 09/05/2020   • Raynaud's syndrome 12/20/2016   • Retinopathy    • Right knee pain 11/08/2016   • Scleroderma 12/20/2016   • Seasonal allergic rhinitis 12/20/2016   • Seizures 2018   • SLE (systemic lupus erythematosus) 03/16/2017   • Sleep apnea 11/02/2017    BIPAP   • Sleep disorder 01/14/2019   • Sprain of unspecified site of right knee, subsequent encounter 12/01/2016   • Status post placement of implantable loop recorder 05/09/2018    presence   • Syncope and collapse 2020    still has occassionally   • Ulcerative colitis 02/2020   • Urine incontinence    • Vasovagal syncope    • Ventricular arrhythmia 03/16/2016   • Visit for screening mammogram 12/20/2016   • Vitamin D deficiency 01/14/2019   • Wheezing        Past Surgical History:   Procedure Laterality Date   • ADENOIDECTOMY  01/01/2010   • ANKLE SURGERY Right 02/2017   • BREAST EXCISIONAL BIOPSY Right     years ago   • BRONCHOSCOPY     • CARDIAC ABLATION  01/2000   • CARDIAC CATHETERIZATION  01/01/2016   • CARDIAC ELECTROPHYSIOLOGY PROCEDURE     • CARPAL TUNNEL RELEASE  2017   • CHOLECYSTECTOMY     • COLON SURGERY     • COLONOSCOPY N/A 02/26/2020    Procedure: COLONOSCOPY WITH BIOPSY X1 AREA;  Surgeon: Michael Cheng MD;  Location: [facility] ENDOSCOPY;  Service: Gastroenterology;  Laterality: N/A;  diarrhea   • ECHO - CONVERTED  2020   • ENDOSCOPY     • ENDOSCOPY N/A 05/21/2020    Procedure: ESOPHAGOGASTRODUODENOSCOPY WITH DILATATION (#54, 60 bougie);  Surgeon: Michael Cheng MD;  Location: [facility] ENDOSCOPY;  Service: Gastroenterology;  Laterality:  N/A;  Post: candidiasis, upper esophagus sphincter stricture   • HYSTERECTOMY  01/01/1997   • KNEE SURGERY Right 09/2017   • OTHER SURGICAL HISTORY      Urerine hysterectomy   • OTHER SURGICAL HISTORY      t and a   • OTHER SURGICAL HISTORY      d&c   • OTHER SURGICAL HISTORY      bladder stim   • OTHER SURGICAL HISTORY      Loop recorder placement   • SUBTOTAL HYSTERECTOMY  01/01/1997   • TONSILLECTOMY  01/01/2010   • TUBAL ABDOMINAL LIGATION  01/12/1995   • WRIST SURGERY Right          Current Outpatient Medications:   •  albuterol sulfate  (90 Base) MCG/ACT inhaler, Inhale 2 puffs Every 4 (Four) Hours As Needed for Wheezing., Disp: 18 g, Rfl: 0  •  amLODIPine (NORVASC) 10 MG tablet, TAKE 1 TABLET BY MOUTH DAILY., Disp: 90 tablet, Rfl: 1  •  apixaban (ELIQUIS) 5 MG tablet tablet, Take 1 tablet by mouth Every 12 (Twelve) Hours., Disp: 180 tablet, Rfl: 3  •  Budeson-Glycopyrrol-Formoterol (Breztri Aerosphere) 160-9-4.8 MCG/ACT aerosol inhaler, Inhale 2 puffs 2 (Two) Times a Day., Disp: , Rfl:   •  cloNIDine (CATAPRES) 0.1 MG tablet, , Disp: , Rfl:   •  dicyclomine (BENTYL) 20 MG tablet, TAKE 1 TABLET BY MOUTH EVERY 6 (SIX) HOURS., Disp: 120 tablet, Rfl: 5  •  ferrous sulfate 325 (65 FE) MG tablet, Take 1 tablet by mouth Daily With Breakfast., Disp: 90 tablet, Rfl: 1  •  furosemide (LASIX) 20 MG tablet, TAKE 2 TABS EVERY MORNING, 2 TABS EVERY EVENING AND MAY TAKE AN ADDITIONAL 1 TO 2 TABS MIDDAY AS NEEDED, Disp: 540 tablet, Rfl: 1  •  gabapentin (NEURONTIN) 600 MG tablet, TAKE 1 TABLET BY MOUTH THREE TIMES DAILY, Disp: 90 tablet, Rfl: 10  •  HYDROcodone-acetaminophen (NORCO)  MG per tablet, Take 1 tablet by mouth Every 8 (Eight) Hours As Needed for Moderate Pain., Disp: , Rfl:   •  hydroxychloroquine (PLAQUENIL) 200 MG tablet, , Disp: , Rfl:   •  levocetirizine (XYZAL) 5 MG tablet, TAKE 1 TABLET BY MOUTH EVERY EVENING., Disp: 90 tablet, Rfl: 3  •  lidocaine (LIDODERM) 5 %, Place 1 patch on the skin as  directed by provider Daily. Remove & Discard patch within 12 hours or as directed by MD, Disp: , Rfl:   •  lisinopril (PRINIVIL,ZESTRIL) 20 MG tablet, TAKE 1 TABLET BY MOUTH 2 (TWO) TIMES A DAY., Disp: 60 tablet, Rfl: 10  •  metoprolol tartrate (LOPRESSOR) 50 MG tablet, TAKE ONE TABLET BY MOUTH TWO TIMES A DAY, Disp: 180 tablet, Rfl: 3  •  montelukast (SINGULAIR) 10 MG tablet, Take 1 tablet by mouth Every Night., Disp: , Rfl:   •  omeprazole (priLOSEC) 40 MG capsule, TAKE 1 CAPSULE BY MOUTH 2 TIMES A DAY., Disp: 180 capsule, Rfl: 3  •  ondansetron (ZOFRAN) 4 MG tablet, Take 1 tablet by mouth Every 6 (Six) Hours As Needed for Nausea or Vomiting., Disp: 40 tablet, Rfl: 0  •  ondansetron ODT (ZOFRAN-ODT) 4 MG disintegrating tablet, PLACE 1 TABLET ON TONGUE AND ALLOW TO DISSOLVE EVERY 8 HOURS AS NEEDED FOR NAUSEA, Disp: , Rfl:   •  potassium chloride (K-DUR,KLOR-CON) 10 MEQ CR tablet, , Disp: , Rfl:   •  potassium chloride 10 MEQ CR tablet, Take 1 tablet by mouth 2 (Two) Times a Day., Disp: , Rfl:   •  predniSONE (DELTASONE) 10 MG tablet, TAKE 1 TABLET BY MOUTH DAILY., Disp: 30 tablet, Rfl: 5  •  rimegepant sulfate (Nurtec) 75 MG tablet, DISSOLVE 1 TABLET BY MOUTH EVERY OTHER DAY, Disp: 16 tablet, Rfl: 7  •  Semaglutide,0.25 or 0.5MG/DOS, (OZEMPIC) 2 MG/3ML solution pen-injector, Inject 0.5 mg under the skin into the appropriate area as directed Every 7 (Seven) Days., Disp: , Rfl:   •  sennosides-docusate (senna-docusate sodium) 8.6-50 MG per tablet, Take 1 tablet by mouth Daily., Disp: 90 tablet, Rfl: 1  •  theophylline (IVELISSE-24) 200 MG 24 hr capsule, Take 1 capsule by mouth Daily., Disp: , Rfl:   •  vilazodone (VIIBRYD) 40 MG tablet tablet, Take 1 tablet by mouth every night at bedtime., Disp: 90 tablet, Rfl: 3  •  vitamin D (ERGOCALCIFEROL) 1.25 MG (46930 UT) capsule capsule, TAKE 1 CAPSULE BY MOUTH EVERY WEEK, Disp: 4 capsule, Rfl: 10  •  fluticasone (FLONASE) 50 MCG/ACT nasal spray, 2 sprays into the nostril(s) as  directed by provider Daily. (Patient not taking: Reported on 1/13/2025), Disp: , Rfl:   •  ipratropium (ATROVENT) 0.03 % nasal spray, Administer 2 sprays into the nostril(s) as directed by provider. (Patient not taking: Reported on 1/13/2025), Disp: , Rfl:   •  ipratropium-albuterol (DUO-NEB) 0.5-2.5 mg/3 ml nebulizer, Take 3 mL by nebulization 4 (Four) Times a Day. (Patient not taking: Reported on 1/13/2025), Disp: 360 mL, Rfl: 1  •  metoclopramide (REGLAN) 10 MG tablet, TAKE 1 TABLET BY MOUTH 4 (FOUR) TIMES A DAY. (Patient not taking: Reported on 1/13/2025), Disp: 120 tablet, Rfl: 5  •  orphenadrine (NORFLEX) 100 MG 12 hr tablet, Take 1 tablet by mouth 3 (Three) Times a Day. As needed (Patient not taking: Reported on 1/13/2025), Disp: , Rfl:     Allergies   Allergen Reactions   • Adhesive Tape Other (See Comments)     Pt ok with paper tape    Abstracted from centricity   • Alendronate Rash and Hives   • Bupropion Shortness Of Breath   • Morphine Other (See Comments), Rash and Swelling     Abstracted from centricity   • Amoxicillin-Pot Clavulanate Diarrhea   • Buspar [Buspirone] Other (See Comments)     Abstracted from centricity   • Alendronate Sodium Other (See Comments)   • Hydroxyzine Rash       Family History   Problem Relation Age of Onset   • Cancer Mother    • Migraines Mother    • Anxiety disorder Mother    • Arthritis Mother    • Mental illness Mother    • Heart disease Father    • Lung disease Father    • Neuropathy Father    • Alcohol abuse Father    • Asthma Father    • COPD Father    • Diabetes Father    • Hyperlipidemia Father    • Vision loss Father    • Diabetes Sister    • Heart disease Sister    • Hypertension Sister    • Other Sister         weight disorder   • Migraines Sister    • Stroke Sister    • Anxiety disorder Sister    • Drug abuse Sister    • Mental illness Sister    • Migraines Sister    • Neuropathy Brother    • Neuropathy Brother    • Seizures Brother         Epilepsy   • Asthma  "Brother    • Diabetes Brother    • Thyroid disease Daughter        Cancer-related family history includes Cancer in her mother.      Social History     Tobacco Use   • Smoking status: Every Day     Current packs/day: 0.75     Average packs/day: 0.8 packs/day for 40.7 years (30.5 ttl pk-yrs)     Types: Cigarettes     Start date: 5/1/1984   • Smokeless tobacco: Former     Quit date: 5/11/2020   • Tobacco comments:     In the process of trying to quit   Vaping Use   • Vaping status: Never Used   Substance Use Topics   • Alcohol use: No   • Drug use: Not Currently     Frequency: 1.0 times per week     Types: Marijuana     Comment: stopped 2 months ago     Social History     Social History Narrative   • Not on file       ROS:   Review of Systems   Constitutional:  Positive for fatigue.   HENT: Negative.     Eyes: Negative.    Respiratory: Negative.     Cardiovascular: Negative.    Gastrointestinal: Negative.    Genitourinary: Negative.    Musculoskeletal:  Positive for arthralgias.   Neurological: Negative.    Hematological: Negative.    Psychiatric/Behavioral: Negative.           Objective:    Vital Signs:  Vitals:    01/13/25 1459   BP: 158/80   Pulse: 71   Temp: 98.2 °F (36.8 °C)   SpO2: 96%   Weight: 82.1 kg (181 lb)   Height: 165.1 cm (65\")   PainSc: 0-No pain         Body mass index is 30.12 kg/m².    ECOG  (1) Restricted in physically strenuous activity, ambulatory and able to do work of light nature    Physical Exam:   Physical Exam  Vitals reviewed.   Constitutional:       Appearance: Normal appearance. She is not toxic-appearing.   HENT:      Head: Normocephalic and atraumatic.   Eyes:      Pupils: Pupils are equal, round, and reactive to light.   Cardiovascular:      Rate and Rhythm: Normal rate and regular rhythm.      Pulses: Normal pulses.      Heart sounds: No murmur heard.  Pulmonary:      Effort: Pulmonary effort is normal.      Breath sounds: Normal breath sounds. No wheezing.   Abdominal:      General: " There is no distension.      Palpations: Abdomen is soft. There is no mass.      Tenderness: There is no abdominal tenderness.   Musculoskeletal:         General: No swelling. Normal range of motion.      Cervical back: Normal range of motion and neck supple.   Skin:     General: Skin is warm.      Coloration: Skin is not jaundiced or pale.      Findings: No bruising, erythema, lesion or rash.   Neurological:      General: No focal deficit present.      Mental Status: She is alert and oriented to person, place, and time.   Psychiatric:         Mood and Affect: Mood normal.         Behavior: Behavior normal.         Thought Content: Thought content normal.         Judgment: Judgment normal.       Lab Results - Last 18 Months   Lab Units 01/13/25  1449 12/19/24  1318 07/11/24  1514   WBC 10*3/mm3 10.27 9 10.55   HEMOGLOBIN g/dL 15.3 14.5 13.7   HEMATOCRIT % 47.0* 44.1 42.2   PLATELETS 10*3/mm3 172 169 182   MCV fL 95.9 94.4 89.8     Lab Results - Last 18 Months   Lab Units 04/25/24  1603 03/27/24  1710 02/16/24  1150   SODIUM mmol/L  --   --  142   POTASSIUM mmol/L  --   --  3.7   CHLORIDE mmol/L  --   --  104   CO2 mmol/L  --   --  25.6   BUN mg/dL  --   --  9   CREATININE mg/dL  --  0.60 0.69   CALCIUM mg/dL  --   --  9.2   BILIRUBIN mg/dL  --   --  0.2   ALK PHOS U/L  --   --  87   ALT (SGPT) U/L 10  --  8   AST (SGOT) U/L 13  --  13   GLUCOSE mg/dL  --   --  102*       Lab Results   Component Value Date    GLUCOSE 102 (H) 02/16/2024    BUN 9 02/16/2024    CREATININE 0.60 03/27/2024    EGFRIFNONA 83 02/02/2021    EGFRIFAFRI >60 06/01/2022    BCR 13.0 02/16/2024    K 3.7 02/16/2024    CO2 25.6 02/16/2024    CALCIUM 9.2 02/16/2024    ALBUMIN 3.9 02/16/2024    LABIL2 1.4 05/16/2019    AST 13 04/25/2024    ALT 10 04/25/2024       Lab Results - Last 18 Months   Lab Units 04/01/24  0832   INR  0.99   APTT seconds 26.6       Lab Results   Component Value Date    IRON 30 (L) 07/11/2024    TIBC 365 07/11/2024    FERRITIN  "76.60 07/11/2024       Lab Results   Component Value Date    FOLATE 5.54 03/11/2024       No results found for: \"OCCULTBLD\"    No results found for: \"RETICCTPCT\"  Lab Results   Component Value Date    NMTBMFRO34 318 03/11/2024     No results found for: \"SPEP\", \"UPEP\"  LDH   Date Value Ref Range Status   05/17/2020 313 (H) 135 - 214 U/L Final     Comment:     Specimen hemolyzed.  Results may be affected.     Lab Results   Component Value Date    ROBBY POSITIVE (A) 03/11/2017    SEDRATE 11 02/01/2018     Lab Results   Component Value Date    FIBRINOGEN 339 05/17/2020     Lab Results   Component Value Date    PTT 26.6 04/01/2024    INR 0.99 04/01/2024     No results found for: \"\"  No results found for: \"CEA\"  No components found for: \"CA-19-9\"  No results found for: \"PSA\"  Lab Results   Component Value Date    SEDRATE 11 02/01/2018          Assessment & Plan     Right Lower Extremity DVT and Right sided PE:  -Outside hematology records not available at this time. History obtained from patient and review of other medical records.  -Patient appears to have had unprovoked DVT and PE.  Prior hypercoagulable workup not available, however other records suggestive of possible protein C deficiency.  -Discussed with the patient that timing of hypercoagulable workup can be important following diagnosis of VTE and protein S levels can be falsely low if checked immediately after DVT diagnosis.  -Regardless, in view of patient's history of autoimmune disorders she is relatively high risk for recurrent DVT and PE and it is appropriate in my view to continue with indefinite anticoagulation.  No indication for repeat thrombophilia workup at this time.  -Patient is currently on Eliquis 5 Mg twice daily.  Recommend continuation of the same.  Refills given today.    Iron Deficiency anemia:  Iron panel and ferritin consistent with DACIA, despite patient being on oral iron for 3 months.  She reported intermittent compliance with oral iron " so far,   Counseled on daily compliance.   Patient was scheduled for IV iron with ferumoxytol 510 mg x 2 weekly doses  July 2024.  Hemoglobin is normal today, iron panel is pending.  If continued iron deficiency noted may recommend further IV iron infusions.    Fatigue:  -Patient reports this is chronic and a little worse than her baseline.  Rechecking iron studies today, will also recheck thyroid levels.  If normal, recommend follow-up with her primary care provider for further evaluation.    Will continue to monitor CBC and D-dimer going forward  Follow with primary care provider for routine medical management and age-appropriate screenings    Follow-up with Dr. Jones in 6 months with repeat labs, sooner if condition indicates        Thank you very much for providing the opportunity to participate in this patient’s care. Please do not hesitate to call if there are any other questions.

## 2025-01-13 ENCOUNTER — OFFICE VISIT (OUTPATIENT)
Dept: ONCOLOGY | Facility: CLINIC | Age: 55
End: 2025-01-13
Payer: MEDICARE

## 2025-01-13 ENCOUNTER — LAB (OUTPATIENT)
Dept: LAB | Facility: HOSPITAL | Age: 55
End: 2025-01-13
Payer: MEDICARE

## 2025-01-13 VITALS
HEIGHT: 65 IN | BODY MASS INDEX: 30.16 KG/M2 | SYSTOLIC BLOOD PRESSURE: 158 MMHG | OXYGEN SATURATION: 96 % | HEART RATE: 71 BPM | TEMPERATURE: 98.2 F | WEIGHT: 181 LBS | DIASTOLIC BLOOD PRESSURE: 80 MMHG

## 2025-01-13 DIAGNOSIS — Z79.01 CURRENT LONG-TERM USE OF ANTICOAGULANT MEDICATION WITH HISTORY OF DEEP VENOUS THROMBOSIS (DVT): ICD-10-CM

## 2025-01-13 DIAGNOSIS — D50.9 IRON DEFICIENCY ANEMIA, UNSPECIFIED IRON DEFICIENCY ANEMIA TYPE: Primary | ICD-10-CM

## 2025-01-13 DIAGNOSIS — I82.4Y1 ACUTE DEEP VEIN THROMBOSIS (DVT) OF PROXIMAL VEIN OF RIGHT LOWER EXTREMITY: Primary | ICD-10-CM

## 2025-01-13 DIAGNOSIS — R53.83 OTHER FATIGUE: ICD-10-CM

## 2025-01-13 DIAGNOSIS — D64.9 ANEMIA, UNSPECIFIED TYPE: ICD-10-CM

## 2025-01-13 DIAGNOSIS — Z86.718 CURRENT LONG-TERM USE OF ANTICOAGULANT MEDICATION WITH HISTORY OF DEEP VENOUS THROMBOSIS (DVT): ICD-10-CM

## 2025-01-13 LAB
BASOPHILS # BLD AUTO: 0.02 10*3/MM3 (ref 0–0.2)
BASOPHILS NFR BLD AUTO: 0.2 % (ref 0–1.5)
D DIMER PPP FEU-MCNC: 0.35 MCGFEU/ML (ref 0–0.54)
DEPRECATED RDW RBC AUTO: 47.6 FL (ref 37–54)
EOSINOPHIL # BLD AUTO: 0.18 10*3/MM3 (ref 0–0.4)
EOSINOPHIL NFR BLD AUTO: 1.8 % (ref 0.3–6.2)
ERYTHROCYTE [DISTWIDTH] IN BLOOD BY AUTOMATED COUNT: 14 % (ref 12.3–15.4)
FERRITIN SERPL-MCNC: 275 NG/ML (ref 13–150)
HCT VFR BLD AUTO: 47 % (ref 34–46.6)
HGB BLD-MCNC: 15.3 G/DL (ref 12–15.9)
HOLD SPECIMEN: NORMAL
IRON 24H UR-MRATE: 65 MCG/DL (ref 37–145)
IRON SATN MFR SERPL: 19 % (ref 20–50)
LYMPHOCYTES # BLD AUTO: 2.19 10*3/MM3 (ref 0.7–3.1)
LYMPHOCYTES NFR BLD AUTO: 21.3 % (ref 19.6–45.3)
MCH RBC QN AUTO: 31.2 PG (ref 26.6–33)
MCHC RBC AUTO-ENTMCNC: 32.6 G/DL (ref 31.5–35.7)
MCV RBC AUTO: 95.9 FL (ref 79–97)
MONOCYTES # BLD AUTO: 0.62 10*3/MM3 (ref 0.1–0.9)
MONOCYTES NFR BLD AUTO: 6 % (ref 5–12)
NEUTROPHILS NFR BLD AUTO: 7.26 10*3/MM3 (ref 1.7–7)
NEUTROPHILS NFR BLD AUTO: 70.7 % (ref 42.7–76)
PLATELET # BLD AUTO: 172 10*3/MM3 (ref 140–450)
PMV BLD AUTO: 10.1 FL (ref 6–12)
RBC # BLD AUTO: 4.9 10*6/MM3 (ref 3.77–5.28)
T4 FREE SERPL-MCNC: 1.2 NG/DL (ref 0.93–1.7)
TIBC SERPL-MCNC: 343 MCG/DL (ref 298–536)
TRANSFERRIN SERPL-MCNC: 230 MG/DL (ref 200–360)
TSH SERPL DL<=0.05 MIU/L-ACNC: 0.69 UIU/ML (ref 0.27–4.2)
WBC NRBC COR # BLD AUTO: 10.27 10*3/MM3 (ref 3.4–10.8)

## 2025-01-13 PROCEDURE — 84466 ASSAY OF TRANSFERRIN: CPT | Performed by: STUDENT IN AN ORGANIZED HEALTH CARE EDUCATION/TRAINING PROGRAM

## 2025-01-13 PROCEDURE — 36415 COLL VENOUS BLD VENIPUNCTURE: CPT

## 2025-01-13 PROCEDURE — 83540 ASSAY OF IRON: CPT | Performed by: STUDENT IN AN ORGANIZED HEALTH CARE EDUCATION/TRAINING PROGRAM

## 2025-01-13 PROCEDURE — 84443 ASSAY THYROID STIM HORMONE: CPT | Performed by: PHYSICIAN ASSISTANT

## 2025-01-13 PROCEDURE — 85379 FIBRIN DEGRADATION QUANT: CPT | Performed by: STUDENT IN AN ORGANIZED HEALTH CARE EDUCATION/TRAINING PROGRAM

## 2025-01-13 PROCEDURE — 84439 ASSAY OF FREE THYROXINE: CPT | Performed by: PHYSICIAN ASSISTANT

## 2025-01-13 PROCEDURE — 82728 ASSAY OF FERRITIN: CPT | Performed by: STUDENT IN AN ORGANIZED HEALTH CARE EDUCATION/TRAINING PROGRAM

## 2025-01-13 PROCEDURE — 85025 COMPLETE CBC W/AUTO DIFF WBC: CPT

## 2025-01-13 RX ORDER — FERROUS SULFATE 325(65) MG
325 TABLET ORAL
Qty: 90 TABLET | Refills: 1 | Status: SHIPPED | OUTPATIENT
Start: 2025-01-13

## 2025-01-13 RX ORDER — POTASSIUM CHLORIDE 750 MG/1
10 TABLET, EXTENDED RELEASE ORAL 2 TIMES DAILY
COMMUNITY

## 2025-01-13 RX ORDER — IPRATROPIUM BROMIDE 21 UG/1
2 SPRAY, METERED NASAL
COMMUNITY
Start: 2024-12-19

## 2025-01-13 RX ORDER — ONDANSETRON 4 MG/1
TABLET, ORALLY DISINTEGRATING ORAL
COMMUNITY

## 2025-01-24 NOTE — PROGRESS NOTES
EMG and Nerve Conduction Studies    Patient Name: Merry Thornton  Date of birth May 25, 1970  Date of Study:1/28/25    Referring Provider:DR. SEIPEL    History:    BLE-NEUROPATHY    Results:    The complete report includes the data sheets.     Prior to starting the procedure, the patient's identity was verified, pertinent available records were reviewed, the nature of the procedure was explained, the appropriate site of the exam were confirmed directly with the patient, and a pre-procedure pause was performed for final verification of all the above.    1.  The bilateral medial plantar responses showed normal distal latency but low amplitude nerve action potentials.    2 the sural sensory nerve conduction studies were normal bilaterally    3.  The right peroneal and the left tibial motor nerve conduction study and corresponding F-wave latencies were normal.    4.  Electromyography of the vastus lateralis tibialis anterior peroneus longus and gastrocnemius muscles were essentially normal.  There were minor neurogenic changes with increased insertional activity occasional fibrillations and decreased recruitment in the abductor hallucis and extensor digitorum brevis muscles bilaterally.      Impression:    This is an abnormal study.  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      Electronically signed by :    Joseph Seipel, M.D.  February 3, 2025

## 2025-01-28 ENCOUNTER — PROCEDURE VISIT (OUTPATIENT)
Dept: NEUROLOGY | Facility: CLINIC | Age: 55
End: 2025-01-28
Payer: MEDICARE

## 2025-01-28 VITALS
BODY MASS INDEX: 30.16 KG/M2 | WEIGHT: 181 LBS | DIASTOLIC BLOOD PRESSURE: 84 MMHG | SYSTOLIC BLOOD PRESSURE: 127 MMHG | HEART RATE: 85 BPM | HEIGHT: 65 IN

## 2025-01-28 DIAGNOSIS — R20.2 PARESTHESIA OF BOTH FEET: Primary | ICD-10-CM

## 2025-01-28 PROCEDURE — 95909 NRV CNDJ TST 5-6 STUDIES: CPT | Performed by: PSYCHIATRY & NEUROLOGY

## 2025-01-28 PROCEDURE — 95886 MUSC TEST DONE W/N TEST COMP: CPT | Performed by: PSYCHIATRY & NEUROLOGY

## 2025-02-17 ENCOUNTER — TELEPHONE (OUTPATIENT)
Dept: FAMILY MEDICINE CLINIC | Facility: CLINIC | Age: 55
End: 2025-02-17

## 2025-02-17 ENCOUNTER — SPECIALTY PHARMACY (OUTPATIENT)
Dept: INFUSION THERAPY | Facility: HOSPITAL | Age: 55
End: 2025-02-17
Payer: MEDICARE

## 2025-02-17 NOTE — PROGRESS NOTES
Specialty Pharmacy Patient Management Program  Refill Outreach     [facility] approved until 2/17/2026     Padmini Hernández  2/17/2025  15:29 EST

## 2025-02-17 NOTE — TELEPHONE ENCOUNTER
Caller: Merry Thornton    Relationship to patient: Self    Best call back number: [phone]    Patient is needing: PATIENT HAS TALKED WITH THE OFFICE A COUPLE TIMES ABOUT HER INSURANCE LISTED PCP. SHE STATES THAT AFTER GETTING OFF THE PHON WITH HER INSURANCE THEY TOLD HER SHE WILL HAVE NO PROBLEM SEEING [facility]. SHE DRIVES OVER 2 HOURS TO COME TO THESE APPOINTMENTS AND WANTS TO MAKE SURE SHE IS OKAY FOR HER 2/20 APPOINTMENT. SHE STATES SHE HAS TRADITIONAL MEDICAID FULL BENEFITS AND MEDICARE AS OF NOV 1ST 2024.     PLEASE CALL TO MAKE SURE EVERYTHING IS OKAY FOR APPOINTMENT.       
show

## 2025-03-19 DIAGNOSIS — J30.2 SEASONAL ALLERGIC RHINITIS, UNSPECIFIED TRIGGER: ICD-10-CM

## 2025-03-19 RX ORDER — LEVOCETIRIZINE DIHYDROCHLORIDE 5 MG/1
TABLET, FILM COATED ORAL
Qty: 30 TABLET | Refills: 10 | Status: SHIPPED | OUTPATIENT
Start: 2025-03-19

## 2025-03-19 NOTE — PROGRESS NOTES
"Chief Complaint  No chief complaint on file.    Subjective          Merry Thornton presents to [facility] NEUROLOGY for neuropathy  History of Present Illness    Merry Levy is a 54-year-old female seen today in follow-up for peripheral neuropathy and migraine.  The patient last saw Dr. Seipel for EMG/NCV on 1/28/2025.  The patient has complaints of gait abnormalities, sensation of leg heaviness, and paresthesias in both lower extremities for over 30 years.  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  She has been on Plaquenil since and follows rheumatology.  She states paresthesias have worsened over the last few months and she has stiffness and heaviness in both lower extremities.  She feels like there is something crawling on her legs and \"pricklies\" up her legs to mid-shin.  She has subjective fluctuating weakness, but her friend and caregiver, Orly, believes it is more of a problem with balance.  The patient fell a couple of weeks ago.  She is currently taking gabapentin 600 mg 3 times daily and has been on this dose for many years.  It does cause some drowsiness, but no other known side effects.    Migraine continues to be about 3 times a week.  She describes the pain as throbbing with pressure and light sensitivity.  She does not have any nausea since being on Nurtec every other day.  She admits to drinking very little water and other fluids throughout the day.  She does have untreated sleep apnea but wears oxygen at night.  She is a smoker.    She has type 2 diabetes which was poorly controlled for about 4 years.  Her highest A1c was 12.8%.  She has been well-controlled on Ozempic, but this is causing GI motility issues because of her baseline gastroparesis.  She is scheduled to see a motility specialist at [facility].      Current Outpatient Medications:     albuterol sulfate  (90 Base) MCG/ACT inhaler, Inhale " 2 puffs Every 4 (Four) Hours As Needed for Wheezing., Disp: 18 g, Rfl: 0    amLODIPine (NORVASC) 10 MG tablet, TAKE 1 TABLET BY MOUTH DAILY., Disp: 90 tablet, Rfl: 1    apixaban (ELIQUIS) 5 MG tablet tablet, Take 1 tablet by mouth Every 12 (Twelve) Hours., Disp: 180 tablet, Rfl: 3    Budeson-Glycopyrrol-Formoterol (Breztri Aerosphere) 160-9-4.8 MCG/ACT aerosol inhaler, Inhale 2 puffs 2 (Two) Times a Day., Disp: , Rfl:     cloNIDine (CATAPRES) 0.1 MG tablet, , Disp: , Rfl:     dicyclomine (BENTYL) 20 MG tablet, TAKE 1 TABLET BY MOUTH EVERY 6 (SIX) HOURS., Disp: 120 tablet, Rfl: 5    ferrous sulfate 325 (65 FE) MG tablet, Take 1 tablet by mouth Daily With Breakfast., Disp: 90 tablet, Rfl: 1    fluticasone (FLONASE) 50 MCG/ACT nasal spray, Administer 2 sprays into the nostril(s) as directed by provider Daily., Disp: , Rfl:     furosemide (LASIX) 20 MG tablet, TAKE 2 TABS EVERY MORNING, 2 TABS EVERY EVENING AND MAY TAKE AN ADDITIONAL 1 TO 2 TABS MIDDAY AS NEEDED, Disp: 540 tablet, Rfl: 1    gabapentin (NEURONTIN) 800 MG tablet, Take 1 tablet by mouth 3 (Three) Times a Day., Disp: 120 tablet, Rfl: 5    HYDROcodone-acetaminophen (NORCO)  MG per tablet, Take 1 tablet by mouth Every 8 (Eight) Hours As Needed for Moderate Pain., Disp: , Rfl:     hydroxychloroquine (PLAQUENIL) 200 MG tablet, , Disp: , Rfl:     ipratropium (ATROVENT) 0.03 % nasal spray, Administer 2 sprays into the nostril(s) as directed by provider., Disp: , Rfl:     ipratropium-albuterol (DUO-NEB) 0.5-2.5 mg/3 ml nebulizer, Take 3 mL by nebulization 4 (Four) Times a Day., Disp: 360 mL, Rfl: 1    levocetirizine (XYZAL) 5 MG tablet, TAKE 1 TABLET BY MOUTH EACH EVENING, Disp: 30 tablet, Rfl: 10    levoFLOXacin (LEVAQUIN) 500 MG tablet, , Disp: , Rfl:     lidocaine (LIDODERM) 5 %, Place 1 patch on the skin as directed by provider Daily. Remove & Discard patch within 12 hours or as directed by MD, Disp: , Rfl:     lisinopril (PRINIVIL,ZESTRIL) 20 MG tablet,  TAKE 1 TABLET BY MOUTH 2 (TWO) TIMES A DAY., Disp: 60 tablet, Rfl: 10    metoclopramide (REGLAN) 10 MG tablet, TAKE 1 TABLET BY MOUTH 4 (FOUR) TIMES A DAY., Disp: 120 tablet, Rfl: 5    metoprolol tartrate (LOPRESSOR) 50 MG tablet, TAKE ONE TABLET BY MOUTH TWO TIMES A DAY, Disp: 180 tablet, Rfl: 3    montelukast (SINGULAIR) 10 MG tablet, Take 1 tablet by mouth Every Night., Disp: , Rfl:     omeprazole (priLOSEC) 40 MG capsule, TAKE 1 CAPSULE BY MOUTH 2 TIMES A DAY., Disp: 180 capsule, Rfl: 3    ondansetron (ZOFRAN) 4 MG tablet, Take 1 tablet by mouth Every 6 (Six) Hours As Needed for Nausea or Vomiting., Disp: 40 tablet, Rfl: 0    ondansetron ODT (ZOFRAN-ODT) 4 MG disintegrating tablet, PLACE 1 TABLET ON TONGUE AND ALLOW TO DISSOLVE EVERY 8 HOURS AS NEEDED FOR NAUSEA, Disp: , Rfl:     orphenadrine (NORFLEX) 100 MG 12 hr tablet, Take 1 tablet by mouth 3 (Three) Times a Day. As needed, Disp: , Rfl:     potassium chloride (K-DUR,KLOR-CON) 10 MEQ CR tablet, , Disp: , Rfl:     potassium chloride 10 MEQ CR tablet, Take 1 tablet by mouth 2 (Two) Times a Day., Disp: , Rfl:     predniSONE (DELTASONE) 10 MG tablet, TAKE 1 TABLET BY MOUTH DAILY., Disp: 30 tablet, Rfl: 5    rimegepant sulfate (Nurtec) 75 MG tablet, DISSOLVE 1 TABLET BY MOUTH EVERY OTHER DAY, Disp: 16 tablet, Rfl: 7    Semaglutide,0.25 or 0.5MG/DOS, (OZEMPIC) 2 MG/3ML solution pen-injector, Inject 0.5 mg under the skin into the appropriate area as directed Every 7 (Seven) Days., Disp: , Rfl:     sennosides-docusate (senna-docusate sodium) 8.6-50 MG per tablet, Take 1 tablet by mouth Daily., Disp: 90 tablet, Rfl: 1    theophylline (IVELISSE-24) 200 MG 24 hr capsule, Take 1 capsule by mouth Daily., Disp: , Rfl:     vilazodone (VIIBRYD) 40 MG tablet tablet, Take 1 tablet by mouth every night at bedtime., Disp: 90 tablet, Rfl: 3    vitamin D (ERGOCALCIFEROL) 1.25 MG (40935 UT) capsule capsule, TAKE 1 CAPSULE BY MOUTH EVERY WEEK, Disp: 4 capsule, Rfl: 10    Review of  "Systems   Constitutional:  Positive for fatigue.   Musculoskeletal:  Positive for arthralgias and gait problem.   Neurological:  Positive for weakness, numbness and headaches.   All other systems reviewed and are negative.         Objective:    Vital Signs:   /88   Pulse 83   Ht 165.1 cm (65\")   Wt 80.7 kg (178 lb)   BMI 29.62 kg/m²     Physical Exam  Vitals reviewed.   Constitutional:       Appearance: She is well-developed and overweight.   HENT:      Head: Normocephalic and atraumatic.   Eyes:      General: Lids are normal.      Extraocular Movements: Extraocular movements intact.      Pupils: Pupils are equal, round, and reactive to light.   Neck:      Vascular: No carotid bruit.   Cardiovascular:      Rate and Rhythm: Normal rate.      Heart sounds: No murmur heard.  Pulmonary:      Effort: Pulmonary effort is normal.   Musculoskeletal:      Cervical back: Normal range of motion and neck supple.   Skin:     General: Skin is warm and dry.   Neurological:      Mental Status: She is oriented to person, place, and time.      Cranial Nerves: Cranial nerves 2-12 are intact. No cranial nerve deficit.      Sensory: Sensory deficit present.      Motor: Motor function is intact. No weakness or tremor.      Coordination: Romberg sign negative. Finger-Nose-Finger Test normal. Rapid alternating movements normal.      Gait: Gait normal.      Deep Tendon Reflexes: Reflexes abnormal. Babinski sign absent on the right side.      Reflex Scores:       Tricep reflexes are 2+ on the right side and 2+ on the left side.       Bicep reflexes are 2+ on the right side and 2+ on the left side.       Brachioradialis reflexes are 2+ on the right side and 2+ on the left side.       Patellar reflexes are 2+ on the right side and 2+ on the left side.       Achilles reflexes are 1+ on the right side and 1+ on the left side.  Psychiatric:         Attention and Perception: Attention normal.         Mood and Affect: Mood normal.         " Speech: Speech normal.         Behavior: Behavior normal.         Cognition and Memory: Cognition and memory normal.         Judgment: Judgment normal.        Result Review :   The following data was reviewed by: ARIANNA Ibanez on 03/20/2025:  CMP          3/27/2024    17:10 4/25/2024    16:03   CMP   Creatinine 0.60     EGFR 107.5     AST (SGOT)  13       ALT (SGPT)  10          Details          This result is from an external source.             CBC          7/11/2024    15:14 12/19/2024    13:18 1/13/2025    14:49   CBC   WBC 10.55  9     10.27    RBC 4.70  4.67     4.90    Hemoglobin 13.7  14.5     15.3    Hematocrit 42.2  44.1     47.0    MCV 89.8  94.4     95.9    MCH 29.1  31     31.2    MCHC 32.5  32.9     32.6    RDW 15.4  14     14.0    Platelets 182  169     172       Details          This result is from an external source.             Lipid Panel          12/19/2024    13:18   Lipid Panel   Total Cholesterol 141       Triglycerides 163       HDL Cholesterol 32       LDL Cholesterol  81          Details          This result is from an external source.             TSH          1/13/2025    14:49   TSH   TSH 0.687      Labs 1/29/2025  ROBBY positive, 1: 80H  Phospholipid antibody negative  Centromere B IgG antibody 8H  Sed rate 20  Urine creatinine to 223.6H  CK 43           Date of Study:1/28/25   Referring Provider:DR. SEIPEL   History:   BLE-NEUROPATHY  Impression:   This is an abnormal study.  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    Electronically signed by :   Joseph Seipel, M.D.  February 3, 2025    Neurological Exam  Mental Status  Awake, alert and oriented to person, place and time. Oriented to person, place, and time. Memory is normal. Speech is normal.    Cranial Nerves  CN II: Visual acuity is normal. Visual fields full to confrontation.  CN III, IV, VI: Extraocular movements intact bilaterally. Normal lids and orbits bilaterally. Pupils equal round  and reactive to light bilaterally.  CN V: Facial sensation is normal.  CN VII: Full and symmetric facial movement.  CN IX, X: Palate elevates symmetrically. Normal gag reflex.  CN XI: Shoulder shrug strength is normal.  CN XII: Tongue midline without atrophy or fasciculations.    Motor   Normal muscle tone. No abnormal involuntary movements. No pronator drift.                                             Right                     Left   Shoulder abduction               5                          5  Elbow flexion                         5                          5  Elbow extension                    5                          5  Wrist flexion                           5                          5  Wrist extension                      5                          5  Hip flexion                              5                          5  Knee flexion                           5                          5  Knee extension                      5                          5  Plantarflexion                         5                          5  Dorsiflexion                            5                          5    Sensory  Light touch is normal in upper and lower extremities. Temperature abnormality: Vibration is normal in upper and lower extremities.   Decreased temperature sensation from ankles distally both lower extremities.    Reflexes                                            Right                      Left  Brachioradialis                    2+                         2+  Biceps                                 2+                         2+  Triceps                                2+                         2+  Patellar                                2+                         2+  Achilles                                1+                         1+  Right Plantar: downgoing  Left Plantar: equivocal  Right Babinski sign: no Babinski's sign    Coordination  No tremorRight: Finger-to-nose normal. Rapid alternating movement  normal.Left: Finger-to-nose normal. Rapid alternating movement normal.    Gait   Normal gait.Casual gait is normal including stance, stride, and arm swing. Normal tandem gait. Romberg is absent. Able to rise from chair without using arms.         Assessment and Plan    Diagnoses and all orders for this visit:    1. Neuropathy (Primary)  Comments:  Continue gabapentin.  Orders:  -     Vitamin B12 & Folate  -     Vitamin B6 (Pyridoxine); Future  -     Vitamin E; Future  -     Copper, Urine - Urine, Clean Catch; Future  -     Heavy Metals, Blood  -     Immunofixation, Serum; Future  -     Protein Elec + Interp, Serum  -     gabapentin (NEURONTIN) 800 MG tablet; Take 1 tablet by mouth 3 (Three) Times a Day.  Dispense: 120 tablet; Refill: 5    2. Migraine without aura, intractable    3. Paresthesias with subjective weakness    Merry Thornton is seen in follow-up for peripheral neuropathy and migraine headache.  The patient recently had an EMG/NCV with Dr. 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  The patient has long standing history of connective tissue disorder and type 2 diabetes (previously uncontrolled).  Diabetes  is much better controlled on Ozempic, but this is causing gastro motility issues.  She is still having migraine headache less severe 2-3 times a week since starting Nurtec.    Will increase gabapentin 800 mg 3 times a day.  We discussed side effects of this medication including drowsiness.    Will check additional labs for causes of neuropathy.  I suspect her neuropathy is likely related to glucose intolerance and connective tissue disease.    Migraine headaches still occur 2-3 times a week, but less severe on Nurtec 75 mg every other day.  She admits to drinking very little fluids throughout the day.  I strongly encouraged her to start by increasing her non-caffeinated fluid intake to at least 90 ounces/day.  Consider adding Botox therapy.  She would be a poor  candidate for Qulipta given her gastroparesis.    She also has untreated sleep apnea.  Though it was retested from a home sleep study and revealed an AHI of 4.8 (reportedly), she had this test done with oxygen therapy in place therefore inaccurate.  Untreated sleep apnea can contribute to chronic headache.  She also has an extensive medication list.  Several of these medications can cause  headache.    It is possible that the increase in gabapentin dosing may help her headache.    She will follow-up with  Dr. Seipel as scheduled in July       I spent 57 minutes caring for Merry on this date of service. This time includes time spent by me in the following activities:preparing for the visit, reviewing tests, performing a medically appropriate examination and/or evaluation , counseling and educating the patient/family/caregiver, ordering medications, tests, or procedures, and documenting information in the medical record  Follow Up   No follow-ups on file.  Patient was given instructions and counseling regarding her condition or for health maintenance advice. Please see specific information pulled into the AVS if appropriate.     This document has been electronically signed by ARIANNA Jennings  on March 20, 2025 17:40 EDT

## 2025-03-20 ENCOUNTER — OFFICE VISIT (OUTPATIENT)
Dept: NEUROLOGY | Facility: CLINIC | Age: 55
End: 2025-03-20
Payer: MEDICARE

## 2025-03-20 VITALS
HEIGHT: 65 IN | SYSTOLIC BLOOD PRESSURE: 129 MMHG | WEIGHT: 178 LBS | HEART RATE: 83 BPM | BODY MASS INDEX: 29.66 KG/M2 | DIASTOLIC BLOOD PRESSURE: 88 MMHG

## 2025-03-20 DIAGNOSIS — G43.019 MIGRAINE WITHOUT AURA, INTRACTABLE: ICD-10-CM

## 2025-03-20 DIAGNOSIS — R53.1 PARESTHESIAS WITH SUBJECTIVE WEAKNESS: ICD-10-CM

## 2025-03-20 DIAGNOSIS — G62.9 NEUROPATHY: Primary | ICD-10-CM

## 2025-03-20 DIAGNOSIS — R20.2 PARESTHESIAS WITH SUBJECTIVE WEAKNESS: ICD-10-CM

## 2025-03-20 RX ORDER — GABAPENTIN 800 MG/1
800 TABLET ORAL 3 TIMES DAILY
Qty: 120 TABLET | Refills: 5 | Status: SHIPPED | OUTPATIENT
Start: 2025-03-20

## 2025-03-20 RX ORDER — LEVOFLOXACIN 500 MG/1
TABLET, FILM COATED ORAL
COMMUNITY
Start: 2025-02-14

## 2025-04-08 RX ORDER — ERGOCALCIFEROL 1.25 MG/1
50000 CAPSULE, LIQUID FILLED ORAL WEEKLY
Qty: 4 CAPSULE | Refills: 11 | Status: SHIPPED | OUTPATIENT
Start: 2025-04-08

## 2025-04-14 ENCOUNTER — OFFICE VISIT (OUTPATIENT)
Dept: FAMILY MEDICINE CLINIC | Facility: CLINIC | Age: 55
End: 2025-04-14
Payer: MEDICARE

## 2025-04-14 ENCOUNTER — LAB (OUTPATIENT)
Dept: LAB | Facility: HOSPITAL | Age: 55
End: 2025-04-14
Payer: MEDICARE

## 2025-04-14 VITALS
BODY MASS INDEX: 29.39 KG/M2 | HEART RATE: 89 BPM | WEIGHT: 176.4 LBS | HEIGHT: 65 IN | TEMPERATURE: 97.9 F | SYSTOLIC BLOOD PRESSURE: 116 MMHG | OXYGEN SATURATION: 95 % | DIASTOLIC BLOOD PRESSURE: 83 MMHG | RESPIRATION RATE: 18 BRPM

## 2025-04-14 DIAGNOSIS — R11.0 NAUSEA: ICD-10-CM

## 2025-04-14 DIAGNOSIS — Z12.31 SCREENING MAMMOGRAM FOR BREAST CANCER: ICD-10-CM

## 2025-04-14 DIAGNOSIS — Z78.0 MENOPAUSE: ICD-10-CM

## 2025-04-14 DIAGNOSIS — E78.2 MIXED HYPERLIPIDEMIA: ICD-10-CM

## 2025-04-14 DIAGNOSIS — G62.9 NEUROPATHY: ICD-10-CM

## 2025-04-14 DIAGNOSIS — K59.09 CHRONIC CONSTIPATION: Primary | ICD-10-CM

## 2025-04-14 PROCEDURE — 83655 ASSAY OF LEAD: CPT | Performed by: NURSE PRACTITIONER

## 2025-04-14 PROCEDURE — 84144 ASSAY OF PROGESTERONE: CPT

## 2025-04-14 PROCEDURE — 84446 ASSAY OF VITAMIN E: CPT

## 2025-04-14 PROCEDURE — 80061 LIPID PANEL: CPT

## 2025-04-14 PROCEDURE — 82784 ASSAY IGA/IGD/IGG/IGM EACH: CPT

## 2025-04-14 PROCEDURE — 36415 COLL VENOUS BLD VENIPUNCTURE: CPT

## 2025-04-14 PROCEDURE — 84207 ASSAY OF VITAMIN B-6: CPT

## 2025-04-14 PROCEDURE — 86334 IMMUNOFIX E-PHORESIS SERUM: CPT

## 2025-04-14 PROCEDURE — 83001 ASSAY OF GONADOTROPIN (FSH): CPT

## 2025-04-14 PROCEDURE — 80053 COMPREHEN METABOLIC PANEL: CPT

## 2025-04-14 PROCEDURE — 83002 ASSAY OF GONADOTROPIN (LH): CPT

## 2025-04-14 PROCEDURE — 82175 ASSAY OF ARSENIC: CPT | Performed by: NURSE PRACTITIONER

## 2025-04-14 PROCEDURE — 84165 PROTEIN E-PHORESIS SERUM: CPT | Performed by: NURSE PRACTITIONER

## 2025-04-14 PROCEDURE — 82746 ASSAY OF FOLIC ACID SERUM: CPT | Performed by: NURSE PRACTITIONER

## 2025-04-14 PROCEDURE — 82607 VITAMIN B-12: CPT | Performed by: NURSE PRACTITIONER

## 2025-04-14 PROCEDURE — 83825 ASSAY OF MERCURY: CPT | Performed by: NURSE PRACTITIONER

## 2025-04-14 RX ORDER — ONDANSETRON 8 MG/1
8 TABLET, ORALLY DISINTEGRATING ORAL EVERY 8 HOURS PRN
Qty: 90 TABLET | Refills: 5 | Status: SHIPPED | OUTPATIENT
Start: 2025-04-14

## 2025-04-14 NOTE — PROGRESS NOTES
Subjective   Merry Thornton is a 54 y.o. female.     History of Present Illness  Pt presents to follow up on her chronic conditions.  She has been having more falls and balance is off. She does have neuropathy in her legs so her balance isn't the best.  Sees eye doctor but vision has been worse.  She has been on Nurtec but continues to get headaches.  Unsure if migraines.  Seeing Dr. Seipel.  She had hemoglobin A1c on 3/19/25 and was 5.6.  She is taking Ozempic.  She has chronic nausea and vomiting but had this prior to Ozempic.  Unsure if making any worse.  Her Ozempic is managed by her internal med doctor.  She also has been having constipation issues lately.  She has appointment with the motility speciality clinic in [place] but not until July and it is a virtual visit due to wait time. She would like to try something for constipation and get a refill on her zofran for now.  She had partial hysterectomy years ago so no longer having periods but feels like she is menopausal now.  She has been having more memory issues and is concerned about dementia but also thinks it may be hormonal.    Checkup discuss a few issues  Symptoms are: recurrent.   Onset was 1 to 6 months.   Symptoms occur: weekly.  Symptoms include: anorexia, change in stool, diaphoresis, fatigue, headaches, myalgias, nausea, numbness, sore throat, vertigo, visual change, vomiting and weakness.   Pertinent negative symptoms include no abdominal pain, no joint pain, no chest pain, no chills, no congestion, no cough, no fever, no joint swelling, no neck pain, no rash, no swollen glands and no dysuria.   Treatment and/or Medications comments include: Working with other specialist and waiting for appointment with specialist for the gastroparasis          Past Medical History:   Diagnosis Date    Allergic 01/01/2002    Asthma 01/01/1980    CAD (coronary artery disease) 12/20/2016    dr. singh    Carpal tunnel syndrome on right 03/08/2017    CHF  (congestive heart failure) 01/01/2014    Cholelithiasis 01/01/2015    Cluster headache 2000    Common migraine 12/20/2016    COPD (chronic obstructive pulmonary disease) 12/20/2016    Cyst, ovarian 12/20/2016    mass    DDD (degenerative disc disease), cervical 03/11/2016    Deep vein thrombosis 09/05/2020    Dental caries 01/14/2019    Depression 11/02/2017    Depression, major, recurrent, moderate 08/25/2016    Dietary counseling 09/21/2017    Difficulty walking 1999    Dysphagia 05/2020    Elevated random blood glucose level 05/14/2020    Encounter for smoking cessation counseling 09/21/2017    Exacerbation of systemic lupus 04/30/2019    Fibromyalgia 12/20/2016    Fibromyalgia, primary 2001    Generalized anxiety disorder 03/11/2016    GERD (gastroesophageal reflux disease) 12/20/2016    Headache, tension-type 2014    Heartburn 11/02/2017    History of medical problems 01/01/2000    Hyperlipidemia 2017    Hypertension 03/16/2016    Hyperthyroidism 03/11/2016    Hypocalcemia 09/21/2017    IBS (irritable colon syndrome) 09/13/2016    Immobility syndrome 09/14/2017    Intracranial pressure increased 12/20/2016    Kidney stone 01/01/2014    Left knee pain 09/21/2017    Lower back pain 02/01/2018    Lung nodules 09/07/2016    Memory loss 2017    Morbid obesity 11/02/2017    Muscle cramp 09/21/2017    Neck pain 01/14/2019    Need for prophylactic vaccination and inoculation against influenza 09/21/2017    Neurogenic bladder 12/20/2016    Obesity 03/07/2017    Obesity (BMI 30-39.9) 05/14/2020    Orthostatic hypotension 07/17/2017    Osteoarthritis 03/07/2017    Osteopenia 01/14/2019    Osteoporosis 12/20/2016    Other instability, right ankle 04/13/2017    Overlap syndrome     scleroderma, lupus, Raynaud's. Mixxed connective Tissue D/o    Pain, joint, ankle, left 09/21/2017    Palpitations 10/04/2017    Paresthesia 06/12/2017    facial    Peripheral neuropathy 12/20/2016    bilateral lower extremities    Poor vision      Prediabetes     Presence of pessary     Pulmonary embolism 09/05/2020    Raynaud's syndrome 12/20/2016    Retinopathy     Right knee pain 11/08/2016    Scleroderma 12/20/2016    Seasonal allergic rhinitis 12/20/2016    Seizures 2018    SLE (systemic lupus erythematosus) 03/16/2017    Sleep apnea 11/02/2017    BIPAP    Sleep disorder 01/14/2019    Sprain of unspecified site of right knee, subsequent encounter 12/01/2016    Status post placement of implantable loop recorder 05/09/2018    presence    Syncope and collapse 2020    still has occassionally    Ulcerative colitis 02/2020    Urine incontinence     Vasovagal syncope     Ventricular arrhythmia 03/16/2016    Visit for screening mammogram 12/20/2016    Vitamin D deficiency 01/14/2019    Wheezing      Past Surgical History:   Procedure Laterality Date    ADENOIDECTOMY  01/01/2010    ANKLE SURGERY Right 02/2017    BREAST EXCISIONAL BIOPSY Right     years ago    BRONCHOSCOPY      CARDIAC ABLATION  01/2000    CARDIAC CATHETERIZATION  01/01/2016    CARDIAC ELECTROPHYSIOLOGY PROCEDURE      CARPAL TUNNEL RELEASE  2017    CHOLECYSTECTOMY      COLON SURGERY      COLONOSCOPY N/A 02/26/2020    Procedure: COLONOSCOPY WITH BIOPSY X1 AREA;  Surgeon: Michael Cheng MD;  Location: [facility] ENDOSCOPY;  Service: Gastroenterology;  Laterality: N/A;  diarrhea    ECHO - CONVERTED  2020    ENDOSCOPY      ENDOSCOPY N/A 05/21/2020    Procedure: ESOPHAGOGASTRODUODENOSCOPY WITH DILATATION (#54, 60 bougie);  Surgeon: Michael Cheng MD;  Location: [facility] ENDOSCOPY;  Service: Gastroenterology;  Laterality: N/A;  Post: candidiasis, upper esophagus sphincter stricture    HYSTERECTOMY  01/01/1997    KNEE SURGERY Right 09/2017    OTHER SURGICAL HISTORY      Urerine hysterectomy    OTHER SURGICAL HISTORY      t and a    OTHER SURGICAL HISTORY      d&c    OTHER SURGICAL HISTORY      bladder stim    OTHER SURGICAL HISTORY      Loop recorder placement    SUBTOTAL HYSTERECTOMY  01/01/1997     TONSILLECTOMY  01/01/2010    TUBAL ABDOMINAL LIGATION  01/12/1995    WRIST SURGERY Right      Family History   Problem Relation Age of Onset    Cancer Mother     Migraines Mother     Anxiety disorder Mother     Arthritis Mother     Mental illness Mother     Heart disease Father     Lung disease Father     Neuropathy Father     Alcohol abuse Father     Asthma Father     COPD Father     Diabetes Father     Hyperlipidemia Father     Vision loss Father     Diabetes Sister     Heart disease Sister     Hypertension Sister     Other Sister         weight disorder    Migraines Sister     Stroke Sister     Anxiety disorder Sister     Drug abuse Sister     Mental illness Sister     Migraines Sister     Neuropathy Brother     Neuropathy Brother     Seizures Brother         Epilepsy    Asthma Brother     Diabetes Brother     Thyroid disease Daughter      Social History     Socioeconomic History    Marital status: Single   Tobacco Use    Smoking status: Every Day     Current packs/day: 0.75     Average packs/day: 0.8 packs/day for 41.0 years (30.7 ttl pk-yrs)     Types: Cigarettes     Start date: 5/1/1984    Smokeless tobacco: Former     Quit date: 5/11/2020    Tobacco comments:     In the process of trying to quit   Vaping Use    Vaping status: Never Used   Substance and Sexual Activity    Alcohol use: No    Drug use: Not Currently     Frequency: 1.0 times per week     Types: Marijuana     Comment: stopped 2 months ago    Sexual activity: Not Currently     Partners: Male     Birth control/protection: Post-menopausal, Tubal ligation, Hysterectomy     Comment: Partial         Current Outpatient Medications:     albuterol sulfate  (90 Base) MCG/ACT inhaler, Inhale 2 puffs Every 4 (Four) Hours As Needed for Wheezing., Disp: 18 g, Rfl: 0    amLODIPine (NORVASC) 10 MG tablet, TAKE 1 TABLET BY MOUTH DAILY., Disp: 90 tablet, Rfl: 1    apixaban (ELIQUIS) 5 MG tablet tablet, Take 1 tablet by mouth Every 12 (Twelve) Hours., Disp:  180 tablet, Rfl: 3    Budeson-Glycopyrrol-Formoterol (Breztri Aerosphere) 160-9-4.8 MCG/ACT aerosol inhaler, Inhale 2 puffs 2 (Two) Times a Day., Disp: , Rfl:     cloNIDine (CATAPRES) 0.1 MG tablet, , Disp: , Rfl:     fluticasone (FLONASE) 50 MCG/ACT nasal spray, Administer 2 sprays into the nostril(s) as directed by provider Daily., Disp: , Rfl:     furosemide (LASIX) 20 MG tablet, TAKE 2 TABS EVERY MORNING, 2 TABS EVERY EVENING AND MAY TAKE AN ADDITIONAL 1 TO 2 TABS MIDDAY AS NEEDED, Disp: 540 tablet, Rfl: 1    gabapentin (NEURONTIN) 800 MG tablet, Take 1 tablet by mouth 3 (Three) Times a Day., Disp: 120 tablet, Rfl: 5    HYDROcodone-acetaminophen (NORCO)  MG per tablet, Take 1 tablet by mouth Every 8 (Eight) Hours As Needed for Moderate Pain., Disp: , Rfl:     hydroxychloroquine (PLAQUENIL) 200 MG tablet, , Disp: , Rfl:     ipratropium (ATROVENT) 0.03 % nasal spray, Administer 2 sprays into the nostril(s) as directed by provider., Disp: , Rfl:     ipratropium-albuterol (DUO-NEB) 0.5-2.5 mg/3 ml nebulizer, Take 3 mL by nebulization 4 (Four) Times a Day., Disp: 360 mL, Rfl: 1    levocetirizine (XYZAL) 5 MG tablet, TAKE 1 TABLET BY MOUTH EACH EVENING, Disp: 30 tablet, Rfl: 10    levoFLOXacin (LEVAQUIN) 500 MG tablet, , Disp: , Rfl:     lidocaine (LIDODERM) 5 %, Place 1 patch on the skin as directed by provider Daily. Remove & Discard patch within 12 hours or as directed by MD, Disp: , Rfl:     lisinopril (PRINIVIL,ZESTRIL) 20 MG tablet, TAKE 1 TABLET BY MOUTH 2 (TWO) TIMES A DAY., Disp: 60 tablet, Rfl: 10    metoprolol tartrate (LOPRESSOR) 50 MG tablet, TAKE ONE TABLET BY MOUTH TWO TIMES A DAY, Disp: 180 tablet, Rfl: 3    montelukast (SINGULAIR) 10 MG tablet, Take 1 tablet by mouth Every Night., Disp: , Rfl:     omeprazole (priLOSEC) 40 MG capsule, TAKE 1 CAPSULE BY MOUTH 2 TIMES A DAY., Disp: 180 capsule, Rfl: 3    ondansetron ODT (ZOFRAN-ODT) 8 MG disintegrating tablet, Place 1 tablet on the tongue Every 8  (Eight) Hours As Needed for Nausea or Vomiting., Disp: 90 tablet, Rfl: 5    orphenadrine (NORFLEX) 100 MG 12 hr tablet, Take 1 tablet by mouth 3 (Three) Times a Day. As needed, Disp: , Rfl:     potassium chloride (K-DUR,KLOR-CON) 10 MEQ CR tablet, , Disp: , Rfl:     potassium chloride 10 MEQ CR tablet, Take 1 tablet by mouth 2 (Two) Times a Day., Disp: , Rfl:     predniSONE (DELTASONE) 10 MG tablet, TAKE 1 TABLET BY MOUTH DAILY., Disp: 30 tablet, Rfl: 5    rimegepant sulfate (Nurtec) 75 MG tablet, DISSOLVE 1 TABLET BY MOUTH EVERY OTHER DAY, Disp: 16 tablet, Rfl: 7    Semaglutide,0.25 or 0.5MG/DOS, (OZEMPIC) 2 MG/3ML solution pen-injector, Inject 0.5 mg under the skin into the appropriate area as directed Every 7 (Seven) Days., Disp: , Rfl:     sennosides-docusate (senna-docusate sodium) 8.6-50 MG per tablet, Take 1 tablet by mouth Daily., Disp: 90 tablet, Rfl: 1    theophylline (IVELISSE-24) 200 MG 24 hr capsule, Take 1 capsule by mouth Daily., Disp: , Rfl:     vilazodone (VIIBRYD) 40 MG tablet tablet, Take 1 tablet by mouth every night at bedtime., Disp: 90 tablet, Rfl: 3    vitamin D (ERGOCALCIFEROL) 1.25 MG (82351 UT) capsule capsule, TAKE 1 CAPSULE BY MOUTH EVERY WEEK, Disp: 4 capsule, Rfl: 11    linaclotide (Linzess) 145 MCG capsule capsule, Take 1 capsule by mouth Every Morning Before Breakfast., Disp: 30 capsule, Rfl: 2    Review of Systems   Constitutional:  Positive for diaphoresis and fatigue. Negative for activity change, appetite change, chills, fever, unexpected weight gain and unexpected weight loss.   HENT:  Positive for sore throat. Negative for congestion and swollen glands.    Respiratory:  Negative for cough.    Cardiovascular:  Negative for chest pain.   Gastrointestinal:  Positive for anorexia, nausea and vomiting. Negative for abdominal pain.   Genitourinary:  Negative for dysuria.   Musculoskeletal:  Positive for myalgias. Negative for gait problem, joint pain and neck pain.   Skin:  Negative for  "rash.   Neurological:  Positive for vertigo, weakness, numbness, headache and memory problem. Negative for tremors, seizures, syncope, facial asymmetry, speech difficulty and light-headedness.   Psychiatric/Behavioral:  Negative for sleep disturbance.      /83 (BP Location: Left arm, Patient Position: Sitting, Cuff Size: Large Adult)   Pulse 89   Temp 97.9 °F (36.6 °C) (Temporal)   Resp 18   Ht 165.1 cm (65\")   Wt 80 kg (176 lb 6.4 oz)   SpO2 95%   BMI 29.35 kg/m²       Objective   Physical Exam  Vitals and nursing note reviewed.   Constitutional:       Appearance: Normal appearance. She is normal weight.   HENT:      Head: Normocephalic and atraumatic.   Neck:      Thyroid: No thyroid mass, thyromegaly or thyroid tenderness.      Vascular: No carotid bruit.   Cardiovascular:      Rate and Rhythm: Normal rate and regular rhythm.      Heart sounds: Normal heart sounds.   Pulmonary:      Effort: Pulmonary effort is normal.      Breath sounds: Wheezing present.   Musculoskeletal:         General: Normal range of motion.      Cervical back: Normal range of motion and neck supple.      Right lower leg: No edema.      Left lower leg: No edema.   Skin:     General: Skin is warm and dry.   Neurological:      General: No focal deficit present.      Mental Status: She is alert and oriented to person, place, and time.   Psychiatric:         Mood and Affect: Mood normal.         Behavior: Behavior normal.         Thought Content: Thought content normal.         Procedures     Assessment    Diagnoses and all orders for this visit:    1. Chronic constipation (Primary)  Comments:  Will try on Linzess while waiting to get into motility speciality clinic.  Orders:  -     linaclotide (Linzess) 145 MCG capsule capsule; Take 1 capsule by mouth Every Morning Before Breakfast.  Dispense: 30 capsule; Refill: 2    2. Screening mammogram for breast cancer  Comments:  Orders entered.  Orders:  -     Mammo Screening Digital " Tomosynthesis Bilateral With CAD; Future    3. Nausea  Comments:  Will increase dose and refill.  Discussed that it may be worth decreasing ozempic dose just to see if symptoms improve.  She will discuss with motility sp.  Orders:  -     ondansetron ODT (ZOFRAN-ODT) 8 MG disintegrating tablet; Place 1 tablet on the tongue Every 8 (Eight) Hours As Needed for Nausea or Vomiting.  Dispense: 90 tablet; Refill: 5    4. Menopause  Comments:  Will check labs.  Memory issues likely secondary to menopause.  Orders:  -     FSH & LH; Future  -     Progesterone; Future    5. Mixed hyperlipidemia  Comments:  will recheck labs.  Orders:  -     Comprehensive metabolic panel; Future  -     Lipid panel; Future

## 2025-04-15 LAB
ALBUMIN SERPL ELPH-MCNC: 3.4 G/DL (ref 2.9–4.4)
ALBUMIN SERPL-MCNC: 4.2 G/DL (ref 3.5–5.2)
ALBUMIN/GLOB SERPL: 1.1 {RATIO} (ref 0.7–1.7)
ALBUMIN/GLOB SERPL: 1.5 G/DL
ALP SERPL-CCNC: 95 U/L (ref 39–117)
ALPHA1 GLOB SERPL ELPH-MCNC: 0.3 G/DL (ref 0–0.4)
ALPHA2 GLOB SERPL ELPH-MCNC: 0.9 G/DL (ref 0.4–1)
ALT SERPL W P-5'-P-CCNC: 12 U/L (ref 1–33)
ANION GAP SERPL CALCULATED.3IONS-SCNC: 12.2 MMOL/L (ref 5–15)
AST SERPL-CCNC: 14 U/L (ref 1–32)
B-GLOBULIN SERPL ELPH-MCNC: 0.9 G/DL (ref 0.7–1.3)
BILIRUB SERPL-MCNC: 0.3 MG/DL (ref 0–1.2)
BUN SERPL-MCNC: 9 MG/DL (ref 6–20)
BUN/CREAT SERPL: 12.2 (ref 7–25)
CALCIUM SPEC-SCNC: 9.3 MG/DL (ref 8.6–10.5)
CHLORIDE SERPL-SCNC: 102 MMOL/L (ref 98–107)
CHOLEST SERPL-MCNC: 147 MG/DL (ref 0–200)
CO2 SERPL-SCNC: 27.8 MMOL/L (ref 22–29)
CREAT SERPL-MCNC: 0.74 MG/DL (ref 0.57–1)
EGFRCR SERPLBLD CKD-EPI 2021: 96.3 ML/MIN/1.73
FOLATE SERPL-MCNC: 3.64 NG/ML (ref 4.78–24.2)
FSH SERPL-ACNC: 65.1 MIU/ML
GAMMA GLOB SERPL ELPH-MCNC: 1 G/DL (ref 0.4–1.8)
GLOBULIN SER CALC-MCNC: 3.1 G/DL (ref 2.2–3.9)
GLOBULIN UR ELPH-MCNC: 2.8 GM/DL
GLUCOSE SERPL-MCNC: 87 MG/DL (ref 65–99)
HDLC SERPL-MCNC: 40 MG/DL (ref 40–60)
LABORATORY COMMENT REPORT: NORMAL
LDLC SERPL CALC-MCNC: 81 MG/DL (ref 0–100)
LDLC/HDLC SERPL: 1.93 {RATIO}
LH SERPL-ACNC: 28.8 MIU/ML
M PROTEIN SERPL ELPH-MCNC: NORMAL G/DL
POTASSIUM SERPL-SCNC: 4.4 MMOL/L (ref 3.5–5.2)
PROGEST SERPL-MCNC: <0.05 NG/ML
PROT PATTERN SERPL ELPH-IMP: NORMAL
PROT SERPL-MCNC: 6.5 G/DL (ref 6–8.5)
PROT SERPL-MCNC: 7 G/DL (ref 6–8.5)
SODIUM SERPL-SCNC: 142 MMOL/L (ref 136–145)
TRIGL SERPL-MCNC: 149 MG/DL (ref 0–150)
VIT B12 BLD-MCNC: 290 PG/ML (ref 211–946)
VLDLC SERPL-MCNC: 26 MG/DL (ref 5–40)

## 2025-04-16 LAB
IGA SERPL-MCNC: 272 MG/DL (ref 87–352)
IGG SERPL-MCNC: 925 MG/DL (ref 586–1602)
IGM SERPL-MCNC: 108 MG/DL (ref 26–217)
PROT PATTERN SERPL IFE-IMP: NORMAL

## 2025-04-17 LAB — PYRIDOXAL PHOS SERPL-MCNC: 5.7 UG/L (ref 3.4–65.2)

## 2025-04-18 ENCOUNTER — RESULTS FOLLOW-UP (OUTPATIENT)
Dept: FAMILY MEDICINE CLINIC | Facility: CLINIC | Age: 55
End: 2025-04-18

## 2025-04-18 DIAGNOSIS — Z78.0 MENOPAUSE: Primary | ICD-10-CM

## 2025-04-19 LAB
A-TOCOPHEROL VIT E SERPL-MCNC: 9.4 MG/L (ref 7–25.1)
GAMMA TOCOPHEROL SERPL-MCNC: 1.1 MG/L (ref 0.5–5.5)

## 2025-04-20 LAB
ARSENIC BLD-MCNC: 2 UG/L (ref 0–9)
LEAD BLDV-MCNC: 1.2 UG/DL (ref 0–3.4)
MERCURY BLD-MCNC: <1 UG/L (ref 0–14.9)

## 2025-04-23 ENCOUNTER — TELEPHONE (OUTPATIENT)
Dept: ONCOLOGY | Facility: CLINIC | Age: 55
End: 2025-04-23
Payer: MEDICARE

## 2025-04-23 ENCOUNTER — TELEPHONE (OUTPATIENT)
Dept: NEUROLOGY | Facility: CLINIC | Age: 55
End: 2025-04-23
Payer: MEDICARE

## 2025-04-23 NOTE — TELEPHONE ENCOUNTER
Provider: KETAN GUALLPA    Caller: RAE WITH [employer]    Phone Number: [phone]    Reason for Call: CALLED REGARDING GABAPENTIN SCRIPT. STATES THEY NEED PROVIDER'S YAYA # TO FILL THIS. PLEASE REVIEW & ADVISE, THANK YOU.

## 2025-04-23 NOTE — TELEPHONE ENCOUNTER
Caller: Merry Thornton    Relationship: Self    Best call back number: [phone]     What is the best time to reach you: ANYTIME     Who are you requesting to speak with (clinical staff, provider,  specific staff member): CLINICAL     What was the call regarding: THE PT HAD LABS DONE WITH HER PCP AND HER LABS SHOWED LOW PROGESTERONE AND THE PT'S PCP WOULD LIKE TO PRESCRIBE THE PT SOME VAGINAL ESTRADIOL CREAM AND PROMETRIUM FOR NIGHT TIME.    THE PT'S PCP WANTS THE PT TO DISCUSS WITH DR FLOWERS FIRST AND GET CLEARANCE.     PLEASE ADVISE PT.     Is it okay if the provider responds through MyChart: YES

## 2025-05-01 ENCOUNTER — TELEPHONE (OUTPATIENT)
Dept: FAMILY MEDICINE CLINIC | Facility: CLINIC | Age: 55
End: 2025-05-01

## 2025-05-01 RX ORDER — ESTRADIOL 0.03 MG/D
1 PATCH TRANSDERMAL WEEKLY
Qty: 4 EACH | Refills: 0 | Status: CANCELLED | OUTPATIENT
Start: 2025-05-01

## 2025-05-01 RX ORDER — PROGESTERONE 100 MG/1
100 CAPSULE ORAL DAILY
Qty: 30 CAPSULE | Refills: 0 | Status: CANCELLED | OUTPATIENT
Start: 2025-05-01

## 2025-05-01 NOTE — TELEPHONE ENCOUNTER
Caller: Merry Thornton    Relationship: Self    Best call back number: [phone]    What medication are you requesting: PROMETRIUM AND TOPICAL ESTRADIAL    What are your current symptoms: MENOPAUSAL    How long have you been experiencing symptoms:     Have you had these symptoms before:    [x] Yes  [] No    Have you been treated for these symptoms before:   [x] Yes  [] No    If a prescription is needed, what is your preferred pharmacy and phone number: [address] - [phone] PH - [phone] FX     Additional notes:  WAS OKAYED BY DR FLOWERS

## 2025-05-06 LAB
NCCN CRITERIA FLAG: NORMAL
TYRER CUZICK SCORE: 6.6

## 2025-05-30 ENCOUNTER — HOSPITAL ENCOUNTER (OUTPATIENT)
Dept: MAMMOGRAPHY | Facility: HOSPITAL | Age: 55
Discharge: HOME | End: 2025-05-30
Admitting: PHYSICIAN ASSISTANT
Payer: MEDICARE

## 2025-05-30 DIAGNOSIS — Z12.31 SCREENING MAMMOGRAM FOR BREAST CANCER: ICD-10-CM

## 2025-05-30 PROCEDURE — 77063 BREAST TOMOSYNTHESIS BI: CPT

## 2025-05-30 PROCEDURE — 77067 SCR MAMMO BI INCL CAD: CPT

## 2025-06-01 RX ORDER — ESTRADIOL 0.03 MG/D
1 FILM, EXTENDED RELEASE TRANSDERMAL 2 TIMES WEEKLY
Qty: 8 EACH | Refills: 5 | Status: SHIPPED | OUTPATIENT
Start: 2025-06-02

## 2025-06-01 RX ORDER — PROGESTERONE 100 MG/1
100 CAPSULE ORAL NIGHTLY
Qty: 30 CAPSULE | Refills: 5 | Status: SHIPPED | OUTPATIENT
Start: 2025-06-01

## 2025-06-04 ENCOUNTER — TELEPHONE (OUTPATIENT)
Dept: NEUROLOGY | Facility: CLINIC | Age: 55
End: 2025-06-04
Payer: MEDICARE

## 2025-06-04 NOTE — TELEPHONE ENCOUNTER
Caller: Norberto Merry ABBY    Relationship to patient: Self    Best call back number: [phone]    New or established patient? [] New  [x] Established   With whom?: DR. SEIPEL    Date of admission: NOT ADMITTED    Date of discharge: 6/3/2025    Length of stay (If applicable): A FEW HOURS    Facility discharged from: [facility] ED    Diagnosis/Symptoms: SYNCOPE EPISODES W/ LOC- PT STATES ED STAFF FELT PT WAS HAVING SEIZURES    Suggested follow-up timeframe: NOT INDICATED    Specialty Only: Did you see a [facility] provider?    [] Yes  [x] No    Additional notes: PT STATES SHE HAS HAD TWO SYNCOPAL EPISODES, BOTH WITH LOSS OF CONSCIOUSNESS WITHIN THE PAST 11 DAYS. RIGHT BEFORE SHE LOSES CONSCIOUSNESS, PT FEELS SHAKY AND HAS BLANK STARES/TUNNEL VISION. THE FIRST EPISODE, PT WAS UNCONSCIOUS FOR APPROXIMATELY 2 MINS. THE SECOND EPISODE, PT WAS UNCONSCIOUS FOR APPROXIMATELY 5 MINS.    FOLLOWING THE EPISODES, WITHIN AN HOUR, PT HAS VOMITING, SEVERE DIARRHEA, FATIGUE, AND LETHARGY.    AMBULANCE WAS CALLED AFTER THE FIRST EPISODE, PT DECLINED TO GO TO THE ED. AFTER THE SECOND EPISODE, AMBULANCE DID TRANSPORT PT TO [facility] ED; ED NOTE AVAILABLE VIA [employer].    PT WAS INSTRUCTED TO REACH OUT TO HER CARDIOLOGIST, DR. ABBY ESPINOZA, AND NEUROLOGIST. PT HAS ALREADY CONTACTED DR. ESPINOZA'S OFFICE THIS MORNING. SHE DID SEE DR. ESPINOZA ON 5/21/25 IF OFFICE WOULD LIKE TO REVIEW OV NOTE.    **PER DIOGO, SENDING T/E TO BE REVIEWED BY DR. SEIPEL.    PLEASE REVIEW AND ADVISE.

## 2025-06-05 NOTE — TELEPHONE ENCOUNTER
Spoke to the patient advised she will need to get new referral these are new issues, cx her July apt

## 2025-06-06 RX ORDER — LISINOPRIL 20 MG/1
20 TABLET ORAL EVERY 12 HOURS SCHEDULED
Qty: 60 TABLET | Refills: 11 | Status: SHIPPED | OUTPATIENT
Start: 2025-06-06

## 2025-07-07 DIAGNOSIS — D50.9 IRON DEFICIENCY ANEMIA, UNSPECIFIED IRON DEFICIENCY ANEMIA TYPE: ICD-10-CM

## 2025-07-08 RX ORDER — FERROUS SULFATE 325(65) MG
1 TABLET ORAL
Qty: 90 TABLET | Refills: 11 | OUTPATIENT
Start: 2025-07-08

## 2025-07-10 RX ORDER — RIMEGEPANT SULFATE 75 MG/75MG
TABLET, ORALLY DISINTEGRATING ORAL
Qty: 16 TABLET | Refills: 11 | Status: SHIPPED | OUTPATIENT
Start: 2025-07-10

## 2025-07-10 NOTE — PROGRESS NOTES
HEMATOLOGY ONCOLOGY OUTPATIENT FOLLOW U{       Patient name: Merry Thornton  : 1970  MRN: [id number]  Primary Care Physician: No primary care provider on file.  Referring Physician: No ref. provider found  Reason For Consult:       History of Present Illness:  Patient is a 55 y.o. female who has been referred to [facility] heme-onc clinic for further evaluation and management for prior history of DVT.  She was previously being seen at [facility] [place] by Dr. Tom.  Pertinent outside records are not available at this time.  History was obtained from the patient and summarized as follows:    Patient reported having a DVT in right leg and Right Sided Pulmonary embolism in 2020. Patient reported that she was started on Lovenox for therapeutic anticoagulation and was later transitioned to Eliquis 2.5mg BID for 6 months thereafter.  She reported that her Eliquis dose was increased to 5mg BID thereafter by her cardiologist and is continued on the same.     She has underlying history of scleroderma and SLE, She is currently on Plaquenil and Nurtec for the same.  Per Chart review, she also has suspected IBD, She was previously being followed by [facility], but does not have a GI provider at this time.  Most recent EGD/colonoscopy was in 2023 and showed Superficial gastric erosion, Esophageal mucosal inflammation, Colonic inflammation (non specific)    3/11/24: Patient presents for initial consultation today. She reported having chronic allergies causing Upper respiratory symptoms and Sinus congestion. Has chronic fatigue and arthralgias, however no other new symptoms reported. No GI symptoms presently.    : Seen today for routine follow-up.  She continues to have chronic fatigue and arthralgias.  She completed IV Feraheme 510 mg x 2 weekly doses in 2024.  She reports mild improvement in fatigue symptoms although recently feels they are worsening again.  She continues  apixaban 5 mg twice daily with good compliance.  She denies any bleeding concerns.      Subjective:  7/14/25: had episode of loss of consciousness and fatigue. Being followed by Neurology and thought to be have Seizure disorder. On holter monitor now, has underlying history of Afib.  Having bruises on arms & legs. But denied any other GI// bleeding.   She is compliant with oral iron supplements and denied any side effects from it.  She has recently started hormone replacement therapy for postmenopausal symptoms which is being managed by her PCP.      Past Medical History:   Diagnosis Date    Allergic 01/01/2002    Asthma 01/01/1980    CAD (coronary artery disease) 12/20/2016    dr. singh    Carpal tunnel syndrome on right 03/08/2017    CHF (congestive heart failure) 01/01/2014    Cholelithiasis 01/01/2015    Cluster headache 2000    Common migraine 12/20/2016    COPD (chronic obstructive pulmonary disease) 12/20/2016    Cyst, ovarian 12/20/2016    mass    DDD (degenerative disc disease), cervical 03/11/2016    Deep vein thrombosis 09/05/2020    Dental caries 01/14/2019    Depression 11/02/2017    Depression, major, recurrent, moderate 08/25/2016    Dietary counseling 09/21/2017    Difficulty walking 1999    Dysphagia 05/2020    Elevated random blood glucose level 05/14/2020    Encounter for smoking cessation counseling 09/21/2017    Exacerbation of systemic lupus 04/30/2019    Fibromyalgia 12/20/2016    Fibromyalgia, primary 2001    Generalized anxiety disorder 03/11/2016    GERD (gastroesophageal reflux disease) 12/20/2016    Headache, tension-type 2014    Heartburn 11/02/2017    History of medical problems 01/01/2000    Hyperlipidemia 2017    Hypertension 03/16/2016    Hyperthyroidism 03/11/2016    Hypocalcemia 09/21/2017    IBS (irritable colon syndrome) 09/13/2016    Immobility syndrome 09/14/2017    Intracranial pressure increased 12/20/2016    Kidney stone 01/01/2014    Left knee pain 09/21/2017    Lower back  pain 02/01/2018    Lung nodules 09/07/2016    Memory loss 2017    Morbid obesity 11/02/2017    Muscle cramp 09/21/2017    Neck pain 01/14/2019    Need for prophylactic vaccination and inoculation against influenza 09/21/2017    Neurogenic bladder 12/20/2016    Obesity 03/07/2017    Obesity (BMI 30-39.9) 05/14/2020    Orthostatic hypotension 07/17/2017    Osteoarthritis 03/07/2017    Osteopenia 01/14/2019    Osteoporosis 12/20/2016    Other instability, right ankle 04/13/2017    Overlap syndrome     scleroderma, lupus, Raynaud's. Mixxed connective Tissue D/o    Pain, joint, ankle, left 09/21/2017    Palpitations 10/04/2017    Paresthesia 06/12/2017    facial    Peripheral neuropathy 12/20/2016    bilateral lower extremities    Poor vision     Prediabetes     Presence of pessary     Pulmonary embolism 09/05/2020    Raynaud's syndrome 12/20/2016    Retinopathy     Right knee pain 11/08/2016    Scleroderma 12/20/2016    Seasonal allergic rhinitis 12/20/2016    Seizures 2018    SLE (systemic lupus erythematosus) 03/16/2017    Sleep apnea 11/02/2017    BIPAP    Sleep disorder 01/14/2019    Sprain of unspecified site of right knee, subsequent encounter 12/01/2016    Status post placement of implantable loop recorder 05/09/2018    presence    Syncope and collapse 2020    still has occassionally    Ulcerative colitis 02/2020    Urine incontinence     Vasovagal syncope     Ventricular arrhythmia 03/16/2016    Visit for screening mammogram 12/20/2016    Vitamin D deficiency 01/14/2019    Wheezing        Past Surgical History:   Procedure Laterality Date    ADENOIDECTOMY  01/01/2010    ANKLE SURGERY Right 02/2017    BREAST EXCISIONAL BIOPSY Right     years ago    BRONCHOSCOPY      CARDIAC ABLATION  01/2000    CARDIAC CATHETERIZATION  01/01/2016    CARDIAC ELECTROPHYSIOLOGY PROCEDURE      CARPAL TUNNEL RELEASE  2017    CHOLECYSTECTOMY      COLON SURGERY      COLONOSCOPY N/A 02/26/2020    Procedure: COLONOSCOPY WITH BIOPSY X1  AREA;  Surgeon: Michael Cheng MD;  Location: [facility] ENDOSCOPY;  Service: Gastroenterology;  Laterality: N/A;  diarrhea    ECHO - CONVERTED  2020    ENDOSCOPY      ENDOSCOPY N/A 05/21/2020    Procedure: ESOPHAGOGASTRODUODENOSCOPY WITH DILATATION (#54, 60 bougie);  Surgeon: Michael Cheng MD;  Location: [facility] ENDOSCOPY;  Service: Gastroenterology;  Laterality: N/A;  Post: candidiasis, upper esophagus sphincter stricture    HYSTERECTOMY  01/01/1997    KNEE SURGERY Right 09/2017    OTHER SURGICAL HISTORY      Urerine hysterectomy    OTHER SURGICAL HISTORY      t and a    OTHER SURGICAL HISTORY      d&c    OTHER SURGICAL HISTORY      bladder stim    OTHER SURGICAL HISTORY      Loop recorder placement    SUBTOTAL HYSTERECTOMY  01/01/1997    TONSILLECTOMY  01/01/2010    TUBAL ABDOMINAL LIGATION  01/12/1995    WRIST SURGERY Right          Current Outpatient Medications:     albuterol sulfate  (90 Base) MCG/ACT inhaler, Inhale 2 puffs Every 4 (Four) Hours As Needed for Wheezing., Disp: 18 g, Rfl: 0    amLODIPine (NORVASC) 10 MG tablet, TAKE 1 TABLET BY MOUTH DAILY., Disp: 90 tablet, Rfl: 1    apixaban (ELIQUIS) 5 MG tablet tablet, Take 1 tablet by mouth Every 12 (Twelve) Hours., Disp: 180 tablet, Rfl: 3    Budeson-Glycopyrrol-Formoterol (Breztri Aerosphere) 160-9-4.8 MCG/ACT aerosol inhaler, Inhale 2 puffs 2 (Two) Times a Day., Disp: , Rfl:     cloNIDine (CATAPRES) 0.1 MG tablet, , Disp: , Rfl:     cyanocobalamin 1000 MCG/ML injection, 1000 mcg SQ daily x 7 days, then weekly x 4 weeks, then monthly thereafter, Disp: 15 mL, Rfl: 1    estradiol (VIVELLE-DOT) 0.025 MG/24HR patch, Place 1 patch on the skin as directed by provider 2 (Two) Times a Week. Place 1 patch on skin and replace every 3-4 days., Disp: 8 each, Rfl: 5    fluticasone (FLONASE) 50 MCG/ACT nasal spray, Administer 2 sprays into the nostril(s) as directed by provider Daily., Disp: , Rfl:     folic acid (FOLVITE) 1 MG tablet, Take 1 tablet by mouth  Daily., Disp: 30 tablet, Rfl: 11    furosemide (LASIX) 20 MG tablet, TAKE 2 TABS EVERY MORNING, 2 TABS EVERY EVENING AND MAY TAKE AN ADDITIONAL 1 TO 2 TABS MIDDAY AS NEEDED, Disp: 540 tablet, Rfl: 1    gabapentin (NEURONTIN) 800 MG tablet, Take 1 tablet by mouth 3 (Three) Times a Day., Disp: 120 tablet, Rfl: 5    HYDROcodone-acetaminophen (NORCO)  MG per tablet, Take 1 tablet by mouth Every 8 (Eight) Hours As Needed for Moderate Pain., Disp: , Rfl:     hydroxychloroquine (PLAQUENIL) 200 MG tablet, , Disp: , Rfl:     ipratropium (ATROVENT) 0.03 % nasal spray, Administer 2 sprays into the nostril(s) as directed by provider., Disp: , Rfl:     ipratropium-albuterol (DUO-NEB) 0.5-2.5 mg/3 ml nebulizer, Take 3 mL by nebulization 4 (Four) Times a Day., Disp: 360 mL, Rfl: 1    levocetirizine (XYZAL) 5 MG tablet, TAKE 1 TABLET BY MOUTH EACH EVENING, Disp: 30 tablet, Rfl: 10    levoFLOXacin (LEVAQUIN) 500 MG tablet, , Disp: , Rfl:     lidocaine (LIDODERM) 5 %, Place 1 patch on the skin as directed by provider Daily. Remove & Discard patch within 12 hours or as directed by MD, Disp: , Rfl:     linaclotide (Linzess) 145 MCG capsule capsule, Take 1 capsule by mouth Every Morning Before Breakfast., Disp: 30 capsule, Rfl: 2    lisinopril (PRINIVIL,ZESTRIL) 20 MG tablet, TAKE 1 TABLET BY MOUTH TWICE DAILY, Disp: 60 tablet, Rfl: 11    metoprolol tartrate (LOPRESSOR) 50 MG tablet, TAKE ONE TABLET BY MOUTH TWO TIMES A DAY, Disp: 180 tablet, Rfl: 3    montelukast (SINGULAIR) 10 MG tablet, Take 1 tablet by mouth Every Night., Disp: , Rfl:     Nurtec 75 MG dispersible tablet, DISSOLVE 1 TABLET BY MOUTH EVERY OTHER DAY, Disp: 16 tablet, Rfl: 11    omeprazole (priLOSEC) 40 MG capsule, TAKE 1 CAPSULE BY MOUTH 2 TIMES A DAY., Disp: 180 capsule, Rfl: 3    ondansetron ODT (ZOFRAN-ODT) 8 MG disintegrating tablet, Place 1 tablet on the tongue Every 8 (Eight) Hours As Needed for Nausea or Vomiting., Disp: 90 tablet, Rfl: 5    orphenadrine  "(NORFLEX) 100 MG 12 hr tablet, Take 1 tablet by mouth 3 (Three) Times a Day. As needed, Disp: , Rfl:     potassium chloride (K-DUR,KLOR-CON) 10 MEQ CR tablet, , Disp: , Rfl:     potassium chloride 10 MEQ CR tablet, Take 1 tablet by mouth 2 (Two) Times a Day., Disp: , Rfl:     predniSONE (DELTASONE) 10 MG tablet, TAKE 1 TABLET BY MOUTH DAILY., Disp: 30 tablet, Rfl: 5    Progesterone (Prometrium) 100 MG capsule, Take 1 capsule by mouth Every Night., Disp: 30 capsule, Rfl: 5    Semaglutide,0.25 or 0.5MG/DOS, (OZEMPIC) 2 MG/3ML solution pen-injector, Inject 0.5 mg under the skin into the appropriate area as directed Every 7 (Seven) Days., Disp: , Rfl:     sennosides-docusate (senna-docusate sodium) 8.6-50 MG per tablet, Take 1 tablet by mouth Daily., Disp: 90 tablet, Rfl: 1    theophylline (IVELISSE-24) 200 MG 24 hr capsule, Take 1 capsule by mouth Daily., Disp: , Rfl:     Tuberculin-Allergy Syringes (B-D ALLERGY SYRINGE 1CC/28G) 28G X 1/2\" 1 ML misc, To be used with B12 injections (1 time 7 days, then once a week x 4 weeks, then monthly thereafter), Disp: 30 each, Rfl: 1    vilazodone (VIIBRYD) 40 MG tablet tablet, Take 1 tablet by mouth every night at bedtime., Disp: 90 tablet, Rfl: 3    vitamin D (ERGOCALCIFEROL) 1.25 MG (64271 UT) capsule capsule, TAKE 1 CAPSULE BY MOUTH EVERY WEEK, Disp: 4 capsule, Rfl: 11    Allergies   Allergen Reactions    Adhesive Tape Other (See Comments)     Pt ok with paper tape    Abstracted from centricity    Alendronate Rash and Hives    Bupropion Shortness Of Breath    Morphine Other (See Comments), Rash and Swelling     Abstracted from centricity    Amoxicillin-Pot Clavulanate Diarrhea    Buspar [Buspirone] Other (See Comments)     Abstracted from centricity    Alendronate Sodium Other (See Comments)    Hydroxyzine Rash       Family History   Problem Relation Age of Onset    Cancer Mother     Migraines Mother     Anxiety disorder Mother     Arthritis Mother     Mental illness Mother     " Heart disease Father     Lung disease Father     Neuropathy Father     Alcohol abuse Father     Asthma Father     COPD Father     Diabetes Father     Hyperlipidemia Father     Vision loss Father     Ovarian cancer Sister     Breast cancer Sister     Diabetes Sister     Heart disease Sister     Hypertension Sister     Other Sister         weight disorder    Migraines Sister     Stroke Sister     Anxiety disorder Sister     Drug abuse Sister     Mental illness Sister     Migraines Sister     Neuropathy Brother     Neuropathy Brother     Seizures Brother         Epilepsy    Asthma Brother     Diabetes Brother     Thyroid disease Daughter        Cancer-related family history includes Breast cancer in her sister; Cancer in her mother; Ovarian cancer in her sister.      Social History     Tobacco Use    Smoking status: Every Day     Current packs/day: 0.75     Average packs/day: 0.8 packs/day for 41.2 years (30.9 ttl pk-yrs)     Types: Cigarettes     Start date: 5/1/1984    Smokeless tobacco: Former     Quit date: 5/11/2020    Tobacco comments:     In the process of trying to quit   Vaping Use    Vaping status: Never Used   Substance Use Topics    Alcohol use: No    Drug use: Not Currently     Frequency: 1.0 times per week     Types: Marijuana     Comment: stopped 2 months ago     Social History     Social History Narrative    Not on file       ROS:   Review of Systems   Constitutional:  Positive for fatigue.   HENT: Negative.     Eyes: Negative.    Respiratory: Negative.     Cardiovascular: Negative.    Gastrointestinal: Negative.    Genitourinary: Negative.    Musculoskeletal:  Positive for arthralgias.   Neurological: Negative.    Hematological: Negative.    Psychiatric/Behavioral: Negative.           Objective:    Vital Signs:  There were no vitals filed for this visit.        There is no height or weight on file to calculate BMI.    ECOG  (1) Restricted in physically strenuous activity, ambulatory and able to do work  of light nature    Physical Exam:   Physical Exam  Vitals reviewed.   Constitutional:       Appearance: Normal appearance. She is not toxic-appearing.   HENT:      Head: Normocephalic and atraumatic.   Eyes:      Pupils: Pupils are equal, round, and reactive to light.   Cardiovascular:      Rate and Rhythm: Normal rate and regular rhythm.      Pulses: Normal pulses.      Heart sounds: No murmur heard.  Pulmonary:      Effort: Pulmonary effort is normal.      Breath sounds: Normal breath sounds. No wheezing.   Abdominal:      General: There is no distension.      Palpations: Abdomen is soft. There is no mass.      Tenderness: There is no abdominal tenderness.   Musculoskeletal:         General: No swelling. Normal range of motion.      Cervical back: Normal range of motion and neck supple.   Skin:     General: Skin is warm.      Coloration: Skin is not jaundiced or pale.      Findings: No bruising, erythema, lesion or rash.   Neurological:      General: No focal deficit present.      Mental Status: She is alert and oriented to person, place, and time.   Psychiatric:         Mood and Affect: Mood normal.         Behavior: Behavior normal.         Thought Content: Thought content normal.         Judgment: Judgment normal.         Lab Results - Last 18 Months   Lab Units 06/03/25  1754 01/13/25  1449 12/19/24  1318   WBC x10(3)/mcL 11.7* 10.27 9   HEMOGLOBIN g/dL 15.3 15.3 14.5   HEMATOCRIT % 45 47.0* 44.1   PLATELETS x10(3)/mcL 168 172 169   MCV fL 90.5 95.9 94.4     Lab Results - Last 18 Months   Lab Units 04/14/25  1714 04/25/24  1603 03/27/24  1710 02/16/24  1150   SODIUM mmol/L 142  --   --  142   POTASSIUM mmol/L 4.4  --   --  3.7   CHLORIDE mmol/L 102  --   --  104   CO2 mmol/L 27.8  --   --  25.6   BUN mg/dL 9  --   --  9   CREATININE mg/dL 0.74  --  0.60 0.69   CALCIUM mg/dL 9.3  --   --  9.2   BILIRUBIN mg/dL 0.3  --   --  0.2   ALK PHOS U/L 95  --   --  87   ALT (SGPT) U/L 12 10  --  8   AST (SGOT) U/L 14 13   "--  13   GLUCOSE mg/dL 87  --   --  102*       Lab Results   Component Value Date    GLUCOSE 87 04/14/2025    BUN 9 04/14/2025    CREATININE 0.74 04/14/2025    EGFRIFNONA 83 02/02/2021    EGFRIFAFRI >60 06/01/2022    BCR 12.2 04/14/2025    K 4.4 04/14/2025    CO2 27.8 04/14/2025    CALCIUM 9.3 04/14/2025    ALBUMIN 3.4 04/14/2025    ALBUMIN 4.2 04/14/2025    AST 14 04/14/2025    ALT 12 04/14/2025       Lab Results - Last 18 Months   Lab Units 04/01/24  0832   INR  0.99   APTT seconds 26.6       Lab Results   Component Value Date    IRON 65 01/13/2025    TIBC 343 01/13/2025    FERRITIN 275.00 (H) 01/13/2025       Lab Results   Component Value Date    FOLATE 3.64 (L) 04/14/2025       No results found for: \"OCCULTBLD\"    No results found for: \"RETICCTPCT\"  Lab Results   Component Value Date    AODJNDTT49 290 04/14/2025     Lab Results   Component Value Date    SPEP Comment 04/14/2025     LDH   Date Value Ref Range Status   05/17/2020 313 (H) 135 - 214 U/L Final     Comment:     Specimen hemolyzed.  Results may be affected.     Lab Results   Component Value Date    ROBBY POSITIVE (A) 03/11/2017    SEDRATE 11 02/01/2018     Lab Results   Component Value Date    FIBRINOGEN 339 05/17/2020     Lab Results   Component Value Date    PTT 26.6 04/01/2024    INR 0.99 04/01/2024     No results found for: \"\"  No results found for: \"CEA\"  No components found for: \"CA-19-9\"  No results found for: \"PSA\"  Lab Results   Component Value Date    SEDRATE 11 02/01/2018          Assessment & Plan     Right Lower Extremity DVT and Right sided PE:  -Outside hematology records not available at this time. History obtained from patient and review of other medical records.  -Patient appears to have had unprovoked DVT and PE.  Prior hypercoagulable workup not available, however other records suggestive of possible protein C deficiency.  -Discussed with the patient that timing of hypercoagulable workup can be important following diagnosis of VTE " and protein S levels can be falsely low if checked immediately after DVT diagnosis.  -Regardless, in view of patient's history of autoimmune disorders she is relatively high risk for recurrent DVT and PE and it is appropriate in my view to continue with indefinite anticoagulation.  No indication for repeat thrombophilia workup at this time.  -Patient is currently on Eliquis 5 Mg twice daily.  Recommend continuation of the same.  Refills given today.    Iron Deficiency anemia:  Iron panel and ferritin consistent with DACIA, despite patient being on oral iron for 3 months.  She reported intermittent compliance with oral iron so far,   Counseled on daily compliance.   Patient was scheduled for IV iron with ferumoxytol 510 mg x 2 weekly doses  July 2024.  - 7/14/2025: Hemoglobin 14.0.  Iron panel not consistent with iron deficiency anemia.  - Patient currently on oral iron supplementation.  Can continue oral iron    Fatigue:  - His ongoing symptoms.  Iron panel and thyroid levels are WNL.  If normal, recommend follow-up with her primary care provider for further evaluation.    Hormone replacement therapy: She has been started on hormone replacement therapy with the surgeon and progesterone supplements for suspected postmenopausal symptoms.  her history of DVT and PE would be a relative contraindication for HRT.  This was explained to the patient.  -Advised to continue surveillance for breast cancer.    Will continue to monitor CBC and D-dimer going forward  Follow with primary care provider for routine medical management and age-appropriate screenings    Follow-up in 6 months with repeat labs, sooner if condition indicates        Thank you very much for providing the opportunity to participate in this patient’s care. Please do not hesitate to call if there are any other questions.

## 2025-07-14 ENCOUNTER — LAB (OUTPATIENT)
Dept: LAB | Facility: HOSPITAL | Age: 55
End: 2025-07-14
Payer: MEDICARE

## 2025-07-14 ENCOUNTER — OFFICE VISIT (OUTPATIENT)
Dept: ONCOLOGY | Facility: CLINIC | Age: 55
End: 2025-07-14
Payer: MEDICARE

## 2025-07-14 VITALS
WEIGHT: 163.2 LBS | OXYGEN SATURATION: 96 % | BODY MASS INDEX: 27.19 KG/M2 | DIASTOLIC BLOOD PRESSURE: 95 MMHG | SYSTOLIC BLOOD PRESSURE: 134 MMHG | HEART RATE: 83 BPM | HEIGHT: 65 IN

## 2025-07-14 DIAGNOSIS — Z79.01 CURRENT LONG-TERM USE OF ANTICOAGULANT MEDICATION WITH HISTORY OF DEEP VENOUS THROMBOSIS (DVT): ICD-10-CM

## 2025-07-14 DIAGNOSIS — Z86.718 CURRENT LONG-TERM USE OF ANTICOAGULANT MEDICATION WITH HISTORY OF DEEP VENOUS THROMBOSIS (DVT): ICD-10-CM

## 2025-07-14 DIAGNOSIS — I82.4Y1 ACUTE DEEP VEIN THROMBOSIS (DVT) OF PROXIMAL VEIN OF RIGHT LOWER EXTREMITY: Primary | ICD-10-CM

## 2025-07-14 DIAGNOSIS — D50.0 IRON DEFICIENCY ANEMIA DUE TO CHRONIC BLOOD LOSS: ICD-10-CM

## 2025-07-14 DIAGNOSIS — D50.9 IRON DEFICIENCY ANEMIA, UNSPECIFIED IRON DEFICIENCY ANEMIA TYPE: ICD-10-CM

## 2025-07-14 DIAGNOSIS — R53.83 OTHER FATIGUE: Primary | ICD-10-CM

## 2025-07-14 DIAGNOSIS — I82.4Y1 ACUTE DEEP VEIN THROMBOSIS (DVT) OF PROXIMAL VEIN OF RIGHT LOWER EXTREMITY: ICD-10-CM

## 2025-07-14 LAB
ALBUMIN SERPL-MCNC: 3.9 G/DL (ref 3.5–5.2)
ALBUMIN/GLOB SERPL: 1.9 G/DL
ALP SERPL-CCNC: 72 U/L (ref 39–117)
ALT SERPL W P-5'-P-CCNC: <5 U/L (ref 1–33)
ANION GAP SERPL CALCULATED.3IONS-SCNC: 9.7 MMOL/L (ref 5–15)
AST SERPL-CCNC: 14 U/L (ref 1–32)
BASOPHILS # BLD AUTO: 0.01 10*3/MM3 (ref 0–0.2)
BASOPHILS NFR BLD AUTO: 0.1 % (ref 0–1.5)
BILIRUB SERPL-MCNC: 0.2 MG/DL (ref 0–1.2)
BUN SERPL-MCNC: 7.1 MG/DL (ref 6–20)
BUN/CREAT SERPL: 10.8 (ref 7–25)
CALCIUM SPEC-SCNC: 8.9 MG/DL (ref 8.6–10.5)
CHLORIDE SERPL-SCNC: 106 MMOL/L (ref 98–107)
CO2 SERPL-SCNC: 26.3 MMOL/L (ref 22–29)
CREAT SERPL-MCNC: 0.66 MG/DL (ref 0.57–1)
D DIMER PPP FEU-MCNC: <0.27 MCGFEU/ML (ref 0–0.55)
DEPRECATED RDW RBC AUTO: 50.2 FL (ref 37–54)
EGFRCR SERPLBLD CKD-EPI 2021: 103.7 ML/MIN/1.73
EOSINOPHIL # BLD AUTO: 0.11 10*3/MM3 (ref 0–0.4)
EOSINOPHIL NFR BLD AUTO: 1.2 % (ref 0.3–6.2)
ERYTHROCYTE [DISTWIDTH] IN BLOOD BY AUTOMATED COUNT: 14.5 % (ref 12.3–15.4)
FERRITIN SERPL-MCNC: 213 NG/ML (ref 13–150)
GLOBULIN UR ELPH-MCNC: 2.1 GM/DL
GLUCOSE SERPL-MCNC: 92 MG/DL (ref 65–99)
HCT VFR BLD AUTO: 42.3 % (ref 34–46.6)
HGB BLD-MCNC: 14 G/DL (ref 12–15.9)
HOLD SPECIMEN: NORMAL
IMM GRANULOCYTES # BLD AUTO: 0.02 10*3/MM3 (ref 0–0.05)
IMM GRANULOCYTES NFR BLD AUTO: 0.2 % (ref 0–0.5)
IRON 24H UR-MRATE: 39 MCG/DL (ref 37–145)
IRON SATN MFR SERPL: 13 % (ref 20–50)
LYMPHOCYTES # BLD AUTO: 1.94 10*3/MM3 (ref 0.7–3.1)
LYMPHOCYTES NFR BLD AUTO: 21.9 % (ref 19.6–45.3)
MCH RBC QN AUTO: 31.1 PG (ref 26.6–33)
MCHC RBC AUTO-ENTMCNC: 33.1 G/DL (ref 31.5–35.7)
MCV RBC AUTO: 94 FL (ref 79–97)
MONOCYTES # BLD AUTO: 0.64 10*3/MM3 (ref 0.1–0.9)
MONOCYTES NFR BLD AUTO: 7.2 % (ref 5–12)
NEUTROPHILS NFR BLD AUTO: 6.14 10*3/MM3 (ref 1.7–7)
NEUTROPHILS NFR BLD AUTO: 69.4 % (ref 42.7–76)
PLATELET # BLD AUTO: 161 10*3/MM3 (ref 140–450)
PMV BLD AUTO: 10.8 FL (ref 6–12)
POTASSIUM SERPL-SCNC: 3.8 MMOL/L (ref 3.5–5.2)
PROT SERPL-MCNC: 6 G/DL (ref 6–8.5)
RBC # BLD AUTO: 4.5 10*6/MM3 (ref 3.77–5.28)
SODIUM SERPL-SCNC: 142 MMOL/L (ref 136–145)
TIBC SERPL-MCNC: 292 MCG/DL (ref 298–536)
TRANSFERRIN SERPL-MCNC: 196 MG/DL (ref 200–360)
WBC NRBC COR # BLD AUTO: 8.86 10*3/MM3 (ref 3.4–10.8)

## 2025-07-14 PROCEDURE — 82728 ASSAY OF FERRITIN: CPT | Performed by: PHYSICIAN ASSISTANT

## 2025-07-14 PROCEDURE — 80053 COMPREHEN METABOLIC PANEL: CPT | Performed by: PHYSICIAN ASSISTANT

## 2025-07-14 PROCEDURE — 84466 ASSAY OF TRANSFERRIN: CPT | Performed by: PHYSICIAN ASSISTANT

## 2025-07-14 PROCEDURE — 36415 COLL VENOUS BLD VENIPUNCTURE: CPT

## 2025-07-14 PROCEDURE — 3080F DIAST BP >= 90 MM HG: CPT | Performed by: STUDENT IN AN ORGANIZED HEALTH CARE EDUCATION/TRAINING PROGRAM

## 2025-07-14 PROCEDURE — 99214 OFFICE O/P EST MOD 30 MIN: CPT | Performed by: STUDENT IN AN ORGANIZED HEALTH CARE EDUCATION/TRAINING PROGRAM

## 2025-07-14 PROCEDURE — 85025 COMPLETE CBC W/AUTO DIFF WBC: CPT

## 2025-07-14 PROCEDURE — 83540 ASSAY OF IRON: CPT | Performed by: PHYSICIAN ASSISTANT

## 2025-07-14 PROCEDURE — 85379 FIBRIN DEGRADATION QUANT: CPT | Performed by: PHYSICIAN ASSISTANT

## 2025-07-14 PROCEDURE — 3075F SYST BP GE 130 - 139MM HG: CPT | Performed by: STUDENT IN AN ORGANIZED HEALTH CARE EDUCATION/TRAINING PROGRAM

## 2025-07-14 PROCEDURE — 1126F AMNT PAIN NOTED NONE PRSNT: CPT | Performed by: STUDENT IN AN ORGANIZED HEALTH CARE EDUCATION/TRAINING PROGRAM

## 2025-07-14 RX ORDER — FERROUS SULFATE 325(65) MG
1 TABLET ORAL
COMMUNITY

## 2025-07-14 RX ORDER — BUDESONIDE 3 MG/1
CAPSULE, COATED PELLETS ORAL
COMMUNITY
End: 2025-07-14

## 2025-07-14 RX ORDER — DULAGLUTIDE 0.75 MG/.5ML
INJECTION, SOLUTION SUBCUTANEOUS
COMMUNITY
End: 2025-07-14

## 2025-07-14 RX ORDER — ALENDRONATE SODIUM 70 MG/1
TABLET ORAL
COMMUNITY
End: 2025-07-14

## 2025-07-14 RX ORDER — METOCLOPRAMIDE 10 MG/1
10 TABLET ORAL
COMMUNITY

## 2025-08-07 ENCOUNTER — OFFICE VISIT (OUTPATIENT)
Dept: NEUROLOGY | Facility: CLINIC | Age: 55
End: 2025-08-07
Payer: MEDICARE

## 2025-08-07 VITALS
DIASTOLIC BLOOD PRESSURE: 89 MMHG | HEIGHT: 65 IN | HEART RATE: 73 BPM | WEIGHT: 162.6 LBS | BODY MASS INDEX: 27.09 KG/M2 | SYSTOLIC BLOOD PRESSURE: 136 MMHG

## 2025-08-07 DIAGNOSIS — R55 SYNCOPE AND COLLAPSE: Primary | ICD-10-CM

## 2025-08-07 DIAGNOSIS — G43.919 INTRACTABLE MIGRAINE WITHOUT STATUS MIGRAINOSUS, UNSPECIFIED MIGRAINE TYPE: ICD-10-CM

## 2025-08-07 DIAGNOSIS — G62.9 NEUROPATHY: ICD-10-CM

## 2025-08-07 RX ORDER — DICYCLOMINE HCL 20 MG
20 TABLET ORAL EVERY 6 HOURS
COMMUNITY

## 2025-08-18 DIAGNOSIS — F33.1 MAJOR DEPRESSIVE DISORDER, RECURRENT EPISODE, MODERATE: Chronic | ICD-10-CM

## 2025-08-19 RX ORDER — VILAZODONE HYDROCHLORIDE 40 MG/1
40 TABLET ORAL
Qty: 90 TABLET | Refills: 0 | Status: SHIPPED | OUTPATIENT
Start: 2025-08-19

## 2025-08-25 ENCOUNTER — TELEPHONE (OUTPATIENT)
Dept: NEUROLOGY | Facility: CLINIC | Age: 55
End: 2025-08-25
Payer: MEDICARE

## (undated) DEVICE — PAPR PRNT PK SONY W RIBN UPC55

## (undated) DEVICE — BITEBLOCK ENDO W/STRAP 60F A/ LF DISP

## (undated) DEVICE — SINGLE-USE BIOPSY FORCEPS: Brand: RADIAL JAW 4

## (undated) DEVICE — PK ENDO GI 50